# Patient Record
Sex: MALE | Race: WHITE | NOT HISPANIC OR LATINO | Employment: OTHER | URBAN - METROPOLITAN AREA
[De-identification: names, ages, dates, MRNs, and addresses within clinical notes are randomized per-mention and may not be internally consistent; named-entity substitution may affect disease eponyms.]

---

## 2017-02-15 ENCOUNTER — HISTORICAL (OUTPATIENT)
Dept: ADMINISTRATIVE | Facility: HOSPITAL | Age: 77
End: 2017-02-15

## 2017-02-15 LAB
ALBUMIN SERPL-MCNC: 4.3 G/DL (ref 3.1–4.7)
ALP SERPL-CCNC: 79 IU/L (ref 40–104)
ALT (SGPT): 21 IU/L (ref 3–33)
AST SERPL-CCNC: 28 IU/L (ref 10–40)
BILIRUB SERPL-MCNC: 1.2 MG/DL (ref 0.3–1)
BUN SERPL-MCNC: 19 MG/DL (ref 8–20)
CALCIUM SERPL-MCNC: 9.4 MG/DL (ref 7.7–10.4)
CHLORIDE: 101 MMOL/L (ref 98–110)
CO2 SERPL-SCNC: 25.5 MMOL/L (ref 22.8–31.6)
CREATININE RANDOM URINE: 110 MG/DL
CREATININE: 1.23 MG/DL (ref 0.6–1.4)
GLUCOSE: 105 MG/DL (ref 70–99)
MICROALBUM.,U,RANDOM: <2 MCG/ML (ref 0–19.9)
MICROALBUMIN/CREATININE RATIO: NORMAL (ref 0–30)
POTASSIUM SERPL-SCNC: 3.9 MMOL/L (ref 3.5–5)
PROT SERPL-MCNC: 7.8 G/DL (ref 6–8.2)
SODIUM: 138 MMOL/L (ref 134–144)

## 2017-03-20 ENCOUNTER — OFFICE VISIT (OUTPATIENT)
Dept: SURGERY | Facility: CLINIC | Age: 77
End: 2017-03-20
Payer: MEDICARE

## 2017-03-20 VITALS
DIASTOLIC BLOOD PRESSURE: 62 MMHG | BODY MASS INDEX: 33.96 KG/M2 | HEART RATE: 67 BPM | HEIGHT: 70 IN | WEIGHT: 237.19 LBS | SYSTOLIC BLOOD PRESSURE: 135 MMHG

## 2017-03-20 DIAGNOSIS — K40.90 UNILATERAL INGUINAL HERNIA WITHOUT OBSTRUCTION OR GANGRENE, RECURRENCE NOT SPECIFIED: Primary | ICD-10-CM

## 2017-03-20 PROCEDURE — 99203 OFFICE O/P NEW LOW 30 MIN: CPT | Mod: PBBFAC,PO | Performed by: SURGERY

## 2017-03-20 PROCEDURE — 99204 OFFICE O/P NEW MOD 45 MIN: CPT | Mod: S$PBB,,, | Performed by: SURGERY

## 2017-03-20 PROCEDURE — 99999 PR PBB SHADOW E&M-NEW PATIENT-LVL III: CPT | Mod: PBBFAC,,, | Performed by: SURGERY

## 2017-03-20 RX ORDER — OMEPRAZOLE 40 MG/1
40 CAPSULE, DELAYED RELEASE ORAL DAILY
COMMUNITY
End: 2018-05-28

## 2017-03-20 RX ORDER — ASPIRIN 81 MG/1
81 TABLET ORAL DAILY
COMMUNITY
End: 2024-01-18 | Stop reason: SDUPTHER

## 2017-03-20 RX ORDER — ASCORBIC ACID 500 MG
500 TABLET ORAL DAILY
COMMUNITY

## 2017-03-20 RX ORDER — DOXYCYCLINE 40 MG/1
40 CAPSULE ORAL DAILY
Refills: 3 | COMMUNITY
Start: 2017-02-13 | End: 2021-10-15

## 2017-03-20 RX ORDER — AMOXICILLIN 500 MG
2 CAPSULE ORAL DAILY
COMMUNITY
End: 2024-01-18 | Stop reason: SDUPTHER

## 2017-03-20 RX ORDER — LEVOTHYROXINE SODIUM 200 UG/1
200 TABLET ORAL DAILY
COMMUNITY
End: 2018-05-16 | Stop reason: SDUPTHER

## 2017-03-20 RX ORDER — MULTIVITAMIN
1 TABLET ORAL DAILY
COMMUNITY

## 2017-03-20 RX ORDER — PRAVASTATIN SODIUM 20 MG/1
20 TABLET ORAL DAILY
COMMUNITY
End: 2021-01-24

## 2017-03-20 RX ORDER — LOSARTAN POTASSIUM AND HYDROCHLOROTHIAZIDE 12.5; 1 MG/1; MG/1
1 TABLET ORAL DAILY
COMMUNITY
End: 2017-07-31

## 2017-03-20 NOTE — PROGRESS NOTES
Subjective:       Patient ID: Nikolay Barraza is a 76 y.o. male.    Chief Complaint: Consult (hernia right groin, has had it for years but seems to be getting worse)    HPI Comments: Patient presents with Right Inguinal Hernia    Patient presents for evaluation of  right inguinal hernia. Symptoms were first noted 12 years ago. Symptoms did not start at work. Pain is dull, intermittent, worse when standing. Lump is reducible. Pt has no symptoms of  chronic constipation, chronic cough, difficulty urinating. Pt. does not have previous history of groin surgery.      Review of Systems   Constitutional: Negative for activity change, appetite change, chills, fatigue, fever and unexpected weight change.   HENT: Negative for congestion, dental problem, hearing loss, nosebleeds, sinus pressure, sore throat and voice change.    Eyes: Negative for pain and visual disturbance.   Respiratory: Negative for cough, chest tightness and shortness of breath.    Cardiovascular: Negative for chest pain, palpitations and leg swelling.   Gastrointestinal: Negative for abdominal distention, abdominal pain, constipation, diarrhea, nausea and vomiting.   Endocrine: Negative for cold intolerance and heat intolerance.   Genitourinary: Negative for difficulty urinating, flank pain, frequency and hematuria.   Musculoskeletal: Negative for arthralgias, back pain, gait problem, joint swelling, myalgias, neck pain and neck stiffness.   Skin: Negative for rash.   Allergic/Immunologic: Negative for immunocompromised state.   Neurological: Negative for dizziness, tremors, seizures, syncope, weakness, light-headedness, numbness and headaches.   Hematological: Negative for adenopathy. Does not bruise/bleed easily.   Psychiatric/Behavioral: Negative for confusion, decreased concentration, hallucinations, sleep disturbance and suicidal ideas. The patient is not nervous/anxious.        Objective:      Physical Exam   Constitutional: He is oriented to  person, place, and time. He appears well-developed and well-nourished.   HENT:   Head: Normocephalic and atraumatic.   Right Ear: External ear normal.   Left Ear: External ear normal.   Eyes: Conjunctivae are normal. Pupils are equal, round, and reactive to light.   Neck: Normal range of motion.   Cardiovascular: Normal rate and regular rhythm.    Pulmonary/Chest: Effort normal. No respiratory distress. He exhibits no tenderness.   Abdominal: Soft. He exhibits no distension and no mass. There is no hepatosplenomegaly. There is no tenderness. There is no guarding. A hernia is present. Hernia confirmed positive in the right inguinal area (extending into scrotum, reducible). Hernia confirmed negative in the left inguinal area.   Musculoskeletal: He exhibits no edema or tenderness.   Neurological: He is alert and oriented to person, place, and time. He has normal reflexes. No cranial nerve deficit.   Skin: Skin is warm and dry. No rash noted. No erythema. No pallor.   Psychiatric: He has a normal mood and affect. His behavior is normal. Judgment and thought content normal.       Assessment:       1. Unilateral inguinal hernia without obstruction or gangrene, recurrence not specified        Plan:       Discussion had with patient regarding etiology and treatment of inguinal hernias.  Both laparoscopic/robotic and open repair discussed with patient including risks of both.   He would like to wait till after he plants his garden to schedule surgery.  WCB to schedule.

## 2017-04-18 ENCOUNTER — OFFICE VISIT (OUTPATIENT)
Dept: SURGERY | Facility: CLINIC | Age: 77
End: 2017-04-18
Payer: MEDICARE

## 2017-04-18 VITALS
BODY MASS INDEX: 34.18 KG/M2 | DIASTOLIC BLOOD PRESSURE: 61 MMHG | WEIGHT: 238.75 LBS | HEIGHT: 70 IN | SYSTOLIC BLOOD PRESSURE: 128 MMHG | HEART RATE: 66 BPM

## 2017-04-18 DIAGNOSIS — K40.90 UNILATERAL INGUINAL HERNIA WITHOUT OBSTRUCTION OR GANGRENE, RECURRENCE NOT SPECIFIED: Primary | ICD-10-CM

## 2017-04-18 PROCEDURE — 99999 PR PBB SHADOW E&M-EST. PATIENT-LVL II: CPT | Mod: PBBFAC,,, | Performed by: SURGERY

## 2017-04-18 PROCEDURE — 99212 OFFICE O/P EST SF 10 MIN: CPT | Mod: PBBFAC,PO | Performed by: SURGERY

## 2017-04-18 PROCEDURE — 99213 OFFICE O/P EST LOW 20 MIN: CPT | Mod: S$PBB,,, | Performed by: SURGERY

## 2017-04-18 NOTE — PROGRESS NOTES
Subjective:       Patient ID: Nikolay Barraza is a 76 y.o. male.    Chief Complaint: Follow-up (re-evaluate inguinal hernia, swells more now aneudy after being on his feet a lot)    HPI Comments: Patient presents for re-evaluation of Right Inguinal Hernia    Patient presents for evaluation of  right inguinal hernia. Symptoms were first noted 12 years ago. Symptoms did not start at work. Pain is dull, intermittent, worse when standing. Lump is reducible. Pt has no symptoms of  chronic constipation, chronic cough, difficulty urinating. Pt. does not have previous history of groin surgery.    I saw him a month ago and he wanted to wait till his garden was planted.  He noticed it getting larger and wants to discuss repair.           Review of Systems   Constitutional: Negative for activity change, appetite change, chills, fatigue, fever and unexpected weight change.   HENT: Negative for congestion, dental problem, hearing loss, nosebleeds, sinus pressure, sore throat and voice change.    Eyes: Negative for pain and visual disturbance.   Respiratory: Negative for cough, chest tightness and shortness of breath.    Cardiovascular: Negative for chest pain, palpitations and leg swelling.   Gastrointestinal: Negative for abdominal distention, abdominal pain, constipation, diarrhea, nausea and vomiting.   Endocrine: Negative for cold intolerance and heat intolerance.   Genitourinary: Negative for difficulty urinating, flank pain, frequency and hematuria.   Musculoskeletal: Negative for arthralgias, back pain, gait problem, joint swelling, myalgias, neck pain and neck stiffness.   Skin: Negative for rash.   Allergic/Immunologic: Negative for immunocompromised state.   Neurological: Negative for dizziness, tremors, seizures, syncope, weakness, light-headedness, numbness and headaches.   Hematological: Negative for adenopathy. Does not bruise/bleed easily.   Psychiatric/Behavioral: Negative for confusion, decreased concentration,  hallucinations, sleep disturbance and suicidal ideas. The patient is not nervous/anxious.        Objective:      Physical Exam   Constitutional: He is oriented to person, place, and time. He appears well-developed and well-nourished.   HENT:   Head: Normocephalic and atraumatic.   Right Ear: External ear normal.   Left Ear: External ear normal.   Eyes: Conjunctivae are normal. Pupils are equal, round, and reactive to light.   Neck: Normal range of motion.   Cardiovascular: Normal rate and regular rhythm.    Pulmonary/Chest: Effort normal. No respiratory distress. He exhibits no tenderness.   Abdominal: Soft. He exhibits no distension and no mass. There is no hepatosplenomegaly. There is no tenderness. There is no guarding. A hernia is present. Hernia confirmed positive in the right inguinal area (extending into scrotum, reducible). Hernia confirmed negative in the left inguinal area.   Musculoskeletal: He exhibits no edema or tenderness.   Neurological: He is alert and oriented to person, place, and time. He has normal reflexes. No cranial nerve deficit.   Skin: Skin is warm and dry. No rash noted. No erythema. No pallor.   Psychiatric: He has a normal mood and affect. His behavior is normal. Judgment and thought content normal.       Assessment:       1. Unilateral inguinal hernia without obstruction or gangrene, recurrence not specified        Plan:       Discussion had with patient regarding etiology and treatment of inguinal hernias.  Both laparoscopic/robotic and open repair discussed with patient including risks of both.   As this is a large scrotal hernia, recommend open repair.  Since that is the case, he will wait till the fall to schedule.  He will call then.

## 2017-05-17 ENCOUNTER — OFFICE VISIT (OUTPATIENT)
Dept: FAMILY MEDICINE | Facility: CLINIC | Age: 77
End: 2017-05-17
Payer: MEDICARE

## 2017-05-17 VITALS
HEIGHT: 70 IN | BODY MASS INDEX: 34.07 KG/M2 | SYSTOLIC BLOOD PRESSURE: 115 MMHG | HEART RATE: 67 BPM | WEIGHT: 238 LBS | DIASTOLIC BLOOD PRESSURE: 61 MMHG

## 2017-05-17 DIAGNOSIS — H54.7 VISUAL PROBLEMS: Primary | ICD-10-CM

## 2017-05-17 PROBLEM — L71.9 ACNE ROSACEA: Status: ACTIVE | Noted: 2017-05-17

## 2017-05-17 PROBLEM — I10 BENIGN ESSENTIAL HYPERTENSION: Status: ACTIVE | Noted: 2017-05-17

## 2017-05-17 PROBLEM — E03.9 HYPOACTIVE THYROID: Status: ACTIVE | Noted: 2017-05-17

## 2017-05-17 PROBLEM — E78.5 HYPERLIPIDEMIA: Status: ACTIVE | Noted: 2017-05-17

## 2017-05-17 PROBLEM — K21.9 GASTROESOPHAGEAL REFLUX DISEASE WITHOUT ESOPHAGITIS: Status: ACTIVE | Noted: 2017-05-17

## 2017-05-17 PROBLEM — N40.0 BENIGN PROSTATIC HYPERTROPHY WITHOUT URINARY OBSTRUCTION: Status: ACTIVE | Noted: 2017-05-17

## 2017-05-17 PROCEDURE — 99213 OFFICE O/P EST LOW 20 MIN: CPT | Mod: ,,, | Performed by: INTERNAL MEDICINE

## 2017-05-17 NOTE — PATIENT INSTRUCTIONS
Understanding Vision Problems    If your eyes are normal  Your eyes see objects as light. Your cornea focuses light rays onto a layer that lines the back of your eye (the retina). If your eyes are normal, this process produces a focused image on your retina. This makes objects look clear.    If youre nearsighted  Your cornea and your retina are too far apart if youre nearsighted. Sometimes your cornea or your lens has an abnormal shape. This makes light rays from distant objects focus in front of your retina. These objects then look blurry.    If youre farsighted  Your cornea and your retina are too close together if youre farsighted. Sometimes your cornea or your lens has an abnormal shape. This makes light rays from close objects focus behind your retina. These objects then look blurry.    If you have an astigmatism  Sometimes the curve of your cornea is uneven or the lens inside your eye is curved. Then light rays cant focus evenly onto your retina. This is called an astigmatism. It makes both close and distant objects look blurry.  Date Last Reviewed: 6/11/2015 © 2000-2016 [employer]. [address] [place] [address]. Todos los derechos reservados. Esta información no pretende sustituir la atención médica profesional. Sólo zepeda médico puede diagnosticar y tratar un problema de ankit.        Understanding Vision Problems    If your eyes are normal  Your eyes see objects as light. Your cornea focuses light rays onto a layer that lines the back of your eye (the retina). If your eyes are normal, this process produces a focused image on your retina. This makes objects look clear.    If youre nearsighted  Your cornea and your retina are too far apart if youre nearsighted. Sometimes your cornea or your lens has an abnormal shape. This makes light rays from distant objects focus in front of your retina. These objects then look blurry.    If youre farsighted  Your cornea and your retina  are too close together if youre farsighted. Sometimes your cornea or your lens has an abnormal shape. This makes light rays from close objects focus behind your retina. These objects then look blurry.    If you have an astigmatism  Sometimes the curve of your cornea is uneven or the lens inside your eye is curved. Then light rays cant focus evenly onto your retina. This is called an astigmatism. It makes both close and distant objects look blurry.  Date Last Reviewed: 6/11/2015 © 2000-2016 [employer]. [address] [place] [address]. Todos los [facility]. Esta información no pretende sustituir la atención médica profesional. Sólo zepeda médico puede diagnosticar y tratar un problema de ankit.        Understanding Vision Problems    If your eyes are normal  Your eyes see objects as light. Your cornea focuses light rays onto a layer that lines the back of your eye (the retina). If your eyes are normal, this process produces a focused image on your retina. This makes objects look clear.    If youre nearsighted  Your cornea and your retina are too far apart if youre nearsighted. Sometimes your cornea or your lens has an abnormal shape. This makes light rays from distant objects focus in front of your retina. These objects then look blurry.    If youre farsighted  Your cornea and your retina are too close together if youre farsighted. Sometimes your cornea or your lens has an abnormal shape. This makes light rays from close objects focus behind your retina. These objects then look blurry.    If you have an astigmatism  Sometimes the curve of your cornea is uneven or the lens inside your eye is curved. Then light rays cant focus evenly onto your retina. This is called an astigmatism. It makes both close and distant objects look blurry.  Date Last Reviewed: 6/11/2015 © 2000-2016 [employer]. [address] [place] [address]. Todos los [facility]. Esta  información no pretende sustituir la atención médica profesional. Sólo zepeda médico puede diagnosticar y tratar un problema de ankit.        Understanding Vision Problems    If your eyes are normal  Your eyes see objects as light. Your cornea focuses light rays onto a layer that lines the back of your eye (the retina). If your eyes are normal, this process produces a focused image on your retina. This makes objects look clear.    If youre nearsighted  Your cornea and your retina are too far apart if youre nearsighted. Sometimes your cornea or your lens has an abnormal shape. This makes light rays from distant objects focus in front of your retina. These objects then look blurry.    If youre farsighted  Your cornea and your retina are too close together if youre farsighted. Sometimes your cornea or your lens has an abnormal shape. This makes light rays from close objects focus behind your retina. These objects then look blurry.    If you have an astigmatism  Sometimes the curve of your cornea is uneven or the lens inside your eye is curved. Then light rays cant focus evenly onto your retina. This is called an astigmatism. It makes both close and distant objects look blurry.  Date Last Reviewed: 6/11/2015  © 0153-5195 [employer]. [address] [place] [address], [place] [address]. Todos los derechos reservados. Esta información no pretende sustituir la atención médica profesional. Sólo zepeda médico puede diagnosticar y tratar un problema de ankit.

## 2017-05-17 NOTE — MR AVS SNAPSHOT
[facility]  [address]  [place] [address]  Phone: [phone]  Fax: [phone]                  Nikolay Barraza   2017 8:00 AM   Office Visit    Description:  Male : 1940   Provider:  Lalo Cuellar MD   Department:  [facility]           Reason for Visit     Blurred Vision           Diagnoses this Visit        Comments    Acne rosacea                To Do List           Future Appointments        Provider Department Dept Phone    2017 8:15 AM Lalo Cuellar MD [facility] [phone]      Goals (5 Years of Data)     None      Follow-Up and Disposition     Return in about 4 months (around 2017).           Medications           Message regarding Medications     Verify the changes and/or additions to your medication regime listed below are the same as discussed with your clinician today.  If any of these changes or additions are incorrect, please notify your healthcare provider.             Verify that the below list of medications is an accurate representation of the medications you are currently taking.  If none reported, the list may be blank. If incorrect, please contact your healthcare provider. Carry this list with you in case of emergency.           Current Medications     ascorbic acid, vitamin C, (VITAMIN C) 500 MG tablet Take 500 mg by mouth once daily.    aspirin (ECOTRIN) 81 MG EC tablet Take 81 mg by mouth once daily.    CALCIUM CARBONATE/VITAMIN D3 (VITAMIN D-3 ORAL) Take by mouth.    doxycycline (ORACEA) 40 mg capsule Take 40 mg by mouth once daily.    fish oil-omega-3 fatty acids 300-1,000 mg capsule Take 2 g by mouth once daily.    levothyroxine (SYNTHROID) 200 MCG tablet Take 200 mcg by mouth once daily.    losartan-hydrochlorothiazide 100-12.5 mg (HYZAAR) 100-12.5 mg Tab Take 1 tablet by mouth once daily.    multivitamin (ONE DAILY MULTIVITAMIN) per tablet Take 1 tablet by  "mouth once daily.    omeprazole (PRILOSEC) 40 MG capsule Take 40 mg by mouth once daily.    pravastatin (PRAVACHOL) 20 MG tablet Take 20 mg by mouth once daily.           Clinical Reference Information           Your Vitals Were     BP Pulse Height Weight BMI    115/61 67 5' 10" (1.778 m) 108 kg (238 lb) 34.15 kg/m2      Blood Pressure          Most Recent Value    BP  115/61      Allergies as of 5/17/2017     Codeine      Immunizations Administered on Date of Encounter - 5/17/2017     None      [employer] Sign UP     Activating your [employer] account is as easy as 1-2-3!     1) Visit [email or web address], select Sign Up Now, enter this activation code and your date of birth, then select Next.  [email or web address]  Expires: 7/1/2017  8:45 AM      2) Create a username and password to use when you visit [employer] in the future and select a security question in case you lose your password and select Next.    3) Enter your e-mail address and click Sign Up!    Additional Information  If you have questions, please  call [phone] to talk to our [employer] staff. Remember, [employer] is NOT to be used for urgent needs. For medical emergencies, dial 911.         "

## 2017-05-17 NOTE — PROGRESS NOTES
Subjective:       Patient ID: Nikolay Barraza is a 76 y.o. male.    Chief Complaint: Blurred Vision (lasted 30min )    HPI Comments: Pt had episodes of visual blurring going on for 5-10 years. Lasting few minutes in past to 30 minutes as recent as yesterday AM. Saw EYE MD and who though it may be visual migraines.     Eye Problem    Both eyes are affected.This is a recurrent problem. The current episode started more than 1 year ago. The problem occurs rarely. The problem has been unchanged. There was no injury mechanism. The pain is at a severity of 0/10. The patient is experiencing no pain. There is no known exposure to pink eye. He does not wear contacts. Associated symptoms include blurred vision. Pertinent negatives include no eye discharge, eye redness, fever, foreign body sensation, itching, photophobia, recent URI or vomiting. He has tried nothing for the symptoms.     Review of Systems   Constitutional: Negative for activity change, chills and fever.   HENT: Negative for congestion, drooling, ear pain, nosebleeds, rhinorrhea, sore throat and voice change.    Eyes: Positive for blurred vision and visual disturbance. Negative for photophobia, discharge, redness and itching.   Respiratory: Negative for apnea, cough, choking, chest tightness and wheezing.    Cardiovascular: Negative for leg swelling.   Gastrointestinal: Negative for abdominal distention, abdominal pain, anal bleeding and vomiting.   Endocrine: Negative for heat intolerance, polydipsia and polyphagia.   Genitourinary: Negative for enuresis, flank pain and frequency.   Skin: Negative for color change, itching and rash.   Neurological: Negative for facial asymmetry, light-headedness and headaches.   Psychiatric/Behavioral: Negative for agitation and behavioral problems.       Objective:      Physical Exam   Constitutional: He is oriented to person, place, and time. He appears well-developed and well-nourished. No distress.   HENT:   Head:  Normocephalic and atraumatic. Head is without abrasion and without contusion.   Eyes: Conjunctivae and EOM are normal. Pupils are equal, round, and reactive to light. Right eye exhibits no chemosis and no exudate. Left eye exhibits no chemosis and no exudate. Right conjunctiva is not injected. Left conjunctiva is not injected. Right eye exhibits normal extraocular motion. Left eye exhibits normal extraocular motion. Pupils are equal.   Neck: Normal range of motion. Neck supple. No JVD present. No tracheal deviation present. No thyromegaly present.   Cardiovascular: Normal rate and regular rhythm.  Exam reveals no gallop and no friction rub.    No murmur heard.  Pulmonary/Chest: Effort normal and breath sounds normal. No stridor.   Abdominal: Soft. Bowel sounds are normal. He exhibits no distension. There is no tenderness.   Musculoskeletal: Normal range of motion.   Lymphadenopathy:     He has no cervical adenopathy.   Neurological: He is alert and oriented to person, place, and time. He has normal reflexes. He displays normal reflexes. No cranial nerve deficit. He exhibits normal muscle tone. Coordination normal.   Skin: Skin is warm and dry. He is not diaphoretic.   Psychiatric: He has a normal mood and affect. His behavior is normal.   Nursing note and vitals reviewed.      Assessment:       1. Visual problems        Plan:       Patient  Has a rather benign examination. No clinical evidence of  Carotid blockage  Or cardiovascular issues.  Patient will get a [facility] screen which will include bilateral carotid screening also. In the meantime, I will talk with his ophthalmologist Dr. Capps

## 2017-05-23 DIAGNOSIS — J01.00 ACUTE NON-RECURRENT MAXILLARY SINUSITIS: Primary | ICD-10-CM

## 2017-05-23 RX ORDER — AMOXICILLIN 500 MG/1
500 CAPSULE ORAL 3 TIMES DAILY
Qty: 21 CAPSULE | Refills: 0 | Status: SHIPPED | OUTPATIENT
Start: 2017-05-23 | End: 2017-07-14

## 2017-05-23 RX ORDER — PREDNISONE 20 MG/1
20 TABLET ORAL DAILY
Qty: 5 TABLET | Refills: 0 | Status: SHIPPED | OUTPATIENT
Start: 2017-05-23 | End: 2017-06-02

## 2017-06-13 ENCOUNTER — OFFICE VISIT (OUTPATIENT)
Dept: SURGERY | Facility: CLINIC | Age: 77
End: 2017-06-13
Payer: MEDICARE

## 2017-06-13 VITALS
DIASTOLIC BLOOD PRESSURE: 61 MMHG | WEIGHT: 235 LBS | BODY MASS INDEX: 33.64 KG/M2 | HEIGHT: 70 IN | SYSTOLIC BLOOD PRESSURE: 115 MMHG

## 2017-06-13 DIAGNOSIS — K21.9 GASTROESOPHAGEAL REFLUX DISEASE WITHOUT ESOPHAGITIS: ICD-10-CM

## 2017-06-13 DIAGNOSIS — N40.0 BENIGN PROSTATIC HYPERTROPHY WITHOUT URINARY OBSTRUCTION: ICD-10-CM

## 2017-06-13 DIAGNOSIS — K40.90 RIGHT INGUINAL HERNIA: Primary | ICD-10-CM

## 2017-06-13 DIAGNOSIS — I10 BENIGN ESSENTIAL HYPERTENSION: ICD-10-CM

## 2017-06-13 PROCEDURE — 99214 OFFICE O/P EST MOD 30 MIN: CPT | Mod: ,,, | Performed by: SURGERY

## 2017-06-13 PROCEDURE — 1159F MED LIST DOCD IN RCRD: CPT | Mod: ,,, | Performed by: SURGERY

## 2017-06-13 NOTE — LETTER
June 13, 2017      Lalo Cuellar MD  [address]  [address]  [place] [address]           [facility] access hospital Surgery  [address]  [address]  [facility] [address]  Phone: [phone]  Fax: [phone]          Patient: Nikolay Barraza   MR Number: [id number]   YOB: 1940   Date of Visit: 6/13/2017       Dear Dr. Lalo Cuellar:    Thank you for referring Nikolay Barraza to me for evaluation. Attached you will find relevant portions of my assessment and plan of care.    If you have questions, please do not hesitate to call me. I look forward to following Nikolay Barraza along with you.    Sincerely,    Artemio MCKENZIE MD    Enclosure  CC:  No Recipients    If you would like to receive this communication electronically, please contact [email or web address] or [phone] to request more information on [employer] access.    For providers and/or their staff who would like to refer a patient to [facility], please contact us through our one-stop-shop provider referral line, [facility], at [phone].    If you feel you have received this communication in error or would no longer like to receive these types of communications, please e-mail [email or web address]

## 2017-06-13 NOTE — PROGRESS NOTES
Subjective:       Patient ID: Nikolay Barraza is a 76 y.o. male.    Chief Complaint: Hernia (ReEval RT Inguinal Hernia. Last office visit 3/30/17)      HPI:  Patient was seen by me several months ago for a large right inguinal hernia now returns. Patient tells me that his hernias more symptomatic. He would like to have it repaired. No new developments in past medical history the patient reported.    Past Medical History:   Diagnosis Date    GERD (gastroesophageal reflux disease)     Hyperlipidemia     Hypertension     Right inguinal hernia     Thyroid disease      Past Surgical History:   Procedure Laterality Date    ADENOIDECTOMY      TONSILLECTOMY      VASECTOMY       Review of patient's allergies indicates:   Allergen Reactions    Codeine Other (See Comments)     mood changes  Makes him feel violent     Medication List with Changes/Refills   Current Medications    AMOXICILLIN (AMOXIL) 500 MG CAPSULE    Take 1 capsule (500 mg total) by mouth 3 (three) times daily.    ASCORBIC ACID, VITAMIN C, (VITAMIN C) 500 MG TABLET    Take 500 mg by mouth once daily.    ASPIRIN (ECOTRIN) 81 MG EC TABLET    Take 81 mg by mouth once daily.    CALCIUM CARBONATE/VITAMIN D3 (VITAMIN D-3 ORAL)    Take by mouth.    DOXYCYCLINE (ORACEA) 40 MG CAPSULE    Take 40 mg by mouth once daily.    FISH OIL-OMEGA-3 FATTY ACIDS 300-1,000 MG CAPSULE    Take 2 g by mouth once daily.    LEVOTHYROXINE (SYNTHROID) 200 MCG TABLET    Take 200 mcg by mouth once daily.    LOSARTAN-HYDROCHLOROTHIAZIDE 100-12.5 MG (HYZAAR) 100-12.5 MG TAB    Take 1 tablet by mouth once daily.    MULTIVITAMIN (ONE DAILY MULTIVITAMIN) PER TABLET    Take 1 tablet by mouth once daily.    OMEPRAZOLE (PRILOSEC) 40 MG CAPSULE    Take 40 mg by mouth once daily.    PRAVASTATIN (PRAVACHOL) 20 MG TABLET    Take 20 mg by mouth once daily.     Family History   Problem Relation Age of Onset    Heart disease Father     Miscarriages / Stillbirths Father     Skin cancer Mother      Cancer Maternal Grandfather     Stomach cancer Paternal Grandfather      Social History     Social History    Marital status:      Spouse name: N/A    Number of children: N/A    Years of education: N/A     Social History Main Topics    Smoking status: Never Smoker    Smokeless tobacco: None    Alcohol use No    Drug use: No    Sexual activity: Yes     Partners: Female     Other Topics Concern    None     Social History Narrative    None         Review of Systems   Constitutional: Negative for appetite change, chills, fever and unexpected weight change.   HENT: Negative for hearing loss, rhinorrhea, sore throat and voice change.    Eyes: Negative for photophobia and visual disturbance.   Respiratory: Negative for cough, choking and shortness of breath.    Cardiovascular: Negative for chest pain, palpitations and leg swelling.   Gastrointestinal: Negative for abdominal pain, blood in stool, constipation, diarrhea, nausea and vomiting.   Endocrine: Negative for cold intolerance, heat intolerance, polydipsia and polyuria.   Musculoskeletal: Negative for arthralgias, back pain, joint swelling and neck stiffness.   Skin: Negative for color change, pallor and rash.   Neurological: Negative for dizziness, seizures, syncope and headaches.   Hematological: Negative for adenopathy. Does not bruise/bleed easily.   Psychiatric/Behavioral: Negative for agitation, behavioral problems and confusion.       Objective:      Physical Exam   Constitutional: He appears well-developed and well-nourished.  Non-toxic appearance. No distress.   HENT:   Head: Normocephalic and atraumatic. Head is without abrasion and without laceration.   Right Ear: External ear normal.   Left Ear: External ear normal.   Nose: Nose normal.   Mouth/Throat: Oropharynx is clear and moist.   Eyes: EOM are normal. Pupils are equal, round, and reactive to light.   Neck: Trachea normal. No tracheal deviation and normal range of motion present.  No thyroid mass and no thyromegaly present.   Cardiovascular: Normal rate and regular rhythm.    Pulmonary/Chest: Effort normal. No accessory muscle usage. No tachypnea. No respiratory distress.   Abdominal: Soft. Normal appearance and bowel sounds are normal. He exhibits no distension and no mass. There is no hepatosplenomegaly. There is no tenderness. There is no tenderness at McBurney's point and negative Monge's sign. A hernia (Very large right inguinal hernia) is present.   Lymphadenopathy:     He has no cervical adenopathy.     He has no axillary adenopathy.        Right: No inguinal adenopathy present.        Left: No inguinal adenopathy present.   Neurological: He is alert. Coordination and gait normal.   Skin: Skin is warm and intact.   Psychiatric: He has a normal mood and affect. His speech is normal and behavior is normal.       Assessment/Plan:   Right inguinal hernia  -     Ambulatory Referral to External Surgery  -     CBC auto differential; Future; Expected date: 06/13/2017  -     Comprehensive metabolic panel; Future; Expected date: 06/13/2017  -     EKG 12-lead; Future  -     X-Ray Chest PA And Lateral    Gastroesophageal reflux disease without esophagitis    Benign essential hypertension    Benign prostatic hypertrophy without urinary obstruction        Planned procedure: Right inguinal hernia repair with mesh    Ancef 2 gm IV on call to OR    NPO past midnight    Michael cloth scrub per protocol    SCDs Bilateral Lower Extremities    I discussed the proposed procedures the the patient including risks, benefits, indications, alternatives and special concerns.  The patient appears to understand and agrees to go ahead with surgery.  I have made no promises, warranties or verbal agreements beyond what was discussed above.    No Follow-up on file.

## 2017-06-14 LAB
ALBUMIN SERPL-MCNC: 4.2 G/DL (ref 3.1–4.7)
ALP SERPL-CCNC: 66 IU/L (ref 40–104)
ALT (SGPT): 23 IU/L (ref 3–33)
AST SERPL-CCNC: 28 IU/L (ref 10–40)
BASOPHILS NFR BLD: 0 K/UL (ref 0–0.2)
BASOPHILS NFR BLD: 0.6 %
BILIRUB SERPL-MCNC: 1 MG/DL (ref 0.3–1)
BUN SERPL-MCNC: 18 MG/DL (ref 8–20)
CALCIUM SERPL-MCNC: 9.4 MG/DL (ref 7.7–10.4)
CHLORIDE: 101 MMOL/L (ref 98–110)
CO2 SERPL-SCNC: 23.4 MMOL/L (ref 22.8–31.6)
CREATININE: 1.23 MG/DL (ref 0.6–1.4)
EOSINOPHIL NFR BLD: 0.2 K/UL (ref 0–0.7)
EOSINOPHIL NFR BLD: 4.1 %
ERYTHROCYTE [DISTWIDTH] IN BLOOD BY AUTOMATED COUNT: 14.4 % (ref 11.7–14.9)
GLUCOSE: 121 MG/DL (ref 70–99)
GRAN #: 2.7 K/UL (ref 1.4–6.5)
GRAN%: 54.2 %
HCT VFR BLD AUTO: 37.4 % (ref 39–55)
HGB BLD-MCNC: 12.6 G/DL (ref 14–16)
IMMATURE GRANS (ABS): 0 K/UL (ref 0–1)
IMMATURE GRANULOCYTES: 0.2 %
LYMPH #: 1.4 K/UL (ref 1.2–3.4)
LYMPH%: 28.7 %
MCH RBC QN AUTO: 27.6 PG (ref 25–35)
MCHC RBC AUTO-ENTMCNC: 33.7 G/DL (ref 31–36)
MCV RBC AUTO: 82 FL (ref 80–100)
MONO #: 0.6 K/UL (ref 0.1–0.6)
MONO%: 12.2 %
NUCLEATED RBCS: 0 %
PLATELET # BLD AUTO: 236 K/UL (ref 140–440)
PMV BLD AUTO: 9.6 FL (ref 8.8–12.7)
POTASSIUM SERPL-SCNC: 3.9 MMOL/L (ref 3.5–5)
PROT SERPL-MCNC: 7.6 G/DL (ref 6–8.2)
RBC # BLD AUTO: 4.56 M/UL (ref 4.3–5.9)
SODIUM: 135 MMOL/L (ref 134–144)
WBC # BLD AUTO: 4.9 K/UL (ref 5–10)

## 2017-06-28 LAB — GLUCOSE SERPL-MCNC: 110 MG/DL (ref 70–99)

## 2017-06-29 ENCOUNTER — TELEPHONE (OUTPATIENT)
Dept: FAMILY MEDICINE | Facility: CLINIC | Age: 77
End: 2017-06-29

## 2017-06-29 RX ORDER — PHENAZOPYRIDINE HYDROCHLORIDE 200 MG/1
200 TABLET, FILM COATED ORAL 3 TIMES DAILY PRN
Qty: 12 TABLET | Refills: 1 | Status: SHIPPED | OUTPATIENT
Start: 2017-06-29 | End: 2017-07-09

## 2017-06-29 NOTE — TELEPHONE ENCOUNTER
Pt had surg yesterday and had cath    He is now having burning when urinates   Wife gave him Pyridium   And it helped   He wants more called in to pharm

## 2017-07-10 ENCOUNTER — TELEPHONE (OUTPATIENT)
Dept: UROLOGY | Facility: CLINIC | Age: 77
End: 2017-07-10

## 2017-07-10 NOTE — TELEPHONE ENCOUNTER
----- Message from Carla Kang sent at 7/10/2017 10:32 AM CDT -----  Contact: SELF [phone]  Pt was just discharged from the hospital this past Friday and is needing a follow up appt with cody to get catheter out/ this was pt request for cody but he will see someone else/ please call thanks

## 2017-07-12 ENCOUNTER — OFFICE VISIT (OUTPATIENT)
Dept: SURGERY | Facility: CLINIC | Age: 77
End: 2017-07-12
Payer: MEDICARE

## 2017-07-12 VITALS
BODY MASS INDEX: 33.64 KG/M2 | HEIGHT: 70 IN | SYSTOLIC BLOOD PRESSURE: 115 MMHG | WEIGHT: 235 LBS | DIASTOLIC BLOOD PRESSURE: 61 MMHG

## 2017-07-12 DIAGNOSIS — K40.90 RIGHT INGUINAL HERNIA: Primary | ICD-10-CM

## 2017-07-12 PROCEDURE — 99024 POSTOP FOLLOW-UP VISIT: CPT | Mod: ,,, | Performed by: SURGERY

## 2017-07-12 RX ORDER — CIPROFLOXACIN 500 MG/1
500 TABLET ORAL 2 TIMES DAILY
Refills: 0 | COMMUNITY
Start: 2017-06-29 | End: 2017-07-14 | Stop reason: CLARIF

## 2017-07-12 RX ORDER — LOSARTAN POTASSIUM 100 MG/1
100 TABLET ORAL DAILY
Refills: 0 | COMMUNITY
Start: 2017-07-07 | End: 2021-08-04 | Stop reason: SDUPTHER

## 2017-07-12 RX ORDER — HYDROCODONE BITARTRATE AND ACETAMINOPHEN 7.5; 325 MG/1; MG/1
1 TABLET ORAL EVERY 4 HOURS PRN
Refills: 0 | COMMUNITY
Start: 2017-06-28 | End: 2017-07-31

## 2017-07-12 NOTE — PROGRESS NOTES
Subjective:       Patient ID: Nikolay Barraza is a 76 y.o. male.    Chief Complaint: Post-op Evaluation (FU DOS 6/28/17 Right Inguinal Hernia Repair w/mesh)      HPI:  Nikolay Barraza is here for post-op. Patient has no systemic complaints. Post operative   pain is under control.  Tolerating diet, no nausea/vomiting.  Having normal bowel movements.  Postoperative course was complicated by urinary retention as well as electrolyte abnormalities which required readmission. I was not aware of that. Since then patient has been discharged and his problems have been corrected      Objective:      Physical Exam   Constitutional: He is oriented to person, place, and time. He is cooperative. No distress.   Abdominal: Soft. He exhibits no distension. There is no tenderness. There is no rebound and no guarding.   Neurological: He is oriented to person, place, and time.   Skin:   Incisions are clean, dry and intact  There is no evidence of infection, hematoma or seroma        Assessment/Plan:   Right inguinal hernia      postoperative course complicated by I the abnormalities urinary retention. All the problems have been resolved. Patient has urology follow-up.  Return if symptoms worsen or fail to improve.

## 2017-07-14 ENCOUNTER — OFFICE VISIT (OUTPATIENT)
Dept: UROLOGY | Facility: CLINIC | Age: 77
End: 2017-07-14
Payer: MEDICARE

## 2017-07-14 VITALS
TEMPERATURE: 98 F | WEIGHT: 238 LBS | HEART RATE: 69 BPM | SYSTOLIC BLOOD PRESSURE: 119 MMHG | HEIGHT: 70 IN | DIASTOLIC BLOOD PRESSURE: 61 MMHG | BODY MASS INDEX: 34.07 KG/M2

## 2017-07-14 DIAGNOSIS — R33.8 BENIGN PROSTATIC HYPERPLASIA WITH URINARY RETENTION: ICD-10-CM

## 2017-07-14 DIAGNOSIS — R33.9 URINARY RETENTION: Primary | ICD-10-CM

## 2017-07-14 DIAGNOSIS — N40.1 BENIGN PROSTATIC HYPERPLASIA WITH URINARY RETENTION: ICD-10-CM

## 2017-07-14 DIAGNOSIS — Z87.438 HX OF PROSTATITIS: ICD-10-CM

## 2017-07-14 PROCEDURE — 1126F AMNT PAIN NOTED NONE PRSNT: CPT | Mod: ,,, | Performed by: NURSE PRACTITIONER

## 2017-07-14 PROCEDURE — 99214 OFFICE O/P EST MOD 30 MIN: CPT | Mod: S$PBB,,, | Performed by: NURSE PRACTITIONER

## 2017-07-14 PROCEDURE — 99999 PR PBB SHADOW E&M-EST. PATIENT-LVL IV: CPT | Mod: PBBFAC,,, | Performed by: NURSE PRACTITIONER

## 2017-07-14 PROCEDURE — 99214 OFFICE O/P EST MOD 30 MIN: CPT | Mod: PBBFAC,PO | Performed by: NURSE PRACTITIONER

## 2017-07-14 PROCEDURE — 1159F MED LIST DOCD IN RCRD: CPT | Mod: ,,, | Performed by: NURSE PRACTITIONER

## 2017-07-14 RX ORDER — FINASTERIDE 5 MG/1
5 TABLET, FILM COATED ORAL DAILY
Qty: 30 TABLET | Refills: 12 | Status: SHIPPED | OUTPATIENT
Start: 2017-07-14 | End: 2017-07-31 | Stop reason: SDUPTHER

## 2017-07-14 RX ORDER — TAMSULOSIN HYDROCHLORIDE 0.4 MG/1
0.4 CAPSULE ORAL DAILY
Qty: 30 CAPSULE | Refills: 11 | Status: SHIPPED | OUTPATIENT
Start: 2017-07-14 | End: 2017-07-31 | Stop reason: SDUPTHER

## 2017-07-14 NOTE — PROGRESS NOTES
[facility] Note  Staff: DOT Esparza      Chief Complaint: Followup: S/P Urinary retention after hernia surgery.    Subjective:        HPI: Nikolay Barraza is a 76 y.o. male new patient to our Urology clinic presents with recent episode of urinary retention after post right inguinal hernia surgical repair.  He was discharged home after his surgery then few days later returned to [facility] ER with c/o urine dribbling.  Castro was inserted with 2800 mL of urine obtained.  The pt was then seen by Urologist on-call, Dr. Watson on 07/01/2017 for consult of retention (Please see his notes).  Pt was ordered to be discharged on Flomax but pt was never started on it or given a prescription for it at discharge.    Patient does have a history of prostate enlargement with multiple episodes of prostatitis in his past.  He finished a antibiotic course of Cipro 500 mg po BID a few days ago.  He is currently on no medications for his prostate problems.    REVIEW OF SYSTEMS:  Review of Systems   Constitutional: Negative for chills, diaphoresis, fever and weight loss.   HENT: Negative for congestion, hearing loss, nosebleeds and sore throat.    Eyes: Negative for blurred vision and pain.        Wears glasses   Respiratory: Negative for cough and wheezing.    Cardiovascular: Negative for chest pain, palpitations and leg swelling.   Gastrointestinal: Negative for abdominal pain, heartburn, nausea and vomiting.   Genitourinary: Negative for dysuria, flank pain, frequency, hematuria and urgency.        +Castro catheter with leg bag in place and intact upon arrival.   Musculoskeletal: Negative for back pain, joint pain, myalgias and neck pain.   Skin: Negative for itching and rash.   Neurological: Negative for dizziness, tremors, sensory change, seizures, loss of consciousness, weakness and headaches.   Endo/Heme/Allergies: Does not bruise/bleed easily.   Psychiatric/Behavioral: Negative for depression and  suicidal ideas. The patient is not nervous/anxious.      Physical Exam    PMHx:  Past Medical History:   Diagnosis Date    GERD (gastroesophageal reflux disease)     Hyperlipidemia     Hypertension     Right inguinal hernia     Thyroid disease      Kidney stones: No    PSHx:  Past Surgical History:   Procedure Laterality Date    ADENOIDECTOMY      INGUINAL HERNIA REPAIR Right     TONSILLECTOMY      VASECTOMY       Fam Hx:   malignancies: No    kidney stones: No     Allergies:  Codeine    Medications: reviewed   Anticoagulation: Yes - Ecotrin 81 mg one tablet daily    Objective:     Vitals:    07/14/17 0904   BP: 119/61   Pulse: 69   Temp: 97.5 °F (36.4 °C)     General:WDWN in NAD  Eyes: PERRLA, normal conjunctiva  Respiratory: no increased work on breathing, clear to auscultation  Cardiovascular: regular rate and rhythm. No obvious extremity edema.  GI: palpation of masses. No tenderness. No hepatosplenomegaly to palpation.  Musculoskeletal: normal range of motion of bilateral upper extremities. Normal muscle strength and tone.  Skin: no obvious rashes or lesions. No tightening of skin noted.  Neurologic: CN grossly normal. Normal sensation.   Psychiatric: awake, alert and oriented x 3. Mood and affect normal. Cooperative.    LABS REVIEW:  UA today:  Pt has indwelling catheter in place at this time.  Cr:   Lab Results   Component Value Date    CREATININE 1.23 06/14/2017       Assessment:       1. Urinary retention    2. Benign prostatic hyperplasia with urinary retention    3. Hx of prostatitis          Plan:     1.  Pt will RTC on Nurse's schedule on 07/17/17 to have catheter removed in early am.  Pt to do voiding trial at home that day and RTC that evening should he be unable to urinate on his own.    2.  Flomax 0.4 mg 1 po daily started on the patient today by me with risks and side affects explained to the patient and wife.  Finasteride 5 mg 1 po daily prescribed to the patient today with risks and  side affects explained to the pt and wife also.  All questions answered.    F/u--The pt's significant other sees Dr. Galvin, therefore he would like to do the same for his next followup visit in a few weeks.  He will need a repeat pvr check and LARA.    Aleida Duque, JACIP-C

## 2017-07-14 NOTE — PATIENT INSTRUCTIONS
Urinary Retention (Male)  Urinary retention is the medical term for difficulty or inability to pass urine, even though your bladder is full.  Causes  The most common cause of urinary retention in men is the bladder outlet being blocked. This can be due to an enlarged prostate gland or a bladder infection. Certain medicines can also cause this problem. This condition is more likely to occur as men get older.    This condition is treated by insertion of a catheter into the bladder to drain the urine. This provides immediate relief. The catheter may need to remain in place for a few days to prevent a recurrence. The catheter has a balloon on the tip which was inflated after insertion. This prevents the catheter from falling out.  Symptoms  Common symptoms of urinary retention include:  · Pain (not experienced by everyone)  · Frequent urination  · Feeling that the bladder is still full after urinating  · Incontinence (not being able to control the release of urine)  · Swollen abdomen  Treatment  This condition is treated by inserting a tube (catheter) into the bladder to drain the urine. This provides immediate relief. The catheter may need to stay in place for a few days. The catheter has a balloon on the tip, which is inflated after insertion. This prevents the catheter from falling out.  Home care  · If you were given antibiotics, take them until they are used up, or your healthcare provider tells you to stop. It is important to finish the antibiotics even though you feel better. This is to make sure your infection has cleared.  · If a catheter was left in place, it is important to keep bacteria from getting into the collection bag. Do not disconnect the catheter from the collection bag.  · Use a leg band to secure the drainage tube, so it does not pull on the catheter. Drain the collection bag when it becomes full using the drain spout at the bottom of the bag.  · Do not pull on or try to remove your catheter.  This will injure your urethra. The catheter must be removed by a healthcare provider.  Follow-up care  Follow up with your healthcare provider, or as advised.  If a catheter was left in place, it can usually be removed within 3 to 7 days. Some conditions require the catheter to stay in longer. Your healthcare provider will tell you when to return to have the catheter removed.  When to seek medical advice  Call your healthcare provider right away if any of these occur:  · Fever of 100.4ºF (38ºC) or higher, or as directed by your healthcare provider  · Bladder or lower-abdominal pain or fullness  · Abdominal swelling, nausea, vomiting, or back pain  · Blood or urine leakage around the catheter  · Bloody urine coming from the catheter (if a new symptom)  · Weakness, dizziness, or fainting  · Confusion or change in usual level of alertness  · If a catheter was left in place, return if:  ¨ Catheter falls out  ¨ Catheter stops draining for 6 hours  Date Last Reviewed: 7/26/2015  © 4147-4219 [employer]. [address] [place] [address]. All rights reserved. This information is not intended as a substitute for professional medical care. Always follow your healthcare professional's instructions.        Castro Catheter Care    A Castro catheter is a rubber tube that is placed through the urethra (opening where urine comes out) and into the bladder. This helps drain urine from the bladder. There is a small balloon on the end of the tube that is inflated after insertion. This keeps the catheter from sliding out of the bladder.  A Castro catheter is used to treat urinary retention (unable to pass urine). It is also used when there is incontinence (loss of bladder control).  Home care  · Finish taking any prescribed antibiotic even if you are feeling better before then.  · It is important to keep bacteria from getting into the collection bag. Do not disconnect the catheter from the collection bag.  · Use a  leg band to secure the drainage tube, so it does not pull on the catheter. Drain the collection bag when it becomes full using the drain spout at the bottom of the bag.  · Do not try to pull or remove your catheter. This will injure your urethra. It must be removed by your healthcare provider or nurse.  Follow-up care  Follow up with your healthcare provider as advised for repeat urine testing and catheter removal or replacement.  When to seek medical advice  Call your healthcare provider right away if any of these occur:  · Fever of 100.4ºF (38ºC) or higher, or as directed by your healthcare provider  · Bladder pain or fullness  · Abdominal swelling, nausea or vomiting, or back pain  · Blood or urine leakage around the catheter  · Bloody urine coming from the catheter (if a new symptom)  · Catheter falls out  · Catheter stops draining for 6 hours  · Weakness, dizziness, or fainting  Date Last Reviewed: 10/1/2016  © 2257-4592 [employer]. [address] [place] [address]. All rights reserved. This information is not intended as a substitute for professional medical care. Always follow your healthcare professional's instructions.        Finasteride (Proscar) tablets  What is this medicine?  FINASTERIDE (fi VIANEY marsha gary) is used to treat benign prostatic hyperplasia (BPH) in men. This is a condition that causes you to have an enlarged prostate. This medicine helps to control your symptoms, decrease urinary retention, and reduces your risk of needing surgery. When used in combination with certain other medicines, this drug can slow down the progression of your disease.  How should I use this medicine?  Take this medicine by mouth with a glass of water. Follow the directions on the prescription label. You can take this medicine with or without food. Take your doses at regular intervals. Do not take your medicine more often than directed. Do not stop taking except on the advice of your doctor or  health care professional.  Talk to your pediatrician regarding the use of this medicine in children. Special care may be needed.  What side effects may I notice from receiving this medicine?  Side effects that you should report to your doctor or health care professional as soon as possible:  · any signs of an allergic reaction like rash, itching, hives or swelling of the lips or face  · changes in breast like lumps, pain or fluids leaking from the nipple  · pain in the testicles  Side effects that usually do not require medical attention (report to your doctor or health care professional if they continue or are bothersome):  · sexual difficulties like decreased sexual desire or ability to get an erection  · small amount of semen released during sex  What may interact with this medicine?  · saw palmetto or other dietary supplements  What if I miss a dose?  If you miss a dose, take it as soon as you can. If it is almost time for your next dose, take only that dose. Do not take double or extra doses.  Where should I keep my medicine?  Keep out of the reach of children.  Store at room temperature below 30 degrees C (86 degrees F). Protect from light. Keep container tightly closed. Throw away any unused medicine after the expiration date.  What should I tell my health care provider before I take this medicine?  They need to know if you have any of these conditions:  · liver disease  · an unusual or allergic reaction to finasteride, other medicines, foods, dyes, or preservatives  · pregnant or trying to get pregnant  · breast-feeding  What should I watch for while using this medicine?  Do not donate blood while you are taking this medicine. This will prevent giving this medicine to a pregnant female through a blood transfusion. Ask your doctor or health care professional when it is safe to donate blood after you stop taking this medicine.  Women who are pregnant or may get pregnant must not handle broken or crushed  finasteride tablets. The active ingredient could harm the unborn baby. If a pregnant woman comes into contact with broken or crushed tablets she should check with her doctor or health care professional. Exposure to whole tablets is not expected to cause harm as long as they are not swallowed.  Contact your doctor or health care professional if your symptoms do not start to get better. You may need to take this medicine for 6 to 12 months to get the best results.  This medicine can interfere with PSA laboratory tests for prostate cancer. If you are scheduled to have a lab test for prostate cancer, tell your doctor or health care professional that you are taking this medicine.  This medicine may increase your risk of getting some cancers, like breast cancer. Talk with your doctor.  Date Last Reviewed:   NOTE:This sheet is a summary. It may not cover all possible information. If you have questions about this medicine, talk to your doctor, pharmacist, or health care provider. Copyright© 2016 [employer]        Tamsulosin capsules  What is this medicine?  TAMSULOSIN (prince PENELOPE kaylan sin) is used to treat enlargement of the prostate gland in men, a condition called benign prostatic hyperplasia or BPH. It is not for use in women. It works by relaxing muscles in the prostate and bladder neck. This improves urine flow and reduces BPH symptoms.  How should I use this medicine?  Take this medicine by mouth about 30 minutes after the same meal every day. Follow the directions on the prescription label. Swallow the capsules whole with a glass of water. Do not crush, chew, or open capsules. Do not take your medicine more often than directed. Do not stop taking your medicine unless your doctor tells you to.  Talk to your pediatrician regarding the use of this medicine in children. Special care may be needed.  What side effects may I notice from receiving this medicine?  Side effects that you should report to your doctor or health  care professional as soon as possible:  · allergic reactions like skin rash or itching, hives, swelling of the lips, mouth, tongue, or throat  · breathing problems  · change in vision  · feeling faint or lightheaded  · irregular heartbeat  · prolonged or painful erection  · weakness  Side effects that usually do not require medical attention (report to your doctor or health care professional if they continue or are bothersome):  · back pain  · change in sex drive or performance  · constipation, nausea or vomiting  · cough  · drowsy  · runny or stuffy nose  · trouble sleeping  What may interact with this medicine?  · cimetidine  · fluoxetine  · ketoconazole  · medicines for erectile disfunction like sildenafil, tadalafil, vardenafil  · medicines for high blood pressure  · other alpha-blockers like alfuzosin, doxazosin, phentolamine, phenoxybenzamine, prazosin, terazosin  · warfarin  What if I miss a dose?  If you miss a dose, take it as soon as you can. If it is almost time for your next dose, take only that dose. Do not take double or extra doses. If you stop taking your medicine for several days or more, ask your doctor or health care professional what dose you should start back on.  Where should I keep my medicine?  Keep out of the reach of children.  Store at room temperature between 15 and 30 degrees C (59 and 86 degrees F). Throw away any unused medicine after the expiration date.  What should I tell my health care provider before I take this medicine?  They need to know if you have any of the following conditions:  · advanced kidney disease  · advanced liver disease  · low blood pressure  · prostate cancer  · an unusual or allergic reaction to tamsulosin, sulfa drugs, other medicines, foods, dyes, or preservatives  · pregnant or trying to get pregnant  · breast-feeding  What should I watch for while using this medicine?  Visit your doctor or health care professional for regular check ups. You will need lab  work done before you start this medicine and regularly while you are taking it. Check your blood pressure as directed. Ask your health care professional what your blood pressure should be, and when you should contact him or her.  This medicine may make you feel dizzy or lightheaded. This is more likely to happen after the first dose, after an increase in dose, or during hot weather or exercise. Drinking alcohol and taking some medicines can make this worse. Do not drive, use machinery, or do anything that needs mental alertness until you know how this medicine affects you. Do not sit or stand up quickly. If you begin to feel dizzy, sit down until you feel better. These effects can decrease once your body adjusts to the medicine.  Contact your doctor or health care professional right away if you have an erection that lasts longer than 4 hours or if it becomes painful. This may be a sign of a serious problem and must be treated right away to prevent permanent damage.  If you are thinking of having cataract surgery, tell your eye surgeon that you have taken this medicine.  Date Last Reviewed:   NOTE:This sheet is a summary. It may not cover all possible information. If you have questions about this medicine, talk to your doctor, pharmacist, or health care provider. Copyright© 2016 [employer]

## 2017-07-17 ENCOUNTER — OFFICE VISIT (OUTPATIENT)
Dept: FAMILY MEDICINE | Facility: CLINIC | Age: 77
End: 2017-07-17
Payer: MEDICARE

## 2017-07-17 ENCOUNTER — TELEPHONE (OUTPATIENT)
Dept: UROLOGY | Facility: CLINIC | Age: 77
End: 2017-07-17

## 2017-07-17 ENCOUNTER — CLINICAL SUPPORT (OUTPATIENT)
Dept: UROLOGY | Facility: CLINIC | Age: 77
End: 2017-07-17
Payer: MEDICARE

## 2017-07-17 VITALS
WEIGHT: 242 LBS | HEART RATE: 74 BPM | BODY MASS INDEX: 34.65 KG/M2 | HEIGHT: 70 IN | SYSTOLIC BLOOD PRESSURE: 133 MMHG | DIASTOLIC BLOOD PRESSURE: 70 MMHG

## 2017-07-17 DIAGNOSIS — R73.9 ELEVATED BLOOD SUGAR: ICD-10-CM

## 2017-07-17 DIAGNOSIS — R33.9 URINARY RETENTION: Primary | ICD-10-CM

## 2017-07-17 DIAGNOSIS — E87.1 HYPONATREMIA: Primary | ICD-10-CM

## 2017-07-17 DIAGNOSIS — R33.9 URINARY RETENTION: ICD-10-CM

## 2017-07-17 PROBLEM — E03.8 SECONDARY HYPOTHYROIDISM: Status: ACTIVE | Noted: 2017-05-17

## 2017-07-17 PROBLEM — G47.33 OBSTRUCTIVE SLEEP APNEA SYNDROME: Status: ACTIVE | Noted: 2017-07-17

## 2017-07-17 PROBLEM — J98.4 CALCIFICATION OF LUNG: Status: ACTIVE | Noted: 2017-07-17

## 2017-07-17 PROBLEM — E66.9 ADIPOSITY: Status: ACTIVE | Noted: 2017-07-17

## 2017-07-17 PROBLEM — E66.9 OBESITY WITH BODY MASS INDEX 30 OR GREATER: Status: ACTIVE | Noted: 2017-07-17

## 2017-07-17 PROBLEM — E78.2 MULTIPLE-TYPE HYPERLIPIDEMIA: Status: ACTIVE | Noted: 2017-05-17

## 2017-07-17 PROBLEM — Z78.9 NON-SMOKER: Status: ACTIVE | Noted: 2017-07-17

## 2017-07-17 PROBLEM — N40.0 BENIGN PROSTATIC HYPERPLASIA: Status: ACTIVE | Noted: 2017-07-17

## 2017-07-17 PROCEDURE — 1126F AMNT PAIN NOTED NONE PRSNT: CPT | Mod: ,,, | Performed by: INTERNAL MEDICINE

## 2017-07-17 PROCEDURE — 1159F MED LIST DOCD IN RCRD: CPT | Mod: ,,, | Performed by: INTERNAL MEDICINE

## 2017-07-17 PROCEDURE — 99214 OFFICE O/P EST MOD 30 MIN: CPT | Mod: ,,, | Performed by: INTERNAL MEDICINE

## 2017-07-17 PROCEDURE — 99499 UNLISTED E&M SERVICE: CPT | Mod: S$PBB,,, | Performed by: NURSE PRACTITIONER

## 2017-07-17 RX ORDER — FELODIPINE 5 MG/1
5 TABLET, EXTENDED RELEASE ORAL 2 TIMES DAILY
COMMUNITY
End: 2021-04-23

## 2017-07-17 NOTE — PROGRESS NOTES
Patient here to have his catheter removed.    Patient had hernia surgery with urinary retention after.       8ml saline removed from catheter balloon.   Catheter removed with no difficulty.     Patient instructed to drink 6-8 eight oz glasses on non-cafeinated beverages.  If unable to urinate by 2:30 this afternoon, come back to the clinic to have catheter replaced.

## 2017-07-17 NOTE — TELEPHONE ENCOUNTER
----- Message from Deneen Boothe sent at 7/17/2017  4:33 PM CDT -----  Contact: Patient  Patient called to advise that since the catheter has been removed he has been able to use the restroom:    11 - 300 cc   12:30 300 cc  2:30 500 cc or better    He can be contacted at [phone].    Thanks,  Deneen

## 2017-07-17 NOTE — PATIENT INSTRUCTIONS
Hyponatremia  Hyponatremia means low sodium levels in the blood. This condition most often occurs after prolonged vomiting or diarrhea, which causes your body to lose too much water and sodium. It can also result from drinking excess amounts of water or the use of diuretics (water pills).  Mild hyponatremia causes no symptoms. It is only discovered with a blood test. As sodium levels in the blood decreases, symptoms begin to appear. This includes weakness, confusion, muscle cramping and seizures.  Home care  · Reduce your daily water intake until the problem is corrected.  · If you have been taking diuretics, you may be asked to stop taking them for a short time.  · If you are having symptoms of weakness or confusion, do not drive or operate dangerous machinery until symptoms resolve.  Follow-up care  Follow up with your healthcare provider for a repeat blood test within the next week or as advised by our staff.  When to seek medical advice  Call your healthcare provider if any of the following occur:  · Increasing weakness  · Dizziness  · Irregular heartbeat, extra beats or very fast heart rate  · Increasing confusion  · Fainting or loss of consciousness  Date Last Reviewed: 7/26/2015  © 3710-6628 [employer]. [address] [place] [id number]. All rights reserved. This information is not intended as a substitute for professional medical care. Always follow your healthcare professional's instructions.

## 2017-07-17 NOTE — PROGRESS NOTES
Subjective:       Patient ID: Nikolay Barraza is a 76 y.o. male.    Chief Complaint: Hospital Follow Up (hernia surg )    Patient was recently discharged from hospital because of hyponatremia and urinary retention. He had approximately 2 L of urine retained after recent hernia surgery. He was also found to have a sodium of less than 120. He was given hydration and Castro catheter was put. He showed rapid improvement in his sodium levels without any neurological deficits. He was also seen by nephrologist Dr. Smith. Recent sodium is within normal range.    Also noted in the labs was is elevated blood sugars. Thus far he has not been diagnosed as sugar diabetes. Patient's right-sided hernia wound is healing fairly okay. He does suffer from intermittent constipations. No nausea or vomiting.        Past Medical History:   Diagnosis Date    GERD (gastroesophageal reflux disease)     Hyperlipidemia     Hypertension     Right inguinal hernia     Thyroid disease      Social History     Social History    Marital status:      Spouse name: N/A    Number of children: N/A    Years of education: N/A     Occupational History    Not on file.     Social History Main Topics    Smoking status: Never Smoker    Smokeless tobacco: Never Used    Alcohol use No    Drug use: No    Sexual activity: Yes     Partners: Female     Other Topics Concern    Not on file     Social History Narrative    No narrative on file     Past Surgical History:   Procedure Laterality Date    ADENOIDECTOMY      INGUINAL HERNIA REPAIR Right     TONSILLECTOMY      VASECTOMY       Family History   Problem Relation Age of Onset    Heart disease Father     Miscarriages / Stillbirths Father     Skin cancer Mother     Cancer Maternal Grandfather     Stomach cancer Paternal Grandfather        Review of Systems   Constitutional: Negative for activity change, appetite change, fatigue and unexpected weight change.   HENT: Negative for  "congestion, sneezing and trouble swallowing.    Eyes: Negative for pain and visual disturbance.   Respiratory: Negative for cough, chest tightness and shortness of breath.    Cardiovascular: Negative for chest pain, palpitations and leg swelling.   Gastrointestinal: Negative for abdominal distention, abdominal pain, blood in stool, constipation and diarrhea.   Endocrine: Negative for cold intolerance, heat intolerance, polydipsia, polyphagia and polyuria.   Genitourinary: Negative for dysuria, hematuria and scrotal swelling.   Musculoskeletal: Negative for arthralgias, back pain and gait problem.   Skin: Negative for pallor, rash and wound.   Allergic/Immunologic: Negative for environmental allergies, food allergies and immunocompromised state.   Neurological: Negative for dizziness, seizures, speech difficulty, light-headedness and numbness.   Hematological: Negative for adenopathy. Does not bruise/bleed easily.   Psychiatric/Behavioral: Negative for agitation, behavioral problems and confusion. The patient is not nervous/anxious.        Objective:       Vitals:    07/17/17 1354   BP: 133/70   Pulse: 74   Weight: 109.8 kg (242 lb)   Height: 5' 10" (1.778 m)       Physical Exam   Constitutional: He is oriented to person, place, and time. He appears well-developed.   HENT:   Head: Normocephalic and atraumatic.   Nose: Nose normal.   Mouth/Throat: Oropharynx is clear and moist. No oropharyngeal exudate.   Eyes: Conjunctivae and EOM are normal.   Neck: Normal range of motion. Neck supple. No JVD present. No tracheal deviation present. No thyromegaly present.   Cardiovascular: Normal rate, regular rhythm and normal heart sounds.  Exam reveals no gallop and no friction rub.    No murmur heard.  Pulmonary/Chest: Effort normal and breath sounds normal. No respiratory distress. He has no wheezes. He has no rales.   Abdominal: Soft. Bowel sounds are normal. He exhibits no distension. There is no splenomegaly or hepatomegaly. " There is no tenderness. No hernia.       Musculoskeletal: Normal range of motion.   Neurological: He is alert and oriented to person, place, and time. He has normal reflexes.   Skin: Skin is warm and dry.   Psychiatric: He has a normal mood and affect.   Nursing note and vitals reviewed.      Assessment:       1. Hyponatremia    2. Urinary retention    3. Elevated blood sugar         Plan:           Hyponatremia  -     Basic metabolic panel; Future; Expected date: 08/17/2017    Urinary retention    Elevated blood sugar  -     Hemoglobin A1c; Future; Expected date: 08/17/2017    hyponatremia multifactorial -- retention, adh effect and thiazide diuretic  rapid correction with no neurologic sequelae   hypertension  anemia  urinary retention    Current Outpatient Prescriptions on File Prior to Visit   Medication Sig Dispense Refill    ascorbic acid, vitamin C, (VITAMIN C) 500 MG tablet Take 500 mg by mouth once daily.      aspirin (ECOTRIN) 81 MG EC tablet Take 81 mg by mouth once daily.      CALCIUM CARBONATE/VITAMIN D3 (VITAMIN D-3 ORAL) Take by mouth.      doxycycline (ORACEA) 40 mg capsule Take 40 mg by mouth once daily.  3    finasteride (PROSCAR) 5 mg tablet Take 1 tablet (5 mg total) by mouth once daily. 30 tablet 12    fish oil-omega-3 fatty acids 300-1,000 mg capsule Take 2 g by mouth once daily.      levothyroxine (SYNTHROID) 200 MCG tablet Take 200 mcg by mouth once daily.      losartan (COZAAR) 100 MG tablet Take 100 mg by mouth once daily.  0    multivitamin (ONE DAILY MULTIVITAMIN) per tablet Take 1 tablet by mouth once daily.      omeprazole (PRILOSEC) 40 MG capsule Take 40 mg by mouth once daily.      pravastatin (PRAVACHOL) 20 MG tablet Take 20 mg by mouth once daily.      tamsulosin (FLOMAX) 0.4 mg Cp24 Take 1 capsule (0.4 mg total) by mouth once daily. Take at bedtime 30 capsule 11    hydrocodone-acetaminophen 7.5-325mg (NORCO) 7.5-325 mg per tablet Take 1 tablet by mouth every 4 (four)  hours as needed.  0    losartan-hydrochlorothiazide 100-12.5 mg (HYZAAR) 100-12.5 mg Tab Take 1 tablet by mouth once daily.       No current facility-administered medications on file prior to visit.    Patient's sodium has normalized at this point. I've reviewed the hospital records. He is making good recovery from his hernia surgery. Patient's blood sugars are elevated and will check a hemoglobin A1c.

## 2017-07-31 ENCOUNTER — OFFICE VISIT (OUTPATIENT)
Dept: UROLOGY | Facility: CLINIC | Age: 77
End: 2017-07-31
Payer: MEDICARE

## 2017-07-31 VITALS
HEIGHT: 70 IN | DIASTOLIC BLOOD PRESSURE: 66 MMHG | BODY MASS INDEX: 34.07 KG/M2 | TEMPERATURE: 98 F | HEART RATE: 75 BPM | WEIGHT: 238 LBS | SYSTOLIC BLOOD PRESSURE: 116 MMHG

## 2017-07-31 DIAGNOSIS — R33.9 URINARY RETENTION: ICD-10-CM

## 2017-07-31 LAB
BILIRUB SERPL-MCNC: NORMAL MG/DL
BLOOD URINE, POC: NORMAL
COLOR, POC UA: NORMAL
GLUCOSE UR QL STRIP: NORMAL
KETONES UR QL STRIP: NORMAL
LEUKOCYTE ESTERASE URINE, POC: NORMAL
NITRITE, POC UA: NORMAL
PH, POC UA: 5
PROTEIN, POC: NORMAL
SPECIFIC GRAVITY, POC UA: 1.01
UROBILINOGEN, POC UA: NORMAL

## 2017-07-31 PROCEDURE — 1126F AMNT PAIN NOTED NONE PRSNT: CPT | Mod: ,,, | Performed by: UROLOGY

## 2017-07-31 PROCEDURE — 1159F MED LIST DOCD IN RCRD: CPT | Mod: ,,, | Performed by: UROLOGY

## 2017-07-31 PROCEDURE — 99213 OFFICE O/P EST LOW 20 MIN: CPT | Mod: PBBFAC,PO | Performed by: UROLOGY

## 2017-07-31 PROCEDURE — 99214 OFFICE O/P EST MOD 30 MIN: CPT | Mod: S$PBB,,, | Performed by: UROLOGY

## 2017-07-31 PROCEDURE — 51798 US URINE CAPACITY MEASURE: CPT | Mod: PBBFAC,PO | Performed by: UROLOGY

## 2017-07-31 PROCEDURE — 81002 URINALYSIS NONAUTO W/O SCOPE: CPT | Mod: PBBFAC,PO | Performed by: UROLOGY

## 2017-07-31 PROCEDURE — 99999 PR PBB SHADOW E&M-EST. PATIENT-LVL III: CPT | Mod: PBBFAC,,, | Performed by: UROLOGY

## 2017-07-31 RX ORDER — MULTIVIT WITH MINERALS/HERBS
1 TABLET ORAL DAILY
COMMUNITY

## 2017-07-31 RX ORDER — FINASTERIDE 5 MG/1
5 TABLET, FILM COATED ORAL DAILY
Qty: 90 TABLET | Refills: 3 | Status: SHIPPED | OUTPATIENT
Start: 2017-07-31 | End: 2017-10-29

## 2017-07-31 RX ORDER — SODIUM FLUORIDE 1.1 G/100G
GEL ORAL
Refills: 0 | COMMUNITY
Start: 2017-07-20 | End: 2018-02-09

## 2017-07-31 RX ORDER — TAMSULOSIN HYDROCHLORIDE 0.4 MG/1
0.4 CAPSULE ORAL DAILY
Qty: 90 CAPSULE | Refills: 3 | Status: SHIPPED | OUTPATIENT
Start: 2017-07-31 | End: 2018-07-22 | Stop reason: SDUPTHER

## 2017-07-31 NOTE — PROGRESS NOTES
Subjective:       Patient ID: Nikolay Barraza is a 76 y.o. male.    Chief Complaint:   OFFICE VISIT    CHIEF COMPLAINT:  Urinary retention.    Mr. Barraza is a 76-year-old male who has recently underwent a right hernia repair   and underwent an episode of urinary retention with hyponatremia.  The Castro was   inserted and 2800 mL of urine were obtained.  Since then, the patient's Castro   catheter was removed and he is urinating with no significant lower urinary tract   symptoms.  He refers to have nocturia x1 or 2, good urinary flow, no dysuria,   no hematuria and he has the sensation he can empty the bladder satisfactorily.    It is to be noted that before surgery, he was free of any LUTS.  He refers that   before surgery, have nocturia only on occasions.  There is no other significant    history except for recurrent prostatitis.  The last episode was about 15 to   20 years ago.  There is no PSA in the record and there is no PSA at [facility].  The urinalysis today is within normal limits.    The postvoid residual urine was measured and found to be at 0 mL.      EOR/PN  dd: 07/31/2017 14:11:03 (CDT)  td: 07/31/2017 14:21:39 (CDT)  Doc ID   #[id number]  Job ID #[id number]    CC:       HPI  Review of Systems   Constitutional: Negative for activity change and appetite change.   HENT: Negative.    Eyes: Negative for discharge.   Respiratory: Negative for cough and shortness of breath.    Cardiovascular: Negative for chest pain and palpitations.   Gastrointestinal: Negative for abdominal distention, abdominal pain, constipation and vomiting.   Genitourinary: Negative for discharge, dysuria, flank pain, frequency, hematuria, testicular pain and urgency.   Musculoskeletal: Negative for arthralgias.   Skin: Negative for rash.   Neurological: Negative for dizziness.   Psychiatric/Behavioral: The patient is not nervous/anxious.        Objective:      Physical Exam   Constitutional: He appears well-developed and  well-nourished.   HENT:   Head: Normocephalic.   Eyes: Pupils are equal, round, and reactive to light.   Neck: Normal range of motion.   Cardiovascular: Normal rate.    Pulmonary/Chest: Effort normal.   Abdominal: Soft. He exhibits no distension and no mass. There is no tenderness. Hernia confirmed negative in the right inguinal area and confirmed negative in the left inguinal area.   Genitourinary: Rectum normal and penis normal. Rectal exam shows no external hemorrhoid, no mass and no tenderness. Prostate is enlarged. Prostate is not tender. Right testis shows no mass and no tenderness. Left testis shows no mass and no tenderness. No discharge found.   Musculoskeletal: Normal range of motion.   Neurological: He is alert.   Skin: Skin is warm.     Psychiatric: He has a normal mood and affect.       Assessment:       1. Urinary retention        Plan:       Urinary retention  -     POCT URINE DIPSTICK WITHOUT MICROSCOPE  -     POCT Bladder Scan  -     finasteride (PROSCAR) 5 mg tablet; Take 1 tablet (5 mg total) by mouth once daily.  Dispense: 90 tablet; Refill: 3    Other orders  -     tamsulosin (FLOMAX) 0.4 mg Cp24; Take 1 capsule (0.4 mg total) by mouth once daily. Take at bedtime  Dispense: 90 capsule; Refill: 3    PSA ordered at [facility]. Keep on Finasteride + Flomax . RTC 1 yr

## 2017-08-02 ENCOUNTER — TELEPHONE (OUTPATIENT)
Dept: UROLOGY | Facility: CLINIC | Age: 77
End: 2017-08-02

## 2017-08-22 LAB
BUN SERPL-MCNC: 17 MG/DL (ref 8–20)
CALCIUM SERPL-MCNC: 9.3 MG/DL (ref 7.7–10.4)
CHLORIDE: 107 MMOL/L (ref 98–110)
CO2 SERPL-SCNC: 25.5 MMOL/L (ref 22.8–31.6)
CREATININE: 1.25 MG/DL (ref 0.6–1.4)
GLUCOSE: 114 MG/DL (ref 70–99)
HBA1C MFR BLD: 5.8 % (ref 3.1–6.5)
POTASSIUM SERPL-SCNC: 4.3 MMOL/L (ref 3.5–5)
SODIUM: 140 MMOL/L (ref 134–144)

## 2017-08-28 ENCOUNTER — OFFICE VISIT (OUTPATIENT)
Dept: FAMILY MEDICINE | Facility: CLINIC | Age: 77
End: 2017-08-28
Payer: MEDICARE

## 2017-08-28 VITALS
HEIGHT: 70 IN | WEIGHT: 238 LBS | SYSTOLIC BLOOD PRESSURE: 117 MMHG | HEART RATE: 87 BPM | BODY MASS INDEX: 34.07 KG/M2 | DIASTOLIC BLOOD PRESSURE: 71 MMHG

## 2017-08-28 DIAGNOSIS — I10 BENIGN ESSENTIAL HYPERTENSION: ICD-10-CM

## 2017-08-28 DIAGNOSIS — E78.2 MULTIPLE-TYPE HYPERLIPIDEMIA: ICD-10-CM

## 2017-08-28 DIAGNOSIS — R21 RASH: Primary | ICD-10-CM

## 2017-08-28 PROCEDURE — 99213 OFFICE O/P EST LOW 20 MIN: CPT | Mod: ,,, | Performed by: INTERNAL MEDICINE

## 2017-08-28 PROCEDURE — 1159F MED LIST DOCD IN RCRD: CPT | Mod: ,,, | Performed by: INTERNAL MEDICINE

## 2017-08-28 PROCEDURE — 1126F AMNT PAIN NOTED NONE PRSNT: CPT | Mod: ,,, | Performed by: INTERNAL MEDICINE

## 2017-08-28 PROCEDURE — 3078F DIAST BP <80 MM HG: CPT | Mod: ,,, | Performed by: INTERNAL MEDICINE

## 2017-08-28 PROCEDURE — 3074F SYST BP LT 130 MM HG: CPT | Mod: ,,, | Performed by: INTERNAL MEDICINE

## 2017-08-28 RX ORDER — CLOTRIMAZOLE AND BETAMETHASONE DIPROPIONATE 10; .64 MG/G; MG/G
CREAM TOPICAL 2 TIMES DAILY
Qty: 30 G | Refills: 1 | Status: SHIPPED | OUTPATIENT
Start: 2017-08-28 | End: 2018-02-09

## 2017-08-28 NOTE — PATIENT INSTRUCTIONS
Taking Your Blood Pressure  Blood pressure is the force of blood against the artery wall as it moves from the heart through the blood vessels. You can take your own blood pressure reading using a digital monitor. Take readings as often as your healthcare provider instructs. Take each reading at the same time of day.  Step 1. Relax    · Take your blood pressure at the same time every day, such as in the morning or evening, or at the time your healthcare provider recommends.  · Wait at least a half-hour after smoking, eating, drinking caffeinated beverages, or exercising.  · Sit comfortably at a table with both feet on the floor. Do not cross your legs or feet. Place the monitor near you.  · Rest for a few minutes before you begin.  Step 2. Wrap the cuff    · Place your arm on the table, palm up. Your arm should be at the level of your heart. Wrap the cuff around your upper arm, just above your elbow. Its best done on bare skin, not over clothing. Most cuffs will indicate where the brachial artery (the blood vessel in the middle of the arm at the inner side of the elbow) should line up with the cuff. Look in your monitor's instruction booklet for an illustration. You can also bring your cuff to your healthcare provider and have them show you how to correctly place the cuff.  · Make sure your cuff fits. If it doesnt wrap around your upper arm, order a larger cuff.  Step 3. Inflate the cuff    · Pump the cuff until the scale reads 160. If you have a self-inflating cuff, push the button that starts the pump.  · The cuff will tighten, then loosen.  · The numbers will change. When they stop changing, your blood pressure reading will appear.  · Take 2 or 3 readings one minute apart.  Step 4. Write down the results of each reading    · Write down your blood pressure numbers for each reading. Note the date and time. Keep your results in one place, such as a notebook. Even if your monitor has a built-in memory, keep a hard  copy of the readings.  · Remove the cuff from your arm. Turn off the machine.  · Share your blood pressure records with your healthcare providers at each visit.  Date Last Reviewed: 4/27/2016  © 6953-0491 [employer]. [address] [place] [address], [place] [address]. All rights reserved. This information is not intended as a substitute for professional medical care. Always follow your healthcare professional's instructions.

## 2017-08-28 NOTE — PROGRESS NOTES
Subjective:       Patient ID: Nikolay Barraza is a 76 y.o. male.    Chief Complaint: Hypertension (lab review )    Mr. Barraza is a 76-year-old  male who comes for follow-up. He is recovering slowly and steadily from recent hernia surgery and urinary retention. His last sodium level was normal range.    His blood pressures are stable.    He has a rash under his left armpit which is not going away.    Patient is tolerating his medications for blood pressure and cholesterol fairly well. There was some confusion about thyroxine supplementation and is taking only one type of medication not both generic and brand names at this point.      Hypertension   This is a chronic problem. The current episode started more than 1 year ago. The problem has been waxing and waning since onset. The problem is controlled. Pertinent negatives include no anxiety, chest pain, palpitations or shortness of breath. Risk factors for coronary artery disease include male gender, obesity and sedentary lifestyle. Past treatments include angiotensin blockers.   Hyperlipidemia   This is a chronic problem. The current episode started more than 1 year ago. Pertinent negatives include no chest pain or shortness of breath. Current antihyperlipidemic treatment includes statins. Risk factors for coronary artery disease include hypertension, male sex and obesity.   Rash   This is a chronic problem. The affected locations include the left axilla. He was exposed to nothing. Pertinent negatives include no congestion, cough, diarrhea, eye pain, fatigue or shortness of breath. The treatment provided no relief.       Past Medical History:   Diagnosis Date    GERD (gastroesophageal reflux disease)     Hyperlipidemia     Hypertension     Right inguinal hernia     Thyroid disease      Social History     Social History    Marital status:      Spouse name: N/A    Number of children: N/A    Years of education: N/A     Occupational History    Not  on file.     Social History Main Topics    Smoking status: Never Smoker    Smokeless tobacco: Never Used    Alcohol use No    Drug use: No    Sexual activity: Yes     Partners: Female     Other Topics Concern    Not on file     Social History Narrative    No narrative on file     Past Surgical History:   Procedure Laterality Date    ADENOIDECTOMY      INGUINAL HERNIA REPAIR Right     TONSILLECTOMY      VASECTOMY       Family History   Problem Relation Age of Onset    Heart disease Father     Miscarriages / Stillbirths Father     Skin cancer Mother     Cancer Maternal Grandfather     Stomach cancer Paternal Grandfather        Review of Systems   Constitutional: Negative for activity change, appetite change, fatigue and unexpected weight change.   HENT: Negative for congestion, sneezing and trouble swallowing.    Eyes: Negative for pain and visual disturbance.   Respiratory: Negative for cough, chest tightness and shortness of breath.    Cardiovascular: Negative for chest pain, palpitations and leg swelling.        Patient has hypertension and hyperlipidemia.   Gastrointestinal: Negative for abdominal distention, abdominal pain, blood in stool, constipation and diarrhea.   Endocrine: Negative for cold intolerance, heat intolerance, polydipsia and polyphagia.        Patient has hypothyroidism.   Genitourinary: Negative for dysuria, hematuria and scrotal swelling.   Musculoskeletal: Negative for arthralgias, back pain and gait problem.   Skin: Positive for rash. Negative for pallor.        Patient has rash in the left armpit.   Allergic/Immunologic: Negative for environmental allergies, food allergies and immunocompromised state.   Neurological: Negative for dizziness, seizures, speech difficulty, light-headedness and numbness.   Hematological: Negative for adenopathy. Does not bruise/bleed easily.   Psychiatric/Behavioral: Negative for agitation, behavioral problems and confusion. The patient is not  "nervous/anxious.        Objective:       Vitals:    08/28/17 1351   BP: 117/71   Pulse: 87   Weight: 108 kg (238 lb)   Height: 5' 10" (1.778 m)     Physical Exam   Constitutional: He is oriented to person, place, and time. He appears well-developed.   HENT:   Head: Normocephalic and atraumatic.   Nose: Nose normal.   Mouth/Throat: Oropharynx is clear and moist. No oropharyngeal exudate.   Eyes: Conjunctivae and EOM are normal.   Neck: Normal range of motion. Neck supple. No JVD present. No tracheal deviation present. No thyromegaly present.   Cardiovascular: Normal rate, regular rhythm and normal heart sounds.  Exam reveals no gallop and no friction rub.    No murmur heard.  Pulmonary/Chest: Effort normal and breath sounds normal. No respiratory distress. He has no wheezes. He has no rales.   Abdominal: Soft. Bowel sounds are normal. He exhibits no distension. There is no splenomegaly or hepatomegaly. There is no tenderness. No hernia.       Musculoskeletal: Normal range of motion.   Neurological: He is alert and oriented to person, place, and time. He has normal reflexes.   Skin: Skin is warm and dry. Rash noted. Rash is macular.   Patient has a rash which appears to be yeast infection in the left armpit.   Psychiatric: He has a normal mood and affect.   Nursing note and vitals reviewed.      Assessment:       1. Rash    2. Benign essential hypertension    3. Multiple-type hyperlipidemia         Plan:           Rash  -     clotrimazole-betamethasone 1-0.05% (LOTRISONE) cream; Apply topically 2 (two) times daily.  Dispense: 30 g; Refill: 1    Benign essential hypertension    Multiple-type hyperlipidemia    Patient has been advised to watch diet and exercise. Avoid fried and fatty food. Compliance to medications and follow up urged.  Patient has been given instructions on rash in left armpit.  "

## 2017-11-14 ENCOUNTER — CLINICAL SUPPORT (OUTPATIENT)
Dept: FAMILY MEDICINE | Facility: CLINIC | Age: 77
End: 2017-11-14
Payer: MEDICARE

## 2017-11-14 DIAGNOSIS — Z23 INFLUENZA VACCINE ADMINISTERED: Primary | ICD-10-CM

## 2017-11-14 PROCEDURE — 90662 IIV NO PRSV INCREASED AG IM: CPT | Mod: ,,, | Performed by: INTERNAL MEDICINE

## 2017-11-14 PROCEDURE — G0008 ADMIN INFLUENZA VIRUS VAC: HCPCS | Mod: ,,, | Performed by: INTERNAL MEDICINE

## 2017-12-28 ENCOUNTER — OFFICE VISIT (OUTPATIENT)
Dept: FAMILY MEDICINE | Facility: CLINIC | Age: 77
End: 2017-12-28
Payer: MEDICARE

## 2017-12-28 VITALS
HEART RATE: 70 BPM | SYSTOLIC BLOOD PRESSURE: 150 MMHG | DIASTOLIC BLOOD PRESSURE: 80 MMHG | WEIGHT: 245 LBS | BODY MASS INDEX: 35.07 KG/M2 | HEIGHT: 70 IN

## 2017-12-28 DIAGNOSIS — E78.2 MULTIPLE-TYPE HYPERLIPIDEMIA: ICD-10-CM

## 2017-12-28 DIAGNOSIS — R09.81 SINUS CONGESTION: ICD-10-CM

## 2017-12-28 DIAGNOSIS — I10 BENIGN ESSENTIAL HYPERTENSION: Primary | ICD-10-CM

## 2017-12-28 DIAGNOSIS — L85.3 DRY SKIN: ICD-10-CM

## 2017-12-28 PROCEDURE — 99214 OFFICE O/P EST MOD 30 MIN: CPT | Mod: ,,, | Performed by: INTERNAL MEDICINE

## 2017-12-28 RX ORDER — IPRATROPIUM BROMIDE 42 UG/1
2 SPRAY, METERED NASAL 2 TIMES DAILY
Qty: 15 ML | Refills: 5 | Status: SHIPPED | OUTPATIENT
Start: 2017-12-28 | End: 2018-09-07 | Stop reason: SDUPTHER

## 2017-12-28 RX ORDER — FINASTERIDE 5 MG/1
5 TABLET, FILM COATED ORAL DAILY
Refills: 3 | COMMUNITY
Start: 2017-10-31 | End: 2018-07-26 | Stop reason: SDUPTHER

## 2017-12-28 NOTE — PATIENT INSTRUCTIONS
Taking Your Blood Pressure  Blood pressure is the force of blood against the artery wall as it moves from the heart through the blood vessels. You can take your own blood pressure reading using a digital monitor. Take your readings the same each time, using the same arm. Take readings as often as your healthcare provider instructs.  About blood pressure monitors  Blood pressure monitors are designed for certain ages and cases. You can find monitors for older adults, for pregnant women, and for children. Make sure the one you choose is the right one for your age and situation.  The American Heart Association recommends an automatic cuff monitor that fits on your upper arm (bicep). The cuff should fit your arm size. A cuff thats too large or too small will not give an accurate reading. Measure around your upper arm to find your size.  Monitors that attach to your finger or wrist are not as accurate as monitors for your upper arm.  Ask your healthcare provider for help in choosing a monitor. Bring your monitor to your next provider visit if you need help in using it the correct way.  The steps below are general instructions for using an automatic digital monitor.  Step 1. Relax    · Take your blood pressure at the same time every day, such as in the morning or evening, or at the time your healthcare provider recommends.  · Wait at least a half-hour after smoking, eating, or exercising. Don't drink coffee, tea, soda, or other caffeinated beverages before checking your blood pressure.  · Sit comfortably at a table with both feet on the floor. Do not cross your legs or feet. Place the monitor near you.  · Rest for a few minutes before you begin.  Step 2. Wrap the cuff    · Place your arm on the table, palm up. Your arm should be at the level of your heart. Wrap the cuff around your upper arm, just above your elbow. Its best done on bare skin, not over clothing. Most cuffs will indicate where the brachial artery (the  blood vessel in the middle of the arm at the inner side of the elbow) should line up with the cuff. Look in your monitor's instruction booklet for an illustration. You can also bring your cuff to your healthcare provider and have them show you how to correctly place the cuff.  Step 3. Inflate the cuff    · Push the button that starts the pump.  · The cuff will tighten, then loosen.  · The numbers will change. When they stop changing, your blood pressure reading will appear.  · Take 2 or 3 readings one minute apart.  Step 4. Write down the results of each reading    · Write down your blood pressure numbers for each reading. Note the date and time. Keep your results in one place, such as a notebook. Even if your monitor has a built-in memory, keep a hard copy of the readings.  · Remove the cuff from your arm. Turn off the machine.  · Bring your blood pressure records with your healthcare providers at each visit.  · If you start a new blood pressure medicine, note the day you started the new medicine. Also note the day if you change the dose of your medicine. This information goes on your blood pressure recording sheet. This will help your healthcare provider monitor how well the medicine changes are working.  · Ask your healthcare provider what numbers should prompt you to call him or her. Also ask what numbers should prompt you to get help right away.  Date Last Reviewed: 11/1/2016  © 5874-3280 [employer]. [address] [place] [address], [place] [address]. All rights reserved. This information is not intended as a substitute for professional medical care. Always follow your healthcare professional's instructions.

## 2017-12-28 NOTE — PROGRESS NOTES
Subjective:       Patient ID: Nikolay Barraza is a 77 y.o. male.    Chief Complaint: Hypertension; Hyperlipidemia; and Sinus Problem    Mr. Barraza is a 77-year-old  male who comes for follow-up. He has underlying hypertension and dyslipidemia. Last time he saw Dr. Sellers, he was told that his blood pressures were okay. Patient does not check his blood pressures at home. He is getting approximately 10-15 pounds of weight in the holiday season.    He also reports sinus congestion and sneezing intermittently. He also reports a dry skin. He also reports pain in the right shins.    No fevers. There is no known allergen at this point.      Hypertension   This is a chronic problem. The current episode started more than 1 year ago. The problem has been waxing and waning since onset. The problem is uncontrolled. Pertinent negatives include no anxiety, chest pain, malaise/fatigue or palpitations. Risk factors for coronary artery disease include sedentary lifestyle, male gender, obesity and dyslipidemia. Past treatments include calcium channel blockers and alpha 1 blockers. The current treatment provides mild improvement. Compliance problems include exercise and diet.  There is no history of CVA or heart failure. There is no history of a hypertension causing med or pheochromocytoma.   Hyperlipidemia   This is a chronic problem. The current episode started more than 1 year ago. Exacerbating diseases include obesity. He has no history of liver disease. Pertinent negatives include no chest pain. Current antihyperlipidemic treatment includes statins. Risk factors for coronary artery disease include hypertension, male sex, a sedentary lifestyle, dyslipidemia and obesity.   Sinus Problem   This is a recurrent problem. The current episode started more than 1 month ago. The problem has been waxing and waning since onset. There has been no fever. Associated symptoms include sinus pressure and sneezing.       Past Medical  History:   Diagnosis Date    GERD (gastroesophageal reflux disease)     Hyperlipidemia     Hypertension     Right inguinal hernia     Thyroid disease      Social History     Social History    Marital status:      Spouse name: N/A    Number of children: N/A    Years of education: N/A     Occupational History    Not on file.     Social History Main Topics    Smoking status: Never Smoker    Smokeless tobacco: Never Used    Alcohol use No    Drug use: No    Sexual activity: Yes     Partners: Female     Other Topics Concern    Not on file     Social History Narrative    No narrative on file     Past Surgical History:   Procedure Laterality Date    ADENOIDECTOMY      INGUINAL HERNIA REPAIR Right     TONSILLECTOMY      VASECTOMY       Family History   Problem Relation Age of Onset    Heart disease Father     Miscarriages / Stillbirths Father     Skin cancer Mother     Cancer Maternal Grandfather     Stomach cancer Paternal Grandfather        Review of Systems   Constitutional: Negative for activity change, appetite change, malaise/fatigue and unexpected weight change.   HENT: Positive for sinus pressure and sneezing. Negative for trouble swallowing.    Eyes: Negative for visual disturbance.   Respiratory: Negative for chest tightness.    Cardiovascular: Negative for chest pain, palpitations and leg swelling.        Patient has hypertension and hyperlipidemia.   Gastrointestinal: Negative for abdominal distention, abdominal pain, blood in stool and constipation.   Endocrine: Negative for cold intolerance, heat intolerance, polydipsia and polyphagia.        Patient has hypothyroidism.   Genitourinary: Negative for dysuria, hematuria and scrotal swelling.   Musculoskeletal: Negative for arthralgias, back pain and gait problem.   Skin: Negative for pallor.   Allergic/Immunologic: Negative for environmental allergies, food allergies and immunocompromised state.   Neurological: Negative for  "dizziness, seizures, speech difficulty, light-headedness and numbness.   Hematological: Negative for adenopathy. Does not bruise/bleed easily.   Psychiatric/Behavioral: Negative for agitation, behavioral problems and confusion. The patient is not nervous/anxious.        Objective:       Vitals:    12/28/17 1301 12/28/17 1316   BP: (!) 179/85 (!) 150/80   Pulse: 70    Weight: 111.1 kg (245 lb)    Height: 5' 10" (1.778 m)      Physical Exam   Constitutional: He is oriented to person, place, and time. He appears well-developed.   HENT:   Head: Normocephalic and atraumatic.   Nose: Nose normal.   Mouth/Throat: Oropharynx is clear and moist. No oropharyngeal exudate.   Eyes: Conjunctivae and EOM are normal.   Neck: Normal range of motion. Neck supple. No JVD present. No tracheal deviation present. No thyromegaly present.   Cardiovascular: Normal rate, regular rhythm and normal heart sounds.  Exam reveals no gallop and no friction rub.    No murmur heard.  Pulmonary/Chest: Effort normal and breath sounds normal. No respiratory distress. He has no wheezes. He has no rales.   Abdominal: Soft. Bowel sounds are normal. He exhibits no distension. There is no splenomegaly or hepatomegaly. There is no tenderness. No hernia.       Musculoskeletal: Normal range of motion.   Neurological: He is alert and oriented to person, place, and time. He has normal reflexes.   Skin: Skin is warm and dry. No rash noted.   C/O Dry skin   Psychiatric: He has a normal mood and affect.   Nursing note and vitals reviewed.      Assessment:       1. Benign essential hypertension    2. Multiple-type hyperlipidemia    3. Sinus congestion    4. Dry skin         Plan:           Benign essential hypertension    Multiple-type hyperlipidemia    Sinus congestion  -     ipratropium (ATROVENT) 42 mcg (0.06 %) nasal spray; 2 sprays by Nasal route 2 (two) times daily.  Dispense: 15 mL; Refill: 5    Dry skin  -     ceramides 1,3,6-11 (CERAVE) Lotn; Apply 335 mLs " topically 2 (two) times daily as needed.    Patient's blood pressures are slightly elevated today. I've reviewed his labs. His blood sugars 107. Creatinine is 1.20. Liver function tests are okay. LDL cholesterol is 113.  Patient has gained about 10-15 pounds of weight since the last visit.    I would advise the patient to watch his diet now and start some exercise. By the time he comes next month, he should have lost about 10-15 pounds of weight. Patient also complains of pain on the right shin with a history of injury. There seems to be a small hematoma which is getting better. I've advised the patient to keep the legs elevated as much as possible and we will see how he does back in 6 weeks.  Check out complaints are dry skin and sinus congestion with she and pains. These will be addressed in details at follow-up.

## 2018-02-06 ENCOUNTER — TELEPHONE (OUTPATIENT)
Dept: FAMILY MEDICINE | Facility: CLINIC | Age: 78
End: 2018-02-06

## 2018-02-06 DIAGNOSIS — Z20.828 EXPOSURE TO INFLUENZA: Primary | ICD-10-CM

## 2018-02-06 RX ORDER — OSELTAMIVIR PHOSPHATE 75 MG/1
75 CAPSULE ORAL DAILY
Qty: 10 CAPSULE | Refills: 0 | Status: SHIPPED | OUTPATIENT
Start: 2018-02-06 | End: 2018-02-09

## 2018-02-09 ENCOUNTER — OFFICE VISIT (OUTPATIENT)
Dept: FAMILY MEDICINE | Facility: CLINIC | Age: 78
End: 2018-02-09
Payer: MEDICARE

## 2018-02-09 VITALS
WEIGHT: 243 LBS | DIASTOLIC BLOOD PRESSURE: 70 MMHG | SYSTOLIC BLOOD PRESSURE: 125 MMHG | HEART RATE: 73 BPM | HEIGHT: 70 IN | BODY MASS INDEX: 34.79 KG/M2

## 2018-02-09 DIAGNOSIS — E78.2 MULTIPLE-TYPE HYPERLIPIDEMIA: ICD-10-CM

## 2018-02-09 DIAGNOSIS — I10 BENIGN ESSENTIAL HYPERTENSION: Primary | ICD-10-CM

## 2018-02-09 DIAGNOSIS — Z12.5 ENCOUNTER FOR PROSTATE CANCER SCREENING: ICD-10-CM

## 2018-02-09 DIAGNOSIS — K21.9 GASTROESOPHAGEAL REFLUX DISEASE WITHOUT ESOPHAGITIS: ICD-10-CM

## 2018-02-09 DIAGNOSIS — N40.0 BENIGN PROSTATIC HYPERPLASIA WITHOUT URINARY OBSTRUCTION: ICD-10-CM

## 2018-02-09 DIAGNOSIS — E03.8 SECONDARY HYPOTHYROIDISM: ICD-10-CM

## 2018-02-09 PROCEDURE — 1159F MED LIST DOCD IN RCRD: CPT | Mod: ,,, | Performed by: INTERNAL MEDICINE

## 2018-02-09 PROCEDURE — 99213 OFFICE O/P EST LOW 20 MIN: CPT | Mod: ,,, | Performed by: INTERNAL MEDICINE

## 2018-02-09 PROCEDURE — 1126F AMNT PAIN NOTED NONE PRSNT: CPT | Mod: ,,, | Performed by: INTERNAL MEDICINE

## 2018-02-09 PROCEDURE — 3008F BODY MASS INDEX DOCD: CPT | Mod: ,,, | Performed by: INTERNAL MEDICINE

## 2018-02-09 PROCEDURE — 1170F FXNL STATUS ASSESSED: CPT | Mod: ,,, | Performed by: INTERNAL MEDICINE

## 2018-02-09 NOTE — PATIENT INSTRUCTIONS
BPH (Enlarged Prostate)  The prostate is a gland at the base of the bladder. As some men get older, the prostate may get bigger in size. This problem is called benign prostatic hyperplasia (BPH). BPH puts pressure on the urethra. This is the tube that carries urine from the bladder to the penis. It may interfere with the flow of urine. It may also keep the bladder from emptying fully.    Symptoms of BPH include trouble starting urination and feeling as though the bladder isnt emptying all the way. It also includes a weak urine stream, dribbling and leaking of urine, and frequent and urgent urination (especially at night). BPH can increase the risk of urinary infections. It can also block off urine flow completely. If this occurs, a thin tube (catheter) may be passed into the bladder to help drain urine.  If symptoms are mild, no treatment may be needed right now. If symptoms are more severe, treatment is likely needed. The goal of treatment is to improve urine flow and reduce symptoms. Treatments can include medicine and procedures. Your healthcare provider will discuss treatment options with you as needed.  Home care  The following guidelines will help you care for yourself at home:  · Urinate as soon as you feel the urge. Don't try to hold your urine.  · Don't limit your fluid intake during the day. Drink 6 to 8 glasses of water or liquids a day. This prevents bacteria from building up in the bladder.  · Avoid drinking fluids after dinner to help reduce urination during the night.  · Avoid medicines that can worsen your symptoms. These include certain cold and allergy medicines and antidepressants. Diuretics used for high blood pressure can also worsen symptoms. Talk to your doctor about the medicines you take. Other choices may work better for you.  Prostate cancer screening  BPH does not increase the risk of prostate cancer. But because prostate cancer is a common cancer in men, screening is sometimes  recommended. This may help detect the cancer in its early stages when treatment is most effective. Factors that can increase the risk of prostate cancer include being -American or having a father or brother who had prostate cancer. A high-fat diet may also increase the risk of prostate cancer. Talk to your healthcare provider to see whether you should be screened for prostate cancer.  Follow-up care  Follow up with your healthcare provider, or as advised  To learn more, go to:  · National Kidney & Urologic Diseases Information Clearinghouse  kidney.niddk.nih.gov, 397.563.8218  When to seek medical advice  Call your healthcare provider right away if any of these occur:  · Fever of 100.4°F (38.0°C) or higher, or as advised  · Unable to pass urine for 8 hours  · Increasing pressure or pain in your bladder (lower abdomen)  · Blood in the urine  · Increasing low back pain, not related to injury  · Symptoms of urinary infection (increased urge to urinate, burning when passing urine, foul-smelling urine)  Date Last Reviewed: 7/1/2016 © 2000-2017 [employer]. [address] [place] [address], [place] [address]. All rights reserved. This information is not intended as a substitute for professional medical care. Always follow your healthcare professional's instructions.

## 2018-05-16 RX ORDER — LEVOTHYROXINE SODIUM 200 UG/1
200 TABLET ORAL DAILY
Qty: 90 TABLET | Refills: 3 | Status: SHIPPED | OUTPATIENT
Start: 2018-05-16 | End: 2019-05-01 | Stop reason: SDUPTHER

## 2018-05-22 DIAGNOSIS — E03.8 SECONDARY HYPOTHYROIDISM: ICD-10-CM

## 2018-05-22 DIAGNOSIS — Z12.5 SCREENING FOR PROSTATE CANCER: ICD-10-CM

## 2018-05-22 DIAGNOSIS — E78.2 MULTIPLE-TYPE HYPERLIPIDEMIA: ICD-10-CM

## 2018-05-22 DIAGNOSIS — I10 BENIGN ESSENTIAL HYPERTENSION: Primary | ICD-10-CM

## 2018-05-22 LAB
ALBUMIN SERPL-MCNC: 4 G/DL (ref 3.1–4.7)
ALP SERPL-CCNC: 75 IU/L (ref 40–104)
ALT (SGPT): 20 IU/L (ref 3–33)
AST SERPL-CCNC: 23 IU/L (ref 10–40)
BILIRUB SERPL-MCNC: 0.7 MG/DL (ref 0.3–1)
BUN SERPL-MCNC: 20 MG/DL (ref 8–20)
CALCIUM SERPL-MCNC: 9.2 MG/DL (ref 7.7–10.4)
CHLORIDE: 107 MMOL/L (ref 98–110)
CO2 SERPL-SCNC: 26.4 MMOL/L (ref 22.8–31.6)
COMPLEXED PSA SERPL-MCNC: 0.1 NG/ML (ref 0–3)
CREATININE RANDOM URINE: 70 MG/DL
CREATININE: 1.26 MG/DL (ref 0.6–1.4)
GLUCOSE: 109 MG/DL (ref 70–99)
MICROALBUM.,U,RANDOM: <2 MCG/ML (ref 0–19.9)
MICROALBUMIN/CREATININE RATIO: NORMAL (ref 0–30)
POTASSIUM SERPL-SCNC: 4.6 MMOL/L (ref 3.5–5)
PROT SERPL-MCNC: 7 G/DL (ref 6–8.2)
SODIUM: 141 MMOL/L (ref 134–144)
TSH SERPL DL<=0.005 MIU/L-ACNC: 2.36 ULU/ML (ref 0.3–5.6)

## 2018-05-28 ENCOUNTER — OFFICE VISIT (OUTPATIENT)
Dept: FAMILY MEDICINE | Facility: CLINIC | Age: 78
End: 2018-05-28
Payer: MEDICARE

## 2018-05-28 VITALS
WEIGHT: 235 LBS | HEART RATE: 74 BPM | HEIGHT: 70 IN | SYSTOLIC BLOOD PRESSURE: 135 MMHG | DIASTOLIC BLOOD PRESSURE: 69 MMHG | BODY MASS INDEX: 33.64 KG/M2

## 2018-05-28 DIAGNOSIS — R73.9 ELEVATED BLOOD SUGAR: ICD-10-CM

## 2018-05-28 DIAGNOSIS — G47.33 OBSTRUCTIVE SLEEP APNEA: ICD-10-CM

## 2018-05-28 DIAGNOSIS — E78.2 MULTIPLE-TYPE HYPERLIPIDEMIA: ICD-10-CM

## 2018-05-28 DIAGNOSIS — N40.0 BENIGN PROSTATIC HYPERPLASIA WITHOUT URINARY OBSTRUCTION: ICD-10-CM

## 2018-05-28 DIAGNOSIS — I10 BENIGN ESSENTIAL HYPERTENSION: Primary | ICD-10-CM

## 2018-05-28 DIAGNOSIS — R53.83 OTHER FATIGUE: ICD-10-CM

## 2018-05-28 PROCEDURE — 3078F DIAST BP <80 MM HG: CPT | Mod: ,,, | Performed by: INTERNAL MEDICINE

## 2018-05-28 PROCEDURE — 99214 OFFICE O/P EST MOD 30 MIN: CPT | Mod: ,,, | Performed by: INTERNAL MEDICINE

## 2018-05-28 PROCEDURE — 3075F SYST BP GE 130 - 139MM HG: CPT | Mod: ,,, | Performed by: INTERNAL MEDICINE

## 2018-05-28 NOTE — PATIENT INSTRUCTIONS
Step-by-Step  Checking Your Blood Pressure    Date Last Reviewed: 4/27/2016  © 4711-6167 [employer]. [address] [place] [address], [place] [address]. All rights reserved. This information is not intended as a substitute for professional medical care. Always follow your healthcare professional's instructions.

## 2018-05-28 NOTE — PROGRESS NOTES
Subjective:       Patient ID: Nikolay Barraza is a 77 y.o. male.    Chief Complaint: Hypertension (lab review ); Hyperlipidemia; and Thyroid Problem      Mr. Nikolay Barraza is a pleasant 77-year-old  male who comes for follow-up. His underlying medical issues include hypertension, hyperlipidemia, hypothyroidism, benign prostatic hypertrophy and sleep apnea. He is using a mask for sleep but tends to feel fatigued mostly in the morning. He states that his eyes feel tired in the morning.    His blood pressures are stable. He is compliant with his medications for the thyroid, blood pressure and cholesterol. His proximal states symptoms are also stable at this point. No leakage or incontinence. He is not sexually active at this point.    On social front, he seems to be somewhat concerned about his wife's mental issues. This seems to be aggravating him over several years. (Wife does not acknowledge them to be a problem.)      Hypertension   This is a chronic problem. The current episode started more than 1 year ago. Pertinent negatives include no chest pain or palpitations. Risk factors for coronary artery disease include sedentary lifestyle, obesity, dyslipidemia and male gender. Past treatments include calcium channel blockers and angiotensin blockers. The current treatment provides moderate improvement. There is no history of heart failure. Identifiable causes of hypertension include a thyroid problem. There is no history of a hypertension causing med, pheochromocytoma or renovascular disease.   Hyperlipidemia   This is a chronic problem. The current episode started more than 1 year ago. The problem is controlled. Exacerbating diseases include obesity. Pertinent negatives include no chest pain or myalgias. Current antihyperlipidemic treatment includes statins. The current treatment provides moderate improvement of lipids. Risk factors for coronary artery disease include a sedentary lifestyle, hypertension and  dyslipidemia.   Thyroid Problem   Presents for follow-up visit. Symptoms include fatigue. Patient reports no anxiety, cold intolerance, constipation, heat intolerance, palpitations or tremors. The symptoms have been stable. His past medical history is significant for hyperlipidemia. There is no history of heart failure.   Benign Prostatic Hypertrophy   This is a chronic problem. The current episode started more than 1 year ago. The problem has been gradually improving since onset. Pertinent negatives include no dysuria or hematuria. He is not sexually active. Past treatments include finasteride and tamsulosin. The treatment provided moderate relief.   Fatigue   This is a chronic problem. The problem occurs constantly. The problem has been unchanged. Associated symptoms include fatigue. Pertinent negatives include no abdominal pain, arthralgias, chest pain, myalgias or numbness. Nothing aggravates the symptoms. He has tried nothing for the symptoms. The treatment provided no relief.       Past Medical History:   Diagnosis Date    Allergy     Codeine    GERD (gastroesophageal reflux disease)     Hyperlipidemia     Hypertension     Right inguinal hernia     Thyroid disease      Social History     Social History    Marital status:      Spouse name: N/A    Number of children: 3    Years of education: N/A     Occupational History    [employer]-             Social History Main Topics    Smoking status: Never Smoker    Smokeless tobacco: Never Used    Alcohol use No    Drug use: No    Sexual activity: Yes     Partners: Female     Other Topics Concern    Not on file     Social History Narrative    No narrative on file     Past Surgical History:   Procedure Laterality Date    ADENOIDECTOMY      INGUINAL HERNIA REPAIR Right     TONSILLECTOMY      VASECTOMY       Family History   Problem Relation Age of Onset    Heart disease Father     Miscarriages /  "Stillbirths Father     Skin cancer Mother     Cancer Maternal Grandfather     Stomach cancer Paternal Grandfather        Review of Systems   Constitutional: Positive for fatigue. Negative for activity change, appetite change and unexpected weight change.   HENT: Negative for sinus pressure, sneezing and trouble swallowing.    Eyes: Negative for visual disturbance.   Respiratory: Negative for chest tightness.    Cardiovascular: Negative for chest pain, palpitations and leg swelling.        Patient has hypertension and hyperlipidemia.   Gastrointestinal: Negative for abdominal distention, abdominal pain, blood in stool and constipation.        Stable GERD   Endocrine: Negative for cold intolerance, heat intolerance, polydipsia and polyphagia.        Patient has hypothyroidism. Stable on levothyroxine. Current labs are good. Blood sugars are slightly elevated.   Genitourinary: Negative for dysuria, hematuria and scrotal swelling.        Benign prostate hypertrophy stable on tamsulosin and finasteride.   Musculoskeletal: Negative for arthralgias, back pain, gait problem and myalgias.   Skin: Negative for pallor.   Allergic/Immunologic: Negative for environmental allergies, food allergies and immunocompromised state.   Neurological: Negative for dizziness, tremors, seizures, speech difficulty, light-headedness and numbness.        Some memory issues.   Hematological: Negative for adenopathy. Does not bruise/bleed easily.   Psychiatric/Behavioral: Negative for agitation, behavioral problems and confusion. The patient is not nervous/anxious.        Objective:       Vitals:    05/28/18 0801   BP: 135/69   Pulse: 74   Weight: 106.6 kg (235 lb)   Height: 5' 10" (1.778 m)     Physical Exam   Constitutional: He appears well-developed.   HENT:   Head: Normocephalic and atraumatic.   Nose: Mucosal edema (mild nasal congestion.no significant obstruction.) present. Right sinus exhibits no maxillary sinus tenderness and no " frontal sinus tenderness. Left sinus exhibits no maxillary sinus tenderness and no frontal sinus tenderness.   Mouth/Throat: Oropharynx is clear and moist. No oropharyngeal exudate.   Oral airways are Malampati score 4.   Eyes: Conjunctivae and EOM are normal.   Neck: Normal range of motion. Neck supple. No JVD present. No tracheal deviation present. No thyromegaly present.   Cardiovascular: Normal rate, regular rhythm and normal heart sounds.  Exam reveals no gallop and no friction rub.    No murmur heard.  Pulmonary/Chest: Effort normal and breath sounds normal. No respiratory distress. He has no wheezes. He has no rales.   Abdominal: Soft. Bowel sounds are normal. He exhibits no distension. There is no splenomegaly or hepatomegaly. There is no tenderness. No hernia.   Neurological: He is alert. He has normal reflexes.   Skin: Skin is warm and dry. No rash noted.   C/O Dry skin   Psychiatric: He has a normal mood and affect.   Nursing note and vitals reviewed.      Assessment:       1. Benign essential hypertension    2. Multiple-type hyperlipidemia    3. Benign prostatic hyperplasia without urinary obstruction    4. Elevated blood sugar    5. Other fatigue    6. Obstructive sleep apnea       Cholesterol                   148                         mg/dL       Triglycerides                 108                         mg/dL       HDL Cholesterol                35                        23-75  mg/dL       LDL Cholesterol                92                        0-100  mg/dL       VLDL Cholesterol               22                        12-27  mg/dL       Cholesterol Ratio            4.00                                            CHOLESTEROL RATIO INTERPRETATION                           MEN-- WOMEN         TSH   Order: [id number] - Reflex for Order [id number]   Status:  Final result   Visible to patient:  No (Not Released)   Next appt:  None    Ref Range & Units 6d ago   TSH 0.30 - 5.60 ulU/ml 2.36             Glucose 70 - 99 mg/dL 109     BUN, Bld 8 - 20 mg/dL 20    Creatinine 0.60 - 1.40 mg/dL 1.26    Calcium 7.7 - 10.4 mg/dL 9.2    Sodium 134 - 144 mmol/L 141    Potassium 3.5 - 5.0 mmol/L 4.6    Chloride 98 - 110 mmol/L 107    CO2 22.8 - 31.6 mmol/L 26.4    Albumin 3.1 - 4.7 g/dL 4.0    Total Bilirubin 0.3 - 1.0 mg/dL 0.7    Alkaline Phosphatase 40 - 104 IU/L 75    Total Protein 6.0 - 8.2 g/dL 7.0    ALT (SGPT) 3 - 33 IU/L 20    AST 10 - 40 IU/L 23    [facility]     PSA, Screening   Order: [id number] - Reflex for Order [id number]   Status:  Final result   Visible to patient:  No (Not Released)   Next appt:  None    Ref Range & Units 6d ago   PSA, SCREEN 0.0 - 3.0 ng/mL 0.1            Microalbumin/creatinine urine ratio   Order: [id number]   Status:  Final result   Visible to patient:  No (Not Released)   Next appt:  None    Ref Range & Units 6d ago   Microalbum.,U,Random 0.0 - 19.9 mcg/ml <2.0            Estimated GFR                  55                               ml/min/1.73m2      Specimen Collected: 05/22/18 08:23   Last Resulted: 05/22/18 10:26                Plan:           Benign essential hypertension    Multiple-type hyperlipidemia    Benign prostatic hyperplasia without urinary obstruction    Elevated blood sugar    Other fatigue  -     Ambulatory consult to Sleep Disorders    Obstructive sleep apnea    Patient's recent labs have been reviewed and no easy discernible cause of his fatigue can be determined.    His blood pressures are stable. His cholesterol level is good. Prostate symptoms are stable. He has slightly elevated blood sugar and advice to avoid concentrated sweets and excess carbohydrates has been given.    He does have sleep apnea which may be contributing to his persistent fatigue and I believe he may benefit from a follow-up with Dr. Jann Briseno. Letter to Dr. Jann Briseno has been given.    His recent thyroid tests are within range.      I'll see him for follow-up in  approximately 4 months or earlier as needed. Medications have been reviewed.      The patient is asked to make an attempt to improve diet and exercise patterns to aid in medical management of this problem.    Patient has been advised to watch diet and exercise. Avoid fried and fatty food. Compliance to medications and follow up urged.

## 2018-07-06 PROBLEM — S32.009A CLOSED FRACTURE DISLOCATION OF LUMBAR SPINE: Status: ACTIVE | Noted: 2018-07-06

## 2018-07-25 ENCOUNTER — OFFICE VISIT (OUTPATIENT)
Dept: FAMILY MEDICINE | Facility: CLINIC | Age: 78
End: 2018-07-25
Payer: MEDICARE

## 2018-07-25 VITALS
WEIGHT: 222 LBS | SYSTOLIC BLOOD PRESSURE: 124 MMHG | HEIGHT: 70 IN | DIASTOLIC BLOOD PRESSURE: 67 MMHG | HEART RATE: 81 BPM | BODY MASS INDEX: 31.78 KG/M2

## 2018-07-25 DIAGNOSIS — R21 RASH: ICD-10-CM

## 2018-07-25 DIAGNOSIS — R10.31 RIGHT GROIN PAIN: Primary | ICD-10-CM

## 2018-07-25 DIAGNOSIS — K59.09 OTHER CONSTIPATION: ICD-10-CM

## 2018-07-25 PROCEDURE — 3074F SYST BP LT 130 MM HG: CPT | Mod: ,,, | Performed by: INTERNAL MEDICINE

## 2018-07-25 PROCEDURE — 3078F DIAST BP <80 MM HG: CPT | Mod: ,,, | Performed by: INTERNAL MEDICINE

## 2018-07-25 PROCEDURE — 99213 OFFICE O/P EST LOW 20 MIN: CPT | Mod: ,,, | Performed by: INTERNAL MEDICINE

## 2018-07-25 RX ORDER — LACTULOSE 10 G/15ML
10 SOLUTION ORAL 2 TIMES DAILY
Qty: 200 ML | Refills: 1 | Status: SHIPPED | OUTPATIENT
Start: 2018-07-25 | End: 2020-01-08

## 2018-07-25 RX ORDER — TRIAMCINOLONE ACETONIDE 1 MG/G
CREAM TOPICAL 2 TIMES DAILY
Qty: 30 G | Refills: 1 | Status: SHIPPED | OUTPATIENT
Start: 2018-07-25 | End: 2020-07-09

## 2018-07-25 NOTE — PROGRESS NOTES
Subjective:       Patient ID: Nikolay Barraza is a 77 y.o. male.    Chief Complaint: Groin Pain    Mr Nikolay Barraza had L 2 vertebra which was compressed fractured. He had vertebro/kyphoplasty by Dr Erik Collazo with significant relief.    Thereafter he has started experiencing constipation and he goes to the bathroom perhaps once or twice in a week. He was on opiate medications when he was in significant pain because of the compression fracture but currently he is off the opiate medication for several weeks. His constipation has improved somewhat as he continues to take over-the-counter stool softeners. No nausea or vomiting. No abdominal pain.    He is also started feeling some pressure over the right groin region. He does not qualify it as a pain. Several years ago he had a hernia mesh placement in the side.He  Is concerned about hernia coming back. His back pain is approximately 90% better at this point.      Groin Pain   The patient's pertinent negatives include no pelvic pain, scrotal swelling or testicular pain. Associated symptoms include constipation. Pertinent negatives include no abdominal pain, chest pain, diarrhea, dysuria, flank pain, frequency or vomiting. (Vertebroplasty/kypholasty)   Constipation   This is a new problem. The current episode started more than 1 month ago. The problem has been waxing and waning since onset. His stool frequency is 2 to 3 times per week. Associated symptoms include back pain. Pertinent negatives include no abdominal pain, diarrhea, hematochezia, hemorrhoids, melena or vomiting. Risk factors include obesity. (Vertebroplasty/kypholasty)       Past Medical History:   Diagnosis Date    Allergy     Codeine    Closed fracture dislocation of lumbar spine 7/6/2018    #2 vertebra. Patient went to the emergency Department.  Followup with Erik:    GERD (gastroesophageal reflux disease)     Hyperlipidemia     Hypertension     Right inguinal hernia     Thyroid disease       Social History     Social History    Marital status:      Spouse name: N/A    Number of children: 3    Years of education: N/A     Occupational History    [employer]-             Social History Main Topics    Smoking status: Never Smoker    Smokeless tobacco: Never Used    Alcohol use No    Drug use: No    Sexual activity: Yes     Partners: Female     Other Topics Concern    Not on file     Social History Narrative    No narrative on file     Past Surgical History:   Procedure Laterality Date    ADENOIDECTOMY      INGUINAL HERNIA REPAIR Right     TONSILLECTOMY      VASECTOMY       Family History   Problem Relation Age of Onset    Heart disease Father     Miscarriages / Stillbirths Father     Skin cancer Mother     Cancer Maternal Grandfather     Stomach cancer Paternal Grandfather        Review of Systems   Constitutional: Positive for fatigue. Negative for activity change, appetite change and unexpected weight change.   HENT: Negative for sinus pressure, sneezing and trouble swallowing.    Eyes: Negative for visual disturbance.   Respiratory: Negative for chest tightness.    Cardiovascular: Negative for chest pain, palpitations and leg swelling.        Patient has hypertension and hyperlipidemia.   Gastrointestinal: Positive for constipation. Negative for abdominal distention, abdominal pain, blood in stool, diarrhea, hematochezia, hemorrhoids, melena and vomiting.        Stable GERD   Endocrine: Negative for cold intolerance, heat intolerance, polydipsia and polyphagia.        Patient has hypothyroidism. Stable on levothyroxine. Current labs are good. Blood sugars are slightly elevated.   Genitourinary: Negative for dysuria, flank pain, frequency, hematuria, pelvic pain, scrotal swelling and testicular pain.        Benign prostate hypertrophy stable on tamsulosin and finasteride.    Rt groin pain   Musculoskeletal: Positive for back pain. Negative  "for arthralgias and gait problem.        History of compression fracture in the lumbar vertebra closed. Status post kyphoplasty/vertebroplasty by Dr. Erik Orr. He has found approximately 90% improvement in his symptoms.   Skin: Negative for pallor.   Neurological: Negative for dizziness, tremors, speech difficulty and numbness.        Some memory issues.   Hematological: Negative for adenopathy. Does not bruise/bleed easily.       Objective:       Vitals:    07/25/18 1423   BP: 124/67   Pulse: 81   Weight: 100.7 kg (222 lb)   Height: 5' 10" (1.778 m)     Physical Exam   Constitutional: He appears well-developed.   HENT:   Head: Normocephalic and atraumatic.   Nose: Mucosal edema (mild nasal congestion.no significant obstruction.) present. Right sinus exhibits no maxillary sinus tenderness and no frontal sinus tenderness. Left sinus exhibits no maxillary sinus tenderness and no frontal sinus tenderness.   Mouth/Throat: Oropharynx is clear and moist. No oropharyngeal exudate.   Oral airways are Malampati score 4.   Eyes: Conjunctivae and EOM are normal.   Neck: Normal range of motion. Neck supple. No JVD present. No tracheal deviation present. No thyromegaly present.   Cardiovascular: Normal rate, regular rhythm and normal heart sounds.  Exam reveals no gallop and no friction rub.    No murmur heard.  Pulmonary/Chest: Effort normal and breath sounds normal. No respiratory distress. He has no wheezes. He has no rales.   Abdominal: Soft. Bowel sounds are normal. He exhibits no distension, no abdominal bruit and no mass. There is no splenomegaly or hepatomegaly. There is no tenderness. There is no rigidity, no rebound, no guarding, no tenderness at McBurney's point and negative Monge's sign. No hernia.       ? Slight bulge on finger in right inguinal canal.   Neurological: He is alert. He has normal reflexes.   Skin: Skin is warm and dry. No rash noted.        C/O Dry skin   Psychiatric: He has a normal mood and " affect.   Nursing note and vitals reviewed.      Assessment:       1. Right groin pain    2. Other constipation         Plan:           Right groin pain    Other constipation    At this point, postoperatively I would like him to have a good bowel movement and I'll prescribe lactulose 15 mL twice a day daily till he has as a soft bowel movement every day.    I'm wondering if this would improve his sense of fullness in the right groin region.    I'm not convinced that I felt any evidence of significant hernia but perhaps a small bulge report with finger inside the inguinal canal asked him to cough.    I'll see him back in about 6 weeks to 8 weeks time to see how he is doing.    Fall precautions and appropriate seasonal immunizations discussed.

## 2018-07-25 NOTE — PATIENT INSTRUCTIONS
Constipation (Adult)  Constipation means that you have bowel movements that are less frequent than usual. Stools often become very hard and difficult to pass.  Constipation is very common. At some point in life it affects almost everyone. Since everyone's bowel habits are different, what is constipation to one person may not be to another. Your healthcare provider may do tests to diagnose constipation. It depends on what he or she finds when evaluating you.    Symptoms of constipation include:  · Abdominal pain  · Bloating  · Vomiting  · Painful bowel movements  · Itching, swelling, bleeding, or pain around the anus  Causes  Constipation can have many causes. These include:  · Diet low in fiber  · Too much dairy  · Not drinking enough liquids  · Lack of exercise or physical activity. This is especially true for older adults.  · Changes in lifestyle or daily routine, including pregnancy, aging, work, and travel  · Frequent use or misuse of laxatives  · Ignoring the urge to have a bowel movement or delaying it until later  · Medicines, such as certain prescription pain medicines, iron supplements, antacids, certain antidepressants, and calcium supplements  · Diseases like irritable bowel syndrome, bowel obstructions, stroke, diabetes, thyroid disease, Parkinson disease, hemorrhoids, and colon cancer  Complications  Potential complications of constipation can include:  · Hemorrhoids  · Rectal bleeding from hemorrhoids or anal fissures (skin tears)  · Hernias  · Dependency on laxatives  · Chronic constipation  · Fecal impaction  · Bowel obstruction or perforation  Home care  All treatment should be done after talking with your healthcare provider. This is especially true if you have another medical problems, are taking prescription medicines, or are an older adult. Treatment most often involves lifestyle changes. You may also need medicines. Your healthcare provider will tell you which will work best for you. Follow  the advice below to help avoid this problem in the future.  Lifestyle changes  These lifestyle changes can help prevent constipation:  · Diet. Eat a high-fiber diet, with fresh fruit and vegetables, and reduce dairy intake, meats, and processed foods  · Fluids. It's important to get enough fluids each day. Drink plenty of water when you eat more fiber. If you are on diet that limits the amount of fluid you can have, talk about this with your healthcare provider.  · Regular exercise. Check with your healthcare provider first.  Medications  Take any medicines as directed. Some laxatives are safe to use only every now and then. Others can be taken on a regular basis. Talk with your doctor or pharmacist if you have questions.  Prescription pain medicines can cause constipation. If you are taking this kind of medicine, ask your healthcare provider if you should also take a stool softener.  Medicines you may take to treat constipation include:  · Fiber supplements  · Stool softeners  · Laxatives  · Enemas  · Rectal suppositories  Follow-up care  Follow up with your healthcare provider if symptoms don't get better in the next few days. You may need to have more tests or see a specialist.  Call 911  Call 911 if any of these occur:  · Trouble breathing  · Stiff, rigid abdomen that is severely painful to touch  · Confusion  · Fainting or loss of consciousness  · Rapid heart rate  · Chest pain  When to seek medical advice  Call your healthcare provider right away if any of these occur:  · Fever over 100.4°F (38°C)  · Failure to resume normal bowel movements  · Pain in your abdomen or back gets worse  · Nausea or vomiting  · Swelling in your abdomen  · Blood in the stool  · Black, tarry stool  · Involuntary weight loss  · Weakness  Date Last Reviewed: 12/30/2015  © 4922-0050 [employer]. [address] [place] [address], [place] [address]. All rights reserved. This information is not intended as a substitute for  professional medical care. Always follow your healthcare professional's instructions.

## 2018-07-26 DIAGNOSIS — N40.0 BPH WITHOUT URINARY OBSTRUCTION: Primary | ICD-10-CM

## 2018-07-26 NOTE — TELEPHONE ENCOUNTER
----- Message from Zeyad Ro sent at 7/26/2018  9:03 AM CDT -----  Contact: pt  Pt is requesting a 1 year prescription with 90 day refills on his finasteride (PROSCAR) 5 mg tablet  Call Back#[phone]  Thanks    [facility] #[id number] - [place], [place] - [address] AT [facility]  [address]  [place] [address]  Phone: [phone] Fax: [phone]

## 2018-07-30 RX ORDER — FINASTERIDE 5 MG/1
5 TABLET, FILM COATED ORAL DAILY
Qty: 90 TABLET | Refills: 3 | Status: SHIPPED | OUTPATIENT
Start: 2018-07-30 | End: 2019-10-16 | Stop reason: SDUPTHER

## 2018-07-30 RX ORDER — TAMSULOSIN HYDROCHLORIDE 0.4 MG/1
0.4 CAPSULE ORAL DAILY
Qty: 90 CAPSULE | Refills: 3 | Status: SHIPPED | OUTPATIENT
Start: 2018-07-30 | End: 2019-07-16 | Stop reason: SDUPTHER

## 2018-07-30 NOTE — TELEPHONE ENCOUNTER
Notified pt that refill was sent to pharmacy. Pt verbalized understanding and voices no other concerns at this time.

## 2018-08-03 ENCOUNTER — OFFICE VISIT (OUTPATIENT)
Dept: UROLOGY | Facility: CLINIC | Age: 78
End: 2018-08-03
Payer: MEDICARE

## 2018-08-03 VITALS
HEIGHT: 70 IN | SYSTOLIC BLOOD PRESSURE: 123 MMHG | DIASTOLIC BLOOD PRESSURE: 75 MMHG | HEART RATE: 73 BPM | BODY MASS INDEX: 31.5 KG/M2 | WEIGHT: 220 LBS

## 2018-08-03 DIAGNOSIS — N40.0 BPH WITHOUT URINARY OBSTRUCTION: Primary | ICD-10-CM

## 2018-08-03 LAB
BILIRUB SERPL-MCNC: NEGATIVE MG/DL
BLOOD URINE, POC: NEGATIVE
COLOR, POC UA: NORMAL
GLUCOSE UR QL STRIP: NEGATIVE
KETONES UR QL STRIP: NEGATIVE
LEUKOCYTE ESTERASE URINE, POC: NEGATIVE
NITRITE, POC UA: NEGATIVE
PH, POC UA: 5
PROTEIN, POC: NEGATIVE
SPECIFIC GRAVITY, POC UA: 1010
UROBILINOGEN, POC UA: NEGATIVE

## 2018-08-03 PROCEDURE — 99214 OFFICE O/P EST MOD 30 MIN: CPT | Mod: 25,S$GLB,, | Performed by: UROLOGY

## 2018-08-03 PROCEDURE — 81002 URINALYSIS NONAUTO W/O SCOPE: CPT | Mod: S$GLB,,, | Performed by: UROLOGY

## 2018-08-03 PROCEDURE — 3078F DIAST BP <80 MM HG: CPT | Mod: CPTII,S$GLB,, | Performed by: UROLOGY

## 2018-08-03 PROCEDURE — 3074F SYST BP LT 130 MM HG: CPT | Mod: CPTII,S$GLB,, | Performed by: UROLOGY

## 2018-08-03 PROCEDURE — 99999 PR PBB SHADOW E&M-EST. PATIENT-LVL III: CPT | Mod: PBBFAC,,, | Performed by: UROLOGY

## 2018-08-06 ENCOUNTER — OFFICE VISIT (OUTPATIENT)
Dept: SURGERY | Facility: CLINIC | Age: 78
End: 2018-08-06
Payer: MEDICARE

## 2018-08-06 VITALS
DIASTOLIC BLOOD PRESSURE: 75 MMHG | SYSTOLIC BLOOD PRESSURE: 123 MMHG | HEIGHT: 70 IN | WEIGHT: 220 LBS | BODY MASS INDEX: 31.5 KG/M2

## 2018-08-06 DIAGNOSIS — K40.90 LEFT INGUINAL HERNIA: ICD-10-CM

## 2018-08-06 DIAGNOSIS — R10.31 RIGHT GROIN PAIN: Primary | ICD-10-CM

## 2018-08-06 PROCEDURE — 3074F SYST BP LT 130 MM HG: CPT | Mod: ,,, | Performed by: SURGERY

## 2018-08-06 PROCEDURE — 3078F DIAST BP <80 MM HG: CPT | Mod: ,,, | Performed by: SURGERY

## 2018-08-06 PROCEDURE — 99213 OFFICE O/P EST LOW 20 MIN: CPT | Mod: ,,, | Performed by: SURGERY

## 2018-08-06 NOTE — PROGRESS NOTES
About a month ago patient fell down the steps. It was severe enough to fracture ones elbow vertebrae. Ever since then patient had right inguinal pain. Patient is worried about a recurrence of his hernia. No systemic symptoms. Patient does not feel a bulge. He doeshave to hold himself when he strains or has a bowel movement.    Patient examined. Exam is somewhat difficult due to a lot of soft tissue in the inguinal areas bilaterally. There is a small left inguinal hernia. The right side feels fine. There is some mild tenderness but no evidence of recurrence.    Impression/plan: #1 right groin pain. Most likely secondary to muscle strain. No evidence of recurrence at this time. #2 small asymptomatic left inguinal hernia.    All the above discussed with patient and patient was reassured.

## 2018-08-06 NOTE — PROGRESS NOTES
DATE OF PROCEDURE:  08/03/2018.    CHIEF COMPLAINT:  Renal cyst.    HISTORY OF PRESENT ILLNESS:  Mr. Barraza is a 77-year-old male who was last  seen by me on 07/31/2017.  This patient has a history of urinary retention  and BPH and the patient was placed on a combination of finasteride and  tamsulosin.  From that point of view, the patient is doing well.  He is not  having any significant problems.  He is urinating with a good flow, good  control.  No dysuria and no gross hematuria.    Recently, the patient suffered a fall from a ladder and he went to the  Emergency Room at [facility] and among the studies, the  patient had a CT scan of the abdomen and pelvis and lumbar spine.  The  patient did show that the patient has renal cyst both sides and has been  referred to our office for further evaluation and treatment.    The CT scan findings are consistent with simple renal cysts that do not  have septations and probably do not need any followup or when the most an ultrasound in a  couple of years from now.  The patient is free of any  symptoms at the  present time and the urinalysis is completely clear.    It is to be too that he underwent a PSA test on 05/23/2018 and this was  found to be at 0.1.    After a lengthy discussion with  and Mrs. Barraza, we agree that we are  going to follow him up on a yearly basis, but the renal disease has no need  to further follow up at the present time and he is going to keep taking his  Flomax and finasteride that resolving his LUTS.  All the questions were  answered at their satisfaction and they left the office in satisfactory  condition.  I spent approximately 30 minutes with  and Mrs. Barraza and  all the time was spent on counseling.          /vandana [id number] evelyne(s)          EOR/HN dd: 08/06/2018 11:49:24 (CDT)   td: 08/06/2018 15:06:08 (CDT)  Doc ID #[id number]   Job ID #[id number]    CC:

## 2018-09-07 ENCOUNTER — OFFICE VISIT (OUTPATIENT)
Dept: FAMILY MEDICINE | Facility: CLINIC | Age: 78
End: 2018-09-07
Payer: MEDICARE

## 2018-09-07 VITALS
HEART RATE: 69 BPM | WEIGHT: 222 LBS | SYSTOLIC BLOOD PRESSURE: 113 MMHG | BODY MASS INDEX: 31.78 KG/M2 | HEIGHT: 70 IN | DIASTOLIC BLOOD PRESSURE: 67 MMHG

## 2018-09-07 DIAGNOSIS — I10 BENIGN ESSENTIAL HYPERTENSION: ICD-10-CM

## 2018-09-07 DIAGNOSIS — J30.1 SEASONAL ALLERGIC RHINITIS DUE TO POLLEN: ICD-10-CM

## 2018-09-07 DIAGNOSIS — E03.8 SECONDARY HYPOTHYROIDISM: ICD-10-CM

## 2018-09-07 DIAGNOSIS — R09.81 SINUS CONGESTION: ICD-10-CM

## 2018-09-07 DIAGNOSIS — R10.31 RIGHT GROIN PAIN: Primary | ICD-10-CM

## 2018-09-07 DIAGNOSIS — Z23 INFLUENZA VACCINE ADMINISTERED: ICD-10-CM

## 2018-09-07 PROCEDURE — 1101F PT FALLS ASSESS-DOCD LE1/YR: CPT | Mod: ,,, | Performed by: INTERNAL MEDICINE

## 2018-09-07 PROCEDURE — 3078F DIAST BP <80 MM HG: CPT | Mod: ,,, | Performed by: INTERNAL MEDICINE

## 2018-09-07 PROCEDURE — 90662 IIV NO PRSV INCREASED AG IM: CPT | Mod: ,,, | Performed by: INTERNAL MEDICINE

## 2018-09-07 PROCEDURE — 99213 OFFICE O/P EST LOW 20 MIN: CPT | Mod: 25,,, | Performed by: INTERNAL MEDICINE

## 2018-09-07 PROCEDURE — G0008 ADMIN INFLUENZA VIRUS VAC: HCPCS | Mod: ,,, | Performed by: INTERNAL MEDICINE

## 2018-09-07 PROCEDURE — 3074F SYST BP LT 130 MM HG: CPT | Mod: ,,, | Performed by: INTERNAL MEDICINE

## 2018-09-07 RX ORDER — IPRATROPIUM BROMIDE 42 UG/1
2 SPRAY, METERED NASAL 2 TIMES DAILY
Qty: 15 ML | Refills: 5 | Status: SHIPPED | OUTPATIENT
Start: 2018-09-07 | End: 2021-03-04

## 2018-09-07 NOTE — PATIENT INSTRUCTIONS

## 2018-09-07 NOTE — PROGRESS NOTES
Subjective:       Patient ID: Nikolay Barraza is a 77 y.o. male.    Chief Complaint: Groin Injury (pressure ); Hypertension; and Thyroid Problem    Mr. Nikolay Barraza is a 77-year-old gentleman who comes for follow-up. He had sustained a groin strain and injury few months ago. He is doing better now. He was worried that he has a hernia. He saw Dr. Thomas who did not find any evidence of hernia except for a small one on the left side which is not significant. His assessment was that he had a groin strain and to follow conservatively.    In the meantime he had also seen Dr. Galvin and his prostate symptoms seem to be doing okay. His follow-up with Dr. Galvin will be in one-year time.    His blood pressures are doing good.    He does have sinus congestion on and off for which he needs a refill on his nasal spray.    He is also taking doxycycline and he gets skin burns on his nose.      Hypertension   This is a chronic problem. The current episode started more than 1 year ago. Associated symptoms include malaise/fatigue. Pertinent negatives include no palpitations. Risk factors for coronary artery disease include sedentary lifestyle, obesity and male gender. Past treatments include calcium channel blockers. The current treatment provides moderate improvement. Identifiable causes of hypertension include a thyroid problem. There is no history of pheochromocytoma.   Thyroid Problem   Presents for follow-up visit. Symptoms include fatigue. Patient reports no cold intolerance, constipation, depressed mood, diaphoresis, heat intolerance, hoarse voice, palpitations, tremors or visual change. The symptoms have been stable.   Sinus Problem   This is a chronic problem. The current episode started more than 1 year ago. The problem has been waxing and waning since onset. There has been no fever. He is experiencing no pain. Pertinent negatives include no diaphoresis, hoarse voice, sinus pressure or sneezing. Past treatments include  spray decongestants. The treatment provided moderate relief.   Groin Pain   The patient's primary symptoms include pelvic pain. This is a recurrent problem. The current episode started more than 1 month ago. The problem has been gradually improving. The patient is experiencing no pain. Pertinent negatives include no constipation, dysuria, flank pain or frequency.       Past Medical History:   Diagnosis Date    Allergy     Codeine    Closed fracture dislocation of lumbar spine 7/6/2018    #2 vertebra. Patient went to the emergency Department.  Followup with Erik:    GERD (gastroesophageal reflux disease)     Hyperlipidemia     Hypertension     Right inguinal hernia     Thyroid disease      Social History     Socioeconomic History    Marital status:      Spouse name: Not on file    Number of children: 3    Years of education: Not on file    Highest education level: Not on file   Social Needs    Financial resource strain: Not on file    Food insecurity - worry: Not on file    Food insecurity - inability: Not on file    Transportation needs - medical: Not on file    Transportation needs - non-medical: Not on file   Occupational History    Occupation: [employer]-      Comment:     Tobacco Use    Smoking status: Never Smoker    Smokeless tobacco: Never Used   Substance and Sexual Activity    Alcohol use: No    Drug use: No    Sexual activity: Yes     Partners: Female   Other Topics Concern    Not on file   Social History Narrative    Not on file     Past Surgical History:   Procedure Laterality Date    ADENOIDECTOMY      INGUINAL HERNIA REPAIR Right 07/12/2018    Dr[facility]    TONSILLECTOMY      VASECTOMY       Family History   Problem Relation Age of Onset    Heart disease Father     Miscarriages / Stillbirths Father     Skin cancer Mother     Cancer Maternal Grandfather     Stomach cancer Paternal Grandfather        Review of Systems  "  Constitutional: Positive for fatigue and malaise/fatigue. Negative for activity change, appetite change, diaphoresis and unexpected weight change.   HENT: Negative for hoarse voice, sinus pressure and sneezing.    Eyes: Negative for visual disturbance.   Respiratory: Negative for chest tightness.    Cardiovascular: Negative for palpitations and leg swelling.        Patient has hypertension and hyperlipidemia.   Gastrointestinal: Negative for abdominal distention, blood in stool and constipation.        Stable GERD   Endocrine: Negative for cold intolerance, heat intolerance, polydipsia and polyphagia.        Patient has hypothyroidism. Stable on levothyroxine. Current labs are good. Blood sugars are slightly elevated.   Genitourinary: Positive for pelvic pain. Negative for dysuria, flank pain, frequency and hematuria.        Benign prostate hypertrophy stable on tamsulosin and finasteride.    Rt groin pain- better now   Musculoskeletal: Positive for back pain. Negative for arthralgias and gait problem.        History of compression fracture in the lumbar vertebra closed. Status post kyphoplasty/vertebroplasty by Dr. Erik Orr. He has found approximately 90% improvement in his symptoms.   Skin: Negative for pallor.   Neurological: Negative for dizziness, tremors, speech difficulty and numbness.        Some memory issues.   Hematological: Negative for adenopathy. Does not bruise/bleed easily.         Objective:      Blood pressure 113/67, pulse 69, height 5' 10" (1.778 m), weight 100.7 kg (222 lb). Body mass index is 31.85 kg/m².  Physical Exam   Constitutional: He appears well-developed.   HENT:   Head: Normocephalic and atraumatic.   Nose: Mucosal edema (mild nasal congestion.no significant obstruction.) present. Right sinus exhibits no maxillary sinus tenderness and no frontal sinus tenderness. Left sinus exhibits no maxillary sinus tenderness and no frontal sinus tenderness.   Mouth/Throat: Oropharynx is " clear and moist. No oropharyngeal exudate.   Oral airways are Malampati score 4.   Eyes: Conjunctivae and EOM are normal.   Neck: Normal range of motion. Neck supple. No JVD present. No tracheal deviation present. No thyromegaly present.   Cardiovascular: Normal rate, regular rhythm and normal heart sounds. Exam reveals no gallop and no friction rub.   No murmur heard.  Pulmonary/Chest: Effort normal and breath sounds normal. No respiratory distress. He has no wheezes. He has no rales.   Abdominal: Soft. Bowel sounds are normal. He exhibits no distension, no abdominal bruit and no mass. There is no tenderness.   ? Slight bulge on finger in right inguinal canal.   Neurological: He is alert. He has normal reflexes.   Skin: Skin is warm and dry. No rash noted.   C/O Dry skin   Psychiatric: He has a normal mood and affect.   Nursing note and vitals reviewed.        Assessment:       1. Right groin pain    2. Benign essential hypertension    3. Secondary hypothyroidism    4. Seasonal allergic rhinitis due to pollen    5. Sinus congestion    6. Influenza vaccine administered           Office Visit on 08/03/2018   Component Date Value Ref Range Status    Color, UA 08/03/2018 yellow/ clear   Final    Spec Grav UA 08/03/2018 1,010   Final    pH, UA 08/03/2018 5   Final    WBC, UA 08/03/2018 negative   Final    Nitrite, UA 08/03/2018 negative   Final    Protein 08/03/2018 negative   Final    Glucose, UA 08/03/2018 negative   Final    Ketones, UA 08/03/2018 negative   Final    Urobilinogen, UA 08/03/2018 negative   Final    Bilirubin 08/03/2018 negative   Final    Blood, UA 08/03/2018 negative   Final         Plan:           Right groin pain    Benign essential hypertension  -     Basic metabolic panel; Future; Expected date: 09/07/2018    Secondary hypothyroidism    Seasonal allergic rhinitis due to pollen  -     ipratropium (ATROVENT) 42 mcg (0.06 %) nasal spray; 2 sprays by Nasal route 2 (two) times daily.   Dispense: 15 mL; Refill: 5    Sinus congestion    Influenza vaccine administered  -     Influenza - High Dose (65+) (PF) (IM)    Patient has been advised to watch diet and exercise. Avoid fried and fatty food. Compliance to medications and follow up urged.    The patient is asked to make an attempt to improve diet and exercise patterns to aid in medical management of this problem.    Allergen avoidance.    Season appropriate immunizations. Fall precautions. Next    Follow-up in 6 months.        Current Outpatient Medications:     ascorbic acid, vitamin C, (VITAMIN C) 500 MG tablet, Take 500 mg by mouth once daily., Disp: , Rfl:     aspirin (ECOTRIN) 81 MG EC tablet, Take 81 mg by mouth once daily., Disp: , Rfl:     b complex vitamins tablet, Take 1 tablet by mouth once daily., Disp: , Rfl:     CALCIUM CARBONATE/VITAMIN D3 (VITAMIN D-3 ORAL), Take by mouth., Disp: , Rfl:     docusate sodium (COLACE) 50 MG capsule, Take by mouth 2 (two) times daily as needed for Constipation., Disp: , Rfl:     doxycycline (ORACEA) 40 mg capsule, Take 40 mg by mouth once daily., Disp: , Rfl: 3    felodipine (PLENDIL) 5 MG 24 hr tablet, Take 5 mg by mouth 2 (two) times daily. , Disp: , Rfl:     finasteride (PROSCAR) 5 mg tablet, Take 1 tablet (5 mg total) by mouth once daily., Disp: 90 tablet, Rfl: 3    fish oil-omega-3 fatty acids 300-1,000 mg capsule, Take 2 g by mouth once daily., Disp: , Rfl:     ipratropium (ATROVENT) 42 mcg (0.06 %) nasal spray, 2 sprays by Nasal route 2 (two) times daily., Disp: 15 mL, Rfl: 5    lactulose 10 gram/15 ml (CHRONULAC) 10 gram/15 mL (15 mL) solution, Take 15 mLs (10 g total) by mouth 2 (two) times daily., Disp: 200 mL, Rfl: 1    levothyroxine (SYNTHROID) 200 MCG tablet, Take 1 tablet (200 mcg total) by mouth once daily., Disp: 90 tablet, Rfl: 3    losartan (COZAAR) 100 MG tablet, Take 100 mg by mouth once daily., Disp: , Rfl: 0    multivitamin (ONE DAILY MULTIVITAMIN) per tablet, Take 1  tablet by mouth once daily., Disp: , Rfl:     pravastatin (PRAVACHOL) 20 MG tablet, Take 20 mg by mouth once daily., Disp: , Rfl:     tamsulosin (FLOMAX) 0.4 mg Cap, TAKE 1 CAPSULE (0.4 MG TOTAL) BY MOUTH ONCE DAILY. TAKE AT BEDTIME, Disp: 90 capsule, Rfl: 3    triamcinolone acetonide 0.1% (KENALOG) 0.1 % cream, Apply topically 2 (two) times daily. for 10 days, Disp: 30 g, Rfl: 1

## 2018-10-09 ENCOUNTER — OFFICE VISIT (OUTPATIENT)
Dept: FAMILY MEDICINE | Facility: CLINIC | Age: 78
End: 2018-10-09
Payer: MEDICARE

## 2018-10-09 VITALS
HEIGHT: 70 IN | DIASTOLIC BLOOD PRESSURE: 73 MMHG | WEIGHT: 225 LBS | HEART RATE: 76 BPM | BODY MASS INDEX: 32.21 KG/M2 | SYSTOLIC BLOOD PRESSURE: 127 MMHG

## 2018-10-09 DIAGNOSIS — R10.31 RIGHT GROIN PAIN: ICD-10-CM

## 2018-10-09 DIAGNOSIS — M71.372 OTHER BURSAL CYST, LEFT ANKLE AND FOOT: Primary | ICD-10-CM

## 2018-10-09 DIAGNOSIS — S76.211D STRAIN OF ADDUCTOR MAGNUS MUSCLE OF RIGHT LOWER EXTREMITY, SUBSEQUENT ENCOUNTER: ICD-10-CM

## 2018-10-09 PROBLEM — S76.211A STRAIN OF ADDUCTOR MAGNUS MUSCLE OF RIGHT LOWER EXTREMITY: Status: ACTIVE | Noted: 2018-10-09

## 2018-10-09 PROCEDURE — 3078F DIAST BP <80 MM HG: CPT | Mod: ,,, | Performed by: INTERNAL MEDICINE

## 2018-10-09 PROCEDURE — 99213 OFFICE O/P EST LOW 20 MIN: CPT | Mod: ,,, | Performed by: INTERNAL MEDICINE

## 2018-10-09 PROCEDURE — 1101F PT FALLS ASSESS-DOCD LE1/YR: CPT | Mod: ,,, | Performed by: INTERNAL MEDICINE

## 2018-10-09 PROCEDURE — 3074F SYST BP LT 130 MM HG: CPT | Mod: ,,, | Performed by: INTERNAL MEDICINE

## 2018-10-09 RX ORDER — FLUTICASONE PROPIONATE 50 MCG
SPRAY, SUSPENSION (ML) NASAL
Refills: 12 | COMMUNITY
Start: 2018-09-12 | End: 2021-03-04

## 2018-10-09 NOTE — PATIENT INSTRUCTIONS
Groin Strain (Adult)     A groin strain is a stretching or partial tearing of the muscle in the lower abdomen or upper thigh. This may happen because of too much coughing, heavy lifting, or active sports. The pain may last for several days to weeks, depending on how bad the stretch or tear is. It will generally get better with rest, ice, and anti-inflammatory medicines.  A groin strain can lead to a groin hernia. This is also called an inguinal hernia. A hernia is a complete tear of the abdominal muscle. This allows fat or the intestines to bulge out and create a visible bump just above the thigh crease. This is a more serious problem and may need surgery to repair it. When you lie down, the bump should get smaller or disappear completely. If it doesnt, and you are not able to flatten it with your hand, you need medical attention right away.  Home care  · Avoid heavy lifting, straining, or any activities that cause groin pain.  · You may use over-the-counter pain medicine to control pain, unless another pain medicine was prescribed. If you have chronic liver or kidney disease or ever had a stomach ulcer or GI bleeding, talk with your healthcare provider before using these medicines.  Follow-up care  Follow up with your healthcare provider, or as advised. Make an appointment with your healthcare provider if you develop a bump in the area of the groin strain.  When to seek medical advice  Call your healthcare provider right away if any of these occur:  · Increasing pain in the area of the groin strain  · Tender bump just above the groin crease that does not flatten when you lie down or press on it  · Overall abdominal swelling or pain  · Fever of 100.4°F (38°C) or above lasting for 24 to 48 hours  · Repeated vomiting  · Pain that moves to the lower right abdomen, just below the waistline, or spreads to the back  Date Last Reviewed: 11/19/2015  © 3934-3193 [employer]. [address] [place] [address], [place] [address]. All rights reserved. This information is not intended as a substitute for professional medical care. Always follow your healthcare professional's instructions.

## 2018-10-09 NOTE — PROGRESS NOTES
Subjective:       Patient ID: Nikolay Barraza is a 77 y.o. male.    Chief Complaint: Groin Pain and Mass (ankle has bump )    Patient continues to have groin pain. The pain is localized on the right supra pubic/pubic region. Whenever he stretches his right thigh he experiences discomfort. He believes is a hernia there. Apparently had seen a surgeon who did not find anything significant there. He recalls stretching his leg and having a groin injury. He does not have any problems passing urine. Once in a while he gets constipated. There is no weight loss. He does have prostate symptoms which are stable.    He also complains of pain in the left ankle laterally over the lateral epicondyles. There is a small swelling which moves under the skin and it seems to bother him constantly. He does not recall any injury or penetrating injury.      Groin Pain   The patient's primary symptoms include pelvic pain. The patient's pertinent negatives include no genital itching, penile pain, scrotal swelling or testicular pain. This is a new problem. The current episode started more than 1 month ago. The problem occurs intermittently. The problem has been waxing and waning. The pain is medium. Pertinent negatives include no chest pain, chills, constipation, coughing, dysuria, fever, flank pain or frequency. The treatment provided mild relief. He is not sexually active.   Mass   This is a new problem. The current episode started more than 1 month ago. The problem occurs constantly. The problem has been unchanged. Associated symptoms include fatigue. Pertinent negatives include no arthralgias, chest pain, chills, coughing, diaphoresis or fever.     Please note that the small lesion described as mass about is in the left ankle lateral malleolus.  Past Medical History:   Diagnosis Date    Allergy     Codeine    Closed fracture dislocation of lumbar spine 7/6/2018    #2 vertebra. Patient went to the emergency Department.  Followup with  Erik:    GERD (gastroesophageal reflux disease)     Hyperlipidemia     Hypertension     Right inguinal hernia     Thyroid disease      Social History     Socioeconomic History    Marital status:      Spouse name: Not on file    Number of children: 3    Years of education: Not on file    Highest education level: Not on file   Social Needs    Financial resource strain: Not on file    Food insecurity - worry: Not on file    Food insecurity - inability: Not on file    Transportation needs - medical: Not on file    Transportation needs - non-medical: Not on file   Occupational History    Occupation: [employer]-      Comment:     Tobacco Use    Smoking status: Never Smoker    Smokeless tobacco: Never Used   Substance and Sexual Activity    Alcohol use: No    Drug use: No    Sexual activity: Yes     Partners: Female   Other Topics Concern    Not on file   Social History Narrative    Not on file     Past Surgical History:   Procedure Laterality Date    ADENOIDECTOMY      INGUINAL HERNIA REPAIR Right 07/12/2018    Dr. [facility]    TONSILLECTOMY      VASECTOMY       Family History   Problem Relation Age of Onset    Heart disease Father     Miscarriages / Stillbirths Father     Skin cancer Mother     Cancer Maternal Grandfather     Stomach cancer Paternal Grandfather        Review of Systems   Constitutional: Positive for fatigue. Negative for activity change, appetite change, chills, diaphoresis, fever and unexpected weight change.   HENT: Negative for sinus pressure and sneezing.    Eyes: Negative for visual disturbance.   Respiratory: Negative for cough and chest tightness.    Cardiovascular: Negative for chest pain, palpitations and leg swelling.        Patient has hypertension and hyperlipidemia.   Gastrointestinal: Negative for abdominal distention, blood in stool and constipation.        Stable GERD   Endocrine: Negative for cold  "intolerance, heat intolerance, polydipsia and polyphagia.        Patient has hypothyroidism. Stable on levothyroxine. Current labs are good. Blood sugars are slightly elevated.   Genitourinary: Positive for pelvic pain. Negative for dysuria, flank pain, frequency, hematuria, penile pain, scrotal swelling and testicular pain.        Benign prostate hypertrophy stable on tamsulosin and finasteride.    Persistent right groin pain.   Musculoskeletal: Positive for back pain. Negative for arthralgias and gait problem.   Skin: Negative for pallor.   Neurological:        Some memory issues.   Hematological: Negative for adenopathy. Does not bruise/bleed easily.         Objective:      Blood pressure 127/73, pulse 76, height 5' 10" (1.778 m), weight 102.1 kg (225 lb). Body mass index is 32.28 kg/m².  Physical Exam   Constitutional: He appears well-developed.   HENT:   Head: Atraumatic.   Mouth/Throat: Oropharynx is clear and moist. No oropharyngeal exudate.   Eyes: Conjunctivae and EOM are normal.   Neck: Normal range of motion. Neck supple.   Cardiovascular: Normal rate, regular rhythm and normal heart sounds.   Pulmonary/Chest: Effort normal and breath sounds normal. No respiratory distress. He has no wheezes. He has no rales.   Abdominal: Soft. Bowel sounds are normal. He exhibits no distension and no abdominal bruit. There is no tenderness.   ? Slight bulge on finger in right inguinal canal.   Genitourinary:         Musculoskeletal:        Legs:       Feet:    Patient has a tiny subcentimeter cystic firm and mobile swelling under the skin at the left lateral malleolus of left ankle joint. This seems to be occasionally uncomfortable and painful.    I do not definitely feel any hernia or hydrocele. The pain seems to be more on the pubic symphysis region. Forceful abduction and stretching of the thigh seems to exacerbate this discomfort.   Neurological: He is alert. He has normal reflexes.   Skin: Skin is warm and dry. No " rash noted.   C/O Dry skin   Psychiatric: He has a normal mood and affect.   Nursing note and vitals reviewed.        Assessment:       1. Other bursal cyst, left ankle and foot    2. Right groin pain    3. Strain of adductor umm muscle of right lower extremity, subsequent encounter           Office Visit on 08/03/2018   Component Date Value Ref Range Status    Color, UA 08/03/2018 yellow/ clear   Final    Spec Grav UA 08/03/2018 1,010   Final    pH, UA 08/03/2018 5   Final    WBC, UA 08/03/2018 negative   Final    Nitrite, UA 08/03/2018 negative   Final    Protein 08/03/2018 negative   Final    Glucose, UA 08/03/2018 negative   Final    Ketones, UA 08/03/2018 negative   Final    Urobilinogen, UA 08/03/2018 negative   Final    Bilirubin 08/03/2018 negative   Final    Blood, UA 08/03/2018 negative   Final         Plan:           Other bursal cyst, left ankle and foot    Right groin pain  -     MRI Hip Without Contrast Right; Future; Expected date: 10/09/2018    Strain of adductor umm muscle of right lower extremity, subsequent encounter    I feel patient may have right-sided adductor strain leading to persistent pain in the groin and pubic region.    I will order MRI IF ALLOWED AND IF IT PROVES THE DIAGNOSIS THEN WE'LL GET PHYSICAL THERAPY.    Patient seems to have a small cyst in the left ankle region for which he is going to see Dr. Leighann Prince and see what she sees.    Alternatively I will make referral to orthopedics and see if removal of cyst or painful area is feasible and beneficial.    I'll see him back in 3 months for regular follow-up. Earlier if needed.        Current Outpatient Medications:     ascorbic acid, vitamin C, (VITAMIN C) 500 MG tablet, Take 500 mg by mouth once daily., Disp: , Rfl:     aspirin (ECOTRIN) 81 MG EC tablet, Take 81 mg by mouth once daily., Disp: , Rfl:     b complex vitamins tablet, Take 1 tablet by mouth once daily., Disp: , Rfl:     CALCIUM  CARBONATE/VITAMIN D3 (VITAMIN D-3 ORAL), Take by mouth., Disp: , Rfl:     docusate sodium (COLACE) 50 MG capsule, Take by mouth 2 (two) times daily as needed for Constipation., Disp: , Rfl:     doxycycline (ORACEA) 40 mg capsule, Take 40 mg by mouth once daily., Disp: , Rfl: 3    felodipine (PLENDIL) 5 MG 24 hr tablet, Take 5 mg by mouth 2 (two) times daily. , Disp: , Rfl:     finasteride (PROSCAR) 5 mg tablet, Take 1 tablet (5 mg total) by mouth once daily., Disp: 90 tablet, Rfl: 3    fish oil-omega-3 fatty acids 300-1,000 mg capsule, Take 2 g by mouth once daily., Disp: , Rfl:     ipratropium (ATROVENT) 42 mcg (0.06 %) nasal spray, 2 sprays by Nasal route 2 (two) times daily., Disp: 15 mL, Rfl: 5    lactulose 10 gram/15 ml (CHRONULAC) 10 gram/15 mL (15 mL) solution, Take 15 mLs (10 g total) by mouth 2 (two) times daily., Disp: 200 mL, Rfl: 1    levothyroxine (SYNTHROID) 200 MCG tablet, Take 1 tablet (200 mcg total) by mouth once daily., Disp: 90 tablet, Rfl: 3    losartan (COZAAR) 100 MG tablet, Take 100 mg by mouth once daily., Disp: , Rfl: 0    multivitamin (ONE DAILY MULTIVITAMIN) per tablet, Take 1 tablet by mouth once daily., Disp: , Rfl:     pravastatin (PRAVACHOL) 20 MG tablet, Take 20 mg by mouth once daily., Disp: , Rfl:     tamsulosin (FLOMAX) 0.4 mg Cap, TAKE 1 CAPSULE (0.4 MG TOTAL) BY MOUTH ONCE DAILY. TAKE AT BEDTIME, Disp: 90 capsule, Rfl: 3    fluticasone (FLONASE) 50 mcg/actuation nasal spray, SPRAY 1 SPRAY NASALLY AS DIRECTED TWICE A DAY, Disp: , Rfl: 12    triamcinolone acetonide 0.1% (KENALOG) 0.1 % cream, Apply topically 2 (two) times daily. for 10 days, Disp: 30 g, Rfl: 1

## 2019-01-07 ENCOUNTER — OFFICE VISIT (OUTPATIENT)
Dept: FAMILY MEDICINE | Facility: CLINIC | Age: 79
End: 2019-01-07
Payer: MEDICARE

## 2019-01-07 VITALS
WEIGHT: 232 LBS | HEART RATE: 96 BPM | BODY MASS INDEX: 33.21 KG/M2 | HEIGHT: 70 IN | SYSTOLIC BLOOD PRESSURE: 115 MMHG | DIASTOLIC BLOOD PRESSURE: 69 MMHG

## 2019-01-07 DIAGNOSIS — E78.2 MULTIPLE-TYPE HYPERLIPIDEMIA: ICD-10-CM

## 2019-01-07 DIAGNOSIS — Z12.5 SCREENING FOR PROSTATE CANCER: ICD-10-CM

## 2019-01-07 DIAGNOSIS — I10 BENIGN ESSENTIAL HYPERTENSION: Primary | ICD-10-CM

## 2019-01-07 DIAGNOSIS — E03.8 SECONDARY HYPOTHYROIDISM: ICD-10-CM

## 2019-01-07 PROCEDURE — 99214 PR OFFICE/OUTPT VISIT, EST, LEVL IV, 30-39 MIN: ICD-10-PCS | Mod: ,,, | Performed by: INTERNAL MEDICINE

## 2019-01-07 PROCEDURE — 99214 OFFICE O/P EST MOD 30 MIN: CPT | Mod: ,,, | Performed by: INTERNAL MEDICINE

## 2019-01-07 RX ORDER — BETAMETHASONE DIPROPIONATE 0.5 MG/G
1 CREAM TOPICAL DAILY PRN
Refills: 1 | COMMUNITY
Start: 2018-12-12 | End: 2022-04-12

## 2019-01-07 NOTE — PATIENT INSTRUCTIONS
Taking Your Blood Pressure  Blood pressure is the force of blood against the artery wall as it moves from the heart through the blood vessels. You can take your own blood pressure reading using a digital monitor. Take your readings the same each time, using the same arm. Take readings as often as your healthcare provider instructs.  About blood pressure monitors  Blood pressure monitors are designed for certain ages and cases. You can find monitors for older adults, for pregnant women, and for children. Make sure the one you choose is the right one for your age and situation.  The American Heart Association recommends an automatic cuff monitor that fits on your upper arm (bicep). The cuff should fit your arm size. A cuff thats too large or too small will not give an accurate reading. Measure around your upper arm to find your size.  Monitors that attach to your finger or wrist are not as accurate as monitors for your upper arm.  Ask your healthcare provider for help in choosing a monitor. Bring your monitor to your next provider visit if you need help in using it the correct way.  The steps below are general instructions for using an automatic digital monitor.  Step 1. Relax    · Take your blood pressure at the same time every day, such as in the morning or evening, or at the time your healthcare provider recommends.  · Wait at least a half-hour after smoking, eating, or exercising. Don't drink coffee, tea, soda, or other caffeinated beverages before checking your blood pressure.  · Sit comfortably at a table with both feet on the floor. Do not cross your legs or feet. Place the monitor near you.  · Rest for a few minutes before you begin.  Step 2. Wrap the cuff    · Place your arm on the table, palm up. Your arm should be at the level of your heart. Wrap the cuff around your upper arm, just above your elbow. Its best done on bare skin, not over clothing. Most cuffs will indicate where the brachial artery (the  blood vessel in the middle of the arm at the inner side of the elbow) should line up with the cuff. Look in your monitor's instruction booklet for an illustration. You can also bring your cuff to your healthcare provider and have them show you how to correctly place the cuff.  Step 3. Inflate the cuff    · Push the button that starts the pump.  · The cuff will tighten, then loosen.  · The numbers will change. When they stop changing, your blood pressure reading will appear.  · Take 2 or 3 readings one minute apart.  Step 4. Write down the results of each reading    · Write down your blood pressure numbers for each reading. Note the date and time. Keep your results in one place, such as a notebook. Even if your monitor has a built-in memory, keep a hard copy of the readings.  · Remove the cuff from your arm. Turn off the machine.  · Bring your blood pressure records with your healthcare providers at each visit.  · If you start a new blood pressure medicine, note the day you started the new medicine. Also note the day if you change the dose of your medicine. This information goes on your blood pressure recording sheet. This will help your healthcare provider monitor how well the medicine changes are working.  · Ask your healthcare provider what numbers should prompt you to call him or her. Also ask what numbers should prompt you to get help right away.  Date Last Reviewed: 11/1/2016  © 2233-5857 [employer]. [address] [place] [address], [place] [address]. All rights reserved. This information is not intended as a substitute for professional medical care. Always follow your healthcare professional's instructions.

## 2019-01-07 NOTE — PROGRESS NOTES
Subjective:       Patient ID: Nikolay Barraza is a 78 y.o. male.    Chief Complaint: Hypertension (lab review ); Hyperlipidemia; and Thyroid Problem    Mr. Scot Barraza is a pleasant 78-year-old  gentleman who comes for follow-up. He has underlying hypertension, dyslipidemia, benign prostatic hypertrophy and hypothyroidism. His blood pressures are generally under control at home.    Today his pulse rate is slightly elevated at 90 which is not the usual case.    He also had seen his dermatologist was prescribed him dexamethasone cream for a rash on the left ankle medially. Probably this rash is due to status dermatitis and varicose veins.    He also has a either a cancerous or precancerous lesion in the left hand for which she is following up with a dermatologist.    Prostate symptoms are stable at this point. Vision is okay with his glasses. 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 Memories fairly stable. Sleep is okay at night.    I've taken the opportunity to review his labs also.      Hypertension   This is a chronic problem. The current episode started more than 1 year ago. The problem is controlled. Pertinent negatives include no chest pain or palpitations. Risk factors for coronary artery disease include sedentary lifestyle, obesity and male gender. The current treatment provides moderate improvement. Identifiable causes of hypertension include a thyroid problem. There is no history of pheochromocytoma or renovascular disease.   Hyperlipidemia   This is a chronic problem. The current episode started more than 1 year ago. Exacerbating diseases include obesity. Pertinent negatives include no chest pain. Current antihyperlipidemic treatment includes statins. The current treatment provides moderate improvement of lipids. Compliance problems include adherence to diet.  Risk factors for coronary artery disease include a sedentary lifestyle, hypertension, male sex, obesity and dyslipidemia.    Thyroid Problem   Presents for follow-up visit. Symptoms include fatigue. Patient reports no anxiety, cold intolerance, constipation, depressed mood, diaphoresis, diarrhea, dry skin, hair loss, heat intolerance, hoarse voice, leg swelling, nail problem, palpitations, visual change, weight gain or weight loss. The symptoms have been stable. His past medical history is significant for hyperlipidemia.       Past Medical History:   Diagnosis Date    Allergy     Codeine    Closed fracture dislocation of lumbar spine 7/6/2018    #2 vertebra. Patient went to the emergency Department.  Followup with Erik:    GERD (gastroesophageal reflux disease)     Hyperlipidemia     Hypertension     Right inguinal hernia     Thyroid disease      Social History     Socioeconomic History    Marital status:      Spouse name: Not on file    Number of children: 3    Years of education: Not on file    Highest education level: Not on file   Social Needs    Financial resource strain: Not on file    Food insecurity - worry: Not on file    Food insecurity - inability: Not on file    Transportation needs - medical: Not on file    Transportation needs - non-medical: Not on file   Occupational History    Occupation: [employer]-      Comment:     Tobacco Use    Smoking status: Never Smoker    Smokeless tobacco: Never Used   Substance and Sexual Activity    Alcohol use: No    Drug use: No    Sexual activity: Yes     Partners: Female   Other Topics Concern    Not on file   Social History Narrative    Not on file     Past Surgical History:   Procedure Laterality Date    ADENOIDECTOMY      INGUINAL HERNIA REPAIR Right 07/12/2018    [facility]    TONSILLECTOMY      VASECTOMY       Family History   Problem Relation Age of Onset    Heart disease Father     Miscarriages / Stillbirths Father     Skin cancer Mother     Cancer Maternal Grandfather     Stomach cancer Paternal  "Grandfather        Review of Systems   Constitutional: Positive for fatigue. Negative for activity change, appetite change, chills, diaphoresis, fever, unexpected weight change (gained 7 lbs), weight gain and weight loss.   HENT: Negative for hoarse voice, sinus pressure and sneezing.    Eyes: Negative for visual disturbance.   Respiratory: Negative for cough and chest tightness.    Cardiovascular: Negative for chest pain, palpitations and leg swelling.        Patient has hypertension and hyperlipidemia.   Gastrointestinal: Negative for abdominal distention, blood in stool, constipation and diarrhea.        Stable GERD   Endocrine: Negative for cold intolerance, heat intolerance, polydipsia and polyphagia.        Patient has hypothyroidism. Stable on levothyroxine. Current labs are good. Blood sugars are slightly elevated.   Genitourinary: Negative for dysuria and frequency.        Benign prostate hypertrophy stable on tamsulosin and finasteride.    Persistent right groin pain.   Musculoskeletal: Positive for back pain. Negative for arthralgias and gait problem.   Skin: Positive for rash. Negative for pallor. Wound: left ankle.   Neurological:        Some memory issues.   Hematological: Negative for adenopathy. Does not bruise/bleed easily.   Psychiatric/Behavioral: The patient is not nervous/anxious.          Objective:      Blood pressure 115/69, pulse 96, height 5' 10" (1.778 m), weight 105.2 kg (232 lb). Body mass index is 33.29 kg/m².  Physical Exam   Constitutional: He appears well-developed.   HENT:   Head: Atraumatic.   Mouth/Throat: Oropharynx is clear and moist. No oropharyngeal exudate.   Eyes: Conjunctivae and EOM are normal.   Neck: Normal range of motion. Neck supple.   Cardiovascular: Normal rate, regular rhythm and normal heart sounds.   Pulmonary/Chest: Effort normal and breath sounds normal. No respiratory distress. He has no wheezes. He has no rales.   Abdominal: Soft. Bowel sounds are normal. He " exhibits no distension and no abdominal bruit. There is no tenderness.   Musculoskeletal:        Feet:    Neurological: He is alert. He has normal reflexes.   Skin: Skin is warm and dry. No rash noted.   C/O Dry skin   Psychiatric: He has a normal mood and affect.   Nursing note and vitals reviewed.        Assessment:       1. Benign essential hypertension    2. Secondary hypothyroidism    3. Multiple-type hyperlipidemia    4. Screening for prostate cancer           Patient's labs have been reviewed as ordered by Dr. Sellers. Blood glucoses 101. BUN 20. Creatinine 1.20. Estimated GFR 58. Serum calcium is 9.5. Sodium-142. Potassium 4.2. LDL cholesterol is 100. Triglycerides are 166. PTH intact is 28.9. (Why this test is done is unknown dose (liver function tests are normal. CBC shows a hemoglobin of 14.0 and hematocrit of 44. Please see the media section.      Plan:           Benign essential hypertension  -     Microalbumin/creatinine urine ratio; Future; Expected date: 01/07/2019    Secondary hypothyroidism  -     TSH; Future; Expected date: 01/07/2019    Multiple-type hyperlipidemia    Screening for prostate cancer  -     PSA, Screening; Future; Expected date: 01/07/2019      Patient's blood pressures are doing fairly okay. His cholesterol level is fairly under control. His pulse rate tends to be little high. Last year his PSA was in good range. Thyroid was also in good range last year but this year it has not been done as yet. This will be done next visit.    Patient has been advised to give In between steroid cream for the left ankle.    He has been advised to continue to walk and do some exercise. Weight management is also important.    Otherwise all said and done, if he is doing okay I'll be happy to see him in about 6 months time.      Current Outpatient Medications:     ascorbic acid, vitamin C, (VITAMIN C) 500 MG tablet, Take 500 mg by mouth once daily., Disp: , Rfl:     aspirin (ECOTRIN) 81 MG EC  tablet, Take 81 mg by mouth once daily., Disp: , Rfl:     augmented betamethasone dipropionate (DIPROLENE-AF) 0.05 % cream, 1 Dose daily as needed., Disp: , Rfl: 1    b complex vitamins tablet, Take 1 tablet by mouth once daily., Disp: , Rfl:     CALCIUM CARBONATE/VITAMIN D3 (VITAMIN D-3 ORAL), Take by mouth., Disp: , Rfl:     docusate sodium (COLACE) 50 MG capsule, Take by mouth 2 (two) times daily as needed for Constipation., Disp: , Rfl:     doxycycline (ORACEA) 40 mg capsule, Take 40 mg by mouth once daily., Disp: , Rfl: 3    felodipine (PLENDIL) 5 MG 24 hr tablet, Take 5 mg by mouth 2 (two) times daily. , Disp: , Rfl:     finasteride (PROSCAR) 5 mg tablet, Take 1 tablet (5 mg total) by mouth once daily., Disp: 90 tablet, Rfl: 3    fish oil-omega-3 fatty acids 300-1,000 mg capsule, Take 2 g by mouth once daily., Disp: , Rfl:     fluticasone (FLONASE) 50 mcg/actuation nasal spray, SPRAY 1 SPRAY NASALLY AS DIRECTED TWICE A DAY, Disp: , Rfl: 12    ipratropium (ATROVENT) 42 mcg (0.06 %) nasal spray, 2 sprays by Nasal route 2 (two) times daily., Disp: 15 mL, Rfl: 5    lactulose 10 gram/15 ml (CHRONULAC) 10 gram/15 mL (15 mL) solution, Take 15 mLs (10 g total) by mouth 2 (two) times daily., Disp: 200 mL, Rfl: 1    levothyroxine (SYNTHROID) 200 MCG tablet, Take 1 tablet (200 mcg total) by mouth once daily., Disp: 90 tablet, Rfl: 3    losartan (COZAAR) 100 MG tablet, Take 100 mg by mouth once daily., Disp: , Rfl: 0    multivitamin (ONE DAILY MULTIVITAMIN) per tablet, Take 1 tablet by mouth once daily., Disp: , Rfl:     pravastatin (PRAVACHOL) 20 MG tablet, Take 20 mg by mouth once daily., Disp: , Rfl:     tamsulosin (FLOMAX) 0.4 mg Cap, TAKE 1 CAPSULE (0.4 MG TOTAL) BY MOUTH ONCE DAILY. TAKE AT BEDTIME, Disp: 90 capsule, Rfl: 3    triamcinolone acetonide 0.1% (KENALOG) 0.1 % cream, Apply topically 2 (two) times daily. for 10 days, Disp: 30 g, Rfl: 1

## 2019-05-01 RX ORDER — LEVOTHYROXINE SODIUM 200 UG/1
200 TABLET ORAL DAILY
Qty: 90 TABLET | Refills: 3 | Status: SHIPPED | OUTPATIENT
Start: 2019-05-01 | End: 2020-04-14

## 2019-06-28 LAB
COMPLEXED PSA SERPL-MCNC: 0.11 NG/ML (ref 0–3)
CREATININE RANDOM URINE: 159 MG/DL
MICROALBUM.,U,RANDOM: 7.8 MCG/ML (ref 0–19.9)
MICROALBUMIN/CREATININE RATIO: 5 (ref 0–30)
TSH SERPL DL<=0.005 MIU/L-ACNC: 5.25 ULU/ML (ref 0.3–5.6)

## 2019-06-28 NOTE — PROGRESS NOTES
Notify patient that his PSA test and thyroid test are within normal range. He should keep his regular follow-up.

## 2019-07-01 ENCOUNTER — TELEPHONE (OUTPATIENT)
Dept: FAMILY MEDICINE | Facility: CLINIC | Age: 79
End: 2019-07-01

## 2019-07-01 NOTE — TELEPHONE ENCOUNTER
----- Message from Lalo Cuellar MD sent at 6/28/2019  4:43 PM CDT -----  The results are within acceptable range.  Please keep regular follow up.

## 2019-07-08 ENCOUNTER — OFFICE VISIT (OUTPATIENT)
Dept: FAMILY MEDICINE | Facility: CLINIC | Age: 79
End: 2019-07-08
Payer: MEDICARE

## 2019-07-08 VITALS
DIASTOLIC BLOOD PRESSURE: 76 MMHG | WEIGHT: 228 LBS | BODY MASS INDEX: 32.64 KG/M2 | HEIGHT: 70 IN | SYSTOLIC BLOOD PRESSURE: 122 MMHG | HEART RATE: 67 BPM

## 2019-07-08 DIAGNOSIS — E78.2 MULTIPLE-TYPE HYPERLIPIDEMIA: ICD-10-CM

## 2019-07-08 DIAGNOSIS — N40.0 BENIGN PROSTATIC HYPERPLASIA WITHOUT URINARY OBSTRUCTION: ICD-10-CM

## 2019-07-08 DIAGNOSIS — Z23 NEED FOR SHINGLES VACCINE: ICD-10-CM

## 2019-07-08 DIAGNOSIS — E03.8 SECONDARY HYPOTHYROIDISM: ICD-10-CM

## 2019-07-08 DIAGNOSIS — I10 BENIGN ESSENTIAL HYPERTENSION: Primary | ICD-10-CM

## 2019-07-08 PROBLEM — S32.009A CLOSED FRACTURE DISLOCATION OF LUMBAR SPINE: Status: RESOLVED | Noted: 2018-07-06 | Resolved: 2019-07-08

## 2019-07-08 PROBLEM — M71.372 OTHER BURSAL CYST, LEFT ANKLE AND FOOT: Status: RESOLVED | Noted: 2018-10-09 | Resolved: 2019-07-08

## 2019-07-08 PROBLEM — R10.31 RIGHT GROIN PAIN: Status: RESOLVED | Noted: 2018-07-25 | Resolved: 2019-07-08

## 2019-07-08 PROBLEM — H54.7 VISUAL PROBLEMS: Status: RESOLVED | Noted: 2017-05-17 | Resolved: 2019-07-08

## 2019-07-08 PROBLEM — S76.211A STRAIN OF ADDUCTOR MAGNUS MUSCLE OF RIGHT LOWER EXTREMITY: Status: RESOLVED | Noted: 2018-10-09 | Resolved: 2019-07-08

## 2019-07-08 PROCEDURE — 99213 PR OFFICE/OUTPT VISIT, EST, LEVL III, 20-29 MIN: ICD-10-PCS | Mod: ,,, | Performed by: INTERNAL MEDICINE

## 2019-07-08 PROCEDURE — 99213 OFFICE O/P EST LOW 20 MIN: CPT | Mod: ,,, | Performed by: INTERNAL MEDICINE

## 2019-07-08 NOTE — PATIENT INSTRUCTIONS
Taking Your Blood Pressure  Blood pressure is the force of blood against the artery wall as it moves from the heart through the blood vessels. You can take your own blood pressure reading using a digital monitor. Take your readings the same each time, using the same arm. Take readings as often as your healthcare provider instructs.  About blood pressure monitors  Blood pressure monitors are designed for certain ages and cases. You can find monitors for older adults, for pregnant women, and for children. Make sure the one you choose is the right one for your age and situation.  The American Heart Association recommends an automatic cuff monitor that fits on your upper arm (bicep). The cuff should fit your arm size. A cuff thats too large or too small will not give an accurate reading. Measure around your upper arm to find your size.  Monitors that attach to your finger or wrist are not as accurate as monitors for your upper arm.  Ask your healthcare provider for help in choosing a monitor. Bring your monitor to your next provider visit if you need help in using it the correct way.  The steps below are general instructions for using an automatic digital monitor.  Step 1. Relax    · Take your blood pressure at the same time every day, such as in the morning or evening, or at the time your healthcare provider recommends.  · Wait at least a half-hour after smoking, eating, or exercising. Don't drink coffee, tea, soda, or other caffeinated beverages before checking your blood pressure.  · Sit comfortably at a table with both feet on the floor. Do not cross your legs or feet. Place the monitor near you.  · Rest for a few minutes before you begin.  Step 2. Wrap the cuff    · Place your arm on the table, palm up. Your arm should be at the level of your heart. Wrap the cuff around your upper arm, just above your elbow. Its best done on bare skin, not over clothing. Most cuffs will indicate where the brachial artery (the  blood vessel in the middle of the arm at the inner side of the elbow) should line up with the cuff. Look in your monitor's instruction booklet for an illustration. You can also bring your cuff to your healthcare provider and have them show you how to correctly place the cuff.  Step 3. Inflate the cuff    · Push the button that starts the pump.  · The cuff will tighten, then loosen.  · The numbers will change. When they stop changing, your blood pressure reading will appear.  · Take 2 or 3 readings one minute apart.  Step 4. Write down the results of each reading    · Write down your blood pressure numbers for each reading. Note the date and time. Keep your results in one place, such as a notebook. Even if your monitor has a built-in memory, keep a hard copy of the readings.  · Remove the cuff from your arm. Turn off the machine.  · Bring your blood pressure records with your healthcare providers at each visit.  · If you start a new blood pressure medicine, note the day you started the new medicine. Also note the day if you change the dose of your medicine. This information goes on your blood pressure recording sheet. This will help your healthcare provider monitor how well the medicine changes are working.  · Ask your healthcare provider what numbers should prompt you to call him or her. Also ask what numbers should prompt you to get help right away.  Date Last Reviewed: 11/1/2016 © 2000-2017 [employer]. [address] [place] [address]. All rights reserved. This information is not intended as a substitute for professional medical care. Always follow your healthcare professional's instructions.        BPH (Enlarged Prostate)  The prostate is a gland at the base of the bladder. As some men get older, the prostate may get bigger in size. This problem is called benign prostatic hyperplasia (BPH). BPH puts pressure on the urethra. This is the tube that carries urine from the bladder to the  penis. It may interfere with the flow of urine. It may also keep the bladder from emptying fully.    Symptoms of BPH include trouble starting urination and feeling as though the bladder isnt emptying all the way. It also includes a weak urine stream, dribbling and leaking of urine, and frequent and urgent urination (especially at night). BPH can increase the risk of urinary infections. It can also block off urine flow completely. If this occurs, a thin tube (catheter) may be passed into the bladder to help drain urine.  If symptoms are mild, no treatment may be needed right now. If symptoms are more severe, treatment is likely needed. The goal of treatment is to improve urine flow and reduce symptoms. Treatments can include medicine and procedures. Your healthcare provider will discuss treatment options with you as needed.  Home care  The following guidelines will help you care for yourself at home:  · Urinate as soon as you feel the urge. Don't try to hold your urine.  · Don't limit your fluid intake during the day. Drink 6 to 8 glasses of water or liquids a day. This prevents bacteria from building up in the bladder.  · Avoid drinking fluids after dinner to help reduce urination during the night.  · Avoid medicines that can worsen your symptoms. These include certain cold and allergy medicines and antidepressants. Diuretics used for high blood pressure can also worsen symptoms. Talk to your doctor about the medicines you take. Other choices may work better for you.  Prostate cancer screening  BPH does not increase the risk of prostate cancer. But because prostate cancer is a common cancer in men, screening is sometimes recommended. This may help detect the cancer in its early stages when treatment is most effective. Factors that can increase the risk of prostate cancer include being -American or having a father or brother who had prostate cancer. A high-fat diet may also increase the risk of prostate  cancer. Talk to your healthcare provider to see whether you should be screened for prostate cancer.  Follow-up care  Follow up with your healthcare provider, or as advised  To learn more, go to:  · National Kidney & Urologic Diseases Information Clearinghouse  kidney.niddk.nih.gov, 971.573.7095  When to seek medical advice  Call your healthcare provider right away if any of these occur:  · Fever of 100.4°F (38.0°C) or higher, or as advised  · Unable to pass urine for 8 hours  · Increasing pressure or pain in your bladder (lower abdomen)  · Blood in the urine  · Increasing low back pain, not related to injury  · Symptoms of urinary infection (increased urge to urinate, burning when passing urine, foul-smelling urine)  Date Last Reviewed: 7/1/2016  © 5440-2453 [employer]. [address] [place] [address], [place] [address]. All rights reserved. This information is not intended as a substitute for professional medical care. Always follow your healthcare professional's instructions.

## 2019-07-08 NOTE — LETTER
July 8, 2019        Tio Cespedes MD  [address]  [address]  [place] [address]             [facility]  [address]  [place] [address]  Phone: [phone]  Fax: [phone]   Patient: Nikolay Barraza   MR Number: [id number]   YOB: 1940   Date of Visit: 7/8/2019     Dear Dr. Cespedes,     Patient is medically clear for proposed knee surgeries. I have not done any labs at this point.     Dr. Sellers is supposed to see him for cardiac clearance. If you order any labs please forward be a copy for the same.    Sincerely,      Lalo Cuellar MD            CC  No Recipients    Enclosure

## 2019-07-08 NOTE — PROGRESS NOTES
Subjective:       Patient ID: Nikolay Barraza is a 78 y.o. male.    Chief Complaint: Hyperlipidemia; Hypertension; Thyroid Problem; Prostate Problem; and Hemorrhoids    Mr. Scot Barraza is a pleasant 78-year-old  gentleman who comes for follow-up. He has underlying hypertension, dyslipidemia, benign prostatic hypertrophy and hypothyroidism. His blood pressures are generally under control at home.    He suffers from occasional hemorrhoidal bleeding. This is when he gets constipated.    Prostate symptoms are stable at this point. Vision is okay with his glasses. 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 Memory is fairly stable. Sleep is okay at night.      Patient also proposes to get bilateral total knee replacements by Dr. Cespedes because of chronic degenerative changes. Medical opinion has been sought as to the need for clearance.      Hyperlipidemia   This is a chronic problem. The current episode started more than 1 year ago. Exacerbating diseases include obesity. Pertinent negatives include no chest pain. Current antihyperlipidemic treatment includes statins. The current treatment provides moderate improvement of lipids. Compliance problems include adherence to diet.  Risk factors for coronary artery disease include a sedentary lifestyle, hypertension, male sex, obesity and dyslipidemia.   Hypertension   This is a chronic problem. The current episode started more than 1 year ago. The problem is controlled. Pertinent negatives include no chest pain or palpitations. Risk factors for coronary artery disease include sedentary lifestyle, obesity and male gender. The current treatment provides moderate improvement. Identifiable causes of hypertension include a thyroid problem. There is no history of pheochromocytoma or renovascular disease.   Thyroid Problem   Presents for follow-up visit. Symptoms include constipation. Patient reports no anxiety, cold intolerance, depressed mood, diaphoresis,  diarrhea, dry skin, fatigue, hair loss, heat intolerance, hoarse voice, leg swelling, nail problem, palpitations, visual change, weight gain or weight loss. The symptoms have been stable. His past medical history is significant for hyperlipidemia.       Past Medical History:   Diagnosis Date    Allergy     Codeine    Closed fracture dislocation of lumbar spine 7/6/2018    #2 vertebra. Patient went to the emergency Department.  Followup with Erik:    GERD (gastroesophageal reflux disease)     Hyperlipidemia     Hypertension     Right inguinal hernia     Thyroid disease      Social History     Socioeconomic History    Marital status:      Spouse name: Alisa galvin    Number of children: 3    Years of education: Not on file    Highest education level: Not on file   Occupational History    Occupation: [employer]-      Comment:     Social Needs    Financial resource strain: Not on file    Food insecurity:     Worry: Not on file     Inability: Not on file    Transportation needs:     Medical: Not on file     Non-medical: Not on file   Tobacco Use    Smoking status: Never Smoker    Smokeless tobacco: Never Used   Substance and Sexual Activity    Alcohol use: No    Drug use: No    Sexual activity: Yes     Partners: Female   Lifestyle    Physical activity:     Days per week: Not on file     Minutes per session: Not on file    Stress: Not at all   Relationships    Social connections:     Talks on phone: Not on file     Gets together: Not on file     Attends [employer] service: Not on file     Active member of club or organization: Not on file     Attends meetings of clubs or organizations: Not on file     Relationship status: Not on file   Other Topics Concern    Not on file   Social History Narrative    Not on file     Past Surgical History:   Procedure Laterality Date    ADENOIDECTOMY      INGUINAL HERNIA REPAIR Right 07/12/2018    Dr[facility]     "TONSILLECTOMY      VASECTOMY       Family History   Problem Relation Age of Onset    Heart disease Father     Miscarriages / Stillbirths Father     Skin cancer Mother     Cancer Maternal Grandfather     Stomach cancer Paternal Grandfather        Review of Systems   Constitutional: Negative for activity change, appetite change, chills, diaphoresis, fatigue, fever, unexpected weight change (gained 7 lbs), weight gain and weight loss.   HENT: Negative for hoarse voice, sinus pressure and sneezing.    Eyes: Negative for visual disturbance.   Respiratory: Negative for cough and chest tightness.    Cardiovascular: Negative for chest pain, palpitations and leg swelling.        Patient has hypertension and hyperlipidemia.   Gastrointestinal: Positive for blood in stool and constipation. Negative for abdominal distention and diarrhea.        Stable GERD  Occasional hemorrhoidal bleeding. Secondary to constipation.   Endocrine: Negative for cold intolerance, heat intolerance, polydipsia and polyphagia.        Patient has hypothyroidism. Stable on levothyroxine. Current labs are good. Blood sugars are slightly elevated.   Genitourinary: Negative for dysuria and frequency.        Benign prostate hypertrophy stable on tamsulosin and finasteride.    Persistent right groin pain.   Musculoskeletal: Negative for arthralgias, back pain and gait problem.   Skin: Negative for pallor and rash. Wound: left ankle.   Neurological:        Some memory issues.   Hematological: Negative for adenopathy. Does not bruise/bleed easily.   Psychiatric/Behavioral: The patient is not nervous/anxious.          Objective:      Blood pressure 122/76, pulse 67, height 5' 10" (1.778 m), weight 103.4 kg (228 lb). Body mass index is 32.71 kg/m².  Physical Exam   Constitutional: He appears well-developed.   HENT:   Head: Atraumatic.   Mouth/Throat: Oropharynx is clear and moist. No oropharyngeal exudate.   Eyes: Conjunctivae and EOM are normal.   Neck: " Normal range of motion. Neck supple.   Cardiovascular: Normal rate, regular rhythm and normal heart sounds.   Pulmonary/Chest: Effort normal and breath sounds normal. No respiratory distress. He has no wheezes. He has no rales.   Abdominal: Soft. Bowel sounds are normal. He exhibits no distension and no abdominal bruit. There is no tenderness.   Musculoskeletal: He exhibits no edema, tenderness or deformity.   Neurological: He is alert. He has normal reflexes.   Skin: Skin is warm and dry. No rash noted.   C/O Dry skin   Psychiatric: He has a normal mood and affect.   Nursing note and vitals reviewed.        Assessment:       1. Benign essential hypertension    2. Multiple-type hyperlipidemia    3. Secondary hypothyroidism    4. Benign prostatic hyperplasia without urinary obstruction    5. Need for shingles vaccine           Patient's labs have been reviewed. PSA 0.11. Urine microalbumin is 7.8 which is within normal range. TSH is 5.25 which is within normal range.    Plan:           Benign essential hypertension    Multiple-type hyperlipidemia    Secondary hypothyroidism    Benign prostatic hyperplasia without urinary obstruction    Need for shingles vaccine  -     varicella-zoster gE-AS01B, PF, (SHINGRIX, PF,) 50 mcg/0.5 mL injection; Inject 0.5 mLs into the muscle once. First vaccine now and repeat Booster dose between 2-6 months. for 1 dose  Dispense: 0.5 mL; Refill: 1      Patient's blood pressures are doing fairly okay. His cholesterol level is fairly under control. His pulse rate tends to be little high. He has been advised to continue to walk and do some exercise. Weight management is also important.    Otherwise all said and done, if he is doing okay I'll be happy to see him in about 6 months time.    Patient is medically clear for proposed knee surgeries. I'm wondering if he had a course of physical therapy or not.  Once he is close to surgery, he can come back again for final clearance.    Dr. Sellers  should give the cardiac clearance.        Current Outpatient Medications:     ascorbic acid, vitamin C, (VITAMIN C) 500 MG tablet, Take 500 mg by mouth once daily., Disp: , Rfl:     aspirin (ECOTRIN) 81 MG EC tablet, Take 81 mg by mouth once daily., Disp: , Rfl:     augmented betamethasone dipropionate (DIPROLENE-AF) 0.05 % cream, 1 Dose daily as needed., Disp: , Rfl: 1    b complex vitamins tablet, Take 1 tablet by mouth once daily., Disp: , Rfl:     CALCIUM CARBONATE/VITAMIN D3 (VITAMIN D-3 ORAL), Take by mouth., Disp: , Rfl:     docusate sodium (COLACE) 50 MG capsule, Take by mouth 2 (two) times daily as needed for Constipation., Disp: , Rfl:     doxycycline (ORACEA) 40 mg capsule, Take 40 mg by mouth once daily., Disp: , Rfl: 3    felodipine (PLENDIL) 5 MG 24 hr tablet, Take 5 mg by mouth 2 (two) times daily. , Disp: , Rfl:     finasteride (PROSCAR) 5 mg tablet, Take 1 tablet (5 mg total) by mouth once daily., Disp: 90 tablet, Rfl: 3    fish oil-omega-3 fatty acids 300-1,000 mg capsule, Take 2 g by mouth once daily., Disp: , Rfl:     fluticasone (FLONASE) 50 mcg/actuation nasal spray, SPRAY 1 SPRAY NASALLY AS DIRECTED TWICE A DAY, Disp: , Rfl: 12    ipratropium (ATROVENT) 42 mcg (0.06 %) nasal spray, 2 sprays by Nasal route 2 (two) times daily., Disp: 15 mL, Rfl: 5    lactulose 10 gram/15 ml (CHRONULAC) 10 gram/15 mL (15 mL) solution, Take 15 mLs (10 g total) by mouth 2 (two) times daily., Disp: 200 mL, Rfl: 1    levothyroxine (SYNTHROID) 200 MCG tablet, Take 1 tablet (200 mcg total) by mouth once daily., Disp: 90 tablet, Rfl: 3    losartan (COZAAR) 100 MG tablet, Take 100 mg by mouth once daily., Disp: , Rfl: 0    multivitamin (ONE DAILY MULTIVITAMIN) per tablet, Take 1 tablet by mouth once daily., Disp: , Rfl:     pravastatin (PRAVACHOL) 20 MG tablet, Take 20 mg by mouth once daily., Disp: , Rfl:     tamsulosin (FLOMAX) 0.4 mg Cap, TAKE 1 CAPSULE (0.4 MG TOTAL) BY MOUTH ONCE DAILY. TAKE AT  BEDTIME, Disp: 90 capsule, Rfl: 3    triamcinolone acetonide 0.1% (KENALOG) 0.1 % cream, Apply topically 2 (two) times daily. for 10 days, Disp: 30 g, Rfl: 1    varicella-zoster gE-AS01B, PF, (SHINGRIX, PF,) 50 mcg/0.5 mL injection, Inject 0.5 mLs into the muscle once. First vaccine now and repeat Booster dose between 2-6 months. for 1 dose, Disp: 0.5 mL, Rfl: 1

## 2019-07-16 RX ORDER — TAMSULOSIN HYDROCHLORIDE 0.4 MG/1
0.4 CAPSULE ORAL DAILY
Qty: 90 CAPSULE | Refills: 0 | Status: SHIPPED | OUTPATIENT
Start: 2019-07-16 | End: 2019-10-16 | Stop reason: SDUPTHER

## 2019-10-16 DIAGNOSIS — N40.0 BPH WITHOUT URINARY OBSTRUCTION: ICD-10-CM

## 2019-10-16 RX ORDER — TAMSULOSIN HYDROCHLORIDE 0.4 MG/1
0.4 CAPSULE ORAL DAILY
Qty: 90 CAPSULE | Refills: 0 | Status: SHIPPED | OUTPATIENT
Start: 2019-10-16 | End: 2020-01-09

## 2019-10-16 RX ORDER — FINASTERIDE 5 MG/1
TABLET, FILM COATED ORAL
Qty: 90 TABLET | Refills: 0 | Status: SHIPPED | OUTPATIENT
Start: 2019-10-16 | End: 2020-02-13 | Stop reason: SDUPTHER

## 2019-11-07 ENCOUNTER — LAB VISIT (OUTPATIENT)
Dept: LAB | Facility: HOSPITAL | Age: 79
End: 2019-11-07
Attending: INTERNAL MEDICINE
Payer: MEDICARE

## 2019-11-07 DIAGNOSIS — R68.89 MECHANICAL AND MOTOR PROBLEMS WITH INTERNAL ORGANS: ICD-10-CM

## 2019-11-07 DIAGNOSIS — R53.83 FATIGUE: Primary | ICD-10-CM

## 2019-11-07 LAB — TSH SERPL DL<=0.005 MIU/L-ACNC: 4.81 UIU/ML (ref 0.34–5.6)

## 2019-11-07 PROCEDURE — 36415 COLL VENOUS BLD VENIPUNCTURE: CPT

## 2019-11-07 PROCEDURE — 84443 ASSAY THYROID STIM HORMONE: CPT

## 2020-01-08 ENCOUNTER — OFFICE VISIT (OUTPATIENT)
Dept: FAMILY MEDICINE | Facility: CLINIC | Age: 80
End: 2020-01-08
Payer: MEDICARE

## 2020-01-08 VITALS
BODY MASS INDEX: 33.07 KG/M2 | WEIGHT: 231 LBS | OXYGEN SATURATION: 97 % | TEMPERATURE: 98 F | HEART RATE: 73 BPM | SYSTOLIC BLOOD PRESSURE: 127 MMHG | DIASTOLIC BLOOD PRESSURE: 69 MMHG | HEIGHT: 70 IN

## 2020-01-08 DIAGNOSIS — N40.0 BENIGN PROSTATIC HYPERPLASIA WITHOUT URINARY OBSTRUCTION: ICD-10-CM

## 2020-01-08 DIAGNOSIS — I10 BENIGN ESSENTIAL HYPERTENSION: Primary | ICD-10-CM

## 2020-01-08 DIAGNOSIS — E78.2 MULTIPLE-TYPE HYPERLIPIDEMIA: ICD-10-CM

## 2020-01-08 DIAGNOSIS — E03.8 SECONDARY HYPOTHYROIDISM: ICD-10-CM

## 2020-01-08 PROCEDURE — 99214 OFFICE O/P EST MOD 30 MIN: CPT | Performed by: INTERNAL MEDICINE

## 2020-01-08 PROCEDURE — 1159F MED LIST DOCD IN RCRD: CPT | Mod: ,,, | Performed by: INTERNAL MEDICINE

## 2020-01-08 PROCEDURE — 1170F PR FUNCTIONAL STATUS ASSESSED: ICD-10-PCS | Mod: ,,, | Performed by: INTERNAL MEDICINE

## 2020-01-08 PROCEDURE — 1126F AMNT PAIN NOTED NONE PRSNT: CPT | Mod: ,,, | Performed by: INTERNAL MEDICINE

## 2020-01-08 PROCEDURE — 99214 PR OFFICE/OUTPT VISIT, EST, LEVL IV, 30-39 MIN: ICD-10-PCS | Mod: S$PBB,,, | Performed by: INTERNAL MEDICINE

## 2020-01-08 PROCEDURE — 1159F PR MEDICATION LIST DOCUMENTED IN MEDICAL RECORD: ICD-10-PCS | Mod: ,,, | Performed by: INTERNAL MEDICINE

## 2020-01-08 PROCEDURE — 1126F PR PAIN SEVERITY QUANTIFIED, NO PAIN PRESENT: ICD-10-PCS | Mod: ,,, | Performed by: INTERNAL MEDICINE

## 2020-01-08 PROCEDURE — 99214 OFFICE O/P EST MOD 30 MIN: CPT | Mod: S$PBB,,, | Performed by: INTERNAL MEDICINE

## 2020-01-08 PROCEDURE — 1170F FXNL STATUS ASSESSED: CPT | Mod: ,,, | Performed by: INTERNAL MEDICINE

## 2020-01-08 NOTE — PATIENT INSTRUCTIONS
"  Facts About Dietary Fat     Olive oil is a good source of unsaturated fat.     Eating less saturated and trans fat is one of the best things you can do for your heart. Start by finding out which fats are better to use. Then always try to use as little "bad" fat as you can.  Why eat less fat?  · Cutting down on the fat you eat can lower your blood cholesterol levels. This may help prevent clogged arteries from buildup of plaque.  · A low-fat diet can help you lose excess weight. Doing so can lower your blood pressure and reduce your chances of getting diabetes.  · A low-fat diet reduces your risk for stroke and for some cancers.  Unsaturated fat is most healthy  · When you must add fat, use unsaturated fat.  · Unsaturated fats come from plants. They include olive, canola, peanut, corn, avocado, safflower, and sunflower oils.  · Liquid (squeezable) margarine is also mostly unsaturated fat.  · In moderate amounts, unsaturated fat can even be good for your heart.  Saturated fat is less healthy  · Avoid eating saturated fat because it raises your blood cholesterol levels.  · Most saturated fat comes from animals. Foods such as butter, lard, cheese, cream, whole milk, and fatty cuts of meat are high in saturated fat.  · Some oils, such as palm and coconut oils, are also saturated fats.  Trans fat is least healthy  · Also avoid trans fat whenever possible. Even if it's not listed on the food label, look for it in the ingredients in the form of hydrogenated or partially hydrogenated oils.  · This is found in snack foods, shortening, french fries, and stick margarines.  Add flavor without fat  · Sprinkle herbs on fish, chicken, and meat, and in soups.  · Try herbs, lemon juice, or flavored vinegar on vegetables.  · Add chopped onions, garlic, and peppers to flavor beans and rice.   Date Last Reviewed: 5/11/2015  © 8539-0861 [employer]. [address] [place] [address], [place] [address]. All rights reserved. This " information is not intended as a substitute for professional medical care. Always follow your healthcare professional's instructions.

## 2020-01-08 NOTE — PROGRESS NOTES
Subjective:       Patient ID: Nikolay Barraza is a 79 y.o. male.    Chief Complaint: Follow-up; Hyperlipidemia; Hypertension; Thyroid Problem; and Prostate Problem    Mr. Scot Barraza is a pleasant 79-year-old  gentleman who comes for follow-up. He has underlying hypertension, dyslipidemia, benign prostatic hypertrophy and hypothyroidism. His blood pressures are generally under control at home.    Prostate symptoms are stable at this point.  He is on a combination of finasteride and tamsulosin did in fact at this point he does not recall any symptoms at all.    Patient is status post left-sided total knee replacement done by Dr. lewis.  His knees are doing better and has completely recovered.  In the past he was thinking of getting a right knee surgery but at this point he is going to postpone the right needy till he goes to [place] to visit his grandson any comes back.  He does not want to miss that particular trip especially if he has any complication after a potential right knee surgery.        Hyperlipidemia   This is a chronic problem. The current episode started more than 1 year ago. Exacerbating diseases include obesity. Pertinent negatives include no shortness of breath. Current antihyperlipidemic treatment includes statins. The current treatment provides moderate improvement of lipids. Compliance problems include adherence to diet.  Risk factors for coronary artery disease include a sedentary lifestyle, hypertension, male sex, obesity and dyslipidemia.   Hypertension   This is a chronic problem. The current episode started more than 1 year ago. The problem is controlled. Pertinent negatives include no headaches, palpitations or shortness of breath. Risk factors for coronary artery disease include sedentary lifestyle, obesity and male gender. The current treatment provides moderate improvement. Identifiable causes of hypertension include a thyroid problem. There is no history of  pheochromocytoma or renovascular disease.   Thyroid Problem   Presents for follow-up visit. Patient reports no anxiety, cold intolerance, constipation, depressed mood, diarrhea, dry skin, fatigue, hair loss, heat intolerance, hoarse voice, leg swelling, nail problem, palpitations, weight gain or weight loss. The symptoms have been stable. His past medical history is significant for hyperlipidemia.   Benign Prostatic Hypertrophy   This is a chronic problem. The current episode started more than 1 year ago. The problem has been gradually improving since onset. Irritative symptoms do not include frequency, nocturia or urgency. Obstructive symptoms do not include a slower stream or a weak stream. Pertinent negatives include no dysuria. He is not sexually active. Past treatments include tamsulosin and finasteride. The treatment provided significant relief. He has been using treatment for 2 or more years.       Past Medical History:   Diagnosis Date    Allergy     Codeine    Closed fracture dislocation of lumbar spine 7/6/2018    #2 vertebra. Patient went to the emergency Department.  Followup with Erik:    GERD (gastroesophageal reflux disease)     Hyperlipidemia     Hypertension     Right inguinal hernia     Thyroid disease      Social History     Socioeconomic History    Marital status:      Spouse name: Alisa galvin    Number of children: 3    Years of education: Not on file    Highest education level: Not on file   Occupational History    Occupation: [employer]-      Comment:     Social Needs    Financial resource strain: Not on file    Food insecurity:     Worry: Not on file     Inability: Not on file    Transportation needs:     Medical: Not on file     Non-medical: Not on file   Tobacco Use    Smoking status: Never Smoker    Smokeless tobacco: Never Used   Substance and Sexual Activity    Alcohol use: No    Drug use: No    Sexual activity: Yes      Partners: Female   Lifestyle    Physical activity:     Days per week: Not on file     Minutes per session: Not on file    Stress: Not at all   Relationships    Social connections:     Talks on phone: Not on file     Gets together: Not on file     Attends [employer] service: Not on file     Active member of club or organization: Not on file     Attends meetings of clubs or organizations: Not on file     Relationship status: Not on file   Other Topics Concern    Not on file   Social History Narrative    Not on file     Past Surgical History:   Procedure Laterality Date    ADENOIDECTOMY      INGUINAL HERNIA REPAIR Right [facility]/12/2018    [facility]    TONSILLECTOMY      VASECTOMY       Family History   Problem Relation Age of Onset    Heart disease Father     Miscarriages / Stillbirths Father     Skin cancer Mother     Cancer Maternal Grandfather     Stomach cancer Paternal Grandfather        Review of Systems   Constitutional: Negative for activity change, appetite change, fatigue, fever, unexpected weight change (gained 7 lbs), weight gain and weight loss.   HENT: Negative for congestion, hoarse voice, nosebleeds, rhinorrhea, sinus pressure and sneezing.    Eyes: Negative for pain, discharge, itching and visual disturbance.   Respiratory: Negative for cough, chest tightness, shortness of breath and stridor.    Cardiovascular: Negative for palpitations and leg swelling.        Patient has hypertension and hyperlipidemia.   Gastrointestinal: Negative for abdominal distention, blood in stool, constipation and diarrhea.        Stable GERD  Occasional hemorrhoidal bleeding. Secondary to constipation.   Endocrine: Negative for cold intolerance, heat intolerance, polydipsia and polyphagia.        Patient has hypothyroidism. Stable on levothyroxine. Current labs are good. Blood sugars are slightly elevated.   Genitourinary: Negative for dysuria, frequency, nocturia and urgency.        Benign prostate  "hypertrophy stable on tamsulosin and finasteride.    Persistent right groin pain.   Musculoskeletal: Negative for back pain and gait problem.        S/P Left knee TKR   Skin: Negative for pallor. Wound: left ankle.   Neurological: Negative for dizziness, seizures, speech difficulty and headaches.        Some memory issues.   Hematological: Negative for adenopathy. Does not bruise/bleed easily.   Psychiatric/Behavioral: Negative for agitation, behavioral problems and confusion. The patient is not nervous/anxious.          Objective:      Blood pressure 127/69, pulse 73, temperature 98.2 °F (36.8 °C), height 5' 10" (1.778 m), weight 104.8 kg (231 lb), SpO2 97 %. Body mass index is 33.15 kg/m².  Physical Exam   Constitutional: He appears well-developed. No distress.   BMI is 33.   HENT:   Head: Atraumatic.   Mouth/Throat: Oropharynx is clear and moist. No oropharyngeal exudate.   Eyes: Conjunctivae and EOM are normal. Right eye exhibits no discharge. Left eye exhibits no discharge. No scleral icterus.   Neck: Normal range of motion. Neck supple. No JVD present. No tracheal deviation present.   Cardiovascular: Normal rate, regular rhythm and normal heart sounds.   Pulmonary/Chest: Effort normal and breath sounds normal. No respiratory distress. He has no wheezes. He has no rales.   Abdominal: Soft. Bowel sounds are normal. He exhibits no distension and no abdominal bruit. There is no tenderness.   Musculoskeletal: He exhibits no edema, tenderness or deformity.   Lymphadenopathy:     He has no cervical adenopathy.   Neurological: He is alert. He has normal reflexes.   Skin: Skin is warm and dry. No rash noted.   C/O Dry skin   Psychiatric: He has a normal mood and affect.   Nursing note and vitals reviewed.        Assessment:       1. Benign essential hypertension    2. Multiple-type hyperlipidemia    3. Secondary hypothyroidism    4. Benign prostatic hyperplasia without urinary obstruction           Lab Visit on " 11/07/2019   Component Date Value Ref Range Status    TSH 11/07/2019 4.810  0.340 - 5.600 uIU/mL Final     Component      Latest Ref Rng & Units 6/28/2019   Microalbum.,U,Random      0.0 - 19.9 mcg/ml 7.8   Creatinine, Random Ur      mg/dl 159.00   MICROALB/CREAT RATIO      0 - 30 5   PSA, SCREEN      0.00 - 3.00 ng/mL 0.11   TSH      0.30 - 5.60 ulU/ml 5.25         Plan:           Benign essential hypertension  -     Comprehensive metabolic panel; Future; Expected date: 01/08/2020  -     Microalbumin/creatinine urine ratio; Future; Expected date: 01/08/2020    Multiple-type hyperlipidemia  -     Lipid panel; Future; Expected date: 01/08/2020    Secondary hypothyroidism    Benign prostatic hyperplasia without urinary obstruction     Patient's multiple medical issues of hypertension, hypothyroidism, hyperlipidemia have been noted.    His blood pressures seem to be stable.  He is compliant to his medications.  Recent TSH is within normal range.  Previous labs including PSA level have also been reviewed and they are in acceptable range.    Interestingly he does not recall his prostate symptoms and feels completely okay and he is on a combination of finasteride and tamsulosin.  Subsequently may consider discontinuing tamsulosin but he does have a follow-up with Urology also.  (Dr. Lopez)  Patient has been advised to watch diet and exercise. Avoid fried and fatty food. Compliance to medications and follow up urged.    I will check his lipid panel and general chemistry for future follow-up.  Copies will be forwarded to his cardiologist also.    Advised Mr. Barraza about age and season appropriate immunizations/ cancer screenings.  Also seasonal influenza vaccine, update on tetanus diphtheria vaccination every 10 years.  Follow-up in 6 months.  Spent roland 25 minutes with patient which involved review of pts medical conditions, labs, medications and with 50% of time face-to-face discussion about medical problems, management  and any applicable changes.        Current Outpatient Medications:     ascorbic acid, vitamin C, (VITAMIN C) 500 MG tablet, Take 500 mg by mouth once daily., Disp: , Rfl:     aspirin (ECOTRIN) 81 MG EC tablet, Take 81 mg by mouth once daily., Disp: , Rfl:     b complex vitamins tablet, Take 1 tablet by mouth once daily., Disp: , Rfl:     CALCIUM CARBONATE/VITAMIN D3 (VITAMIN D-3 ORAL), Take by mouth., Disp: , Rfl:     docusate sodium (COLACE) 50 MG capsule, Take by mouth 2 (two) times daily as needed for Constipation., Disp: , Rfl:     doxycycline (ORACEA) 40 mg capsule, Take 40 mg by mouth once daily., Disp: , Rfl: 3    felodipine (PLENDIL) 5 MG 24 hr tablet, Take 5 mg by mouth 2 (two) times daily. , Disp: , Rfl:     finasteride (PROSCAR) 5 mg tablet, TAKE 1 TABLET BY MOUTH EVERY DAY, Disp: 90 tablet, Rfl: 0    fish oil-omega-3 fatty acids 300-1,000 mg capsule, Take 2 g by mouth once daily., Disp: , Rfl:     fluticasone (FLONASE) 50 mcg/actuation nasal spray, SPRAY 1 SPRAY NASALLY AS DIRECTED TWICE A DAY, Disp: , Rfl: 12    FLUZONE HIGH-DOSE 2019-20, PF, 180 mcg/0.5 mL Syrg, TO BE ADMINISTERED BY PHARMACIST FOR IMMUNIZATION, Disp: , Rfl:     ipratropium (ATROVENT) 42 mcg (0.06 %) nasal spray, 2 sprays by Nasal route 2 (two) times daily., Disp: 15 mL, Rfl: 5    levothyroxine (SYNTHROID) 200 MCG tablet, Take 1 tablet (200 mcg total) by mouth once daily., Disp: 90 tablet, Rfl: 3    losartan (COZAAR) 100 MG tablet, Take 100 mg by mouth once daily., Disp: , Rfl: 0    multivitamin (ONE DAILY MULTIVITAMIN) per tablet, Take 1 tablet by mouth once daily., Disp: , Rfl:     pravastatin (PRAVACHOL) 20 MG tablet, Take 20 mg by mouth once daily., Disp: , Rfl:     tamsulosin (FLOMAX) 0.4 mg Cap, TAKE 1 CAPSULE (0.4 MG TOTAL) BY MOUTH ONCE DAILY. TAKE AT BEDTIME, Disp: 90 capsule, Rfl: 0    augmented betamethasone dipropionate (DIPROLENE-AF) 0.05 % cream, 1 Dose daily as needed., Disp: , Rfl: 1    triamcinolone  acetonide 0.1% (KENALOG) 0.1 % cream, Apply topically 2 (two) times daily. for 10 days, Disp: 30 g, Rfl: 1

## 2020-01-09 RX ORDER — TAMSULOSIN HYDROCHLORIDE 0.4 MG/1
0.4 CAPSULE ORAL DAILY
Qty: 90 CAPSULE | Refills: 0 | Status: SHIPPED | OUTPATIENT
Start: 2020-01-09 | End: 2020-02-27

## 2020-01-14 ENCOUNTER — LAB VISIT (OUTPATIENT)
Dept: LAB | Facility: HOSPITAL | Age: 80
End: 2020-01-14
Attending: INTERNAL MEDICINE
Payer: MEDICARE

## 2020-01-14 DIAGNOSIS — R33.9 RETENTION OF URINE, UNSPECIFIED: ICD-10-CM

## 2020-01-14 DIAGNOSIS — E87.1 HYPOSMOLALITY SYNDROME: ICD-10-CM

## 2020-01-14 DIAGNOSIS — E66.3 SEVERELY OVERWEIGHT: ICD-10-CM

## 2020-01-14 DIAGNOSIS — N18.30 CHRONIC KIDNEY DISEASE, STAGE III (MODERATE): ICD-10-CM

## 2020-01-14 DIAGNOSIS — I10 BENIGN ESSENTIAL HYPERTENSION: ICD-10-CM

## 2020-01-14 DIAGNOSIS — I10 ESSENTIAL HYPERTENSION, MALIGNANT: Primary | ICD-10-CM

## 2020-01-14 DIAGNOSIS — N18.2 CHRONIC KIDNEY DISEASE, STAGE II (MILD): ICD-10-CM

## 2020-01-14 DIAGNOSIS — N25.81 SECONDARY HYPERPARATHYROIDISM OF RENAL ORIGIN: ICD-10-CM

## 2020-01-14 DIAGNOSIS — E78.2 MULTIPLE-TYPE HYPERLIPIDEMIA: ICD-10-CM

## 2020-01-14 LAB
ALBUMIN SERPL BCP-MCNC: 3.9 G/DL (ref 3.5–5.2)
ALBUMIN SERPL BCP-MCNC: 3.9 G/DL (ref 3.5–5.2)
ALBUMIN/CREAT UR: 3.8 UG/MG (ref 0–30)
ALP SERPL-CCNC: 68 U/L (ref 55–135)
ALT SERPL W/O P-5'-P-CCNC: 18 U/L (ref 10–44)
ANION GAP SERPL CALC-SCNC: 9 MMOL/L (ref 8–16)
ANION GAP SERPL CALC-SCNC: 9 MMOL/L (ref 8–16)
AST SERPL-CCNC: 22 U/L (ref 10–40)
BILIRUB SERPL-MCNC: 0.7 MG/DL (ref 0.1–1)
BILIRUB UR QL STRIP: NEGATIVE
BUN SERPL-MCNC: 18 MG/DL (ref 8–23)
BUN SERPL-MCNC: 18 MG/DL (ref 8–23)
CALCIUM SERPL-MCNC: 9.1 MG/DL (ref 8.7–10.5)
CALCIUM SERPL-MCNC: 9.1 MG/DL (ref 8.7–10.5)
CHLORIDE SERPL-SCNC: 107 MMOL/L (ref 95–110)
CHLORIDE SERPL-SCNC: 107 MMOL/L (ref 95–110)
CHOLEST SERPL-MCNC: 169 MG/DL (ref 120–199)
CHOLEST/HDLC SERPL: 3.8 {RATIO} (ref 2–5)
CLARITY UR: CLEAR
CO2 SERPL-SCNC: 25 MMOL/L (ref 23–29)
CO2 SERPL-SCNC: 25 MMOL/L (ref 23–29)
COLOR UR: YELLOW
CREAT SERPL-MCNC: 1.2 MG/DL (ref 0.5–1.4)
CREAT SERPL-MCNC: 1.2 MG/DL (ref 0.5–1.4)
CREAT UR-MCNC: 73 MG/DL (ref 23–375)
CREAT UR-MCNC: 73 MG/DL (ref 23–375)
EST. GFR  (AFRICAN AMERICAN): >60 ML/MIN/1.73 M^2
EST. GFR  (AFRICAN AMERICAN): >60 ML/MIN/1.73 M^2
EST. GFR  (NON AFRICAN AMERICAN): 56.8 ML/MIN/1.73 M^2
EST. GFR  (NON AFRICAN AMERICAN): 56.8 ML/MIN/1.73 M^2
GLUCOSE SERPL-MCNC: 105 MG/DL (ref 70–110)
GLUCOSE SERPL-MCNC: 105 MG/DL (ref 70–110)
GLUCOSE UR QL STRIP: NEGATIVE
HDLC SERPL-MCNC: 44 MG/DL (ref 40–75)
HDLC SERPL: 26 % (ref 20–50)
HGB UR QL STRIP: NEGATIVE
KETONES UR QL STRIP: NEGATIVE
LDLC SERPL CALC-MCNC: 106.2 MG/DL (ref 63–159)
LEUKOCYTE ESTERASE UR QL STRIP: NEGATIVE
MICROALBUMIN UR DL<=1MG/L-MCNC: 2.8 UG/ML
NITRITE UR QL STRIP: NEGATIVE
NONHDLC SERPL-MCNC: 125 MG/DL
PH UR STRIP: 6 [PH] (ref 5–8)
PHOSPHATE SERPL-MCNC: 3.4 MG/DL (ref 2.7–4.5)
POTASSIUM SERPL-SCNC: 4.4 MMOL/L (ref 3.5–5.1)
POTASSIUM SERPL-SCNC: 4.4 MMOL/L (ref 3.5–5.1)
PROT SERPL-MCNC: 7.2 G/DL (ref 6–8.4)
PROT UR QL STRIP: NEGATIVE
PROT UR-MCNC: <6 MG/DL (ref 0–15)
PROT/CREAT UR: NORMAL MG/G{CREAT} (ref 0–0.2)
SODIUM SERPL-SCNC: 141 MMOL/L (ref 136–145)
SODIUM SERPL-SCNC: 141 MMOL/L (ref 136–145)
SP GR UR STRIP: 1.01 (ref 1–1.03)
TRIGL SERPL-MCNC: 94 MG/DL (ref 30–150)
URN SPEC COLLECT METH UR: NORMAL
UROBILINOGEN UR STRIP-ACNC: NEGATIVE EU/DL

## 2020-01-14 PROCEDURE — 81003 URINALYSIS AUTO W/O SCOPE: CPT

## 2020-01-14 PROCEDURE — 82043 UR ALBUMIN QUANTITATIVE: CPT

## 2020-01-14 PROCEDURE — 36415 COLL VENOUS BLD VENIPUNCTURE: CPT

## 2020-01-14 PROCEDURE — 80061 LIPID PANEL: CPT

## 2020-01-14 PROCEDURE — 84156 ASSAY OF PROTEIN URINE: CPT

## 2020-01-14 PROCEDURE — 80069 RENAL FUNCTION PANEL: CPT

## 2020-01-14 PROCEDURE — 80053 COMPREHEN METABOLIC PANEL: CPT

## 2020-01-15 ENCOUNTER — TELEPHONE (OUTPATIENT)
Dept: FAMILY MEDICINE | Facility: CLINIC | Age: 80
End: 2020-01-15

## 2020-01-15 NOTE — PROGRESS NOTES
Notify patient report of his cholesterol level and general chemistry are in normal range.  Urine test is also okay.  Keep regular appointment in June July.

## 2020-01-15 NOTE — TELEPHONE ENCOUNTER
----- Message from Lalo Cuellar MD sent at 1/14/2020  8:31 PM CST -----  Notify patient report of his cholesterol level and general chemistry are in normal range.  Urine test is also okay.  Keep regular appointment in June July.

## 2020-02-13 DIAGNOSIS — N40.0 BPH WITHOUT URINARY OBSTRUCTION: ICD-10-CM

## 2020-02-13 NOTE — TELEPHONE ENCOUNTER
+Refill request(s). Please advise. Thank you.     Pt has scheduled appt 2/27/2020      ----- Message from Mackenzie Judith sent at 2/13/2020 11:59 AM CST -----  Contact: pt  Type: Needs Medical Advice    Who Called:      Best Call Back Number:   Additional Information: Requesting a call back from Nurse, regarding Medication and pt wants a call back  On number provided ,please call

## 2020-02-14 RX ORDER — FINASTERIDE 5 MG/1
5 TABLET, FILM COATED ORAL DAILY
Qty: 90 TABLET | Refills: 3 | Status: SHIPPED | OUTPATIENT
Start: 2020-02-14 | End: 2020-02-27 | Stop reason: SDUPTHER

## 2020-02-27 ENCOUNTER — OFFICE VISIT (OUTPATIENT)
Dept: UROLOGY | Facility: CLINIC | Age: 80
End: 2020-02-27
Payer: MEDICARE

## 2020-02-27 VITALS
BODY MASS INDEX: 32.93 KG/M2 | SYSTOLIC BLOOD PRESSURE: 135 MMHG | WEIGHT: 230 LBS | HEIGHT: 70 IN | DIASTOLIC BLOOD PRESSURE: 69 MMHG | HEART RATE: 63 BPM | TEMPERATURE: 98 F | RESPIRATION RATE: 14 BRPM

## 2020-02-27 DIAGNOSIS — N13.8 BPH WITH OBSTRUCTION/LOWER URINARY TRACT SYMPTOMS: Primary | ICD-10-CM

## 2020-02-27 DIAGNOSIS — N40.1 BPH WITH OBSTRUCTION/LOWER URINARY TRACT SYMPTOMS: Primary | ICD-10-CM

## 2020-02-27 LAB
BILIRUB SERPL-MCNC: NORMAL MG/DL
BLOOD URINE, POC: NORMAL
COLOR, POC UA: NORMAL
GLUCOSE UR QL STRIP: NORMAL
KETONES UR QL STRIP: NORMAL
LEUKOCYTE ESTERASE URINE, POC: NORMAL
NITRITE, POC UA: NORMAL
PH, POC UA: 7
PROTEIN, POC: NORMAL
SPECIFIC GRAVITY, POC UA: 1.02
UROBILINOGEN, POC UA: NORMAL

## 2020-02-27 PROCEDURE — 99213 OFFICE O/P EST LOW 20 MIN: CPT | Mod: PBBFAC,PN | Performed by: UROLOGY

## 2020-02-27 PROCEDURE — 81002 URINALYSIS NONAUTO W/O SCOPE: CPT | Mod: PBBFAC,PN | Performed by: UROLOGY

## 2020-02-27 PROCEDURE — 99999 PR PBB SHADOW E&M-EST. PATIENT-LVL III: ICD-10-PCS | Mod: PBBFAC,,, | Performed by: UROLOGY

## 2020-02-27 PROCEDURE — 99214 PR OFFICE/OUTPT VISIT, EST, LEVL IV, 30-39 MIN: ICD-10-PCS | Mod: S$PBB,,, | Performed by: UROLOGY

## 2020-02-27 PROCEDURE — 99999 PR PBB SHADOW E&M-EST. PATIENT-LVL III: CPT | Mod: PBBFAC,,, | Performed by: UROLOGY

## 2020-02-27 PROCEDURE — 99214 OFFICE O/P EST MOD 30 MIN: CPT | Mod: S$PBB,,, | Performed by: UROLOGY

## 2020-02-27 RX ORDER — FINASTERIDE 5 MG/1
5 TABLET, FILM COATED ORAL DAILY
Qty: 90 TABLET | Refills: 3 | Status: SHIPPED | OUTPATIENT
Start: 2020-02-27 | End: 2021-02-08 | Stop reason: SDUPTHER

## 2020-02-27 RX ORDER — TAMSULOSIN HYDROCHLORIDE 0.4 MG/1
0.4 CAPSULE ORAL DAILY
Qty: 90 CAPSULE | Refills: 3 | Status: SHIPPED | OUTPATIENT
Start: 2020-02-27 | End: 2021-02-08 | Stop reason: SDUPTHER

## 2020-02-27 NOTE — PROGRESS NOTES
Subjective:       Patient ID: Nikolay Barraza is a 79 y.o. male.    Chief Complaint:   BPH with lower urinary tract symptoms under treatment with Flomax and finasteride.    HPI   Mr. Barraza is an 79-year-old male under treatment for symptomatic BPH with a combination of Flomax and finasteride.  The patient referred that he is doing fairly well while taking the medication.  Patient have nocturia x1.  No dysuria or hematuria.  The flow is adequate.  He feels that he can empty the bladder satisfactory.  The family history is negative for prostate cancer.  The patient referred that is tolerating well both medication with no significant side effects.    The past medical and surgical history the current medications and allergies are well documented in the EHR and all these were reviewed by me during this visit.  Is to be noted that since his last visit in August 2018 he underwent left knee replacement that have no complications from that procedure and significant relief from his symptoms.    The last PSA was performed at the order of Dr. Lalo Cuellar in a on June 2019  0.11.  I explained to the patient that the we should not do another PSA at this point probably he needs to have 1 when he sees his PCP later on this year.    The urinalysis today is completely clear.    Review of Systems   Constitutional: Negative for activity change and appetite change.   HENT: Negative.    Eyes: Negative for discharge.   Respiratory: Negative for cough and shortness of breath.    Cardiovascular: Negative for chest pain and palpitations.   Gastrointestinal: Negative for abdominal distention, abdominal pain, constipation and vomiting.   Genitourinary: Negative for discharge, dysuria, flank pain, frequency, hematuria, testicular pain and urgency.   Musculoskeletal: Positive for arthralgias.   Skin: Negative for rash.   Neurological: Negative for dizziness.   Psychiatric/Behavioral: The patient is not nervous/anxious.          Objective:       Physical Exam   Constitutional: He appears well-developed and well-nourished.   HENT:   Head: Normocephalic.   Eyes: Pupils are equal, round, and reactive to light.   Neck: Normal range of motion.   Cardiovascular: Normal rate.    Pulmonary/Chest: Effort normal.   Abdominal: Soft. He exhibits no distension and no mass. There is no tenderness.   Genitourinary: Rectum normal, prostate normal and penis normal. Rectal exam shows no external hemorrhoid, no mass and no tenderness. Prostate is not enlarged and not tender. Right testis shows no mass and no tenderness. Left testis shows no mass and no tenderness. No discharge found.       Musculoskeletal: Normal range of motion.   Neurological: He is alert.   Skin: Skin is warm.     Psychiatric: He has a normal mood and affect.       Assessment:       1. BPH with obstruction/lower urinary tract symptoms        Plan:       BPH with obstruction/lower urinary tract symptoms  -     finasteride (PROSCAR) 5 mg tablet; Take 1 tablet (5 mg total) by mouth once daily.  Dispense: 90 tablet; Refill: 3    Other orders  -     tamsulosin (FLOMAX) 0.4 mg Cap; Take 1 capsule (0.4 mg total) by mouth once daily. Take at bedtime  Dispense: 90 capsule; Refill: 3     Patient in a stable condition with good satisfactory control of his symptoms of BPH with the present regimen.  He is going to keep taking both medications he is going to follow up with me in 1 year.

## 2020-04-14 RX ORDER — LEVOTHYROXINE SODIUM 200 UG/1
TABLET ORAL
Qty: 90 TABLET | Refills: 3 | Status: SHIPPED | OUTPATIENT
Start: 2020-04-14 | End: 2021-03-27

## 2020-07-02 DIAGNOSIS — J32.4 CHRONIC PANSINUSITIS: Primary | ICD-10-CM

## 2020-07-06 ENCOUNTER — LAB VISIT (OUTPATIENT)
Dept: LAB | Facility: HOSPITAL | Age: 80
End: 2020-07-06
Attending: INTERNAL MEDICINE
Payer: MEDICARE

## 2020-07-06 ENCOUNTER — TELEPHONE (OUTPATIENT)
Dept: FAMILY MEDICINE | Facility: CLINIC | Age: 80
End: 2020-07-06

## 2020-07-06 DIAGNOSIS — I10 BENIGN ESSENTIAL HYPERTENSION: ICD-10-CM

## 2020-07-06 LAB
ALBUMIN/CREAT UR: 2.1 UG/MG (ref 0–30)
CREAT UR-MCNC: 96 MG/DL (ref 23–375)
MICROALBUMIN UR DL<=1MG/L-MCNC: 2 UG/ML

## 2020-07-06 PROCEDURE — 82043 UR ALBUMIN QUANTITATIVE: CPT

## 2020-07-06 NOTE — TELEPHONE ENCOUNTER
----- Message from Lalo Cuellar MD sent at 7/6/2020 12:59 PM CDT -----  The results are within acceptable range.  Please keep regular follow up.

## 2020-07-07 ENCOUNTER — HOSPITAL ENCOUNTER (OUTPATIENT)
Dept: RADIOLOGY | Facility: HOSPITAL | Age: 80
Discharge: HOME | End: 2020-07-07
Attending: OTOLARYNGOLOGY
Payer: MEDICARE

## 2020-07-07 DIAGNOSIS — J32.4 CHRONIC PANSINUSITIS: ICD-10-CM

## 2020-07-07 PROCEDURE — 70486 CT MAXILLOFACIAL W/O DYE: CPT | Mod: TC,PO

## 2020-07-09 ENCOUNTER — OFFICE VISIT (OUTPATIENT)
Dept: FAMILY MEDICINE | Facility: CLINIC | Age: 80
End: 2020-07-09
Payer: MEDICARE

## 2020-07-09 VITALS
BODY MASS INDEX: 31.64 KG/M2 | SYSTOLIC BLOOD PRESSURE: 133 MMHG | HEIGHT: 70 IN | WEIGHT: 221 LBS | DIASTOLIC BLOOD PRESSURE: 61 MMHG | HEART RATE: 59 BPM

## 2020-07-09 DIAGNOSIS — E03.8 SECONDARY HYPOTHYROIDISM: ICD-10-CM

## 2020-07-09 DIAGNOSIS — E78.2 MULTIPLE-TYPE HYPERLIPIDEMIA: ICD-10-CM

## 2020-07-09 DIAGNOSIS — R09.81 SINUS CONGESTION: ICD-10-CM

## 2020-07-09 DIAGNOSIS — J30.1 SEASONAL ALLERGIC RHINITIS DUE TO POLLEN: ICD-10-CM

## 2020-07-09 DIAGNOSIS — I10 BENIGN ESSENTIAL HYPERTENSION: Primary | ICD-10-CM

## 2020-07-09 DIAGNOSIS — N40.0 BENIGN PROSTATIC HYPERPLASIA WITHOUT URINARY OBSTRUCTION: ICD-10-CM

## 2020-07-09 PROCEDURE — 99214 OFFICE O/P EST MOD 30 MIN: CPT | Mod: S$PBB,,, | Performed by: INTERNAL MEDICINE

## 2020-07-09 PROCEDURE — 99214 PR OFFICE/OUTPT VISIT, EST, LEVL IV, 30-39 MIN: ICD-10-PCS | Mod: S$PBB,,, | Performed by: INTERNAL MEDICINE

## 2020-07-09 PROCEDURE — 99213 OFFICE O/P EST LOW 20 MIN: CPT | Performed by: INTERNAL MEDICINE

## 2020-07-09 NOTE — PROGRESS NOTES
Subjective:       Patient ID: Nikolay Barraza is a 79 y.o. male.    Chief Complaint: Hypertension, Hyperlipidemia, Sinus Problem, Thyroid Problem, and Prostate Problem    Mr. Scot Barraza is a pleasant 79-year-old  gentleman who comes for follow-up. He has underlying hypertension, dyslipidemia, benign prostatic hypertrophy and hypothyroidism. His blood pressures are generally under control at home.    Prostate symptoms are stable at this point.  He is on a combination of finasteride and tamsulosin did in fact at this point he does not recall any symptoms at all.    Sinus problems continue to bother him.  He had seen Dr. Alvarez in past who did not recommend him surgery at this point.    He was planning to go to [place] to visit his grandson but because of the currently prevailing COVID-19 pandemic, this plan got scuttled.      Hypertension  This is a chronic problem. The current episode started more than 1 year ago. The problem is controlled. Pertinent negatives include no headaches, palpitations or shortness of breath. Risk factors for coronary artery disease include sedentary lifestyle, obesity and male gender. The current treatment provides moderate improvement. Identifiable causes of hypertension include a thyroid problem. There is no history of pheochromocytoma or renovascular disease.   Hyperlipidemia  This is a chronic problem. The current episode started more than 1 year ago. Exacerbating diseases include obesity. Pertinent negatives include no shortness of breath. Current antihyperlipidemic treatment includes statins. The current treatment provides moderate improvement of lipids. Compliance problems include adherence to diet.  Risk factors for coronary artery disease include a sedentary lifestyle, hypertension, male sex, obesity and dyslipidemia.   Sinus Problem  This is a chronic problem. The current episode started more than 1 year ago. The problem has been waxing and waning since onset. There  has been no fever. Pertinent negatives include no congestion, coughing, headaches, hoarse voice, shortness of breath, sinus pressure or sneezing.   Thyroid Problem  Presents for follow-up visit. Patient reports no anxiety, cold intolerance, constipation, depressed mood, diarrhea, dry skin, fatigue, hair loss, heat intolerance, hoarse voice, leg swelling, nail problem, palpitations, weight gain or weight loss. The symptoms have been stable. His past medical history is significant for hyperlipidemia.   Benign Prostatic Hypertrophy  This is a chronic problem. The current episode started more than 1 year ago. The problem has been gradually improving since onset. Irritative symptoms do not include frequency, nocturia or urgency. Obstructive symptoms do not include a slower stream or a weak stream. Pertinent negatives include no dysuria. He is not sexually active. Past treatments include tamsulosin and finasteride. The treatment provided significant relief. He has been using treatment for 2 or more years.       Past Medical History:   Diagnosis Date    Allergy     Codeine    Closed fracture dislocation of lumbar spine 7/6/2018    #2 vertebra. Patient went to the emergency Department.  Followup with Erik:    GERD (gastroesophageal reflux disease)     Hyperlipidemia     Hypertension     Right inguinal hernia     Thyroid disease      Social History     Socioeconomic History    Marital status:      Spouse name: Alisa galivn    Number of children: 3    Years of education: Not on file    Highest education level: Not on file   Occupational History    Occupation: [employer]-      Comment:     Social Needs    Financial resource strain: Not on file    Food insecurity     Worry: Not on file     Inability: Not on file    Transportation needs     Medical: Not on file     Non-medical: Not on file   Tobacco Use    Smoking status: Never Smoker    Smokeless tobacco: Never Used    Substance and Sexual Activity    Alcohol use: No    Drug use: No    Sexual activity: Not Currently     Partners: Female   Lifestyle    Physical activity     Days per week: Not on file     Minutes per session: Not on file    Stress: Not at all   Relationships    Social connections     Talks on phone: Not on file     Gets together: Not on file     Attends Scientologist service: Not on file     Active member of club or organization: Not on file     Attends meetings of clubs or organizations: Not on file     Relationship status: Not on file   Other Topics Concern    Not on file   Social History Narrative    Not on file     Past Surgical History:   Procedure Laterality Date    ADENOIDECTOMY      INGUINAL HERNIA REPAIR Right 07/12/2018    Dr[facility]    KNEE SURGERY Left 07/31/2019    TONSILLECTOMY      VASECTOMY       Family History   Problem Relation Age of Onset    Heart disease Father     Miscarriages / Stillbirths Father     Skin cancer Mother     Cancer Maternal Grandfather     Stomach cancer Paternal Grandfather        Review of Systems   Constitutional: Negative for activity change, appetite change, fatigue, fever, unexpected weight change (Lost 9 lbs), weight gain and weight loss.   HENT: Negative for congestion, hoarse voice, nosebleeds, rhinorrhea, sinus pressure and sneezing.         Frequent sinus problems   Eyes: Negative for pain, discharge, itching and visual disturbance.   Respiratory: Negative for cough, chest tightness, shortness of breath and stridor.    Cardiovascular: Negative for palpitations and leg swelling.        Patient has hypertension and hyperlipidemia.   Gastrointestinal: Negative for abdominal distention, blood in stool, constipation and diarrhea.        Stable GERD  Occasional hemorrhoidal bleeding. Secondary to constipation.   Endocrine: Negative for cold intolerance, heat intolerance, polydipsia and polyphagia.        Patient has hypothyroidism. Stable on  "levothyroxine. Current labs are good. Blood sugars are slightly elevated.   Genitourinary: Negative for dysuria, frequency, nocturia and urgency.        Benign prostate hypertrophy stable on tamsulosin and finasteride.       Musculoskeletal: Negative for back pain and gait problem.        S/P Left knee TKR   Skin: Negative for pallor. Wound: left ankle.   Neurological: Negative for dizziness, seizures, speech difficulty and headaches.        Some memory issues.   Hematological: Negative for adenopathy. Does not bruise/bleed easily.   Psychiatric/Behavioral: Negative for agitation, behavioral problems and confusion. The patient is not nervous/anxious.          Objective:      Blood pressure 133/61, pulse (!) 59, height 5' 10" (1.778 m), weight 100.2 kg (221 lb). Body mass index is 31.71 kg/m².  Physical Exam  Vitals signs and nursing note reviewed.   Constitutional:       General: He is not in acute distress.     Appearance: He is well-developed. He is not ill-appearing, toxic-appearing or diaphoretic.      Comments: BMI is 31.   HENT:      Head: Atraumatic.      Ears:      Comments: Ears reveal slight amount Of wax.  Nose shows congestion.     Nose: Congestion present.      Right Sinus: No frontal sinus tenderness.      Left Sinus: No maxillary sinus tenderness or frontal sinus tenderness.      Comments: Nasal congestion noted.  Possibility of a nasal septum deviation towards the left side.  Air flow is okay.     Mouth/Throat:      Pharynx: No oropharyngeal exudate.   Eyes:      General: No scleral icterus.        Right eye: No discharge.         Left eye: No discharge.      Conjunctiva/sclera: Conjunctivae normal.   Neck:      Musculoskeletal: Neck supple.      Vascular: No JVD.      Trachea: No tracheal deviation.   Cardiovascular:      Rate and Rhythm: Normal rate and regular rhythm.      Heart sounds: Normal heart sounds.   Pulmonary:      Effort: Pulmonary effort is normal. No respiratory distress.      Breath " sounds: Normal breath sounds. No wheezing or rales.   Abdominal:      General: Bowel sounds are normal. There is no distension or abdominal bruit.      Palpations: Abdomen is soft.      Tenderness: There is no abdominal tenderness.   Musculoskeletal:         General: No tenderness or deformity.      Right lower leg: No edema.      Left lower leg: No edema.   Lymphadenopathy:      Cervical: No cervical adenopathy.   Skin:     General: Skin is warm and dry.      Findings: No erythema or rash.      Comments: C/O Dry skin   Neurological:      Mental Status: He is alert. Mental status is at baseline.      Deep Tendon Reflexes: Reflexes are normal and symmetric.   Psychiatric:         Mood and Affect: Mood normal.         Behavior: Behavior normal.           Assessment:       1. Benign essential hypertension    2. Multiple-type hyperlipidemia    3. Secondary hypothyroidism    4. Benign prostatic hyperplasia without urinary obstruction    5. Sinus congestion    6. Seasonal allergic rhinitis due to pollen           Lab Visit on 07/06/2020   Component Date Value Ref Range Status    Microalbum.,U,Random 07/06/2020 2.0  ug/mL Final    Creatinine, Random Ur 07/06/2020 96.0  23.0 - 375.0 mg/dL Final    Microalb Creat Ratio 07/06/2020 2.1  0.0 - 30.0 ug/mg Final     Component      Latest Ref Rng & Units 6/28/2019   Microalbum.,U,Random      0.0 - 19.9 mcg/ml 7.8   Creatinine, Random Ur      mg/dl 159.00   MICROALB/CREAT RATIO      0 - 30 5   PSA, SCREEN      0.00 - 3.00 ng/mL 0.11   TSH      0.30 - 5.60 ulU/ml 5.25         Plan:           Benign essential hypertension  -     Basic metabolic panel; Future; Expected date: 07/09/2020    Multiple-type hyperlipidemia    Secondary hypothyroidism    Benign prostatic hyperplasia without urinary obstruction    Sinus congestion    Seasonal allergic rhinitis due to pollen     Patient's multiple medical issues include hypertension, dyslipidemia, hypothyroidism have been noted.  He is  taking his medications as prescribed.  His blood pressures seem to be doing okay.  He has lost approximately 9 lb of weight after watching his diet.  He is trying to observe COVID-19 precautions but gets tired of a mask easily and goes without mass to the grocery store.    He states his memory is fair.  He was never good at names.  His wife does the shopping list.  He accompanies his wife.    Sinus symptoms continue to bother him but he has not been considered to be a candidate for sinuplasty.  I have recommended him to do some salt water gargles and steam inhalation.     His blood pressures seem to be stable.  He is compliant to his medications.  Recent TSH is within normal range.  Previous labs including PSA level have also been reviewed and they are in acceptable range.    Prostate symptoms are stable.  Currently is not sexually active.    I will check his lipid panel and general chemistry for future follow-up.  Copies will be forwarded to his cardiologist also.    Advised Mr. Barraza about age and season appropriate immunizations/ cancer screenings.  Also seasonal influenza vaccine, update on tetanus diphtheria vaccination every 10 years.    Incidentally patient is also following up with Dr. Smith.  His creatinine is not bad.  It is 1.2.  Follow-up in 6 months.  Spent roland 25 minutes with patient which involved review of pts medical conditions, labs, medications and with 50% of time face-to-face discussion about medical problems, management and any applicable changes.        Current Outpatient Medications:     ascorbic acid, vitamin C, (VITAMIN C) 500 MG tablet, Take 500 mg by mouth once daily., Disp: , Rfl:     aspirin (ECOTRIN) 81 MG EC tablet, Take 81 mg by mouth once daily., Disp: , Rfl:     augmented betamethasone dipropionate (DIPROLENE-AF) 0.05 % cream, 1 Dose daily as needed., Disp: , Rfl: 1    b complex vitamins tablet, Take 1 tablet by mouth once daily., Disp: , Rfl:     CALCIUM CARBONATE/VITAMIN D3  (VITAMIN D-3 ORAL), Take by mouth., Disp: , Rfl:     docusate sodium (COLACE) 50 MG capsule, Take by mouth 2 (two) times daily as needed for Constipation., Disp: , Rfl:     doxycycline (ORACEA) 40 mg capsule, Take 40 mg by mouth once daily., Disp: , Rfl: 3    felodipine (PLENDIL) 5 MG 24 hr tablet, Take 5 mg by mouth 2 (two) times daily. , Disp: , Rfl:     finasteride (PROSCAR) 5 mg tablet, Take 1 tablet (5 mg total) by mouth once daily., Disp: 90 tablet, Rfl: 3    fish oil-omega-3 fatty acids 300-1,000 mg capsule, Take 2 g by mouth once daily., Disp: , Rfl:     fluticasone (FLONASE) 50 mcg/actuation nasal spray, SPRAY 1 SPRAY NASALLY AS DIRECTED TWICE A DAY, Disp: , Rfl: 12    ipratropium (ATROVENT) 42 mcg (0.06 %) nasal spray, 2 sprays by Nasal route 2 (two) times daily., Disp: 15 mL, Rfl: 5    losartan (COZAAR) 100 MG tablet, Take 100 mg by mouth once daily., Disp: , Rfl: 0    multivitamin (ONE DAILY MULTIVITAMIN) per tablet, Take 1 tablet by mouth once daily., Disp: , Rfl:     pravastatin (PRAVACHOL) 20 MG tablet, Take 20 mg by mouth once daily., Disp: , Rfl:     SYNTHROID 200 mcg tablet, TAKE 1 TABLET ONCE DAILY, Disp: 90 tablet, Rfl: 3    tamsulosin (FLOMAX) 0.4 mg Cap, Take 1 capsule (0.4 mg total) by mouth once daily. Take at bedtime, Disp: 90 capsule, Rfl: 3

## 2020-07-09 NOTE — PATIENT INSTRUCTIONS
Allergic Rhinitis  Allergic rhinitis is an allergic reaction that affects the nose, and often the eyes. Its often known as nasal allergies. Nasal allergies are often due to things in the environment that are breathed in. Depending what you are sensitive to, nasal allergies may occur only during certain seasons. Or they may occur year round. Common indoor allergens include house dust mites, mold, cockroaches, and pet dander. Outdoor allergens include pollen from trees, grasses, and weeds.   Symptoms include a drippy, stuffy, and itchy nose. They also include sneezing and red and itchy eyes. You may feel tired more often. Severe allergies may also affect your breathing and trigger a condition called asthma.   Tests can be done to see what allergens are affecting you. You may be referred to an allergy specialist for testing and further evaluation.  Home care  Your healthcare provider may prescribe medicines to help relieve allergy symptoms. These may include oral medicines, nasal sprays, or eye drops.  Ask your provider for advice on how to avoid substances that you are allergic to. Below are a few tips for each type of allergen.  Pet dander:  · Do not have pets with fur and feathers.  · If you can't avoid having a pet, keep it out of your bedroom and off upholstered furniture.  Pollen:  · When pollen counts are high, keep windows of your car and home closed. If possible, use an air conditioner instead.  · Wear a filter mask when mowing or doing yard work.  House dust mites:  · Wash bedding every week in warm water and detergent and dry on a hot setting.  · Cover the mattress, box spring, and pillows with allergy covers.   · If possible, sleep in a room with no carpet, curtains, or upholstered furniture.  Cockroaches:  · Store food in sealed containers.  · Remove garbage from the home promptly.  · Fix water leaks  Mold:  · Keep humidity low by using a dehumidifier or air conditioner. Keep the dehumidifier and air  conditioner clean and free of mold.  · Clean moldy areas with bleach and water.  In general:  · Vacuum once or twice a week. If possible, use a vacuum with a high-efficiency particulate air (HEPA) filter.  · Do not smoke. Avoid cigarette smoke. Cigarette smoke is an irritant that can make symptoms worse.  Follow-up care  Follow up as advised by the healthcare provider or our staff. If you were referred to an allergy specialist, make this appointment promptly.  When to seek medical advice  Call your healthcare provider right away if the following occur:  · Coughing or wheezing  · Fever greater than 100.4°F (38°C)  · Hives (raised red bumps)  · Continuing symptoms, new symptoms, or worsening symptoms  Call 911 right away if you have:  · Trouble breathing  · Severe swelling of the face or severe itching of the eyes or mouth  Date Last Reviewed: 3/1/2017  © 1780-3600 [employer]. [address] [place] [address]. All rights reserved. This information is not intended as a substitute for professional medical care. Always follow your healthcare professional's instructions.        BPH (Enlarged Prostate)  The prostate is a gland at the base of the bladder. As some men get older, the prostate may get bigger in size. This problem is called benign prostatic hyperplasia (BPH). BPH puts pressure on the urethra. This is the tube that carries urine from the bladder to the penis. It may interfere with the flow of urine. It may also keep the bladder from emptying fully.    Symptoms of BPH include trouble starting urination and feeling as though the bladder isnt emptying all the way. It also includes a weak urine stream, dribbling and leaking of urine, and frequent and urgent urination (especially at night). BPH can increase the risk of urinary infections. It can also block off urine flow completely. If this occurs, a thin tube (catheter) may be passed into the bladder to help drain urine.  If symptoms are  mild, no treatment may be needed right now. If symptoms are more severe, treatment is likely needed. The goal of treatment is to improve urine flow and reduce symptoms. Treatments can include medicine and procedures. Your healthcare provider will discuss treatment options with you as needed.  Home care  The following guidelines will help you care for yourself at home:  · Urinate as soon as you feel the urge. Don't try to hold your urine.  · Don't limit your fluid intake during the day. Drink 6 to 8 glasses of water or liquids a day. This prevents bacteria from building up in the bladder.  · Avoid drinking fluids after dinner to help reduce urination during the night.  · Avoid medicines that can worsen your symptoms. These include certain cold and allergy medicines and antidepressants. Diuretics used for high blood pressure can also worsen symptoms. Talk to your doctor about the medicines you take. Other choices may work better for you.  Prostate cancer screening  BPH does not increase the risk of prostate cancer. But because prostate cancer is a common cancer in men, screening is sometimes recommended. This may help detect the cancer in its early stages when treatment is most effective. Factors that can increase the risk of prostate cancer include being -American or having a father or brother who had prostate cancer. A high-fat diet may also increase the risk of prostate cancer. Talk to your healthcare provider to see whether you should be screened for prostate cancer.  Follow-up care  Follow up with your healthcare provider, or as advised  To learn more, go to:  · National Kidney & Urologic Diseases Information Clearinghouse  kidney.niddk.nih.gov, 910.635.3094  When to seek medical advice  Call your healthcare provider right away if any of these occur:  · Fever of 100.4°F (38.0°C) or higher, or as advised  · Unable to pass urine for 8 hours  · Increasing pressure or pain in your bladder (lower  abdomen)  · Blood in the urine  · Increasing low back pain, not related to injury  · Symptoms of urinary infection (increased urge to urinate, burning when passing urine, foul-smelling urine)  Date Last Reviewed: 7/1/2016  © 8300-5490 [employer]. [address] [place] [address]. All rights reserved. This information is not intended as a substitute for professional medical care. Always follow your healthcare professional's instructions.

## 2020-07-14 ENCOUNTER — LAB VISIT (OUTPATIENT)
Dept: LAB | Facility: HOSPITAL | Age: 80
End: 2020-07-14
Attending: INTERNAL MEDICINE
Payer: MEDICARE

## 2020-07-14 DIAGNOSIS — I10 ESSENTIAL HYPERTENSION, MALIGNANT: ICD-10-CM

## 2020-07-14 DIAGNOSIS — N18.30 CHRONIC KIDNEY DISEASE, STAGE III (MODERATE): Primary | ICD-10-CM

## 2020-07-14 DIAGNOSIS — I10 BENIGN ESSENTIAL HYPERTENSION: ICD-10-CM

## 2020-07-14 DIAGNOSIS — N25.81 SECONDARY HYPERPARATHYROIDISM OF RENAL ORIGIN: ICD-10-CM

## 2020-07-14 LAB
25(OH)D3+25(OH)D2 SERPL-MCNC: 70 NG/ML (ref 30–96)
ALBUMIN SERPL BCP-MCNC: 4 G/DL (ref 3.5–5.2)
ALBUMIN/CREAT UR: NORMAL UG/MG (ref 0–30)
ANION GAP SERPL CALC-SCNC: 11 MMOL/L (ref 8–16)
ANION GAP SERPL CALC-SCNC: 12 MMOL/L (ref 8–16)
BACTERIA #/AREA URNS HPF: NEGATIVE /HPF
BUN SERPL-MCNC: 18 MG/DL (ref 8–23)
BUN SERPL-MCNC: 18 MG/DL (ref 8–23)
CALCIUM SERPL-MCNC: 9.3 MG/DL (ref 8.7–10.5)
CALCIUM SERPL-MCNC: 9.4 MG/DL (ref 8.7–10.5)
CHLORIDE SERPL-SCNC: 104 MMOL/L (ref 95–110)
CHLORIDE SERPL-SCNC: 107 MMOL/L (ref 95–110)
CO2 SERPL-SCNC: 24 MMOL/L (ref 23–29)
CO2 SERPL-SCNC: 25 MMOL/L (ref 23–29)
CREAT SERPL-MCNC: 1.2 MG/DL (ref 0.5–1.4)
CREAT SERPL-MCNC: 1.2 MG/DL (ref 0.5–1.4)
CREAT UR-MCNC: 81 MG/DL (ref 23–375)
CREAT UR-MCNC: 81 MG/DL (ref 23–375)
EST. GFR  (AFRICAN AMERICAN): >60 ML/MIN/1.73 M^2
EST. GFR  (AFRICAN AMERICAN): >60 ML/MIN/1.73 M^2
EST. GFR  (NON AFRICAN AMERICAN): 56.8 ML/MIN/1.73 M^2
EST. GFR  (NON AFRICAN AMERICAN): 56.8 ML/MIN/1.73 M^2
GLUCOSE SERPL-MCNC: 96 MG/DL (ref 70–110)
GLUCOSE SERPL-MCNC: 98 MG/DL (ref 70–110)
HYALINE CASTS #/AREA URNS LPF: 0 /LPF
MAGNESIUM SERPL-MCNC: 1.9 MG/DL (ref 1.6–2.6)
MICROALBUMIN UR DL<=1MG/L-MCNC: <2 UG/ML
MICROSCOPIC COMMENT: ABNORMAL
PHOSPHATE SERPL-MCNC: 3.4 MG/DL (ref 2.7–4.5)
POTASSIUM SERPL-SCNC: 4.3 MMOL/L (ref 3.5–5.1)
POTASSIUM SERPL-SCNC: 4.3 MMOL/L (ref 3.5–5.1)
PROT UR-MCNC: <6 MG/DL (ref 6–15)
PROT/CREAT UR: NORMAL MG/G{CREAT} (ref 0–0.2)
PTH-INTACT SERPL-MCNC: 29.4 PG/ML (ref 9–77)
RBC #/AREA URNS HPF: 0 /HPF (ref 0–4)
SODIUM SERPL-SCNC: 140 MMOL/L (ref 136–145)
SODIUM SERPL-SCNC: 143 MMOL/L (ref 136–145)
SQUAMOUS #/AREA URNS HPF: 0 /HPF
URATE SERPL-MCNC: 5.7 MG/DL (ref 3.4–7)
WBC #/AREA URNS HPF: 0 /HPF (ref 0–5)

## 2020-07-14 PROCEDURE — 82306 VITAMIN D 25 HYDROXY: CPT

## 2020-07-14 PROCEDURE — 83970 ASSAY OF PARATHORMONE: CPT

## 2020-07-14 PROCEDURE — 83735 ASSAY OF MAGNESIUM: CPT

## 2020-07-14 PROCEDURE — 84156 ASSAY OF PROTEIN URINE: CPT

## 2020-07-14 PROCEDURE — 80048 BASIC METABOLIC PNL TOTAL CA: CPT | Mod: XB

## 2020-07-14 PROCEDURE — 80069 RENAL FUNCTION PANEL: CPT

## 2020-07-14 PROCEDURE — 84550 ASSAY OF BLOOD/URIC ACID: CPT

## 2020-07-14 PROCEDURE — 82043 UR ALBUMIN QUANTITATIVE: CPT

## 2020-07-14 PROCEDURE — 36415 COLL VENOUS BLD VENIPUNCTURE: CPT

## 2020-07-14 PROCEDURE — 81001 URINALYSIS AUTO W/SCOPE: CPT

## 2020-07-18 NOTE — PROGRESS NOTES
Notify patient that Dr. Smith and I ordered the same test for him.  This was done on 07/14/2020.  His kidney function on creatinine is 1.2 which is more or less the same as compared to last time.  No further worsening.

## 2020-07-20 ENCOUNTER — TELEPHONE (OUTPATIENT)
Dept: FAMILY MEDICINE | Facility: CLINIC | Age: 80
End: 2020-07-20

## 2020-07-20 NOTE — TELEPHONE ENCOUNTER
----- Message from Lalo Cuellar MD sent at 7/18/2020 10:53 AM CDT -----  Notify patient that Dr. Smith and I ordered the same test for him.  This was done on 07/14/2020.  His kidney function on creatinine is 1.2 which is more or less the same as compared to last time.  No further worsening.

## 2020-12-17 ENCOUNTER — LAB VISIT (OUTPATIENT)
Dept: LAB | Facility: HOSPITAL | Age: 80
End: 2020-12-17
Attending: INTERNAL MEDICINE
Payer: MEDICARE

## 2020-12-17 DIAGNOSIS — R53.83 FATIGUE: ICD-10-CM

## 2020-12-17 DIAGNOSIS — E78.5 HYPERLIPEMIA: Primary | ICD-10-CM

## 2020-12-17 DIAGNOSIS — I11.9 MALIGNANT HYPERTENSIVE HEART DISEASE WITHOUT HEART FAILURE: ICD-10-CM

## 2020-12-17 LAB
ALBUMIN SERPL BCP-MCNC: 4.1 G/DL (ref 3.5–5.2)
ALP SERPL-CCNC: 75 U/L (ref 55–135)
ALT SERPL W/O P-5'-P-CCNC: 20 U/L (ref 10–44)
ANION GAP SERPL CALC-SCNC: 10 MMOL/L (ref 8–16)
AST SERPL-CCNC: 24 U/L (ref 10–40)
BILIRUB SERPL-MCNC: 0.9 MG/DL (ref 0.1–1)
BUN SERPL-MCNC: 19 MG/DL (ref 8–23)
CALCIUM SERPL-MCNC: 10.9 MG/DL (ref 8.7–10.5)
CHLORIDE SERPL-SCNC: 104 MMOL/L (ref 95–110)
CHOLEST SERPL-MCNC: 169 MG/DL (ref 120–199)
CHOLEST/HDLC SERPL: 4.1 {RATIO} (ref 2–5)
CO2 SERPL-SCNC: 27 MMOL/L (ref 23–29)
CREAT SERPL-MCNC: 1.2 MG/DL (ref 0.5–1.4)
EST. GFR  (AFRICAN AMERICAN): >60 ML/MIN/1.73 M^2
EST. GFR  (NON AFRICAN AMERICAN): 56.4 ML/MIN/1.73 M^2
GLUCOSE SERPL-MCNC: 104 MG/DL (ref 70–110)
HDLC SERPL-MCNC: 41 MG/DL (ref 40–75)
HDLC SERPL: 24.3 % (ref 20–50)
LDLC SERPL CALC-MCNC: 108.4 MG/DL (ref 63–159)
NONHDLC SERPL-MCNC: 128 MG/DL
POTASSIUM SERPL-SCNC: 4.4 MMOL/L (ref 3.5–5.1)
PROT SERPL-MCNC: 7.3 G/DL (ref 6–8.4)
SODIUM SERPL-SCNC: 141 MMOL/L (ref 136–145)
TRIGL SERPL-MCNC: 98 MG/DL (ref 30–150)

## 2020-12-17 PROCEDURE — 80053 COMPREHEN METABOLIC PANEL: CPT

## 2020-12-17 PROCEDURE — 36415 COLL VENOUS BLD VENIPUNCTURE: CPT

## 2020-12-17 PROCEDURE — 80061 LIPID PANEL: CPT

## 2020-12-22 ENCOUNTER — OFFICE VISIT (OUTPATIENT)
Dept: CARDIOLOGY | Facility: CLINIC | Age: 80
End: 2020-12-22
Payer: MEDICARE

## 2020-12-22 ENCOUNTER — LAB VISIT (OUTPATIENT)
Dept: LAB | Facility: HOSPITAL | Age: 80
End: 2020-12-22
Attending: INTERNAL MEDICINE
Payer: MEDICARE

## 2020-12-22 DIAGNOSIS — N40.0 BENIGN PROSTATIC HYPERPLASIA WITHOUT URINARY OBSTRUCTION: ICD-10-CM

## 2020-12-22 DIAGNOSIS — E03.8 SECONDARY HYPOTHYROIDISM: ICD-10-CM

## 2020-12-22 DIAGNOSIS — E78.2 MULTIPLE-TYPE HYPERLIPIDEMIA: ICD-10-CM

## 2020-12-22 DIAGNOSIS — I10 BENIGN ESSENTIAL HYPERTENSION: Primary | ICD-10-CM

## 2020-12-22 DIAGNOSIS — E83.52 HYPERCALCEMIA: ICD-10-CM

## 2020-12-22 LAB
CALCIUM SERPL-MCNC: 9.3 MG/DL (ref 8.7–10.5)
PTH-INTACT SERPL-MCNC: 38.7 PG/ML (ref 9–77)

## 2020-12-22 PROCEDURE — 99213 PR OFFICE/OUTPT VISIT, EST, LEVL III, 20-29 MIN: ICD-10-PCS | Mod: S$GLB,,, | Performed by: INTERNAL MEDICINE

## 2020-12-22 PROCEDURE — 83970 ASSAY OF PARATHORMONE: CPT

## 2020-12-22 PROCEDURE — 82310 ASSAY OF CALCIUM: CPT

## 2020-12-22 PROCEDURE — 36415 COLL VENOUS BLD VENIPUNCTURE: CPT

## 2020-12-22 PROCEDURE — 99213 OFFICE O/P EST LOW 20 MIN: CPT | Mod: S$GLB,,, | Performed by: INTERNAL MEDICINE

## 2020-12-22 NOTE — PROGRESS NOTES
Subjective:    Patient ID:  Nikolay Barraza is a 80 y.o. male who presents for   Follow-up (labs in chart, no test, meds utd and reviewed, )    HPI no specific cardiac complaints.  Other than age-related some decrease in balance he is doing fine he has no chest pain or shortness of breath.    Review of patient's allergies indicates:   Allergen Reactions    Codeine Other (See Comments)     mood changes  Makes him feel violent       Past Medical History:   Diagnosis Date    Allergy     Codeine    Closed fracture dislocation of lumbar spine 7/6/2018    #2 vertebra. Patient went to the emergency Department.  Followup with Erik:    GERD (gastroesophageal reflux disease)     Hyperlipidemia     Hypertension     Right inguinal hernia     Thyroid disease      Past Surgical History:   Procedure Laterality Date    ADENOIDECTOMY      INGUINAL HERNIA REPAIR Right 07/12/2018    [facility]    KNEE SURGERY Left 07/31/2019    TONSILLECTOMY      VASECTOMY       Social History     Tobacco Use    Smoking status: Never Smoker    Smokeless tobacco: Never Used   Substance Use Topics    Alcohol use: No    Drug use: No        Review of Systems     ROS        Objective:        There were no vitals filed for this visit.    Lab Results   Component Value Date    WBC 4.9 (L) 06/14/2017    HGB 12.6 (L) 06/14/2017    HCT 37.4 (L) 06/14/2017     06/14/2017    CHOL 169 12/17/2020    TRIG 98 12/17/2020    HDL 41 12/17/2020    ALT 20 12/17/2020    AST 24 12/17/2020     12/17/2020    K 4.4 12/17/2020     12/17/2020    CREATININE 1.2 12/17/2020    BUN 19 12/17/2020    CO2 27 12/17/2020    TSH 4.810 11/07/2019    PSA 0.11 06/28/2019    HGBA1C 5.8 08/22/2017        ECHOCARDIOGRAM RESULTS  No results found for this or any previous visit.    CURRENT/PREVIOUS VISIT EKG  Today's ECG shows sinus rhythm rate 63 nonspecific T-wave flattening borderline ECG    PHYSICAL EXAM    Physical Exam   There is no thyroid or  other palpable neck swelling  Pulses regular  Heart sounds are normal no murmur or gallop  Lungs clear to auscultation  Lower extremities no edema  No carotid bruit  Labs reviewed    Medication List with Changes/Refills   Current Medications    ASCORBIC ACID, VITAMIN C, (VITAMIN C) 500 MG TABLET    Take 500 mg by mouth once daily.    ASPIRIN (ECOTRIN) 81 MG EC TABLET    Take 81 mg by mouth once daily.    AUGMENTED BETAMETHASONE DIPROPIONATE (DIPROLENE-AF) 0.05 % CREAM    1 Dose daily as needed.    B COMPLEX VITAMINS TABLET    Take 1 tablet by mouth once daily.    CALCIUM CARBONATE/VITAMIN D3 (VITAMIN D-3 ORAL)    Take by mouth.    DOCUSATE SODIUM (COLACE) 50 MG CAPSULE    Take by mouth 2 (two) times daily as needed for Constipation.    DOXYCYCLINE (ORACEA) 40 MG CAPSULE    Take 40 mg by mouth once daily.    FELODIPINE (PLENDIL) 5 MG 24 HR TABLET    Take 5 mg by mouth 2 (two) times daily.     FINASTERIDE (PROSCAR) 5 MG TABLET    Take 1 tablet (5 mg total) by mouth once daily.    FISH OIL-OMEGA-3 FATTY ACIDS 300-1,000 MG CAPSULE    Take 2 g by mouth once daily.    FLUTICASONE (FLONASE) 50 MCG/ACTUATION NASAL SPRAY    SPRAY 1 SPRAY NASALLY AS DIRECTED TWICE A DAY    IPRATROPIUM (ATROVENT) 42 MCG (0.06 %) NASAL SPRAY    2 sprays by Nasal route 2 (two) times daily.    LOSARTAN (COZAAR) 100 MG TABLET    Take 100 mg by mouth once daily.    MULTIVITAMIN (ONE DAILY MULTIVITAMIN) PER TABLET    Take 1 tablet by mouth once daily.    PRAVASTATIN (PRAVACHOL) 20 MG TABLET    Take 20 mg by mouth once daily.    SYNTHROID 200 MCG TABLET    TAKE 1 TABLET ONCE DAILY    TAMSULOSIN (FLOMAX) 0.4 MG CAP    Take 1 capsule (0.4 mg total) by mouth once daily. Take at bedtime           Assessment:       1. Benign essential hypertension    2. Multiple-type hyperlipidemia    3. Secondary hypothyroidism    4. Benign prostatic hyperplasia without urinary obstruction    5. Hypercalcemia         Plan:  Incidental finding of hypercalcemia at 10.9.   He does not have any bone pain.  No parathyroid swelling in the neck.  No recent fractures his renal function is normal will check a PTH and repeat calcium level.  His blood pressure is well controlled lipid profile is satisfactory follow-up will be in with 6 months but will get back to him with his repeat calcium levels       Problem List Items Addressed This Visit        Cardiac/Vascular    Benign essential hypertension - Primary    Overview     Blood pressures are still elevated today. It is felt that with further weight loss, perhaps his blood pressure may come down naturally and he may not need any new medications.His lipid panel shows borderline LDL and triglycerides. HDL is 30.         Relevant Orders    IN OFFICE EKG 12-LEAD (to Muse)    Multiple-type hyperlipidemia       Renal/    Benign prostatic hyperplasia without urinary obstruction       Endocrine    Secondary hypothyroidism    Overview     Clinically patient is euthyroid and his TSH is within normal limits.           Other Visit Diagnoses     Hypercalcemia        Relevant Orders    PTH, intact    Calcium           Follow up in about 6 months (around 6/22/2021) for Routine follow up.

## 2020-12-26 ENCOUNTER — HOSPITAL ENCOUNTER (OUTPATIENT)
Facility: HOSPITAL | Age: 80
Discharge: HOME | End: 2020-12-27
Attending: EMERGENCY MEDICINE | Admitting: INTERNAL MEDICINE
Payer: MEDICARE

## 2020-12-26 ENCOUNTER — OFFICE VISIT (OUTPATIENT)
Dept: URGENT CARE | Facility: CLINIC | Age: 80
End: 2020-12-26
Payer: MEDICARE

## 2020-12-26 VITALS
OXYGEN SATURATION: 97 % | BODY MASS INDEX: 32.64 KG/M2 | RESPIRATION RATE: 17 BRPM | DIASTOLIC BLOOD PRESSURE: 82 MMHG | HEIGHT: 70 IN | WEIGHT: 228 LBS | SYSTOLIC BLOOD PRESSURE: 152 MMHG | HEART RATE: 60 BPM | TEMPERATURE: 98 F

## 2020-12-26 DIAGNOSIS — R07.9 CHEST PAIN: ICD-10-CM

## 2020-12-26 DIAGNOSIS — R55 NEAR SYNCOPE: ICD-10-CM

## 2020-12-26 DIAGNOSIS — H81.10 BENIGN PAROXYSMAL POSITIONAL VERTIGO, UNSPECIFIED LATERALITY: ICD-10-CM

## 2020-12-26 DIAGNOSIS — R55 SYNCOPE: ICD-10-CM

## 2020-12-26 DIAGNOSIS — R42 DIZZINESS: Primary | ICD-10-CM

## 2020-12-26 LAB
ALBUMIN SERPL BCP-MCNC: 4.2 G/DL (ref 3.5–5.2)
ALP SERPL-CCNC: 98 U/L (ref 55–135)
ALT SERPL W/O P-5'-P-CCNC: 24 U/L (ref 10–44)
ANION GAP SERPL CALC-SCNC: 12 MMOL/L (ref 8–16)
APTT BLDCRRT: 27.3 SEC (ref 21–32)
AST SERPL-CCNC: 34 U/L (ref 10–40)
BASOPHILS # BLD AUTO: 0.05 K/UL (ref 0–0.2)
BASOPHILS NFR BLD: 0.8 % (ref 0–1.9)
BILIRUB SERPL-MCNC: 0.7 MG/DL (ref 0.1–1)
BILIRUB UR QL STRIP: NEGATIVE
BUN SERPL-MCNC: 16 MG/DL (ref 8–23)
CALCIUM SERPL-MCNC: 9.7 MG/DL (ref 8.7–10.5)
CHLORIDE SERPL-SCNC: 105 MMOL/L (ref 95–110)
CK MB SERPL-MCNC: 0.9 NG/ML (ref 0.1–6.5)
CK MB SERPL-RTO: 2.4 % (ref 0–5)
CK SERPL-CCNC: 37 U/L (ref 20–200)
CLARITY UR: CLEAR
CO2 SERPL-SCNC: 23 MMOL/L (ref 23–29)
COLOR UR: YELLOW
CREAT SERPL-MCNC: 1.2 MG/DL (ref 0.5–1.4)
DIFFERENTIAL METHOD: NORMAL
EOSINOPHIL # BLD AUTO: 0.1 K/UL (ref 0–0.5)
EOSINOPHIL NFR BLD: 1.2 % (ref 0–8)
ERYTHROCYTE [DISTWIDTH] IN BLOOD BY AUTOMATED COUNT: 13.3 % (ref 11.5–14.5)
EST. GFR  (AFRICAN AMERICAN): >60 ML/MIN/1.73 M^2
EST. GFR  (NON AFRICAN AMERICAN): 56 ML/MIN/1.73 M^2
GLUCOSE SERPL-MCNC: 96 MG/DL (ref 70–110)
GLUCOSE UR QL STRIP: NEGATIVE
HCT VFR BLD AUTO: 43.4 % (ref 40–54)
HGB BLD-MCNC: 14.5 G/DL (ref 14–18)
HGB UR QL STRIP: NEGATIVE
IMM GRANULOCYTES # BLD AUTO: 0.01 K/UL (ref 0–0.04)
IMM GRANULOCYTES NFR BLD AUTO: 0.2 % (ref 0–0.5)
INR PPP: 1 (ref 0.8–1.2)
KETONES UR QL STRIP: NEGATIVE
LEUKOCYTE ESTERASE UR QL STRIP: NEGATIVE
LYMPHOCYTES # BLD AUTO: 1.4 K/UL (ref 1–4.8)
LYMPHOCYTES NFR BLD: 24.3 % (ref 18–48)
MCH RBC QN AUTO: 29.5 PG (ref 27–31)
MCHC RBC AUTO-ENTMCNC: 33.4 G/DL (ref 32–36)
MCV RBC AUTO: 88 FL (ref 82–98)
MONOCYTES # BLD AUTO: 0.6 K/UL (ref 0.3–1)
MONOCYTES NFR BLD: 9.4 % (ref 4–15)
NEUTROPHILS # BLD AUTO: 3.8 K/UL (ref 1.8–7.7)
NEUTROPHILS NFR BLD: 64.1 % (ref 38–73)
NITRITE UR QL STRIP: NEGATIVE
NRBC BLD-RTO: 0 /100 WBC
PH UR STRIP: 6 [PH] (ref 5–8)
PLATELET # BLD AUTO: 212 K/UL (ref 150–350)
PMV BLD AUTO: 9.8 FL (ref 9.2–12.9)
POTASSIUM SERPL-SCNC: 5.2 MMOL/L (ref 3.5–5.1)
PROT SERPL-MCNC: 7.9 G/DL (ref 6–8.4)
PROT UR QL STRIP: NEGATIVE
PROTHROMBIN TIME: 10.5 SEC (ref 9–12.5)
RBC # BLD AUTO: 4.92 M/UL (ref 4.6–6.2)
SARS-COV-2 RDRP RESP QL NAA+PROBE: NEGATIVE
SODIUM SERPL-SCNC: 140 MMOL/L (ref 136–145)
SP GR UR STRIP: 1.01 (ref 1–1.03)
TROPONIN I SERPL DL<=0.01 NG/ML-MCNC: <0.006 NG/ML (ref 0–0.03)
URN SPEC COLLECT METH UR: NORMAL
UROBILINOGEN UR STRIP-ACNC: NEGATIVE EU/DL
WBC # BLD AUTO: 5.93 K/UL (ref 3.9–12.7)

## 2020-12-26 PROCEDURE — 93005 ELECTROCARDIOGRAM TRACING: CPT

## 2020-12-26 PROCEDURE — 82550 ASSAY OF CK (CPK): CPT

## 2020-12-26 PROCEDURE — G0378 HOSPITAL OBSERVATION PER HR: HCPCS

## 2020-12-26 PROCEDURE — 99285 EMERGENCY DEPT VISIT HI MDM: CPT | Mod: 25

## 2020-12-26 PROCEDURE — 99211 OFF/OP EST MAY X REQ PHY/QHP: CPT | Mod: S$GLB,,, | Performed by: NURSE PRACTITIONER

## 2020-12-26 PROCEDURE — 85025 COMPLETE CBC W/AUTO DIFF WBC: CPT

## 2020-12-26 PROCEDURE — 85730 THROMBOPLASTIN TIME PARTIAL: CPT

## 2020-12-26 PROCEDURE — 25000003 PHARM REV CODE 250: Performed by: EMERGENCY MEDICINE

## 2020-12-26 PROCEDURE — 82553 CREATINE MB FRACTION: CPT

## 2020-12-26 PROCEDURE — 93010 ELECTROCARDIOGRAM REPORT: CPT | Mod: ,,, | Performed by: SPECIALIST

## 2020-12-26 PROCEDURE — 99211 PR OFFICE/OUTPT VISIT, EST, LEVL I: ICD-10-PCS | Mod: S$GLB,,, | Performed by: NURSE PRACTITIONER

## 2020-12-26 PROCEDURE — 93010 EKG 12-LEAD: ICD-10-PCS | Mod: ,,, | Performed by: SPECIALIST

## 2020-12-26 PROCEDURE — U0002 COVID-19 LAB TEST NON-CDC: HCPCS

## 2020-12-26 PROCEDURE — 36415 COLL VENOUS BLD VENIPUNCTURE: CPT

## 2020-12-26 PROCEDURE — 84484 ASSAY OF TROPONIN QUANT: CPT

## 2020-12-26 PROCEDURE — 81003 URINALYSIS AUTO W/O SCOPE: CPT

## 2020-12-26 PROCEDURE — 99900035 HC TECH TIME PER 15 MIN (STAT)

## 2020-12-26 PROCEDURE — 80053 COMPREHEN METABOLIC PANEL: CPT

## 2020-12-26 PROCEDURE — 85610 PROTHROMBIN TIME: CPT

## 2020-12-26 RX ORDER — SODIUM CHLORIDE 0.9 % (FLUSH) 0.9 %
10 SYRINGE (ML) INJECTION
Status: DISCONTINUED | OUTPATIENT
Start: 2020-12-26 | End: 2020-12-27 | Stop reason: HOSPADM

## 2020-12-26 RX ORDER — ACETAMINOPHEN 325 MG/1
650 TABLET ORAL EVERY 6 HOURS PRN
Status: DISCONTINUED | OUTPATIENT
Start: 2020-12-26 | End: 2020-12-27 | Stop reason: HOSPADM

## 2020-12-26 RX ORDER — LEVOTHYROXINE SODIUM 100 UG/1
200 TABLET ORAL
Status: DISCONTINUED | OUTPATIENT
Start: 2020-12-27 | End: 2020-12-27 | Stop reason: HOSPADM

## 2020-12-26 RX ORDER — POTASSIUM CHLORIDE 20 MEQ/1
40 TABLET, EXTENDED RELEASE ORAL
Status: DISCONTINUED | OUTPATIENT
Start: 2020-12-26 | End: 2020-12-26

## 2020-12-26 RX ORDER — DOCUSATE SODIUM 50 MG/5ML
50 LIQUID ORAL 2 TIMES DAILY PRN
Status: DISCONTINUED | OUTPATIENT
Start: 2020-12-26 | End: 2020-12-27 | Stop reason: HOSPADM

## 2020-12-26 RX ORDER — FLUTICASONE PROPIONATE 50 MCG
1 SPRAY, SUSPENSION (ML) NASAL DAILY
Status: DISCONTINUED | OUTPATIENT
Start: 2020-12-27 | End: 2020-12-27 | Stop reason: HOSPADM

## 2020-12-26 RX ORDER — SODIUM,POTASSIUM PHOSPHATES 280-250MG
2 POWDER IN PACKET (EA) ORAL
Status: DISCONTINUED | OUTPATIENT
Start: 2020-12-26 | End: 2020-12-27 | Stop reason: HOSPADM

## 2020-12-26 RX ORDER — DOXYCYCLINE 25 MG/5ML
20 POWDER, FOR SUSPENSION ORAL 2 TIMES DAILY
Status: DISCONTINUED | OUTPATIENT
Start: 2020-12-27 | End: 2020-12-27 | Stop reason: HOSPADM

## 2020-12-26 RX ORDER — MECLIZINE HYDROCHLORIDE 25 MG/1
25 TABLET ORAL
Status: COMPLETED | OUTPATIENT
Start: 2020-12-26 | End: 2020-12-26

## 2020-12-26 RX ORDER — LOSARTAN POTASSIUM 25 MG/1
100 TABLET ORAL DAILY
Status: DISCONTINUED | OUTPATIENT
Start: 2020-12-27 | End: 2020-12-26

## 2020-12-26 RX ORDER — IPRATROPIUM BROMIDE AND ALBUTEROL SULFATE 2.5; .5 MG/3ML; MG/3ML
3 SOLUTION RESPIRATORY (INHALATION) EVERY 6 HOURS PRN
Status: DISCONTINUED | OUTPATIENT
Start: 2020-12-26 | End: 2020-12-27 | Stop reason: HOSPADM

## 2020-12-26 RX ORDER — GLUCAGON 1 MG
1 KIT INJECTION
Status: DISCONTINUED | OUTPATIENT
Start: 2020-12-26 | End: 2020-12-27 | Stop reason: HOSPADM

## 2020-12-26 RX ORDER — IBUPROFEN 200 MG
24 TABLET ORAL
Status: DISCONTINUED | OUTPATIENT
Start: 2020-12-26 | End: 2020-12-27 | Stop reason: HOSPADM

## 2020-12-26 RX ORDER — PRAVASTATIN SODIUM 10 MG/1
20 TABLET ORAL DAILY
Status: DISCONTINUED | OUTPATIENT
Start: 2020-12-27 | End: 2020-12-27 | Stop reason: HOSPADM

## 2020-12-26 RX ORDER — MECLIZINE HCL 12.5 MG 12.5 MG/1
12.5 TABLET ORAL 3 TIMES DAILY PRN
Status: DISCONTINUED | OUTPATIENT
Start: 2020-12-26 | End: 2020-12-27 | Stop reason: HOSPADM

## 2020-12-26 RX ORDER — ONDANSETRON 2 MG/ML
4 INJECTION INTRAMUSCULAR; INTRAVENOUS EVERY 8 HOURS PRN
Status: DISCONTINUED | OUTPATIENT
Start: 2020-12-26 | End: 2020-12-27 | Stop reason: HOSPADM

## 2020-12-26 RX ORDER — ASPIRIN 81 MG/1
81 TABLET ORAL DAILY
Status: DISCONTINUED | OUTPATIENT
Start: 2020-12-27 | End: 2020-12-27 | Stop reason: HOSPADM

## 2020-12-26 RX ORDER — TAMSULOSIN HYDROCHLORIDE 0.4 MG/1
0.4 CAPSULE ORAL DAILY
Status: DISCONTINUED | OUTPATIENT
Start: 2020-12-27 | End: 2020-12-27 | Stop reason: HOSPADM

## 2020-12-26 RX ORDER — LANOLIN ALCOHOL/MO/W.PET/CERES
800 CREAM (GRAM) TOPICAL
Status: DISCONTINUED | OUTPATIENT
Start: 2020-12-26 | End: 2020-12-27 | Stop reason: HOSPADM

## 2020-12-26 RX ORDER — FELODIPINE 5 MG/1
5 TABLET, EXTENDED RELEASE ORAL DAILY
Status: DISCONTINUED | OUTPATIENT
Start: 2020-12-27 | End: 2020-12-27 | Stop reason: HOSPADM

## 2020-12-26 RX ORDER — IBUPROFEN 200 MG
16 TABLET ORAL
Status: DISCONTINUED | OUTPATIENT
Start: 2020-12-26 | End: 2020-12-27 | Stop reason: HOSPADM

## 2020-12-26 RX ORDER — GLUCOSAM/CHONDRO/HERB 149/HYAL 750-100 MG
1 TABLET ORAL DAILY
Status: DISCONTINUED | OUTPATIENT
Start: 2020-12-27 | End: 2020-12-27 | Stop reason: HOSPADM

## 2020-12-26 RX ORDER — ASCORBIC ACID 500 MG
500 TABLET ORAL DAILY
Status: DISCONTINUED | OUTPATIENT
Start: 2020-12-27 | End: 2020-12-27 | Stop reason: HOSPADM

## 2020-12-26 RX ADMIN — MECLIZINE HYDROCHLORIDE 25 MG: 25 TABLET ORAL at 01:12

## 2020-12-26 NOTE — PROGRESS NOTES
"Subjective:       Patient ID: Nikolay Barraza is a 80 y.o. male.    Vitals:  height is 5' 10" (1.778 m) and weight is 103.4 kg (228 lb). His temperature is 97.8 °F (36.6 °C). His blood pressure is 152/82 (abnormal) and his pulse is 60. His respiration is 17 and oxygen saturation is 97%.     Chief Complaint: Dizziness    Pt here with complaints of dizziness, pt fell yesterday morning didn't hit anything caught his self and slid down. States that he woke up with dizziness yesterday, most noted with position changes. Denies associated nausea. Denies SOB or CP. Denies associated or unusual weakness, denies headache. Denies memory changes or speech issues. States that he moved slowly and changed positions very slowly. States that he may have had something like this years ago.   Orthostatic vitals:   Lying 138/72  Sitting 152/82 (increased nystagmus at this point and dizziness)  Standing  160/92 (unsteady balance for several seconds)      Dizziness:   Chronicity:  Recurrent  Onset:  Yesterday  Progression since onset:  Gradually worsening  Frequency:  Constantly  Severity:  Severe  Duration:  1 minute  Dizziness characteristics:  Spacial disorientation, off-balance, walking on uneven surface, lightheaded/impending faint and spinning inside head only  Frequency of Spells:  Hourly  Aggravated by:  Getting up, lying down and bending  Treatments tried:  Nothing  Improvements on treatment:  No relief      Constitution: Negative.   HENT: Negative.    Neck: negative.   Cardiovascular: Negative.    Eyes: Negative.    Respiratory: Negative.    Gastrointestinal: Negative.    Endocrine: negative.   Genitourinary: Negative.    Musculoskeletal: Negative.    Skin: Negative.    Allergic/Immunologic: Negative.    Neurological: Positive for dizziness.   Hematologic/Lymphatic: Negative.    Psychiatric/Behavioral: Negative.        Objective:      Physical Exam   Constitutional: He is oriented to person, place, and time. He appears " well-developed. He is cooperative.  Non-toxic appearance. He does not appear ill. No distress.   HENT:   Head: Normocephalic and atraumatic.   Ears:   Right Ear: Hearing, tympanic membrane, external ear and ear canal normal.   Left Ear: Hearing, tympanic membrane, external ear and ear canal normal.   Nose: Nose normal. No mucosal edema, rhinorrhea or nasal deformity. No epistaxis. Right sinus exhibits no maxillary sinus tenderness and no frontal sinus tenderness. Left sinus exhibits no maxillary sinus tenderness and no frontal sinus tenderness.   Mouth/Throat: Uvula is midline, oropharynx is clear and moist and mucous membranes are normal. No trismus in the jaw. Normal dentition. No uvula swelling. No posterior oropharyngeal erythema.   Eyes: Conjunctivae and lids are normal. Right eye exhibits no discharge. Left eye exhibits no discharge. No scleral icterus.   Neck: Trachea normal, normal range of motion, full passive range of motion without pain and phonation normal. Neck supple.   Cardiovascular: Normal rate, regular rhythm, normal heart sounds and normal pulses.   Pulmonary/Chest: Effort normal and breath sounds normal. No respiratory distress.   Abdominal: Soft. Normal appearance and bowel sounds are normal. He exhibits no distension, no pulsatile midline mass and no mass. There is no abdominal tenderness.   Musculoskeletal: Normal range of motion.         General: No deformity.   Neurological: He is alert and oriented to person, place, and time. He exhibits normal muscle tone. Coordination normal.   Skin: Skin is warm, dry, intact, not diaphoretic and not pale. Psychiatric: His speech is normal and behavior is normal. Judgment and thought content normal.   Nursing note and vitals reviewed.        Assessment:       1. Dizziness    2. Benign paroxysmal positional vertigo, unspecified laterality        Plan:         Dizziness    Benign paroxysmal positional vertigo, unspecified laterality    TO ER for further  eval due to age and sudden onset of symptoms.

## 2020-12-27 VITALS
OXYGEN SATURATION: 97 % | RESPIRATION RATE: 18 BRPM | SYSTOLIC BLOOD PRESSURE: 134 MMHG | HEIGHT: 70 IN | WEIGHT: 223.13 LBS | DIASTOLIC BLOOD PRESSURE: 63 MMHG | HEART RATE: 56 BPM | TEMPERATURE: 99 F | BODY MASS INDEX: 31.94 KG/M2

## 2020-12-27 LAB
ANION GAP SERPL CALC-SCNC: 10 MMOL/L (ref 8–16)
AORTIC ROOT ANNULUS: 3.42 CM
AORTIC VALVE CUSP SEPERATION: 2.02 CM
AV INDEX (PROSTH): 0.84
AV MEAN GRADIENT: 3 MMHG
AV PEAK GRADIENT: 5 MMHG
AV VALVE AREA: 2.73 CM2
AV VELOCITY RATIO: 0.84
BASOPHILS # BLD AUTO: 0.03 K/UL (ref 0–0.2)
BASOPHILS NFR BLD: 0.6 % (ref 0–1.9)
BSA FOR ECHO PROCEDURE: 2.25 M2
BUN SERPL-MCNC: 16 MG/DL (ref 8–23)
CALCIUM SERPL-MCNC: 8.9 MG/DL (ref 8.7–10.5)
CHLORIDE SERPL-SCNC: 106 MMOL/L (ref 95–110)
CHOLEST SERPL-MCNC: 157 MG/DL (ref 120–199)
CHOLEST/HDLC SERPL: 4.2 {RATIO} (ref 2–5)
CK MB SERPL-MCNC: 0.6 NG/ML (ref 0.1–6.5)
CK MB SERPL-RTO: 1.9 % (ref 0–5)
CK SERPL-CCNC: 32 U/L (ref 20–200)
CO2 SERPL-SCNC: 25 MMOL/L (ref 23–29)
CREAT SERPL-MCNC: 1.2 MG/DL (ref 0.5–1.4)
CV ECHO LV RWT: 0.46 CM
DIFFERENTIAL METHOD: ABNORMAL
DOP CALC AO PEAK VEL: 1.15 M/S
DOP CALC AO VTI: 26.87 CM
DOP CALC LVOT AREA: 3.2 CM2
DOP CALC LVOT DIAMETER: 2.03 CM
DOP CALC LVOT PEAK VEL: 0.97 M/S
DOP CALC LVOT STROKE VOLUME: 73.4 CM3
DOP CALCLVOT PEAK VEL VTI: 22.69 CM
E WAVE DECELERATION TIME: 269.62 MSEC
E/A RATIO: 0.77
E/E' RATIO: 9 M/S
ECHO LV POSTERIOR WALL: 1.26 CM (ref 0.6–1.1)
EOSINOPHIL # BLD AUTO: 0.2 K/UL (ref 0–0.5)
EOSINOPHIL NFR BLD: 3.2 % (ref 0–8)
ERYTHROCYTE [DISTWIDTH] IN BLOOD BY AUTOMATED COUNT: 13.3 % (ref 11.5–14.5)
EST. GFR  (AFRICAN AMERICAN): >60 ML/MIN/1.73 M^2
EST. GFR  (NON AFRICAN AMERICAN): 56 ML/MIN/1.73 M^2
FRACTIONAL SHORTENING: 31 % (ref 28–44)
GLUCOSE SERPL-MCNC: 91 MG/DL (ref 70–110)
HCT VFR BLD AUTO: 39.8 % (ref 40–54)
HDLC SERPL-MCNC: 37 MG/DL (ref 40–75)
HDLC SERPL: 23.6 % (ref 20–50)
HGB BLD-MCNC: 13.2 G/DL (ref 14–18)
IMM GRANULOCYTES # BLD AUTO: 0.01 K/UL (ref 0–0.04)
IMM GRANULOCYTES NFR BLD AUTO: 0.2 % (ref 0–0.5)
INTERVENTRICULAR SEPTUM: 1.42 CM (ref 0.6–1.1)
LDLC SERPL CALC-MCNC: 93.6 MG/DL (ref 63–159)
LEFT ATRIUM SIZE: 4.42 CM
LEFT INTERNAL DIMENSION IN SYSTOLE: 3.79 CM (ref 2.1–4)
LEFT VENTRICLE DIASTOLIC VOLUME INDEX: 68.01 ML/M2
LEFT VENTRICLE DIASTOLIC VOLUME: 148.7 ML
LEFT VENTRICLE MASS INDEX: 146 G/M2
LEFT VENTRICLE SYSTOLIC VOLUME INDEX: 28.2 ML/M2
LEFT VENTRICLE SYSTOLIC VOLUME: 61.59 ML
LEFT VENTRICULAR INTERNAL DIMENSION IN DIASTOLE: 5.52 CM (ref 3.5–6)
LEFT VENTRICULAR MASS: 319.4 G
LV LATERAL E/E' RATIO: 9 M/S
LV SEPTAL E/E' RATIO: 9 M/S
LYMPHOCYTES # BLD AUTO: 1.7 K/UL (ref 1–4.8)
LYMPHOCYTES NFR BLD: 33.7 % (ref 18–48)
MCH RBC QN AUTO: 29.7 PG (ref 27–31)
MCHC RBC AUTO-ENTMCNC: 33.2 G/DL (ref 32–36)
MCV RBC AUTO: 90 FL (ref 82–98)
MONOCYTES # BLD AUTO: 0.5 K/UL (ref 0.3–1)
MONOCYTES NFR BLD: 8.9 % (ref 4–15)
MV PEAK A VEL: 0.82 M/S
MV PEAK E VEL: 0.63 M/S
NEUTROPHILS # BLD AUTO: 2.7 K/UL (ref 1.8–7.7)
NEUTROPHILS NFR BLD: 53.4 % (ref 38–73)
NONHDLC SERPL-MCNC: 120 MG/DL
NRBC BLD-RTO: 0 /100 WBC
PISA MRMAX VEL: 0.04 M/S
PISA TR MAX VEL: 2.51 M/S
PLATELET # BLD AUTO: 202 K/UL (ref 150–350)
PMV BLD AUTO: 9.8 FL (ref 9.2–12.9)
POTASSIUM SERPL-SCNC: 4.3 MMOL/L (ref 3.5–5.1)
PV PEAK VELOCITY: 1.01 CM/S
RA PRESSURE: 3 MMHG
RBC # BLD AUTO: 4.44 M/UL (ref 4.6–6.2)
RIGHT VENTRICULAR END-DIASTOLIC DIMENSION: 3.53 CM
SODIUM SERPL-SCNC: 141 MMOL/L (ref 136–145)
TDI LATERAL: 0.07 M/S
TDI SEPTAL: 0.07 M/S
TDI: 0.07 M/S
TR MAX PG: 25 MMHG
TRICUSPID ANNULAR PLANE SYSTOLIC EXCURSION: 2.05 CM
TRIGL SERPL-MCNC: 132 MG/DL (ref 30–150)
TROPONIN I SERPL DL<=0.01 NG/ML-MCNC: 0.01 NG/ML (ref 0–0.03)
TROPONIN I SERPL DL<=0.01 NG/ML-MCNC: <0.006 NG/ML (ref 0–0.03)
TROPONIN I SERPL DL<=0.01 NG/ML-MCNC: <0.006 NG/ML (ref 0–0.03)
TSH SERPL DL<=0.005 MIU/L-ACNC: 1.48 UIU/ML (ref 0.4–4)
TV REST PULMONARY ARTERY PRESSURE: 28 MMHG
WBC # BLD AUTO: 5.07 K/UL (ref 3.9–12.7)

## 2020-12-27 PROCEDURE — 80061 LIPID PANEL: CPT

## 2020-12-27 PROCEDURE — 84484 ASSAY OF TROPONIN QUANT: CPT | Mod: 91

## 2020-12-27 PROCEDURE — 80048 BASIC METABOLIC PNL TOTAL CA: CPT

## 2020-12-27 PROCEDURE — 82550 ASSAY OF CK (CPK): CPT

## 2020-12-27 PROCEDURE — G0378 HOSPITAL OBSERVATION PER HR: HCPCS

## 2020-12-27 PROCEDURE — 85025 COMPLETE CBC W/AUTO DIFF WBC: CPT

## 2020-12-27 PROCEDURE — 82553 CREATINE MB FRACTION: CPT

## 2020-12-27 PROCEDURE — 97112 NEUROMUSCULAR REEDUCATION: CPT

## 2020-12-27 PROCEDURE — 99900035 HC TECH TIME PER 15 MIN (STAT)

## 2020-12-27 PROCEDURE — 25000242 PHARM REV CODE 250 ALT 637 W/ HCPCS: Performed by: INTERNAL MEDICINE

## 2020-12-27 PROCEDURE — 36415 COLL VENOUS BLD VENIPUNCTURE: CPT

## 2020-12-27 PROCEDURE — 97161 PT EVAL LOW COMPLEX 20 MIN: CPT

## 2020-12-27 PROCEDURE — 84484 ASSAY OF TROPONIN QUANT: CPT

## 2020-12-27 PROCEDURE — 25000003 PHARM REV CODE 250: Performed by: INTERNAL MEDICINE

## 2020-12-27 PROCEDURE — 84443 ASSAY THYROID STIM HORMONE: CPT

## 2020-12-27 RX ORDER — MECLIZINE HCL 12.5 MG 12.5 MG/1
12.5 TABLET ORAL 3 TIMES DAILY PRN
Qty: 20 TABLET | Refills: 0 | Status: SHIPPED | OUTPATIENT
Start: 2020-12-27 | End: 2021-03-04

## 2020-12-27 RX ADMIN — THERA TABS 1 TABLET: TAB at 08:12

## 2020-12-27 RX ADMIN — Medication 500 MG: at 08:12

## 2020-12-27 RX ADMIN — FELODIPINE 5 MG: 5 TABLET, EXTENDED RELEASE ORAL at 08:12

## 2020-12-27 RX ADMIN — PRAVASTATIN SODIUM 20 MG: 10 TABLET ORAL at 08:12

## 2020-12-27 RX ADMIN — ASPIRIN 81 MG: 81 TABLET, COATED ORAL at 08:12

## 2020-12-27 RX ADMIN — FLUTICASONE PROPIONATE 50 MCG: 50 SPRAY, METERED NASAL at 08:12

## 2020-12-27 RX ADMIN — LEVOTHYROXINE SODIUM 200 MCG: 0.1 TABLET ORAL at 06:12

## 2020-12-27 RX ADMIN — TAMSULOSIN HYDROCHLORIDE 0.4 MG: 0.4 CAPSULE ORAL at 08:12

## 2020-12-27 RX ADMIN — DOXYCYCLINE 20 MG: 25 FOR SUSPENSION ORAL at 08:12

## 2020-12-27 RX ADMIN — OMEGA-3 FATTY ACIDS CAP 1000 MG 1 CAPSULE: 1000 CAP at 08:12

## 2020-12-28 ENCOUNTER — TELEPHONE (OUTPATIENT)
Dept: CARDIOLOGY | Facility: CLINIC | Age: 80
End: 2020-12-28

## 2020-12-28 NOTE — TELEPHONE ENCOUNTER
Spoke to pt he was in hospital over weekend he hasnt talk to dr au was told to f/u due to test at hospital will talk to Dr AU and call pt back

## 2020-12-28 NOTE — TELEPHONE ENCOUNTER
----- Message from Kandi Koenig sent at 12/28/2020  8:39 AM CST -----  Regarding: APPT   patient was seen in the hospital and was told he need to be seen by   today or tomorrow ASAP  give patient a call [phone]

## 2020-12-28 NOTE — TELEPHONE ENCOUNTER
----- Message from Kandi Koenig sent at 12/28/2020  8:39 AM CST -----  Regarding: APPT   patient was seen in the hospital and was told he need to be seen by   today or tomorrow ASAP  give patient a call [phone]

## 2020-12-29 ENCOUNTER — OFFICE VISIT (OUTPATIENT)
Dept: FAMILY MEDICINE | Facility: CLINIC | Age: 80
End: 2020-12-29
Payer: MEDICARE

## 2020-12-29 VITALS
SYSTOLIC BLOOD PRESSURE: 136 MMHG | OXYGEN SATURATION: 98 % | WEIGHT: 225.5 LBS | BODY MASS INDEX: 32.28 KG/M2 | TEMPERATURE: 97 F | HEIGHT: 70 IN | DIASTOLIC BLOOD PRESSURE: 74 MMHG | RESPIRATION RATE: 20 BRPM | HEART RATE: 72 BPM

## 2020-12-29 DIAGNOSIS — H83.2X9 VESTIBULAR DISEQUILIBRIUM, UNSPECIFIED LATERALITY: ICD-10-CM

## 2020-12-29 DIAGNOSIS — E78.2 MULTIPLE-TYPE HYPERLIPIDEMIA: ICD-10-CM

## 2020-12-29 DIAGNOSIS — R42 DIZZINESS ON STANDING: Primary | ICD-10-CM

## 2020-12-29 DIAGNOSIS — E03.8 SECONDARY HYPOTHYROIDISM: ICD-10-CM

## 2020-12-29 DIAGNOSIS — I10 BENIGN ESSENTIAL HYPERTENSION: ICD-10-CM

## 2020-12-29 PROCEDURE — 99215 OFFICE O/P EST HI 40 MIN: CPT | Performed by: INTERNAL MEDICINE

## 2020-12-29 PROCEDURE — 99214 OFFICE O/P EST MOD 30 MIN: CPT | Mod: S$PBB,,, | Performed by: INTERNAL MEDICINE

## 2020-12-29 PROCEDURE — 99214 PR OFFICE/OUTPT VISIT, EST, LEVL IV, 30-39 MIN: ICD-10-PCS | Mod: S$PBB,,, | Performed by: INTERNAL MEDICINE

## 2020-12-29 NOTE — PATIENT INSTRUCTIONS
Understanding Dizziness, Balance Problems, and Fainting     The eyes, inner ear, joints, and muscles send signals to the brain to achieve balance.     Balance is a group effort of the eyes, inner ear, joints, and muscles. They each send signals to the brain about body position and head movement. Then the brain uses this information to achieve balance. When the brain receives conflicting signals, or when there is a problem with blood flow, dizziness or fainting can happen.  Vertigo  Vertigo is the feeling of spinning. It may happen if the brain receives conflicting balance signals. Vertigo is often caused by a problem in the inner ear. Problems include changes in inner ear structures, infection, swelling, or excess fluid. Sometimes vertigo is due to a brain problem, such as migraine.   Dysequilibrium  Dysequilibrium is the feeling of imbalance without a sense of spinning. It may happen if the signal path between the body and brain is disrupted. There are many causes of dysequilibrium, including diabetes, anemia, head injury, and aging.  Syncope  Syncope is losing consciousness or fainting. The brain needs oxygen-rich blood to function. The heart pumps that blood to the brain. If there is a problem with the heart, blood flow (such as low blood pressure), or blood vessels, faintness may happen.  Date Last Reviewed: 10/6/2015  © 4212-6362 [employer] [address] [place] [address], [place] [address]. All rights reserved. This information is not intended as a substitute for professional medical care. Always follow your healthcare professional's instructions.

## 2020-12-29 NOTE — PROGRESS NOTES
Subjective:       Patient ID: Nikolay Barraza is a 80 y.o. male.    Chief Complaint: Ear Fullness (ER f/u Inner ear ( crystals in left ear) ) and Dizziness      Mr. Barraza is a 80-year-old gentleman who comes for follow-up.  Recently was hospitalized for what appeared to be an episode of dizziness and lightheadedness.  He had extensive workup in the hospital.  He also had a echocardiogram and carotid Doppler study.  Carotid Dopplers did not show any hemodynamically significant stenosis.  MRI was age-appropriate for cortical atrophy and some microvascular changes.  No lateral skull lesions.  He also had a ENT evaluation is he was told that his crystals are out of balance.    Patient continues to have symptoms of lightheadedness when he gets up in the morning.  He is taking tamsulosin at this point.  I have advised him to cut down on this medication or stop it for 7-10 days and see how he does.    Multiple blood pressure medications also have been noted.    Hospital course of action has been copied and pasted here ad verbatim for easy referral.  No editing has been made by me.  HPI:   Patient is an 80-year-old  male with past medical history significant for hypertension, history of benign prostatic hypertrophy, hyperlipidemia and obstructive sleep apnea who is being admitted to Hospital Medicine under observation status from [facility] Emergency Room with complaint of ongoing recurrent dizziness for 1 day.  Patient denies any prior history of stroke, stroke-like symptom or vertigo symptoms.  No recent history of head injury or seizure activity reported.  Patient denies any new hearing deficit, tinnitus, headache, vision changes or any focal neuro deficits.  Patient states he woke up this morning with sense of dizziness and upon getting up from bed he backed into the wall and slid to the wall.  Patient states his symptoms are reoccurring every time he tries to sit up or stand.  No recent  changes in medications reported.  Patient denied any recent changes in appetite, diarrhea or dehydration symptoms.  No recent fevers or chills reported.  Patient was evaluated at a local urgent care facility and was directed to come to the emergency room.  No abdominal pain or urinary complaints reported.  Patient received dose of meclizine in the emergency room with persisting symptoms.     Hospital Course:   Patient was admitted to medicine telemetry service where patient underwent evaluation and further workup by Neurology.  No evidence of acute CVA noted on MRI and CT scan of the brain.  Carotid Doppler ultrasound negative for any significant stenosis.  Patient's symptoms improved with use of meclizine.  Fall precautions and use of walker discussed with the patient.  Neurologist cleared the patient for discharge.  No evidence of intracardiac thrombus noted.  Patient instructed in case if any worsening of symptoms, he should consider follow-up with the ENT specialist.  Echocardiogram reported negative bubble study without intracardiac shunting however cardiologist recommended outpatient transesophageal echocardiography.  I discussed with the patient.  Patient states he follows with Dr. Sellers and will see him this coming Tuesday to discuss transesophageal echocardiography need.  Discharge plan of care reviewed with the patient who is in agreement and voiced understanding.       Ear Fullness   There is pain in both ears. This is a new problem. The current episode started 1 to 4 weeks ago. The problem occurs every few minutes. The problem has been waxing and waning. There has been no fever. Pertinent negatives include no coughing, diarrhea, headaches or rhinorrhea.   Dizziness:   Chronicity:  New  Onset:  1 to 4 weeks ago  Progression since onset:  Waxing and waning  Frequency:  Every few minutes  Severity:  Initially severe, but improved  Dizziness characteristics:  Spacial disorientation, sensation of  movement, off-balance and lightheaded/impending faint   Associated symptoms: aural fullness and light-headedness.no ear congestion, no ear pain, no fever, no headaches, no tinnitus, no visual disturbances, no syncope, no palpitations, no facial weakness and no slurred speech.  Aggravated by:  Getting up and position changes      Past Medical History:   Diagnosis Date    Allergy     Codeine    Closed fracture dislocation of lumbar spine 7/6/2018    #2 vertebra. Patient went to the emergency Department.  Followup with Erik:    GERD (gastroesophageal reflux disease)     Hyperlipidemia     Hypertension     Right inguinal hernia     Thyroid disease      Social History     Socioeconomic History    Marital status:      Spouse name: Alisa galvin    Number of children: 3    Years of education: Not on file    Highest education level: Not on file   Occupational History    Occupation: [employer]-      Comment:     Social Needs    Financial resource strain: Not on file    Food insecurity     Worry: Not on file     Inability: Not on file    Transportation needs     Medical: Not on file     Non-medical: Not on file   Tobacco Use    Smoking status: Never Smoker    Smokeless tobacco: Never Used   Substance and Sexual Activity    Alcohol use: No    Drug use: No    Sexual activity: Not Currently     Partners: Female   Lifestyle    Physical activity     Days per week: Not on file     Minutes per session: Not on file    Stress: Not at all   Relationships    Social connections     Talks on phone: Not on file     Gets together: Not on file     Attends Jain service: Not on file     Active member of club or organization: Not on file     Attends meetings of clubs or organizations: Not on file     Relationship status: Not on file   Other Topics Concern    Not on file   Social History Narrative    Not on file     Past Surgical History:   Procedure Laterality Date     ADENOIDECTOMY      INGUINAL HERNIA REPAIR Right 07/12/2018    Dr. Thomas - [facility]    KNEE SURGERY Left 07/31/2019    TONSILLECTOMY      VASECTOMY       Family History   Problem Relation Age of Onset    Heart disease Father     Miscarriages / Stillbirths Father     Skin cancer Mother     Cancer Maternal Grandfather     Stomach cancer Paternal Grandfather        Review of Systems   Constitutional: Negative for activity change, appetite change, fatigue, fever and unexpected weight change (Lost 9 lbs).   HENT: Negative for congestion, ear pain, nosebleeds, rhinorrhea, sinus pressure, sneezing and tinnitus.         Frequent sinus problems   Eyes: Negative for pain, discharge, itching and visual disturbance.   Respiratory: Negative for cough, chest tightness, shortness of breath and stridor.    Cardiovascular: Negative for palpitations, leg swelling and syncope.        Patient has hypertension and hyperlipidemia.   Gastrointestinal: Negative for abdominal distention, blood in stool, constipation and diarrhea.        Stable GERD  Occasional hemorrhoidal bleeding. Secondary to constipation.   Endocrine: Negative for cold intolerance, heat intolerance, polydipsia and polyphagia.        Patient has hypothyroidism. Stable on levothyroxine. Current labs are good. Blood sugars are slightly elevated.   Genitourinary: Negative for dysuria, frequency and urgency.        Benign prostate hypertrophy stable on tamsulosin and finasteride.       Musculoskeletal: Negative for back pain and gait problem.        S/P Left knee TKR   Skin: Negative for pallor. Wound: left ankle.   Neurological: Positive for dizziness and light-headedness. Negative for seizures, speech difficulty and headaches.        Some memory issues.   Hematological: Negative for adenopathy. Does not bruise/bleed easily.   Psychiatric/Behavioral: Negative for agitation, behavioral problems and confusion. The patient is not nervous/anxious.          Objective:     "  Blood pressure 136/74, pulse 72, temperature 97.2 °F (36.2 °C), temperature source Temporal, resp. rate 20, height 5' 10" (1.778 m), weight 102.3 kg (225 lb 8 oz), SpO2 98 %. Body mass index is 32.36 kg/m².     Blood pressure while lying down was 126/79 with a pulse of 60.    Blood pressure while standing up immediately was 125/63 with a pulse of 65.    No significant orthostatic drop in blood pressure.  Physical Exam  Vitals signs and nursing note reviewed.   Constitutional:       General: He is not in acute distress.     Appearance: He is well-developed. He is not ill-appearing, toxic-appearing or diaphoretic.      Comments: BMI is 31.   HENT:      Head: Atraumatic.      Ears:      Comments: Ears reveal slight amount Of wax.  Nose shows congestion.     Nose: Congestion present.      Right Sinus: No frontal sinus tenderness.      Left Sinus: No maxillary sinus tenderness or frontal sinus tenderness.      Comments: Nasal congestion noted.  Possibility of a nasal septum deviation towards the left side.  Air flow is okay.     Mouth/Throat:      Pharynx: No oropharyngeal exudate.   Eyes:      General: No scleral icterus.        Right eye: No discharge.         Left eye: No discharge.      Conjunctiva/sclera: Conjunctivae normal.   Neck:      Musculoskeletal: Neck supple.      Vascular: No JVD.      Trachea: No tracheal deviation.   Cardiovascular:      Rate and Rhythm: Normal rate and regular rhythm.      Heart sounds: Normal heart sounds.   Pulmonary:      Effort: Pulmonary effort is normal. No respiratory distress.      Breath sounds: Normal breath sounds. No wheezing or rales.   Abdominal:      General: Bowel sounds are normal. There is no distension or abdominal bruit.      Palpations: Abdomen is soft.      Tenderness: There is no abdominal tenderness.   Musculoskeletal:         General: No tenderness or deformity.      Right lower leg: No edema.      Left lower leg: No edema.   Lymphadenopathy:      Cervical: No " cervical adenopathy.   Skin:     General: Skin is warm and dry.      Findings: No erythema or rash.      Comments: C/O Dry skin   Neurological:      Mental Status: He is alert. Mental status is at baseline.      Deep Tendon Reflexes: Reflexes are normal and symmetric.   Psychiatric:         Mood and Affect: Mood normal.         Behavior: Behavior normal.           Assessment:       1. Dizziness on standing    2. Vestibular disequilibrium, unspecified laterality    3. Benign essential hypertension    4. Multiple-type hyperlipidemia    5. Secondary hypothyroidism           Admission on 12/26/2020, Discharged on 12/27/2020   Component Date Value Ref Range Status    WBC 12/26/2020 5.93  3.90 - 12.70 K/uL Final    RBC 12/26/2020 4.92  4.60 - 6.20 M/uL Final    Hemoglobin 12/26/2020 14.5  14.0 - 18.0 g/dL Final    Hematocrit 12/26/2020 43.4  40.0 - 54.0 % Final    MCV 12/26/2020 88  82 - 98 fL Final    MCH 12/26/2020 29.5  27.0 - 31.0 pg Final    MCHC 12/26/2020 33.4  32.0 - 36.0 g/dL Final    RDW 12/26/2020 13.3  11.5 - 14.5 % Final    Platelets 12/26/2020 212  150 - 350 K/uL Final    MPV 12/26/2020 9.8  9.2 - 12.9 fL Final    Immature Granulocytes 12/26/2020 0.2  0.0 - 0.5 % Final    Gran # (ANC) 12/26/2020 3.8  1.8 - 7.7 K/uL Final    Immature Grans (Abs) 12/26/2020 0.01  0.00 - 0.04 K/uL Final    Lymph # 12/26/2020 1.4  1.0 - 4.8 K/uL Final    Mono # 12/26/2020 0.6  0.3 - 1.0 K/uL Final    Eos # 12/26/2020 0.1  0.0 - 0.5 K/uL Final    Baso # 12/26/2020 0.05  0.00 - 0.20 K/uL Final    nRBC 12/26/2020 0  0 /100 WBC Final    Gran % 12/26/2020 64.1  38.0 - 73.0 % Final    Lymph % 12/26/2020 24.3  18.0 - 48.0 % Final    Mono % 12/26/2020 9.4  4.0 - 15.0 % Final    Eosinophil % 12/26/2020 1.2  0.0 - 8.0 % Final    Basophil % 12/26/2020 0.8  0.0 - 1.9 % Final    Differential Method 12/26/2020 Automated   Final    Sodium 12/26/2020 140  136 - 145 mmol/L Final    Potassium 12/26/2020 5.2* 3.5 - 5.1  mmol/L Final    Chloride 12/26/2020 105  95 - 110 mmol/L Final    CO2 12/26/2020 23  23 - 29 mmol/L Final    Glucose 12/26/2020 96  70 - 110 mg/dL Final    BUN 12/26/2020 16  8 - 23 mg/dL Final    Creatinine 12/26/2020 1.2  0.5 - 1.4 mg/dL Final    Calcium 12/26/2020 9.7  8.7 - 10.5 mg/dL Final    Total Protein 12/26/2020 7.9  6.0 - 8.4 g/dL Final    Albumin 12/26/2020 4.2  3.5 - 5.2 g/dL Final    Total Bilirubin 12/26/2020 0.7  0.1 - 1.0 mg/dL Final    Alkaline Phosphatase 12/26/2020 98  55 - 135 U/L Final    AST 12/26/2020 34  10 - 40 U/L Final    ALT 12/26/2020 24  10 - 44 U/L Final    Anion Gap 12/26/2020 12  8 - 16 mmol/L Final    eGFR if African American 12/26/2020 >60  >60 mL/min/1.73 m^2 Final    eGFR if non African American 12/26/2020 56* >60 mL/min/1.73 m^2 Final    Specimen UA 12/26/2020 Urine, Clean Catch   Final    Color, UA 12/26/2020 Yellow  Yellow, Straw, Raina Final    Appearance, UA 12/26/2020 Clear  Clear Final    pH, UA 12/26/2020 6.0  5.0 - 8.0 Final    Specific [place], UA 12/26/2020 1.015  1.005 - 1.030 Final    Protein, UA 12/26/2020 Negative  Negative Final    Glucose, UA 12/26/2020 Negative  Negative Final    Ketones, UA 12/26/2020 Negative  Negative Final    Bilirubin (UA) 12/26/2020 Negative  Negative Final    Occult Blood UA 12/26/2020 Negative  Negative Final    Nitrite, UA 12/26/2020 Negative  Negative Final    Urobilinogen, UA 12/26/2020 Negative  <2.0 EU/dL Final    Leukocytes, UA 12/26/2020 Negative  Negative Final    SARS-CoV-2 RNA, Amplification, Qual 12/26/2020 Negative  Negative Final    CPK 12/26/2020 37  20 - 200 U/L Final    CPK MB 12/26/2020 0.9  0.1 - 6.5 ng/mL Final    MB % 12/26/2020 2.4  0.0 - 5.0 % Final    Troponin I 12/26/2020 <0.006  0.000 - 0.026 ng/mL Final    Prothrombin Time 12/26/2020 10.5  9.0 - 12.5 sec Final    INR 12/26/2020 1.0  0.8 - 1.2 Final    aPTT 12/26/2020 27.3  21.0 - 32.0 sec Final    AV mean gradient 12/27/2020  3  mmHg Final    Ao peak kunal 12/27/2020 1.15  m/s Final    Ao VTI 12/27/2020 26.87  cm Final    IVS 12/27/2020 1.42* 0.6 - 1.1 cm Final    LA size 12/27/2020 4.42  cm Final    LVIDd 12/27/2020 5.52  3.5 - 6.0 cm Final    LVIDs 12/27/2020 3.79  2.1 - 4.0 cm Final    LVOT diameter 12/27/2020 2.03  cm Final    LVOT peak VTI 12/27/2020 22.69  cm Final    Posterior Wall 12/27/2020 1.26* 0.6 - 1.1 cm Final    MV Peak A Kunal 12/27/2020 0.82  m/s Final    E wave decelartion time 12/27/2020 269.62  msec Final    MV Peak E Kunal 12/27/2020 0.63  m/s Final    RVDD 12/27/2020 3.53  cm Final    TAPSE 12/27/2020 2.05  cm Final    TR Max Kunal 12/27/2020 2.51  m/s Final    Ao root annulus 12/27/2020 3.42  cm Final    AORTIC VALVE CUSP SEPERATION 12/27/2020 2.02  cm Final    PV PEAK VELOCITY 12/27/2020 1.01  cm/s Final    LV Diastolic Volume 12/27/2020 148.70  mL Final    LV Systolic Volume 12/27/2020 61.59  mL Final    LVOT peak kunal 12/27/2020 0.97  m/s Final    TDI LATERAL 12/27/2020 0.07  m/s Final    TDI SEPTAL 12/27/2020 0.07  m/s Final    Mr max kunal 12/27/2020 0.04  m/s Final    LV LATERAL E/E' RATIO 12/27/2020 9.00  m/s Final    LV SEPTAL E/E' RATIO 12/27/2020 9.00  m/s Final    FS 12/27/2020 31  % Final    LV mass 12/27/2020 319.40  g Final    Left Ventricle Relative Wall Thick* 12/27/2020 0.46  cm Final    AV valve area 12/27/2020 2.73  cm2 Final    AV Velocity Ratio 12/27/2020 0.84   Final    AV index (prosthetic) 12/27/2020 0.84   Final    E/A ratio 12/27/2020 0.77   Final    Mean e' 12/27/2020 0.07  m/s Final    LVOT area 12/27/2020 3.2  cm2 Final    LVOT stroke volume 12/27/2020 73.40  cm3 Final    AV peak gradient 12/27/2020 5  mmHg Final    E/E' ratio 12/27/2020 9.00  m/s Final    LV Systolic Volume Index 12/27/2020 28.2  mL/m2 Final    LV Diastolic Volume Index 12/27/2020 68.01  mL/m2 Final    LV Mass Index 12/27/2020 146  g/m2 Final    Triscuspid Valve Regurgitation Pea*  12/27/2020 25  mmHg Final    BSA 12/27/2020 2.25  m2 Final    Right Atrial Pressure (from IVC) 12/27/2020 3  mmHg Final    TV rest pulmonary artery pressure 12/27/2020 28  mmHg Final    CPK 12/27/2020 32  20 - 200 U/L Final    CPK MB 12/27/2020 0.6  0.1 - 6.5 ng/mL Final    MB % 12/27/2020 1.9  0.0 - 5.0 % Final    Troponin I 12/27/2020 <0.006  0.000 - 0.026 ng/mL Final    Cholesterol 12/27/2020 157  120 - 199 mg/dL Final    Triglycerides 12/27/2020 132  30 - 150 mg/dL Final    HDL 12/27/2020 37* 40 - 75 mg/dL Final    LDL Cholesterol 12/27/2020 93.6  63.0 - 159.0 mg/dL Final    HDL/Cholesterol Ratio 12/27/2020 23.6  20.0 - 50.0 % Final    Total Cholesterol/HDL Ratio 12/27/2020 4.2  2.0 - 5.0 Final    Non-HDL Cholesterol 12/27/2020 120  mg/dL Final    Troponin I 12/27/2020 0.009  0.000 - 0.026 ng/mL Final    TSH 12/27/2020 1.483  0.400 - 4.000 uIU/mL Final    Sodium 12/27/2020 141  136 - 145 mmol/L Final    Potassium 12/27/2020 4.3  3.5 - 5.1 mmol/L Final    Chloride 12/27/2020 106  95 - 110 mmol/L Final    CO2 12/27/2020 25  23 - 29 mmol/L Final    Glucose 12/27/2020 91  70 - 110 mg/dL Final    BUN 12/27/2020 16  8 - 23 mg/dL Final    Creatinine 12/27/2020 1.2  0.5 - 1.4 mg/dL Final    Calcium 12/27/2020 8.9  8.7 - 10.5 mg/dL Final    Anion Gap 12/27/2020 10  8 - 16 mmol/L Final    eGFR if African American 12/27/2020 >60  >60 mL/min/1.73 m^2 Final    eGFR if non  12/27/2020 56* >60 mL/min/1.73 m^2 Final    WBC 12/27/2020 5.07  3.90 - 12.70 K/uL Final    RBC 12/27/2020 4.44* 4.60 - 6.20 M/uL Final    Hemoglobin 12/27/2020 13.2* 14.0 - 18.0 g/dL Final    Hematocrit 12/27/2020 39.8* 40.0 - 54.0 % Final    MCV 12/27/2020 90  82 - 98 fL Final    MCH 12/27/2020 29.7  27.0 - 31.0 pg Final    MCHC 12/27/2020 33.2  32.0 - 36.0 g/dL Final    RDW 12/27/2020 13.3  11.5 - 14.5 % Final    Platelets 12/27/2020 202  150 - 350 K/uL Final    MPV 12/27/2020 9.8  9.2 - 12.9 fL Final     Immature Granulocytes 12/27/2020 0.2  0.0 - 0.5 % Final    Gran # (ANC) 12/27/2020 2.7  1.8 - 7.7 K/uL Final    Immature Grans (Abs) 12/27/2020 0.01  0.00 - 0.04 K/uL Final    Lymph # 12/27/2020 1.7  1.0 - 4.8 K/uL Final    Mono # 12/27/2020 0.5  0.3 - 1.0 K/uL Final    Eos # 12/27/2020 0.2  0.0 - 0.5 K/uL Final    Baso # 12/27/2020 0.03  0.00 - 0.20 K/uL Final    nRBC 12/27/2020 0  0 /100 WBC Final    Gran % 12/27/2020 53.4  38.0 - 73.0 % Final    Lymph % 12/27/2020 33.7  18.0 - 48.0 % Final    Mono % 12/27/2020 8.9  4.0 - 15.0 % Final    Eosinophil % 12/27/2020 3.2  0.0 - 8.0 % Final    Basophil % 12/27/2020 0.6  0.0 - 1.9 % Final    Differential Method 12/27/2020 Automated   Final    Troponin I 12/27/2020 <0.006  0.000 - 0.026 ng/mL Final   Lab Visit on 12/22/2020   Component Date Value Ref Range Status    PTH, Intact 12/22/2020 38.7  9.0 - 77.0 pg/mL Final    Calcium 12/22/2020 9.3  8.7 - 10.5 mg/dL Final   Lab Visit on 12/17/2020   Component Date Value Ref Range Status    Sodium 12/17/2020 141  136 - 145 mmol/L Final    Potassium 12/17/2020 4.4  3.5 - 5.1 mmol/L Final    Chloride 12/17/2020 104  95 - 110 mmol/L Final    CO2 12/17/2020 27  23 - 29 mmol/L Final    Glucose 12/17/2020 104  70 - 110 mg/dL Final    BUN 12/17/2020 19  8 - 23 mg/dL Final    Creatinine 12/17/2020 1.2  0.5 - 1.4 mg/dL Final    Calcium 12/17/2020 10.9* 8.7 - 10.5 mg/dL Final    Total Protein 12/17/2020 7.3  6.0 - 8.4 g/dL Final    Albumin 12/17/2020 4.1  3.5 - 5.2 g/dL Final    Total Bilirubin 12/17/2020 0.9  0.1 - 1.0 mg/dL Final    Alkaline Phosphatase 12/17/2020 75  55 - 135 U/L Final    AST 12/17/2020 24  10 - 40 U/L Final    ALT 12/17/2020 20  10 - 44 U/L Final    Anion Gap 12/17/2020 10  8 - 16 mmol/L Final    eGFR if African American 12/17/2020 >60.0  >60 mL/min/1.73 m^2 Final    eGFR if non  12/17/2020 56.4* >60 mL/min/1.73 m^2 Final    Cholesterol 12/17/2020 169  120 - 199  mg/dL Final    Triglycerides 12/17/2020 98  30 - 150 mg/dL Final    HDL 12/17/2020 41  40 - 75 mg/dL Final    LDL Cholesterol 12/17/2020 108.4  63.0 - 159.0 mg/dL Final    HDL/Cholesterol Ratio 12/17/2020 24.3  20.0 - 50.0 % Final    Total Cholesterol/HDL Ratio 12/17/2020 4.1  2.0 - 5.0 Final    Non-HDL Cholesterol 12/17/2020 128  mg/dL Final     ECHO:   · The left ventricle is normal in size with mild concentric hypertrophy and normal systolic function. The estimated ejection fraction is 60%  · Normal left ventricular diastolic function.  · Normal right ventricular size with normal right ventricular systolic function.  · Mild left atrial enlargement.  · Normal central venous pressure (3 mmHg).  · The estimated PA systolic pressure is 28 mmHg.  · PFO noted by color flow Doppler. Bubble study is negative for any intracardiac shunt however it is not of good quality. Consider MYRIAM    Plan:           Dizziness on standing    Vestibular disequilibrium, unspecified laterality    Benign essential hypertension    Multiple-type hyperlipidemia    Secondary hypothyroidism      patient's dizziness or lightheadedness could be multifactorial.    His blood pressures are comparatively on the low side.  He is treated at this point with combination of felodipine, losartan 100 mg.  He also takes tamsulosin.    He is on finasteride and I believe he is on finasteride for quite some time and may not need tamsulosin.  Tamsulosin should be stopped for 7-10 days and see if it makes any difference in his dizziness.  Getting up from the bed and feeling dizzy for a few seconds may be a sign of benign positional vertigo.  Maybe he may benefit from canalith repositioning and may consider referral to physical therapy it ENT is not helpful.  He does have an appointment with ENT specialist either today or tomorrow.    His symptoms mostly occur when he tries to get up from the bed.  Probably it could be vestibular also.  I am not sure if he  might have because of dysautonomia.    Advised Mr. Barraza about age and season appropriate immunizations/ cancer screenings.  Also seasonal influenza vaccine, update on tetanus diphtheria vaccination every 10 years.        Patient has been advised to watch diet and exercise. Avoid fried and fatty food. Compliance to medications and follow up urged.    Please utilize precautions for current COVID-19 pandemic.  Try to avoid crowds or close contact with multiple people.  Minimize outside interaction.  Wash hands with soap for  frequently upon contact.Use face mask or cover.    Assuming improvement in symptoms gradually over a period of time and no other changes, I will see him back in 3 months time.    Spent roland 25 minutes with patient which involved review of pts medical conditions, labs, medications and with 50% of time face-to-face discussion about medical problems, management and any applicable changes.      Current Outpatient Medications:     ascorbic acid, vitamin C, (VITAMIN C) 500 MG tablet, Take 500 mg by mouth once daily., Disp: , Rfl:     aspirin (ECOTRIN) 81 MG EC tablet, Take 81 mg by mouth once daily., Disp: , Rfl:     augmented betamethasone dipropionate (DIPROLENE-AF) 0.05 % cream, 1 Dose daily as needed., Disp: , Rfl: 1    b complex vitamins tablet, Take 1 tablet by mouth once daily., Disp: , Rfl:     CALCIUM CARBONATE/VITAMIN D3 (VITAMIN D-3 ORAL), Take by mouth., Disp: , Rfl:     docusate sodium (COLACE) 50 MG capsule, Take by mouth 2 (two) times daily as needed for Constipation., Disp: , Rfl:     doxycycline (ORACEA) 40 mg capsule, Take 40 mg by mouth once daily., Disp: , Rfl: 3    felodipine (PLENDIL) 5 MG 24 hr tablet, Take 5 mg by mouth 2 (two) times daily. , Disp: , Rfl:     finasteride (PROSCAR) 5 mg tablet, Take 1 tablet (5 mg total) by mouth once daily., Disp: 90 tablet, Rfl: 3    fish oil-omega-3 fatty acids 300-1,000 mg capsule, Take 2 g by mouth once daily., Disp: , Rfl:      fluticasone (FLONASE) 50 mcg/actuation nasal spray, SPRAY 1 SPRAY NASALLY AS DIRECTED TWICE A DAY, Disp: , Rfl: 12    ipratropium (ATROVENT) 42 mcg (0.06 %) nasal spray, 2 sprays by Nasal route 2 (two) times daily., Disp: 15 mL, Rfl: 5    losartan (COZAAR) 100 MG tablet, Take 100 mg by mouth once daily., Disp: , Rfl: 0    meclizine (ANTIVERT) 12.5 mg tablet, Take 1 tablet (12.5 mg total) by mouth 3 (three) times daily as needed for Dizziness. Watch for sedation, observe fall precautions, use walker for ambulation, Disp: 20 tablet, Rfl: 0    multivitamin (ONE DAILY MULTIVITAMIN) per tablet, Take 1 tablet by mouth once daily., Disp: , Rfl:     pravastatin (PRAVACHOL) 20 MG tablet, Take 20 mg by mouth once daily., Disp: , Rfl:     SYNTHROID 200 mcg tablet, TAKE 1 TABLET ONCE DAILY, Disp: 90 tablet, Rfl: 3    tamsulosin (FLOMAX) 0.4 mg Cap, Take 1 capsule (0.4 mg total) by mouth once daily. Take at bedtime, Disp: 90 capsule, Rfl: 3

## 2021-01-20 ENCOUNTER — LAB VISIT (OUTPATIENT)
Dept: LAB | Facility: HOSPITAL | Age: 81
End: 2021-01-20
Attending: INTERNAL MEDICINE
Payer: MEDICARE

## 2021-01-20 DIAGNOSIS — N18.30 CHRONIC KIDNEY DISEASE, STAGE III (MODERATE): Primary | ICD-10-CM

## 2021-01-20 LAB
ALBUMIN SERPL BCP-MCNC: 4 G/DL (ref 3.5–5.2)
ANION GAP SERPL CALC-SCNC: 8 MMOL/L (ref 8–16)
BILIRUB UR QL STRIP: NEGATIVE
BUN SERPL-MCNC: 24 MG/DL (ref 8–23)
CALCIUM SERPL-MCNC: 9.3 MG/DL (ref 8.7–10.5)
CHLORIDE SERPL-SCNC: 106 MMOL/L (ref 95–110)
CLARITY UR: CLEAR
CO2 SERPL-SCNC: 27 MMOL/L (ref 23–29)
COLOR UR: YELLOW
CREAT SERPL-MCNC: 1.6 MG/DL (ref 0.5–1.4)
CREAT UR-MCNC: 117 MG/DL (ref 23–375)
EST. GFR  (AFRICAN AMERICAN): 46 ML/MIN/1.73 M^2
EST. GFR  (NON AFRICAN AMERICAN): 39.8 ML/MIN/1.73 M^2
GLUCOSE SERPL-MCNC: 88 MG/DL (ref 70–110)
GLUCOSE UR QL STRIP: NEGATIVE
HGB UR QL STRIP: NEGATIVE
KETONES UR QL STRIP: NEGATIVE
LEUKOCYTE ESTERASE UR QL STRIP: NEGATIVE
NITRITE UR QL STRIP: NEGATIVE
PH UR STRIP: 7 [PH] (ref 5–8)
PHOSPHATE SERPL-MCNC: 3.6 MG/DL (ref 2.7–4.5)
POTASSIUM SERPL-SCNC: 4.4 MMOL/L (ref 3.5–5.1)
PROT UR QL STRIP: NEGATIVE
PROT UR-MCNC: 7 MG/DL (ref 6–15)
PROT/CREAT UR: 0.06 MG/G{CREAT} (ref 0–0.2)
SODIUM SERPL-SCNC: 141 MMOL/L (ref 136–145)
SP GR UR STRIP: 1.02 (ref 1–1.03)
URN SPEC COLLECT METH UR: NORMAL
UROBILINOGEN UR STRIP-ACNC: NEGATIVE EU/DL

## 2021-01-20 PROCEDURE — 80069 RENAL FUNCTION PANEL: CPT

## 2021-01-20 PROCEDURE — 81003 URINALYSIS AUTO W/O SCOPE: CPT

## 2021-01-20 PROCEDURE — 82570 ASSAY OF URINE CREATININE: CPT

## 2021-01-20 PROCEDURE — 36415 COLL VENOUS BLD VENIPUNCTURE: CPT

## 2021-01-22 ENCOUNTER — TELEPHONE (OUTPATIENT)
Dept: CARDIOLOGY | Facility: CLINIC | Age: 81
End: 2021-01-22

## 2021-02-02 ENCOUNTER — LAB VISIT (OUTPATIENT)
Dept: LAB | Facility: HOSPITAL | Age: 81
End: 2021-02-02
Attending: INTERNAL MEDICINE
Payer: MEDICARE

## 2021-02-02 DIAGNOSIS — N17.9 ACUTE KIDNEY FAILURE, UNSPECIFIED: Primary | ICD-10-CM

## 2021-02-02 LAB
ALBUMIN SERPL BCP-MCNC: 4 G/DL (ref 3.5–5.2)
ANION GAP SERPL CALC-SCNC: 13 MMOL/L (ref 8–16)
BUN SERPL-MCNC: 17 MG/DL (ref 8–23)
CALCIUM SERPL-MCNC: 9.6 MG/DL (ref 8.7–10.5)
CHLORIDE SERPL-SCNC: 105 MMOL/L (ref 95–110)
CO2 SERPL-SCNC: 24 MMOL/L (ref 23–29)
CREAT SERPL-MCNC: 1.3 MG/DL (ref 0.5–1.4)
EST. GFR  (AFRICAN AMERICAN): 59.2 ML/MIN/1.73 M^2
EST. GFR  (NON AFRICAN AMERICAN): 51.2 ML/MIN/1.73 M^2
GLUCOSE SERPL-MCNC: 116 MG/DL (ref 70–110)
PHOSPHATE SERPL-MCNC: 2.5 MG/DL (ref 2.7–4.5)
POTASSIUM SERPL-SCNC: 3.8 MMOL/L (ref 3.5–5.1)
SODIUM SERPL-SCNC: 142 MMOL/L (ref 136–145)

## 2021-02-02 PROCEDURE — 80069 RENAL FUNCTION PANEL: CPT

## 2021-02-02 PROCEDURE — 36415 COLL VENOUS BLD VENIPUNCTURE: CPT

## 2021-02-08 ENCOUNTER — OFFICE VISIT (OUTPATIENT)
Dept: UROLOGY | Facility: CLINIC | Age: 81
End: 2021-02-08
Payer: MEDICARE

## 2021-02-08 VITALS
HEART RATE: 64 BPM | DIASTOLIC BLOOD PRESSURE: 64 MMHG | WEIGHT: 229.63 LBS | RESPIRATION RATE: 15 BRPM | TEMPERATURE: 98 F | BODY MASS INDEX: 32.87 KG/M2 | SYSTOLIC BLOOD PRESSURE: 138 MMHG | OXYGEN SATURATION: 98 % | HEIGHT: 70 IN

## 2021-02-08 DIAGNOSIS — N18.31 STAGE 3A CHRONIC KIDNEY DISEASE: ICD-10-CM

## 2021-02-08 DIAGNOSIS — R39.9 LOWER URINARY TRACT SYMPTOMS (LUTS): Primary | ICD-10-CM

## 2021-02-08 LAB
BILIRUB SERPL-MCNC: NORMAL MG/DL
BLOOD URINE, POC: NORMAL
CLARITY, POC UA: CLEAR
COLOR, POC UA: YELLOW
GLUCOSE UR QL STRIP: NORMAL
KETONES UR QL STRIP: NORMAL
LEUKOCYTE ESTERASE URINE, POC: NORMAL
NITRITE, POC UA: NORMAL
PH, POC UA: 5.5
PROTEIN, POC: NORMAL
SPECIFIC GRAVITY, POC UA: 1.01
UROBILINOGEN, POC UA: 0.2

## 2021-02-08 PROCEDURE — 99999 PR PBB SHADOW E&M-EST. PATIENT-LVL V: CPT | Mod: PBBFAC,,, | Performed by: UROLOGY

## 2021-02-08 PROCEDURE — 99999 PR PBB SHADOW E&M-EST. PATIENT-LVL V: ICD-10-PCS | Mod: PBBFAC,,, | Performed by: UROLOGY

## 2021-02-08 PROCEDURE — 81002 URINALYSIS NONAUTO W/O SCOPE: CPT | Mod: PBBFAC | Performed by: UROLOGY

## 2021-02-08 PROCEDURE — 99214 PR OFFICE/OUTPT VISIT, EST, LEVL IV, 30-39 MIN: ICD-10-PCS | Mod: S$PBB,,, | Performed by: UROLOGY

## 2021-02-08 PROCEDURE — 99214 OFFICE O/P EST MOD 30 MIN: CPT | Mod: S$PBB,,, | Performed by: UROLOGY

## 2021-02-08 PROCEDURE — 99215 OFFICE O/P EST HI 40 MIN: CPT | Mod: PBBFAC | Performed by: UROLOGY

## 2021-02-08 RX ORDER — TAMSULOSIN HYDROCHLORIDE 0.4 MG/1
0.4 CAPSULE ORAL DAILY
Qty: 90 CAPSULE | Refills: 3 | Status: SHIPPED | OUTPATIENT
Start: 2021-02-08 | End: 2022-02-09 | Stop reason: SDUPTHER

## 2021-02-08 RX ORDER — FINASTERIDE 5 MG/1
5 TABLET, FILM COATED ORAL DAILY
Qty: 90 TABLET | Refills: 3 | Status: SHIPPED | OUTPATIENT
Start: 2021-02-08 | End: 2022-02-09 | Stop reason: SDUPTHER

## 2021-02-10 ENCOUNTER — HOSPITAL ENCOUNTER (OUTPATIENT)
Dept: RADIOLOGY | Facility: HOSPITAL | Age: 81
Discharge: HOME | End: 2021-02-10
Attending: UROLOGY
Payer: MEDICARE

## 2021-02-10 DIAGNOSIS — N18.31 STAGE 3A CHRONIC KIDNEY DISEASE: ICD-10-CM

## 2021-02-10 PROCEDURE — 76770 US EXAM ABDO BACK WALL COMP: CPT | Mod: 26,,, | Performed by: RADIOLOGY

## 2021-02-10 PROCEDURE — 76770 US EXAM ABDO BACK WALL COMP: CPT | Mod: TC

## 2021-02-10 PROCEDURE — 76770 US RETROPERITONEAL COMPLETE: ICD-10-PCS | Mod: 26,,, | Performed by: RADIOLOGY

## 2021-02-17 DIAGNOSIS — N28.1 CYST OF KIDNEY, ACQUIRED: Primary | ICD-10-CM

## 2021-02-26 ENCOUNTER — HOSPITAL ENCOUNTER (OUTPATIENT)
Dept: RADIOLOGY | Facility: HOSPITAL | Age: 81
Discharge: HOME | End: 2021-02-26
Attending: UROLOGY
Payer: MEDICARE

## 2021-02-26 DIAGNOSIS — N28.1 CYST OF KIDNEY, ACQUIRED: ICD-10-CM

## 2021-02-26 PROCEDURE — 25500020 PHARM REV CODE 255

## 2021-02-26 PROCEDURE — 74170 CT ABD WO CNTRST FLWD CNTRST: CPT | Mod: 26,,, | Performed by: RADIOLOGY

## 2021-02-26 PROCEDURE — 74170 CT ABDOMEN W WO CONTRAST: ICD-10-PCS | Mod: 26,,, | Performed by: RADIOLOGY

## 2021-02-26 PROCEDURE — 74170 CT ABD WO CNTRST FLWD CNTRST: CPT | Mod: TC

## 2021-02-26 RX ADMIN — IOHEXOL 100 ML: 350 INJECTION, SOLUTION INTRAVENOUS at 08:02

## 2021-03-02 ENCOUNTER — TELEPHONE (OUTPATIENT)
Dept: CARDIOLOGY | Facility: CLINIC | Age: 81
End: 2021-03-02

## 2021-03-03 ENCOUNTER — TELEPHONE (OUTPATIENT)
Dept: UROLOGY | Facility: CLINIC | Age: 81
End: 2021-03-03

## 2021-03-04 ENCOUNTER — OFFICE VISIT (OUTPATIENT)
Dept: FAMILY MEDICINE | Facility: CLINIC | Age: 81
End: 2021-03-04
Payer: MEDICARE

## 2021-03-04 VITALS
HEIGHT: 70 IN | DIASTOLIC BLOOD PRESSURE: 62 MMHG | HEART RATE: 70 BPM | BODY MASS INDEX: 32.35 KG/M2 | TEMPERATURE: 98 F | SYSTOLIC BLOOD PRESSURE: 134 MMHG | WEIGHT: 226 LBS

## 2021-03-04 DIAGNOSIS — I10 BENIGN ESSENTIAL HYPERTENSION: Primary | Chronic | ICD-10-CM

## 2021-03-04 DIAGNOSIS — I71.40 ABDOMINAL AORTIC ANEURYSM (AAA) WITHOUT RUPTURE: ICD-10-CM

## 2021-03-04 DIAGNOSIS — R91.1 PULMONARY NODULE LESS THAN 1 CM IN DIAMETER WITH LOW RISK FOR MALIGNANT NEOPLASM: ICD-10-CM

## 2021-03-04 DIAGNOSIS — E03.8 SECONDARY HYPOTHYROIDISM: ICD-10-CM

## 2021-03-04 DIAGNOSIS — E78.2 MULTIPLE-TYPE HYPERLIPIDEMIA: Chronic | ICD-10-CM

## 2021-03-04 DIAGNOSIS — I67.82 SUBCORTICAL MICROVASCULAR ISCHEMIC OCCLUSIVE DISEASE: ICD-10-CM

## 2021-03-04 DIAGNOSIS — N40.0 BENIGN PROSTATIC HYPERPLASIA WITHOUT URINARY OBSTRUCTION: ICD-10-CM

## 2021-03-04 DIAGNOSIS — Z91.89 PULMONARY NODULE LESS THAN 1 CM IN DIAMETER WITH LOW RISK FOR MALIGNANT NEOPLASM: ICD-10-CM

## 2021-03-04 PROCEDURE — 99214 PR OFFICE/OUTPT VISIT, EST, LEVL IV, 30-39 MIN: ICD-10-PCS | Mod: S$PBB,,, | Performed by: INTERNAL MEDICINE

## 2021-03-04 PROCEDURE — 99214 OFFICE O/P EST MOD 30 MIN: CPT | Performed by: INTERNAL MEDICINE

## 2021-03-04 PROCEDURE — 99214 OFFICE O/P EST MOD 30 MIN: CPT | Mod: S$PBB,,, | Performed by: INTERNAL MEDICINE

## 2021-04-23 ENCOUNTER — TELEPHONE (OUTPATIENT)
Dept: CARDIOLOGY | Facility: CLINIC | Age: 81
End: 2021-04-23

## 2021-04-23 ENCOUNTER — TELEPHONE (OUTPATIENT)
Dept: UROLOGY | Facility: CLINIC | Age: 81
End: 2021-04-23

## 2021-04-23 RX ORDER — FELODIPINE 5 MG/1
TABLET, EXTENDED RELEASE ORAL
Qty: 180 TABLET | Refills: 3 | Status: SHIPPED | OUTPATIENT
Start: 2021-04-23 | End: 2022-03-02

## 2021-05-12 PROBLEM — H35.033 HYPERTENSIVE RETINOPATHY OF BOTH EYES: Status: ACTIVE | Noted: 2021-05-11

## 2021-07-20 ENCOUNTER — LAB VISIT (OUTPATIENT)
Dept: LAB | Facility: HOSPITAL | Age: 81
End: 2021-07-20
Attending: INTERNAL MEDICINE
Payer: MEDICARE

## 2021-07-20 DIAGNOSIS — N18.30 CHRONIC KIDNEY DISEASE, STAGE III (MODERATE): ICD-10-CM

## 2021-07-20 DIAGNOSIS — I10 ESSENTIAL HYPERTENSION, MALIGNANT: ICD-10-CM

## 2021-07-20 DIAGNOSIS — N25.81 SECONDARY HYPERPARATHYROIDISM OF RENAL ORIGIN: Primary | ICD-10-CM

## 2021-07-20 LAB
25(OH)D3+25(OH)D2 SERPL-MCNC: 63 NG/ML (ref 30–96)
ALBUMIN SERPL BCP-MCNC: 3.9 G/DL (ref 3.5–5.2)
ANION GAP SERPL CALC-SCNC: 12 MMOL/L (ref 8–16)
BACTERIA #/AREA URNS HPF: NEGATIVE /HPF
BILIRUB UR QL STRIP: NEGATIVE
BUN SERPL-MCNC: 19 MG/DL (ref 8–23)
CALCIUM SERPL-MCNC: 9.4 MG/DL (ref 8.7–10.5)
CHLORIDE SERPL-SCNC: 106 MMOL/L (ref 95–110)
CLARITY UR: CLEAR
CO2 SERPL-SCNC: 22 MMOL/L (ref 23–29)
COLOR UR: YELLOW
CREAT SERPL-MCNC: 1.1 MG/DL (ref 0.5–1.4)
CREAT UR-MCNC: 66 MG/DL (ref 23–375)
EST. GFR  (AFRICAN AMERICAN): >60 ML/MIN/1.73 M^2
EST. GFR  (NON AFRICAN AMERICAN): >60 ML/MIN/1.73 M^2
GLUCOSE SERPL-MCNC: 107 MG/DL (ref 70–110)
GLUCOSE UR QL STRIP: NEGATIVE
HGB UR QL STRIP: NEGATIVE
HYALINE CASTS #/AREA URNS LPF: 0 /LPF
KETONES UR QL STRIP: NEGATIVE
LEUKOCYTE ESTERASE UR QL STRIP: NEGATIVE
MAGNESIUM SERPL-MCNC: 1.9 MG/DL (ref 1.6–2.6)
MICROSCOPIC COMMENT: ABNORMAL
NITRITE UR QL STRIP: NEGATIVE
PH UR STRIP: 6 [PH] (ref 5–8)
PHOSPHATE SERPL-MCNC: 3.3 MG/DL (ref 2.7–4.5)
POTASSIUM SERPL-SCNC: 4.3 MMOL/L (ref 3.5–5.1)
PROT UR QL STRIP: NEGATIVE
PROT UR-MCNC: <6 MG/DL (ref 6–15)
PROT/CREAT UR: NORMAL MG/G{CREAT} (ref 0–0.2)
PTH-INTACT SERPL-MCNC: 23.9 PG/ML (ref 9–77)
RBC #/AREA URNS HPF: 0 /HPF (ref 0–4)
SODIUM SERPL-SCNC: 140 MMOL/L (ref 136–145)
SP GR UR STRIP: 1.01 (ref 1–1.03)
SQUAMOUS #/AREA URNS HPF: 0 /HPF
URATE SERPL-MCNC: 5.5 MG/DL (ref 3.4–7)
URN SPEC COLLECT METH UR: NORMAL
UROBILINOGEN UR STRIP-ACNC: NEGATIVE EU/DL
WBC #/AREA URNS HPF: 0 /HPF (ref 0–5)

## 2021-07-20 PROCEDURE — 84550 ASSAY OF BLOOD/URIC ACID: CPT | Performed by: INTERNAL MEDICINE

## 2021-07-20 PROCEDURE — 36415 COLL VENOUS BLD VENIPUNCTURE: CPT | Performed by: INTERNAL MEDICINE

## 2021-07-20 PROCEDURE — 82306 VITAMIN D 25 HYDROXY: CPT | Performed by: INTERNAL MEDICINE

## 2021-07-20 PROCEDURE — 83735 ASSAY OF MAGNESIUM: CPT | Performed by: INTERNAL MEDICINE

## 2021-07-20 PROCEDURE — 80069 RENAL FUNCTION PANEL: CPT | Performed by: INTERNAL MEDICINE

## 2021-07-20 PROCEDURE — 83970 ASSAY OF PARATHORMONE: CPT | Performed by: INTERNAL MEDICINE

## 2021-07-20 PROCEDURE — 84156 ASSAY OF PROTEIN URINE: CPT | Performed by: INTERNAL MEDICINE

## 2021-07-20 PROCEDURE — 81001 URINALYSIS AUTO W/SCOPE: CPT | Performed by: INTERNAL MEDICINE

## 2021-08-04 RX ORDER — LOSARTAN POTASSIUM 100 MG/1
100 TABLET ORAL DAILY
Qty: 90 TABLET | Refills: 3 | Status: SHIPPED | OUTPATIENT
Start: 2021-08-04 | End: 2022-03-02

## 2021-09-09 ENCOUNTER — TELEPHONE (OUTPATIENT)
Dept: FAMILY MEDICINE | Facility: CLINIC | Age: 81
End: 2021-09-09

## 2021-09-09 ENCOUNTER — OFFICE VISIT (OUTPATIENT)
Dept: FAMILY MEDICINE | Facility: CLINIC | Age: 81
End: 2021-09-09
Payer: MEDICARE

## 2021-09-09 ENCOUNTER — LAB VISIT (OUTPATIENT)
Dept: LAB | Facility: HOSPITAL | Age: 81
End: 2021-09-09
Attending: INTERNAL MEDICINE
Payer: MEDICARE

## 2021-09-09 VITALS
HEIGHT: 70 IN | WEIGHT: 225 LBS | SYSTOLIC BLOOD PRESSURE: 124 MMHG | BODY MASS INDEX: 32.21 KG/M2 | HEART RATE: 65 BPM | DIASTOLIC BLOOD PRESSURE: 65 MMHG

## 2021-09-09 DIAGNOSIS — Z91.89 PULMONARY NODULE LESS THAN 1 CM IN DIAMETER WITH LOW RISK FOR MALIGNANT NEOPLASM: ICD-10-CM

## 2021-09-09 DIAGNOSIS — E03.8 SECONDARY HYPOTHYROIDISM: ICD-10-CM

## 2021-09-09 DIAGNOSIS — I71.40 ABDOMINAL AORTIC ANEURYSM (AAA) WITHOUT RUPTURE: ICD-10-CM

## 2021-09-09 DIAGNOSIS — I67.82 SUBCORTICAL MICROVASCULAR ISCHEMIC OCCLUSIVE DISEASE: ICD-10-CM

## 2021-09-09 DIAGNOSIS — Z23 NEEDS FLU SHOT: ICD-10-CM

## 2021-09-09 DIAGNOSIS — R91.1 PULMONARY NODULE LESS THAN 1 CM IN DIAMETER WITH LOW RISK FOR MALIGNANT NEOPLASM: ICD-10-CM

## 2021-09-09 DIAGNOSIS — R91.1 SOLITARY PULMONARY NODULE: ICD-10-CM

## 2021-09-09 DIAGNOSIS — E78.2 MULTIPLE-TYPE HYPERLIPIDEMIA: ICD-10-CM

## 2021-09-09 DIAGNOSIS — I10 BENIGN ESSENTIAL HYPERTENSION: Primary | ICD-10-CM

## 2021-09-09 DIAGNOSIS — N40.0 BENIGN PROSTATIC HYPERPLASIA WITHOUT URINARY OBSTRUCTION: ICD-10-CM

## 2021-09-09 LAB — TSH SERPL DL<=0.005 MIU/L-ACNC: 1.6 UIU/ML (ref 0.34–5.6)

## 2021-09-09 PROCEDURE — 99214 PR OFFICE/OUTPT VISIT, EST, LEVL IV, 30-39 MIN: ICD-10-PCS | Mod: S$PBB,,, | Performed by: INTERNAL MEDICINE

## 2021-09-09 PROCEDURE — 90662 IIV NO PRSV INCREASED AG IM: CPT | Mod: PBBFAC | Performed by: INTERNAL MEDICINE

## 2021-09-09 PROCEDURE — G0008 ADMIN INFLUENZA VIRUS VAC: HCPCS | Mod: PBBFAC | Performed by: INTERNAL MEDICINE

## 2021-09-09 PROCEDURE — 84443 ASSAY THYROID STIM HORMONE: CPT | Performed by: INTERNAL MEDICINE

## 2021-09-09 PROCEDURE — 36415 COLL VENOUS BLD VENIPUNCTURE: CPT | Performed by: INTERNAL MEDICINE

## 2021-09-09 PROCEDURE — 99214 OFFICE O/P EST MOD 30 MIN: CPT | Mod: 25 | Performed by: INTERNAL MEDICINE

## 2021-09-09 PROCEDURE — 99214 OFFICE O/P EST MOD 30 MIN: CPT | Mod: S$PBB,,, | Performed by: INTERNAL MEDICINE

## 2021-12-06 ENCOUNTER — TELEPHONE (OUTPATIENT)
Dept: CARDIOLOGY | Facility: CLINIC | Age: 81
End: 2021-12-06
Payer: MEDICARE

## 2021-12-07 ENCOUNTER — TELEPHONE (OUTPATIENT)
Dept: CARDIOLOGY | Facility: CLINIC | Age: 81
End: 2021-12-07
Payer: MEDICARE

## 2021-12-21 RX ORDER — PRAVASTATIN SODIUM 20 MG/1
20 TABLET ORAL DAILY
Qty: 90 TABLET | Refills: 2 | Status: SHIPPED | OUTPATIENT
Start: 2021-12-21 | End: 2022-09-13

## 2022-01-18 ENCOUNTER — LAB VISIT (OUTPATIENT)
Dept: LAB | Facility: HOSPITAL | Age: 82
End: 2022-01-18
Attending: INTERNAL MEDICINE
Payer: MEDICARE

## 2022-01-18 DIAGNOSIS — N18.30 CHRONIC KIDNEY DISEASE, STAGE III (MODERATE): Primary | ICD-10-CM

## 2022-01-18 LAB
ALBUMIN SERPL BCP-MCNC: 4 G/DL (ref 3.5–5.2)
ANION GAP SERPL CALC-SCNC: 9 MMOL/L (ref 8–16)
BACTERIA #/AREA URNS HPF: NEGATIVE /HPF
BILIRUB UR QL STRIP: NEGATIVE
BUN SERPL-MCNC: 23 MG/DL (ref 8–23)
CALCIUM SERPL-MCNC: 9.5 MG/DL (ref 8.7–10.5)
CHLORIDE SERPL-SCNC: 106 MMOL/L (ref 95–110)
CLARITY UR: CLEAR
CO2 SERPL-SCNC: 25 MMOL/L (ref 23–29)
COLOR UR: YELLOW
CREAT SERPL-MCNC: 1.2 MG/DL (ref 0.5–1.4)
CREAT UR-MCNC: 63 MG/DL (ref 23–375)
EST. GFR  (AFRICAN AMERICAN): >60 ML/MIN/1.73 M^2
EST. GFR  (NON AFRICAN AMERICAN): 56 ML/MIN/1.73 M^2
GLUCOSE SERPL-MCNC: 106 MG/DL (ref 70–110)
GLUCOSE UR QL STRIP: NEGATIVE
HGB UR QL STRIP: NEGATIVE
HYALINE CASTS #/AREA URNS LPF: 0 /LPF
KETONES UR QL STRIP: NEGATIVE
LEUKOCYTE ESTERASE UR QL STRIP: NEGATIVE
MICROSCOPIC COMMENT: ABNORMAL
NITRITE UR QL STRIP: NEGATIVE
PH UR STRIP: 6 [PH] (ref 5–8)
PHOSPHATE SERPL-MCNC: 2.8 MG/DL (ref 2.7–4.5)
POTASSIUM SERPL-SCNC: 4.2 MMOL/L (ref 3.5–5.1)
PROT UR QL STRIP: NEGATIVE
PROT UR-MCNC: <6 MG/DL (ref 6–15)
PROT/CREAT UR: NORMAL MG/G{CREAT} (ref 0–0.2)
RBC #/AREA URNS HPF: 0 /HPF (ref 0–4)
SODIUM SERPL-SCNC: 140 MMOL/L (ref 136–145)
SP GR UR STRIP: 1.01 (ref 1–1.03)
SQUAMOUS #/AREA URNS HPF: 0 /HPF
URN SPEC COLLECT METH UR: NORMAL
UROBILINOGEN UR STRIP-ACNC: NEGATIVE EU/DL
WBC #/AREA URNS HPF: 0 /HPF (ref 0–5)

## 2022-01-18 PROCEDURE — 82570 ASSAY OF URINE CREATININE: CPT | Performed by: INTERNAL MEDICINE

## 2022-01-18 PROCEDURE — 80069 RENAL FUNCTION PANEL: CPT | Performed by: INTERNAL MEDICINE

## 2022-01-18 PROCEDURE — 36415 COLL VENOUS BLD VENIPUNCTURE: CPT | Performed by: INTERNAL MEDICINE

## 2022-01-18 PROCEDURE — 81001 URINALYSIS AUTO W/SCOPE: CPT | Performed by: INTERNAL MEDICINE

## 2022-01-26 ENCOUNTER — OFFICE VISIT (OUTPATIENT)
Dept: CARDIOLOGY | Facility: CLINIC | Age: 82
End: 2022-01-26
Payer: MEDICARE

## 2022-01-26 VITALS
HEIGHT: 70 IN | HEART RATE: 79 BPM | DIASTOLIC BLOOD PRESSURE: 68 MMHG | OXYGEN SATURATION: 96 % | SYSTOLIC BLOOD PRESSURE: 118 MMHG | BODY MASS INDEX: 32.21 KG/M2 | WEIGHT: 225 LBS

## 2022-01-26 DIAGNOSIS — I10 BENIGN ESSENTIAL HYPERTENSION: Primary | ICD-10-CM

## 2022-01-26 DIAGNOSIS — Z01.810 PREOPERATIVE CARDIOVASCULAR EXAMINATION: ICD-10-CM

## 2022-01-26 PROCEDURE — 99214 OFFICE O/P EST MOD 30 MIN: CPT | Mod: S$GLB,,, | Performed by: INTERNAL MEDICINE

## 2022-01-26 PROCEDURE — 93005 EKG 12-LEAD: ICD-10-PCS | Mod: S$GLB,,, | Performed by: INTERNAL MEDICINE

## 2022-01-26 PROCEDURE — 93010 EKG 12-LEAD: ICD-10-PCS | Mod: S$GLB,,, | Performed by: INTERNAL MEDICINE

## 2022-01-26 PROCEDURE — 93005 ELECTROCARDIOGRAM TRACING: CPT | Mod: S$GLB,,, | Performed by: INTERNAL MEDICINE

## 2022-01-26 PROCEDURE — 99214 PR OFFICE/OUTPT VISIT, EST, LEVL IV, 30-39 MIN: ICD-10-PCS | Mod: S$GLB,,, | Performed by: INTERNAL MEDICINE

## 2022-01-26 PROCEDURE — 93010 ELECTROCARDIOGRAM REPORT: CPT | Mod: S$GLB,,, | Performed by: INTERNAL MEDICINE

## 2022-01-26 NOTE — LETTER
January 26, 2022        Tio Cespedes MD  [address]  [place]  [facility] [address]             [facility] [place]  [address]  [place] [address]  Phone: [phone]  Fax: [phone]   Patient: Nikolay Barraza   MR Number: [id number]   YOB: 1940   Date of Visit: 1/26/2022     Dear Dr. Cespedes,     Thank you for referring him. Patient is asymptomatic with moderate exertion from cardiac standpoint. Can proceed with surgery. Low to intermediate risk of perioperative cardiovascular complications. Please let me know if there is anything else I can do.    Sincerely,      Tomas Salomon MD            CC  No Recipients    Enclosure

## 2022-01-26 NOTE — PROGRESS NOTES
[facility] [place]    Subjective:     Patient ID:  Nikolay Barraza is a 81 y.o. male patient here for evaluation Establish Care (New patient , here for surgery clearance . Needs total RIGHT knee replacement , Dr Cespedes . )      HPI: 80 yo male here for preop evaluation for knee surgery. Reports being active at home and going to gym without any symptoms of chest pain, dyspnea, dizziness, palpitations. No previous cardiac issues.    Review of Systems   All other systems reviewed and are negative.       Past Medical History:   Diagnosis Date    Allergy     Codeine    Closed fracture dislocation of lumbar spine 7/6/2018    #2 vertebra. Patient went to the emergency Department.  Followup with Erik:    GERD (gastroesophageal reflux disease)     Hyperlipidemia     Hypertension     Hypertensive retinopathy of both eyes 5/11/2021    As per ophthalmology notes.  Eye care 20/20    Right inguinal hernia     Thyroid disease        Past Surgical History:   Procedure Laterality Date    ADENOIDECTOMY      INGUINAL HERNIA REPAIR Right 07/12/2018    Dr. Thomas - [facility]    KNEE SURGERY Left 07/31/2019    TONSILLECTOMY      VASECTOMY         Family History   Problem Relation Age of Onset    Heart disease Father     Miscarriages / Stillbirths Father     Skin cancer Mother     Cancer Maternal Grandfather     Stomach cancer Paternal Grandfather        Social History     Socioeconomic History    Marital status:      Spouse name: Alisa barraza    Number of children: 3   Occupational History    Occupation: [employer]-      Comment:     Tobacco Use    Smoking status: Never Smoker    Smokeless tobacco: Never Used   Substance and Sexual Activity    Alcohol use: No    Drug use: No    Sexual activity: Not Currently     Partners: Female     Social Determinants of Health     Financial Resource Strain: Low Risk     Difficulty of Paying Living Expenses: Not very  hard   Food Insecurity: No Food Insecurity    Worried About Running Out of Food in the Last Year: Never true    Ran Out of Food in the Last Year: Never true   Transportation Needs: No Transportation Needs    Lack of Transportation (Medical): No    Lack of Transportation (Non-Medical): No   Physical Activity: Inactive    Days of Exercise per Week: 0 days    Minutes of Exercise per Session: 0 min   Stress: Stress Concern Present    Feeling of Stress : To some extent   Social Connections: Socially Integrated    Frequency of Communication with Friends and Family: Three times a week    Frequency of Social Gatherings with Friends and Family: Three times a week    Attends Orthodox Services: More than 4 times per year    Active Member of Clubs or Organizations: Yes    Attends Club or Organization Meetings: 1 to 4 times per year    Marital Status:    Housing Stability: Low Risk     Unable to Pay for Housing in the Last Year: No    Number of Places Lived in the Last Year: 1    Unstable Housing in the Last Year: No       Current Outpatient Medications   Medication Sig Dispense Refill    ascorbic acid, vitamin C, (VITAMIN C) 500 MG tablet Take 500 mg by mouth once daily.      aspirin (ECOTRIN) 81 MG EC tablet Take 81 mg by mouth once daily.      b complex vitamins tablet Take 1 tablet by mouth once daily.      CALCIUM CARBONATE/VITAMIN D3 (VITAMIN D-3 ORAL) Take by mouth.      docusate sodium (COLACE) 50 MG capsule Take by mouth 2 (two) times daily as needed for Constipation.      doxycycline (ORACEA) 40 mg capsule TAKE 1 CAPSULE BY MOUTH EVERY DAY 90 capsule 3    felodipine (PLENDIL) 5 MG 24 hr tablet TAKE 1 TABLET BY MOUTH TWICE A  tablet 3    finasteride (PROSCAR) 5 mg tablet Take 1 tablet (5 mg total) by mouth once daily. 90 tablet 3    fish oil-omega-3 fatty acids 300-1,000 mg capsule Take 2 g by mouth once daily.      losartan (COZAAR) 100 MG tablet Take 1 tablet (100 mg total) by  mouth once daily. 90 tablet 3    multivitamin (THERAGRAN) per tablet Take 1 tablet by mouth once daily.      pravastatin (PRAVACHOL) 20 MG tablet Take 1 tablet (20 mg total) by mouth once daily. 90 tablet 2    SYNTHROID 200 mcg tablet TAKE 1 TABLET ONCE DAILY 90 tablet 3    tamsulosin (FLOMAX) 0.4 mg Cap Take 1 capsule (0.4 mg total) by mouth once daily. Take at bedtime 90 capsule 3    augmented betamethasone dipropionate (DIPROLENE-AF) 0.05 % cream 1 Dose daily as needed.  1     No current facility-administered medications for this visit.       Review of patient's allergies indicates:   Allergen Reactions    Codeine Other (See Comments)     mood changes  Makes him feel violent         Objective:        Vitals:    01/26/22 1310   BP: 118/68   Pulse: 79       Physical Exam  Vitals reviewed.   HENT:      Mouth/Throat:      Mouth: Mucous membranes are moist.   Eyes:      Extraocular Movements: Extraocular movements intact.      Pupils: Pupils are equal, round, and reactive to light.   Cardiovascular:      Rate and Rhythm: Normal rate.      Pulses: Normal pulses.      Heart sounds: Normal heart sounds. No murmur heard.  No gallop.    Pulmonary:      Effort: Pulmonary effort is normal.      Breath sounds: Normal breath sounds.   Abdominal:      General: Bowel sounds are normal.      Palpations: Abdomen is soft.   Musculoskeletal:         General: Normal range of motion.      Cervical back: Normal range of motion.   Skin:     General: Skin is warm and dry.   Neurological:      General: No focal deficit present.      Mental Status: He is alert and oriented to person, place, and time.   Psychiatric:         Mood and Affect: Mood normal.         LIPIDS - LAST 2   Lab Results   Component Value Date    CHOL 157 12/27/2020    CHOL 169 12/17/2020    HDL 37 (L) 12/27/2020    HDL 41 12/17/2020    LDLCALC 93.6 12/27/2020    LDLCALC 108.4 12/17/2020    TRIG 132 12/27/2020    TRIG 98 12/17/2020    CHOLHDL 23.6 12/27/2020     CHOLHDL 24.3 12/17/2020       CBC - LAST 2  Lab Results   Component Value Date    WBC 5.07 12/27/2020    WBC 5.93 12/26/2020    RBC 4.44 (L) 12/27/2020    RBC 4.92 12/26/2020    HGB 13.2 (L) 12/27/2020    HGB 14.5 12/26/2020    HCT 39.8 (L) 12/27/2020    HCT 43.4 12/26/2020    MCV 90 12/27/2020    MCV 88 12/26/2020    MCH 29.7 12/27/2020    MCH 29.5 12/26/2020    MCHC 33.2 12/27/2020    MCHC 33.4 12/26/2020    RDW 13.3 12/27/2020    RDW 13.3 12/26/2020     12/27/2020     12/26/2020    MPV 9.8 12/27/2020    MPV 9.8 12/26/2020    GRAN 2.7 12/27/2020    GRAN 53.4 12/27/2020    LYMPH 1.7 12/27/2020    LYMPH 33.7 12/27/2020    MONO 0.5 12/27/2020    MONO 8.9 12/27/2020    BASO 0.03 12/27/2020    BASO 0.05 12/26/2020    NRBC 0 12/27/2020    NRBC 0 12/26/2020       CHEMISTRY & LIVER FUNCTION - LAST 2  Lab Results   Component Value Date     01/18/2022     07/20/2021    K 4.2 01/18/2022    K 4.3 07/20/2021     01/18/2022     07/20/2021    CO2 25 01/18/2022    CO2 22 (L) 07/20/2021    ANIONGAP 9 01/18/2022    ANIONGAP 12 07/20/2021    BUN 23 01/18/2022    BUN 19 07/20/2021    CREATININE 1.2 01/18/2022    CREATININE 1.1 07/20/2021     01/18/2022     07/20/2021    CALCIUM 9.5 01/18/2022    CALCIUM 9.4 07/20/2021    MG 1.9 07/20/2021    MG 1.9 07/14/2020    ALBUMIN 4.0 01/18/2022    ALBUMIN 3.9 07/20/2021    PROT 7.9 12/26/2020    PROT 7.3 12/17/2020    ALKPHOS 98 12/26/2020    ALKPHOS 75 12/17/2020    ALT 24 12/26/2020    ALT 20 12/17/2020    AST 34 12/26/2020    AST 24 12/17/2020    BILITOT 0.7 12/26/2020    BILITOT 0.9 12/17/2020        CARDIAC PROFILE - LAST 2  Lab Results   Component Value Date    CPK 32 12/27/2020    CPK 37 12/26/2020    CPKMB 0.6 12/27/2020    CPKMB 0.9 12/26/2020    TROPONINI <0.006 12/27/2020    TROPONINI <0.006 12/27/2020        COAGULATION - LAST 2  Lab Results   Component Value Date    INR 1.0 12/26/2020    APTT 27.3 12/26/2020       ENDOCRINE & PSA  - LAST 2  Lab Results   Component Value Date    HGBA1C 5.8 08/22/2017    TSH 1.600 09/09/2021    TSH 1.483 12/27/2020    PSA 0.11 06/28/2019    PSA 0.1 05/22/2018        ECHOCARDIOGRAM RESULTS  Results for orders placed during the hospital encounter of 12/26/20    Echo Color Flow Doppler? Yes    Interpretation Summary  · The left ventricle is normal in size with mild concentric hypertrophy and normal systolic function. The estimated ejection fraction is 60%  · Normal left ventricular diastolic function.  · Normal right ventricular size with normal right ventricular systolic function.  · Mild left atrial enlargement.  · Normal central venous pressure (3 mmHg).  · The estimated PA systolic pressure is 28 mmHg.  · PFO noted by color flow Doppler. Bubble study is negative for any intracardiac shunt however it is not of good quality. Consider MYRIAM      CURRENT/PREVIOUS VISIT EKG  Results for orders placed or performed during the hospital encounter of 12/26/20   EKG 12-lead    Collection Time: 12/26/20  2:34 PM    Narrative    Test Reason : R55,    Vent. Rate : 061 BPM     Atrial Rate : 061 BPM     P-R Int : 198 ms          QRS Dur : 088 ms      QT Int : 438 ms       P-R-T Axes : 057 -16 079 degrees     QTc Int : 440 ms    Normal sinus rhythm  Low voltage QRS  Borderline Abnormal ECG  No previous ECGs available  Confirmed by Ethan GOINS, Tez HOLLAND ([id number]) on 12/28/2020 9:36:58 AM    Referred By: [email or web address]   SELF           Confirmed By:Tez Long MD     No valid procedures specified.   No results found for this or any previous visit.    No valid procedures specified.      EKG - NSR  Assessment:       1. Benign essential hypertension    2. Preoperative cardiovascular examination           Plan:       Benign essential hypertension  -     IN OFFICE EKG 12-LEAD (to Muse)    Preoperative cardiovascular examination    HTN is well controlled. C/w current medications.   Asymptomatic with moderate exertion from cardiac standpoint.  Can proceed with surgery. Low to intermediate risk of perioperative cardiovascular complications.     Follow up in about 1 year (around 1/26/2023) for HTN.        CC: Dr. Tio Cespedes, Orthopedics.     Tomas Salomon MD  [facility] [place]

## 2022-01-27 ENCOUNTER — TELEPHONE (OUTPATIENT)
Dept: CARDIOLOGY | Facility: CLINIC | Age: 82
End: 2022-01-27
Payer: MEDICARE

## 2022-01-27 NOTE — TELEPHONE ENCOUNTER
----- Message from Bigg Patrick MA sent at 1/27/2022  4:39 PM CST -----  Contact: STAN KEN [[id number]]  Type: Needs Medical Advice    Who Called: STAN KEN [[id number]]  Best Call Back Number:[phone]  Inquiry/Question: would you kindly call STAN KEN [[id number]] regarding a medication concern Thank you~

## 2022-01-27 NOTE — TELEPHONE ENCOUNTER
Re faxed note for surgery clearance . Dr Cespedes at [phone] . This is per the patient's request.

## 2022-02-09 ENCOUNTER — OFFICE VISIT (OUTPATIENT)
Dept: UROLOGY | Facility: CLINIC | Age: 82
End: 2022-02-09
Payer: MEDICARE

## 2022-02-09 VITALS
SYSTOLIC BLOOD PRESSURE: 133 MMHG | DIASTOLIC BLOOD PRESSURE: 60 MMHG | HEIGHT: 70 IN | WEIGHT: 225.06 LBS | BODY MASS INDEX: 32.22 KG/M2 | HEART RATE: 70 BPM

## 2022-02-09 DIAGNOSIS — R39.9 LOWER URINARY TRACT SYMPTOMS (LUTS): Primary | ICD-10-CM

## 2022-02-09 DIAGNOSIS — N28.1 CYST OF KIDNEY, ACQUIRED: ICD-10-CM

## 2022-02-09 PROCEDURE — 99214 PR OFFICE/OUTPT VISIT, EST, LEVL IV, 30-39 MIN: ICD-10-PCS | Mod: S$PBB,,, | Performed by: UROLOGY

## 2022-02-09 PROCEDURE — 99999 PR PBB SHADOW E&M-EST. PATIENT-LVL III: CPT | Mod: PBBFAC,,, | Performed by: UROLOGY

## 2022-02-09 PROCEDURE — 99999 PR PBB SHADOW E&M-EST. PATIENT-LVL III: ICD-10-PCS | Mod: PBBFAC,,, | Performed by: UROLOGY

## 2022-02-09 PROCEDURE — 99214 OFFICE O/P EST MOD 30 MIN: CPT | Mod: S$PBB,,, | Performed by: UROLOGY

## 2022-02-09 PROCEDURE — 99213 OFFICE O/P EST LOW 20 MIN: CPT | Mod: PBBFAC,PN | Performed by: UROLOGY

## 2022-02-09 RX ORDER — TAMSULOSIN HYDROCHLORIDE 0.4 MG/1
0.4 CAPSULE ORAL DAILY
Qty: 90 CAPSULE | Refills: 3 | Status: SHIPPED | OUTPATIENT
Start: 2022-02-09 | End: 2022-04-07

## 2022-02-09 RX ORDER — FINASTERIDE 5 MG/1
5 TABLET, FILM COATED ORAL DAILY
Qty: 90 TABLET | Refills: 3 | Status: SHIPPED | OUTPATIENT
Start: 2022-02-09 | End: 2023-02-23

## 2022-02-09 NOTE — PROGRESS NOTES
[facility] Established Patient/H&P:    Nikolay Barraza is a 81 y.o. male who presents for follow up for lower urinary tract symptoms and renal cysts.     Patient with a history of CKD and enlarged prostate complicated by lower urinary tract symptoms and urinary retention that have been managed with Dr. Galvin in the past.      On review of records, he has been on Flomax and Finasteride and feels as if he is voiding well. Per patient, his nephrologist Dr. Smith found that he had an elevated Creatinine of 1.6. It was rechecked on 2/2/21 and found to be 1.3. Referred to rule out urinary retention as a cause of his GUZMAN given his episode of acute urinary retention in 2017 after right inguinal hernia repair.       Interval History    2/9/22: He remains on Flomax 0.4 mg and Finasteride 5 mg PO daily for his well-controlled LUTS. IPSS 1 today. Renal ultrasound on 2/10/21 with right upper pole cystic lesions that grew in size. CT abdomen with contrast showed unremarkable cysts. Incidental lung nodule - referred to PCP.      Denies any significant lower urinary tract symptoms, gross hematuria, recent urinary tract infection,  trauma or history of  malignancy.        PSA  0.11                 6/28/19  0.1                   5/22/18     Creatinine  1.3                   2/2/21     IPSS    QoL  1 0 2/9/22  1          1          2/8/21     PVR  9 mL                2/8/21    Past Medical History:   Diagnosis Date    Allergy     Codeine    Closed fracture dislocation of lumbar spine 7/6/2018    #2 vertebra. Patient went to the emergency Department.  Followup with Erik:    GERD (gastroesophageal reflux disease)     Hyperlipidemia     Hypertension     Hypertensive retinopathy of both eyes 5/11/2021    As per ophthalmology notes.  Eye care 20/20    Right inguinal hernia     Thyroid disease        Past Surgical History:   Procedure Laterality Date    ADENOIDECTOMY      INGUINAL HERNIA REPAIR Right  "07/12/2018    Dr[facility] - [facility]    KNEE SURGERY Left 07/31/2019    TONSILLECTOMY      VASECTOMY         Review of patient's allergies indicates:   Allergen Reactions    Codeine Other (See Comments)     mood changes  Makes him feel violent       Medications Reviewed: see MAR    FOCUSED PHYSICAL EXAM:    Vitals:    02/09/22 0951   BP: 133/60   Pulse: 70     Body mass index is 32.3 kg/m². Weight: 102.1 kg (225 lb 1.4 oz) Height: 5' 10" (177.8 cm)       General: Alert, cooperative, no distress, appears stated age  Abdomen: Soft, non-tender, no CVA tenderness, non-distended      LABS:    No results found for this or any previous visit (from the past 336 hour(s)).      Assessment/Diagnosis:    1. Lower urinary tract symptoms (LUTS)  finasteride (PROSCAR) 5 mg tablet    tamsulosin (FLOMAX) 0.4 mg Cap   2. Cyst of kidney, acquired         Plans:    - Patient's lower urinary tract symptoms well controlled. IPSS 1 today. Continue Flomax 0.4 mg and Finasteride 5 mg PO daily - refills ordered.  - Cysts unremarkable on CT scan in 2/2021. No further surveillance required. Follow up with PCP for pulmonary nodule.   - RTC in 1 year with symptom score.     "

## 2022-03-02 ENCOUNTER — TELEPHONE (OUTPATIENT)
Dept: CARDIOLOGY | Facility: CLINIC | Age: 82
End: 2022-03-02
Payer: MEDICARE

## 2022-03-02 ENCOUNTER — TELEPHONE (OUTPATIENT)
Dept: CARDIOLOGY | Facility: CLINIC | Age: 82
End: 2022-03-02

## 2022-03-02 ENCOUNTER — OFFICE VISIT (OUTPATIENT)
Dept: CARDIOLOGY | Facility: CLINIC | Age: 82
End: 2022-03-02
Payer: MEDICARE

## 2022-03-02 VITALS
OXYGEN SATURATION: 98 % | DIASTOLIC BLOOD PRESSURE: 50 MMHG | HEIGHT: 70 IN | BODY MASS INDEX: 32.21 KG/M2 | WEIGHT: 225 LBS | HEART RATE: 66 BPM | SYSTOLIC BLOOD PRESSURE: 96 MMHG

## 2022-03-02 DIAGNOSIS — R42 DIZZINESS: ICD-10-CM

## 2022-03-02 DIAGNOSIS — I10 ESSENTIAL HYPERTENSION: Primary | ICD-10-CM

## 2022-03-02 PROCEDURE — 99214 PR OFFICE/OUTPT VISIT, EST, LEVL IV, 30-39 MIN: ICD-10-PCS | Mod: S$GLB,,, | Performed by: INTERNAL MEDICINE

## 2022-03-02 PROCEDURE — 99214 OFFICE O/P EST MOD 30 MIN: CPT | Mod: S$GLB,,, | Performed by: INTERNAL MEDICINE

## 2022-03-02 RX ORDER — LOSARTAN POTASSIUM 25 MG/1
25 TABLET ORAL DAILY
Qty: 90 TABLET | Refills: 3 | Status: SHIPPED | OUTPATIENT
Start: 2022-03-02 | End: 2022-03-09

## 2022-03-02 RX ORDER — LOSARTAN POTASSIUM 50 MG/1
50 TABLET ORAL DAILY
Qty: 90 TABLET | Refills: 3 | Status: SHIPPED | OUTPATIENT
Start: 2022-03-02 | End: 2022-03-02

## 2022-03-02 RX ORDER — ASPIRIN 325 MG
325 TABLET ORAL DAILY
COMMUNITY
End: 2022-04-07

## 2022-03-02 NOTE — TELEPHONE ENCOUNTER
----- Message from Lisbeth Loredo sent at 3/2/2022  9:10 AM CST -----  Type:  Same Day Appointment Request    Caller is requesting a same day appointment.  Caller declined first available appointment listed below.      Name of Caller:  WifeAlisa  Symptoms:  Dizziness and his blood pressure was 90/? At Dr Cespedes's office this morning  Best Call Back Number:  [phone] or [phone] or [place] # [phone]   Additional Information:   thank you

## 2022-03-02 NOTE — PROGRESS NOTES
[facility]    Subjective:     Patient ID:  Nikolay Barraza is a 81 y.o. male patient here for evaluation Establish Care (Patient having low blood pressure )      HPI:  81-year-old male here for evaluation of borderline low blood pressure.  Patient was evaluated previously for preop for his knee replacement.  He had knee replacement done following which his blood pressure has been staying low.  He reports feeling dizziness this morning.    Review of Systems   All other systems reviewed and are negative.       Past Medical History:   Diagnosis Date    Allergy     Codeine    Closed fracture dislocation of lumbar spine 7/6/2018    #2 vertebra. Patient went to the emergency Department.  Followup with Erik:    GERD (gastroesophageal reflux disease)     Hyperlipidemia     Hypertension     Hypertensive retinopathy of both eyes 5/11/2021    As per ophthalmology notes.  Eye care 20/20    Right inguinal hernia     Thyroid disease        Past Surgical History:   Procedure Laterality Date    ADENOIDECTOMY      INGUINAL HERNIA REPAIR Right 07/12/2018    Dr[facility]    KNEE SURGERY Left 07/31/2019    TONSILLECTOMY      VASECTOMY         Family History   Problem Relation Age of Onset    Heart disease Father     Miscarriages / Stillbirths Father     Skin cancer Mother     Cancer Maternal Grandfather     Stomach cancer Paternal Grandfather        Social History     Socioeconomic History    Marital status:      Spouse name: Alisa barraza    Number of children: 3   Occupational History    Occupation: [employer]-      Comment:     Tobacco Use    Smoking status: Never Smoker    Smokeless tobacco: Never Used   Substance and Sexual Activity    Alcohol use: No    Drug use: No    Sexual activity: Not Currently     Partners: Female     Social Determinants of Health     Financial Resource Strain: Low Risk     Difficulty of Paying Living  Expenses: Not very hard   Food Insecurity: No Food Insecurity    Worried About Running Out of Food in the Last Year: Never true    Ran Out of Food in the Last Year: Never true   Transportation Needs: No Transportation Needs    Lack of Transportation (Medical): No    Lack of Transportation (Non-Medical): No   Physical Activity: Inactive    Days of Exercise per Week: 0 days    Minutes of Exercise per Session: 0 min   Stress: Stress Concern Present    Feeling of Stress : To some extent   Social Connections: Socially Integrated    Frequency of Communication with Friends and Family: Three times a week    Frequency of Social Gatherings with Friends and Family: Three times a week    Attends Spiritism Services: More than 4 times per year    Active Member of Clubs or Organizations: Yes    Attends Club or Organization Meetings: 1 to 4 times per year    Marital Status:    Housing Stability: Low Risk     Unable to Pay for Housing in the Last Year: No    Number of Places Lived in the Last Year: 1    Unstable Housing in the Last Year: No       Current Outpatient Medications   Medication Sig Dispense Refill    ascorbic acid, vitamin C, (VITAMIN C) 500 MG tablet Take 500 mg by mouth once daily.      aspirin 325 MG tablet Take 325 mg by mouth once daily.      augmented betamethasone dipropionate (DIPROLENE-AF) 0.05 % cream 1 Dose daily as needed.  1    b complex vitamins tablet Take 1 tablet by mouth once daily.      CALCIUM CARBONATE/VITAMIN D3 (VITAMIN D-3 ORAL) Take by mouth.      docusate sodium (COLACE) 50 MG capsule Take by mouth 2 (two) times daily as needed for Constipation.      doxycycline (ORACEA) 40 mg capsule TAKE 1 CAPSULE BY MOUTH EVERY DAY 90 capsule 3    finasteride (PROSCAR) 5 mg tablet Take 1 tablet (5 mg total) by mouth once daily. 90 tablet 3    fish oil-omega-3 fatty acids 300-1,000 mg capsule Take 2 g by mouth once daily.      multivitamin (THERAGRAN) per tablet Take 1 tablet  by mouth once daily.      pravastatin (PRAVACHOL) 20 MG tablet Take 1 tablet (20 mg total) by mouth once daily. 90 tablet 2    SYNTHROID 200 mcg tablet TAKE 1 TABLET ONCE DAILY 90 tablet 3    tamsulosin (FLOMAX) 0.4 mg Cap Take 1 capsule (0.4 mg total) by mouth once daily. Take at bedtime 90 capsule 3    aspirin (ECOTRIN) 81 MG EC tablet Take 81 mg by mouth once daily.      losartan (COZAAR) 25 MG tablet Take 1 tablet (25 mg total) by mouth once daily. 90 tablet 3     No current facility-administered medications for this visit.       Review of patient's allergies indicates:   Allergen Reactions    Codeine Other (See Comments)     mood changes  Makes him feel violent         Objective:        Vitals:    03/02/22 1450   BP: (!) 96/50   Pulse: 66       Physical Exam  Vitals reviewed.   Constitutional:       Appearance: Normal appearance.   HENT:      Mouth/Throat:      Mouth: Mucous membranes are moist.   Eyes:      Extraocular Movements: Extraocular movements intact.      Pupils: Pupils are equal, round, and reactive to light.   Cardiovascular:      Rate and Rhythm: Normal rate and regular rhythm.      Pulses: Normal pulses.      Heart sounds: Normal heart sounds. No murmur heard.    No gallop.   Pulmonary:      Effort: Pulmonary effort is normal.      Breath sounds: Normal breath sounds.   Abdominal:      General: Bowel sounds are normal.      Palpations: Abdomen is soft.   Musculoskeletal:      Cervical back: Normal range of motion.   Skin:     General: Skin is warm and dry.   Neurological:      General: No focal deficit present.      Mental Status: He is alert and oriented to person, place, and time.   Psychiatric:         Mood and Affect: Mood normal.         LIPIDS - LAST 2   Lab Results   Component Value Date    CHOL 157 12/27/2020    CHOL 169 12/17/2020    HDL 37 (L) 12/27/2020    HDL 41 12/17/2020    LDLCALC 93.6 12/27/2020    LDLCALC 108.4 12/17/2020    TRIG 132 12/27/2020    TRIG 98 12/17/2020     CHOLHDL 23.6 12/27/2020    CHOLHDL 24.3 12/17/2020       CBC - LAST 2  Lab Results   Component Value Date    WBC 5.07 12/27/2020    WBC 5.93 12/26/2020    RBC 4.44 (L) 12/27/2020    RBC 4.92 12/26/2020    HGB 13.2 (L) 12/27/2020    HGB 14.5 12/26/2020    HCT 39.8 (L) 12/27/2020    HCT 43.4 12/26/2020    MCV 90 12/27/2020    MCV 88 12/26/2020    MCH 29.7 12/27/2020    MCH 29.5 12/26/2020    MCHC 33.2 12/27/2020    MCHC 33.4 12/26/2020    RDW 13.3 12/27/2020    RDW 13.3 12/26/2020     12/27/2020     12/26/2020    MPV 9.8 12/27/2020    MPV 9.8 12/26/2020    GRAN 2.7 12/27/2020    GRAN 53.4 12/27/2020    LYMPH 1.7 12/27/2020    LYMPH 33.7 12/27/2020    MONO 0.5 12/27/2020    MONO 8.9 12/27/2020    BASO 0.03 12/27/2020    BASO 0.05 12/26/2020    NRBC 0 12/27/2020    NRBC 0 12/26/2020       CHEMISTRY & LIVER FUNCTION - LAST 2  Lab Results   Component Value Date     01/18/2022     07/20/2021    K 4.2 01/18/2022    K 4.3 07/20/2021     01/18/2022     07/20/2021    CO2 25 01/18/2022    CO2 22 (L) 07/20/2021    ANIONGAP 9 01/18/2022    ANIONGAP 12 07/20/2021    BUN 23 01/18/2022    BUN 19 07/20/2021    CREATININE 1.2 01/18/2022    CREATININE 1.1 07/20/2021     01/18/2022     07/20/2021    CALCIUM 9.5 01/18/2022    CALCIUM 9.4 07/20/2021    MG 1.9 07/20/2021    MG 1.9 07/14/2020    ALBUMIN 4.0 01/18/2022    ALBUMIN 3.9 07/20/2021    PROT 7.9 12/26/2020    PROT 7.3 12/17/2020    ALKPHOS 98 12/26/2020    ALKPHOS 75 12/17/2020    ALT 24 12/26/2020    ALT 20 12/17/2020    AST 34 12/26/2020    AST 24 12/17/2020    BILITOT 0.7 12/26/2020    BILITOT 0.9 12/17/2020        CARDIAC PROFILE - LAST 2  Lab Results   Component Value Date    CPK 32 12/27/2020    CPK 37 12/26/2020    CPKMB 0.6 12/27/2020    CPKMB 0.9 12/26/2020    TROPONINI <0.006 12/27/2020    TROPONINI <0.006 12/27/2020        COAGULATION - LAST 2  Lab Results   Component Value Date    INR 1.0 12/26/2020    APTT 27.3  12/26/2020       ENDOCRINE & PSA - LAST 2  Lab Results   Component Value Date    HGBA1C 5.8 08/22/2017    TSH 1.600 09/09/2021    TSH 1.483 12/27/2020    PSA 0.11 06/28/2019    PSA 0.1 05/22/2018        ECHOCARDIOGRAM RESULTS  Results for orders placed during the hospital encounter of 12/26/20    Echo Color Flow Doppler? Yes    Interpretation Summary  · The left ventricle is normal in size with mild concentric hypertrophy and normal systolic function. The estimated ejection fraction is 60%  · Normal left ventricular diastolic function.  · Normal right ventricular size with normal right ventricular systolic function.  · Mild left atrial enlargement.  · Normal central venous pressure (3 mmHg).  · The estimated PA systolic pressure is 28 mmHg.  · PFO noted by color flow Doppler. Bubble study is negative for any intracardiac shunt however it is not of good quality. Consider MYRIAM      CURRENT/PREVIOUS VISIT EKG  Results for orders placed or performed in visit on 01/26/22   IN OFFICE EKG 12-LEAD (to [place])    Collection Time: 01/26/22  1:21 PM    Narrative    Test Reason : I10,    Vent. Rate : 072 BPM     Atrial Rate : 072 BPM     P-R Int : 208 ms          QRS Dur : 074 ms      QT Int : 402 ms       P-R-T Axes : 063 -45 069 degrees     QTc Int : 440 ms    Normal sinus rhythm  Left axis deviation  Low voltage QRS  Possible Inferior infarct ,age undetermined  Abnormal ECG  When compared with ECG of 26-DEC-2020 14:34,  Borderline criteria for Inferior infarct are now Present  Confirmed by Dima Vergara MD ([id number]) on 2/27/2022 9:53:20 PM    Referred By: TRAVIS   SELF           Confirmed By:Dima Vergara MD     No valid procedures specified.   No results found for this or any previous visit.    No valid procedures specified.        Assessment:       1. Essential hypertension    2. Dizziness           Plan:       Essential hypertension    Dizziness    Other orders  -     Discontinue: losartan (COZAAR) 50 MG tablet; Take 1  tablet (50 mg total) by mouth once daily.  Dispense: 90 tablet; Refill: 3  -     losartan (COZAAR) 25 MG tablet; Take 1 tablet (25 mg total) by mouth once daily.  Dispense: 90 tablet; Refill: 3    He was dizzy likely due to low blood pressure.  Discontinue felodipine and reduce losartan to 25 mg q.day.  Patient instructed to check blood pressure at home.  If it continues to stay low, can stop losartan as well.  Will follow up in 1 week for evaluation of the same.    Follow up in about 1 week (around 3/9/2022) for Hypertension.          MD Fantasma Keene [facility] [place]

## 2022-03-09 ENCOUNTER — TELEPHONE (OUTPATIENT)
Dept: CARDIOLOGY | Facility: CLINIC | Age: 82
End: 2022-03-09

## 2022-03-09 ENCOUNTER — OFFICE VISIT (OUTPATIENT)
Dept: CARDIOLOGY | Facility: CLINIC | Age: 82
End: 2022-03-09
Payer: MEDICARE

## 2022-03-09 VITALS
SYSTOLIC BLOOD PRESSURE: 118 MMHG | WEIGHT: 220 LBS | OXYGEN SATURATION: 98 % | HEART RATE: 74 BPM | DIASTOLIC BLOOD PRESSURE: 58 MMHG | HEIGHT: 70 IN | BODY MASS INDEX: 31.5 KG/M2

## 2022-03-09 DIAGNOSIS — I10 PRIMARY HYPERTENSION: Primary | ICD-10-CM

## 2022-03-09 PROCEDURE — 99214 OFFICE O/P EST MOD 30 MIN: CPT | Mod: S$GLB,,, | Performed by: INTERNAL MEDICINE

## 2022-03-09 PROCEDURE — 99214 PR OFFICE/OUTPT VISIT, EST, LEVL IV, 30-39 MIN: ICD-10-PCS | Mod: S$GLB,,, | Performed by: INTERNAL MEDICINE

## 2022-03-09 RX ORDER — LOSARTAN POTASSIUM 25 MG/1
12.5 TABLET ORAL 2 TIMES DAILY
Qty: 90 TABLET | Refills: 3 | Status: SHIPPED | OUTPATIENT
Start: 2022-03-09 | End: 2022-07-28

## 2022-03-09 NOTE — PROGRESS NOTES
[facility] [place]    Subjective:     Patient ID:  Nikolay Barraza is a 81 y.o. male patient here for evaluation Follow-up (Follow up 1 week for low Blood pressure)      HPI:  81-year-old male here for follow-up of his hypertension.  Patient has been having hypotension issues since his knee surgery.  Patient reports his blood pressures are better but he continues to report intermittent dizziness.  He brings a blood pressure log with most blood pressures with systolics 140 to 160s and diastolics in 60 to 70s.  Occasionally blood pressures are 84/50.    Review of Systems   All other systems reviewed and are negative.       Past Medical History:   Diagnosis Date    Allergy     Codeine    Closed fracture dislocation of lumbar spine 7/6/2018    #2 vertebra. Patient went to the emergency Department.  Followup with Erik:    GERD (gastroesophageal reflux disease)     Hyperlipidemia     Hypertension     Hypertensive retinopathy of both eyes 5/11/2021    As per ophthalmology notes.  Eye care 20/20    Right inguinal hernia     Thyroid disease        Past Surgical History:   Procedure Laterality Date    ADENOIDECTOMY      INGUINAL HERNIA REPAIR Right 07/12/2018    [facility]    KNEE SURGERY Left 07/31/2019    TONSILLECTOMY      VASECTOMY         Family History   Problem Relation Age of Onset    Heart disease Father     Miscarriages / Stillbirths Father     Skin cancer Mother     Cancer Maternal Grandfather     Stomach cancer Paternal Grandfather        Social History     Socioeconomic History    Marital status:      Spouse name: Alisa barraza    Number of children: 3   Occupational History    Occupation: [employer]-      Comment:     Tobacco Use    Smoking status: Never Smoker    Smokeless tobacco: Never Used   Substance and Sexual Activity    Alcohol use: No    Drug use: No    Sexual activity: Not Currently     Partners: Female      Social Determinants of Health     Financial Resource Strain: Low Risk     Difficulty of Paying Living Expenses: Not very hard   Food Insecurity: No Food Insecurity    Worried About Running Out of Food in the Last Year: Never true    Ran Out of Food in the Last Year: Never true   Transportation Needs: No Transportation Needs    Lack of Transportation (Medical): No    Lack of Transportation (Non-Medical): No   Physical Activity: Inactive    Days of Exercise per Week: 0 days    Minutes of Exercise per Session: 0 min   Stress: Stress Concern Present    Feeling of Stress : To some extent   Social Connections: Socially Integrated    Frequency of Communication with Friends and Family: Three times a week    Frequency of Social Gatherings with Friends and Family: Three times a week    Attends Baptism Services: More than 4 times per year    Active Member of Clubs or Organizations: Yes    Attends Club or Organization Meetings: 1 to 4 times per year    Marital Status:    Housing Stability: Low Risk     Unable to Pay for Housing in the Last Year: No    Number of Places Lived in the Last Year: 1    Unstable Housing in the Last Year: No       Current Outpatient Medications   Medication Sig Dispense Refill    aspirin (ECOTRIN) 81 MG EC tablet Take 81 mg by mouth once daily.      aspirin 325 MG tablet Take 325 mg by mouth once daily.      augmented betamethasone dipropionate (DIPROLENE-AF) 0.05 % cream 1 Dose daily as needed.  1    b complex vitamins tablet Take 1 tablet by mouth once daily.      CALCIUM CARBONATE/VITAMIN D3 (VITAMIN D-3 ORAL) Take by mouth.      docusate sodium (COLACE) 50 MG capsule Take by mouth 2 (two) times daily as needed for Constipation.      doxycycline (ORACEA) 40 mg capsule TAKE 1 CAPSULE BY MOUTH EVERY DAY 90 capsule 3    finasteride (PROSCAR) 5 mg tablet Take 1 tablet (5 mg total) by mouth once daily. 90 tablet 3    fish oil-omega-3 fatty acids 300-1,000 mg capsule  Take 2 g by mouth once daily.      multivitamin (THERAGRAN) per tablet Take 1 tablet by mouth once daily.      pravastatin (PRAVACHOL) 20 MG tablet Take 1 tablet (20 mg total) by mouth once daily. 90 tablet 2    SYNTHROID 200 mcg tablet TAKE 1 TABLET ONCE DAILY 90 tablet 3    tamsulosin (FLOMAX) 0.4 mg Cap Take 1 capsule (0.4 mg total) by mouth once daily. Take at bedtime 90 capsule 3    ascorbic acid, vitamin C, (VITAMIN C) 500 MG tablet Take 500 mg by mouth once daily.      losartan (COZAAR) 25 MG tablet Take 0.5 tablets (12.5 mg total) by mouth 2 (two) times a day. 90 tablet 3     No current facility-administered medications for this visit.       Review of patient's allergies indicates:   Allergen Reactions    Codeine Other (See Comments)     mood changes  Makes him feel violent         Objective:        Vitals:    03/09/22 1353   BP: (!) 118/58   Pulse: 74       Physical Exam  Vitals reviewed.   HENT:      Mouth/Throat:      Mouth: Mucous membranes are moist.   Eyes:      Extraocular Movements: Extraocular movements intact.      Pupils: Pupils are equal, round, and reactive to light.   Cardiovascular:      Rate and Rhythm: Normal rate.      Pulses: Normal pulses.      Heart sounds: Normal heart sounds. No murmur heard.    No gallop.   Pulmonary:      Effort: Pulmonary effort is normal.      Breath sounds: Normal breath sounds.   Abdominal:      General: Bowel sounds are normal.      Palpations: Abdomen is soft.   Musculoskeletal:      Cervical back: Normal range of motion.   Skin:     General: Skin is warm and dry.   Neurological:      Mental Status: He is alert and oriented to person, place, and time.   Psychiatric:         Mood and Affect: Mood normal.         LIPIDS - LAST 2   Lab Results   Component Value Date    CHOL 157 12/27/2020    CHOL 169 12/17/2020    HDL 37 (L) 12/27/2020    HDL 41 12/17/2020    LDLCALC 93.6 12/27/2020    LDLCALC 108.4 12/17/2020    TRIG 132 12/27/2020    TRIG 98 12/17/2020     CHOLHDL 23.6 12/27/2020    CHOLHDL 24.3 12/17/2020       CBC - LAST 2  Lab Results   Component Value Date    WBC 5.07 12/27/2020    WBC 5.93 12/26/2020    RBC 4.44 (L) 12/27/2020    RBC 4.92 12/26/2020    HGB 13.2 (L) 12/27/2020    HGB 14.5 12/26/2020    HCT 39.8 (L) 12/27/2020    HCT 43.4 12/26/2020    MCV 90 12/27/2020    MCV 88 12/26/2020    MCH 29.7 12/27/2020    MCH 29.5 12/26/2020    MCHC 33.2 12/27/2020    MCHC 33.4 12/26/2020    RDW 13.3 12/27/2020    RDW 13.3 12/26/2020     12/27/2020     12/26/2020    MPV 9.8 12/27/2020    MPV 9.8 12/26/2020    GRAN 2.7 12/27/2020    GRAN 53.4 12/27/2020    LYMPH 1.7 12/27/2020    LYMPH 33.7 12/27/2020    MONO 0.5 12/27/2020    MONO 8.9 12/27/2020    BASO 0.03 12/27/2020    BASO 0.05 12/26/2020    NRBC 0 12/27/2020    NRBC 0 12/26/2020       CHEMISTRY & LIVER FUNCTION - LAST 2  Lab Results   Component Value Date     01/18/2022     07/20/2021    K 4.2 01/18/2022    K 4.3 07/20/2021     01/18/2022     07/20/2021    CO2 25 01/18/2022    CO2 22 (L) 07/20/2021    ANIONGAP 9 01/18/2022    ANIONGAP 12 07/20/2021    BUN 23 01/18/2022    BUN 19 07/20/2021    CREATININE 1.2 01/18/2022    CREATININE 1.1 07/20/2021     01/18/2022     07/20/2021    CALCIUM 9.5 01/18/2022    CALCIUM 9.4 07/20/2021    MG 1.9 07/20/2021    MG 1.9 07/14/2020    ALBUMIN 4.0 01/18/2022    ALBUMIN 3.9 07/20/2021    PROT 7.9 12/26/2020    PROT 7.3 12/17/2020    ALKPHOS 98 12/26/2020    ALKPHOS 75 12/17/2020    ALT 24 12/26/2020    ALT 20 12/17/2020    AST 34 12/26/2020    AST 24 12/17/2020    BILITOT 0.7 12/26/2020    BILITOT 0.9 12/17/2020        CARDIAC PROFILE - LAST 2  Lab Results   Component Value Date    CPK 32 12/27/2020    CPK 37 12/26/2020    CPKMB 0.6 12/27/2020    CPKMB 0.9 12/26/2020    TROPONINI <0.006 12/27/2020    TROPONINI <0.006 12/27/2020        COAGULATION - LAST 2  Lab Results   Component Value Date    INR 1.0 12/26/2020    APTT 27.3  12/26/2020       ENDOCRINE & PSA - LAST 2  Lab Results   Component Value Date    HGBA1C 5.8 08/22/2017    TSH 1.600 09/09/2021    TSH 1.483 12/27/2020    PSA 0.11 06/28/2019    PSA 0.1 05/22/2018        ECHOCARDIOGRAM RESULTS  Results for orders placed during the hospital encounter of 12/26/20    Echo Color Flow Doppler? Yes    Interpretation Summary  · The left ventricle is normal in size with mild concentric hypertrophy and normal systolic function. The estimated ejection fraction is 60%  · Normal left ventricular diastolic function.  · Normal right ventricular size with normal right ventricular systolic function.  · Mild left atrial enlargement.  · Normal central venous pressure (3 mmHg).  · The estimated PA systolic pressure is 28 mmHg.  · PFO noted by color flow Doppler. Bubble study is negative for any intracardiac shunt however it is not of good quality. Consider MYRIAM      CURRENT/PREVIOUS VISIT EKG  Results for orders placed or performed in visit on 01/26/22   IN OFFICE EKG 12-LEAD (to [place])    Collection Time: 01/26/22  1:21 PM    Narrative    Test Reason : I10,    Vent. Rate : 072 BPM     Atrial Rate : 072 BPM     P-R Int : 208 ms          QRS Dur : 074 ms      QT Int : 402 ms       P-R-T Axes : 063 -45 069 degrees     QTc Int : 440 ms    Normal sinus rhythm  Left axis deviation  Low voltage QRS  Possible Inferior infarct ,age undetermined  Abnormal ECG  When compared with ECG of 26-DEC-2020 14:34,  Borderline criteria for Inferior infarct are now Present  Confirmed by Dima Vergara MD ([id number]) on 2/27/2022 9:53:20 PM    Referred By: TRAVIS   SELF           Confirmed By:Dima Vergara MD     No valid procedures specified.   No results found for this or any previous visit.    No valid procedures specified.        Assessment:       1. Primary hypertension           Plan:       Primary hypertension    Other orders  -     losartan (COZAAR) 25 MG tablet; Take 0.5 tablets (12.5 mg total) by mouth 2 (two) times a  day.  Dispense: 90 tablet; Refill: 3    Hypertension with elevated blood pressure readings at home with occasional low blood pressure readings.  Will continue with current medical therapy but will monitor for losartan to 12.5 twice a day and see if his hypotension improves.  Would likely have to wait it out till his knee issues have improved before getting stable blood pressure.    Follow up in about 4 weeks (around 4/6/2022) for HTN.          MD Fantasma Keene [facility] [place]

## 2022-03-10 ENCOUNTER — TELEPHONE (OUTPATIENT)
Dept: CARDIOLOGY | Facility: CLINIC | Age: 82
End: 2022-03-10
Payer: MEDICARE

## 2022-03-10 ENCOUNTER — OFFICE VISIT (OUTPATIENT)
Dept: FAMILY MEDICINE | Facility: CLINIC | Age: 82
End: 2022-03-10
Payer: MEDICARE

## 2022-03-10 VITALS
BODY MASS INDEX: 31.21 KG/M2 | DIASTOLIC BLOOD PRESSURE: 60 MMHG | HEIGHT: 70 IN | SYSTOLIC BLOOD PRESSURE: 109 MMHG | WEIGHT: 218 LBS | HEART RATE: 77 BPM

## 2022-03-10 DIAGNOSIS — I10 BENIGN ESSENTIAL HYPERTENSION: Primary | ICD-10-CM

## 2022-03-10 DIAGNOSIS — E03.8 SECONDARY HYPOTHYROIDISM: ICD-10-CM

## 2022-03-10 DIAGNOSIS — N40.0 BENIGN PROSTATIC HYPERPLASIA WITHOUT URINARY OBSTRUCTION: ICD-10-CM

## 2022-03-10 DIAGNOSIS — I67.82 SUBCORTICAL MICROVASCULAR ISCHEMIC OCCLUSIVE DISEASE: ICD-10-CM

## 2022-03-10 DIAGNOSIS — R91.1 PULMONARY NODULE LESS THAN 1 CM IN DIAMETER WITH LOW RISK FOR MALIGNANT NEOPLASM: ICD-10-CM

## 2022-03-10 DIAGNOSIS — R42 DIZZINESS ON STANDING: ICD-10-CM

## 2022-03-10 DIAGNOSIS — E78.2 MULTIPLE-TYPE HYPERLIPIDEMIA: ICD-10-CM

## 2022-03-10 DIAGNOSIS — Z96.651 HISTORY OF TOTAL RIGHT KNEE REPLACEMENT: ICD-10-CM

## 2022-03-10 DIAGNOSIS — I71.40 ABDOMINAL AORTIC ANEURYSM (AAA) WITHOUT RUPTURE: ICD-10-CM

## 2022-03-10 DIAGNOSIS — Z91.89 PULMONARY NODULE LESS THAN 1 CM IN DIAMETER WITH LOW RISK FOR MALIGNANT NEOPLASM: ICD-10-CM

## 2022-03-10 PROCEDURE — 99213 OFFICE O/P EST LOW 20 MIN: CPT | Performed by: INTERNAL MEDICINE

## 2022-03-10 PROCEDURE — 99214 PR OFFICE/OUTPT VISIT, EST, LEVL IV, 30-39 MIN: ICD-10-PCS | Mod: S$PBB,AQ,, | Performed by: INTERNAL MEDICINE

## 2022-03-10 PROCEDURE — 99214 OFFICE O/P EST MOD 30 MIN: CPT | Mod: S$PBB,AQ,, | Performed by: INTERNAL MEDICINE

## 2022-03-10 NOTE — PROGRESS NOTES
Subjective:       Patient ID: Nikolay Barraza is a 81 y.o. male.    Chief Complaint: Hyperlipidemia, Hypertension, Thyroid Problem, Gastroesophageal Reflux, Prostate Problem, and Postoperative knee    Pt is a  81-year-old gentleman who comes for follow-up.  Underlying medical issues of hypertension, dyslipidemia, hypothyroidism, prostate enlargement have been noted.    He recently  had a right knee(TKA) surgery and has been struggling for an eventless and complete recovery.  It seems his right knee had to be drained at least 3 times.  He still has  Steri-Strips on.  He is going through physical therapy for the same.  His left knee (TKA)surgery years ago was wonderful and without any complications or problems.    He has the following medical issues:-    1.-hypertension   2.-dyslipidemia  3.-hypothyroidism  4.  Benign prostatic hypertrophy  5.  Obstructive sleep apnea since last 20 years or so.    6.-He also follows up with Dr. Smith for kidney issues though his kidney functions are good.    In past  his MRI of the brain was reviewed which had shown microvascular changes which is consistent with his mild cognitive change and decline.  He does  tend to forget names.  He is fairly functional at this point and is able to drive.    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    His prostate symptoms seem to be doing okay.  Constipation seems to be doing okay.    Other medical issues of AAA without rupture have been noticed which is kept under observation.  He also has a pulmonary nodule which is benign in nature.    MRI of the brain had shown microvascular changes consistent with mild cognitive changes and decline.  He tends to forget names.  Generally is fairly functional for activities of daily living, driving and other social issues.    Hyperlipidemia  This is a chronic problem. The current episode  started more than 1 year ago. The problem is controlled. Exacerbating diseases include obesity. He has no history of diabetes or liver disease. Chronic renal disease: Patient is following Nephrology for the same but creatinine seems to be okay. Current antihyperlipidemic treatment includes statins. The current treatment provides moderate improvement of lipids. Compliance problems include psychosocial issues.  Risk factors for coronary artery disease include a sedentary lifestyle, hypertension, male sex, dyslipidemia and obesity.   Hypertension  This is a chronic problem. The current episode started more than 1 year ago. The problem is controlled. Pertinent negatives include no palpitations. Risk factors for coronary artery disease include sedentary lifestyle, male gender and dyslipidemia. Past treatments include calcium channel blockers and angiotensin blockers. The current treatment provides moderate improvement. Compliance problems include psychosocial issues.  Identifiable causes of hypertension include a thyroid problem. There is no history of coarctation of the aorta or hyperaldosteronism. Chronic renal disease: Patient is following Nephrology for the same but creatinine seems to be okay.   Thyroid Problem  Presents for follow-up visit. Patient reports no anxiety, cold intolerance, constipation, diarrhea, dry skin, fatigue, heat intolerance, nail problem, palpitations or tremors. The symptoms have been stable. His past medical history is significant for hyperlipidemia. There is no history of diabetes.   Benign Prostatic Hypertrophy  This is a chronic problem. The current episode started more than 1 year ago. The problem has been gradually improving since onset. Irritative symptoms include frequency and urgency. Pertinent negatives include no dysuria. He is not sexually active. Past treatments include tamsulosin and finasteride. The treatment provided moderate relief. He has been using treatment for 2 or more  years.       Past Medical History:   Diagnosis Date    Allergy     Codeine    Closed fracture dislocation of lumbar spine 7/6/2018    #2 vertebra. Patient went to the emergency Department.  Followup with Erik:    GERD (gastroesophageal reflux disease)     Hyperlipidemia     Hypertension     Hypertensive retinopathy of both eyes 5/11/2021    As per ophthalmology notes.  Eye care 20/20    Right inguinal hernia     Thyroid disease      Social History     Socioeconomic History    Marital status:      Spouse name: Alisa galvin    Number of children: 3   Occupational History    Occupation: [employer]-      Comment:     Tobacco Use    Smoking status: Never Smoker    Smokeless tobacco: Never Used   Substance and Sexual Activity    Alcohol use: No    Drug use: No    Sexual activity: Not Currently     Partners: Female     Social Determinants of Health     Financial Resource Strain: Low Risk     Difficulty of Paying Living Expenses: Not very hard   Food Insecurity: No Food Insecurity    Worried About Running Out of Food in the Last Year: Never true    Ran Out of Food in the Last Year: Never true   Transportation Needs: No Transportation Needs    Lack of Transportation (Medical): No    Lack of Transportation (Non-Medical): No   Physical Activity: Inactive    Days of Exercise per Week: 0 days    Minutes of Exercise per Session: 0 min   Stress: Stress Concern Present    Feeling of Stress : To some extent   Social Connections: Socially Integrated    Frequency of Communication with Friends and Family: Three times a week    Frequency of Social Gatherings with Friends and Family: Three times a week    Attends Sabianist Services: More than 4 times per year    Active Member of Clubs or Organizations: Yes    Attends Club or Organization Meetings: 1 to 4 times per year    Marital Status:    Housing Stability: Low Risk     Unable to Pay for Housing in the  Last Year: No    Number of Places Lived in the Last Year: 1    Unstable Housing in the Last Year: No     Past Surgical History:   Procedure Laterality Date    ADENOIDECTOMY      INGUINAL HERNIA REPAIR Right 07/12/2018    Dr[facility]    KNEE SURGERY Left 07/31/2019    TONSILLECTOMY      VASECTOMY       Family History   Problem Relation Age of Onset    Heart disease Father     Miscarriages / Stillbirths Father     Skin cancer Mother     Cancer Maternal Grandfather     Stomach cancer Paternal Grandfather        Review of Systems   Constitutional: Negative for activity change, appetite change, fatigue, fever and unexpected weight change.        Patient is going through a little rough patch postoperatively.  On occasions he has felt lightheaded.  Patient tells me that he has been stopped off losartan and finasteride by the Cardiology.  Not sure.   HENT: Negative for nosebleeds, rhinorrhea and tinnitus.         Frequent sinus problems-seeing Dr. Jann Carrizales for the same.   Eyes: Negative for pain, discharge, itching and visual disturbance.   Respiratory: Positive for apnea (Known sleep apnea for the last 20 years.). Negative for chest tightness and stridor.         Pulmonary nodule on incidental CT scan.  Uses a CPAP device for sleep apnea.   Cardiovascular: Negative for palpitations and leg swelling.        Patient has hypertension and hyperlipidemia.   Gastrointestinal: Negative for abdominal distention, blood in stool, constipation and diarrhea.        Stable GERD  Occasional hemorrhoidal bleeding. Secondary to constipation.   Endocrine: Negative for cold intolerance, heat intolerance, polydipsia and polyphagia.        Patient has hypothyroidism. Stable on levothyroxine. Current labs are good. Blood sugars are slightly elevated.   Genitourinary: Positive for frequency and urgency. Negative for dysuria.        Benign prostate hypertrophy stable on tamsulosin and finasteride.  Recently had seen   "Timmy Alaniz who had done a CT scan on him.  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    Unclassified renal issues.  Sees Dr. Smith.   Musculoskeletal: Positive for arthralgias and joint swelling. Negative for back pain and gait problem.        S/P Left knee TKR  He recently had a right total knee replacement and is going through a little bit of rough patch postoperatively for recovery.  He had to have his knee drained about 3 times.  Please see the picture.   Skin: Negative for pallor. Wound: left ankle.   Neurological: Positive for dizziness and light-headedness. Negative for tremors, seizures and speech difficulty.        Some memory issues.   Hematological: Negative for adenopathy. Does not bruise/bleed easily.        He is taking a full aspirin for the time being postoperatively after the knee surgery 325 mg.   Psychiatric/Behavioral: Negative for agitation, behavioral problems and confusion. The patient is not nervous/anxious.          Objective:      Blood pressure 109/60, pulse 77, height 5' 10" (1.778 m), weight 98.9 kg (218 lb). Body mass index is 31.28 kg/m².  Physical Exam  Vitals and nursing note reviewed.   Constitutional:       General: He is not in acute distress.     Appearance: He is well-developed. He is obese. He is not ill-appearing, toxic-appearing or diaphoretic.      Comments: BMI is 31.28   HENT:      Head: Atraumatic.      Nose:      Right Sinus: No frontal sinus tenderness.      Left Sinus: No maxillary sinus tenderness or frontal sinus tenderness.   Eyes:      General: No scleral icterus.        Right eye: No discharge.         Left eye: No discharge.      Conjunctiva/sclera: Conjunctivae normal.   Neck:      Vascular: No JVD.      Trachea: No tracheal deviation.   Cardiovascular:      Rate and Rhythm: Normal rate and regular rhythm.      Heart sounds: Normal heart sounds.   Pulmonary:      Effort: Pulmonary effort is normal. No respiratory distress.      Breath sounds: Normal " breath sounds. No wheezing or rales.   Abdominal:      General: Bowel sounds are normal. There is no distension or abdominal bruit.      Palpations: Abdomen is soft.      Tenderness: There is no abdominal tenderness.   Musculoskeletal:         General: No tenderness.      Cervical back: Neck supple.      Right lower leg: Deformity present. Edema (1+ edema which is probably reactive to secondary surgery on the right side.) present.      Left lower leg: No edema.        Legs:       Comments: Postoperative right knee with some effusion and limitation of movements.  Steri-Strips are still on.  Slight warmth which is normal is felt.   Lymphadenopathy:      Cervical: No cervical adenopathy.   Skin:     General: Skin is warm and dry.      Findings: No erythema or rash.      Comments: C/O Dry skin   Neurological:      Mental Status: He is alert. Mental status is at baseline.      Deep Tendon Reflexes: Reflexes are normal and symmetric.   Psychiatric:         Mood and Affect: Mood normal.         Behavior: Behavior normal.      Comments: Beginnings of cognitive changes.             Right knee total knee replacement under Steri-Strips and some effusion.  Assessment:       1. Benign essential hypertension    2. Multiple-type hyperlipidemia    3. Secondary hypothyroidism    4. Benign prostatic hyperplasia without urinary obstruction    5. Abdominal aortic aneurysm (AAA) without rupture    6. Pulmonary nodule less than 1 cm in diameter with low risk for malignant neoplasm    7. Subcortical microvascular ischemic occlusive disease    8. Dizziness on standing    9. History of total right knee replacement           Lab Visit on 01/18/2022   Component Date Value Ref Range Status    Glucose 01/18/2022 106  70 - 110 mg/dL Final    Sodium 01/18/2022 140  136 - 145 mmol/L Final    Potassium 01/18/2022 4.2  3.5 - 5.1 mmol/L Final    Chloride 01/18/2022 106  95 - 110 mmol/L Final    CO2 01/18/2022 25  23 - 29 mmol/L Final    BUN  01/18/2022 23  8 - 23 mg/dL Final    Calcium 01/18/2022 9.5  8.7 - 10.5 mg/dL Final    Creatinine 01/18/2022 1.2  0.5 - 1.4 mg/dL Final    Albumin 01/18/2022 4.0  3.5 - 5.2 g/dL Final    Phosphorus 01/18/2022 2.8  2.7 - 4.5 mg/dL Final    eGFR if African American 01/18/2022 >60.0  >60 mL/min/1.73 m^2 Final    eGFR if non  01/18/2022 56.0 (A) >60 mL/min/1.73 m^2 Final    Anion Gap 01/18/2022 9  8 - 16 mmol/L Final    Specimen UA 01/18/2022 Urine, Clean Catch   Final    Color, UA 01/18/2022 Yellow  Yellow, Straw, Raina Final    Appearance, UA 01/18/2022 Clear  Clear Final    pH, UA 01/18/2022 6.0  5.0 - 8.0 Final    Specific Gravity, UA 01/18/2022 1.010  1.005 - 1.030 Final    Protein, UA 01/18/2022 Negative  Negative Final    Glucose, UA 01/18/2022 Negative  Negative Final    Ketones, UA 01/18/2022 Negative  Negative Final    Bilirubin (UA) 01/18/2022 Negative  Negative Final    Occult Blood UA 01/18/2022 Negative  Negative Final    Nitrite, UA 01/18/2022 Negative  Negative Final    Urobilinogen, UA 01/18/2022 Negative  Negative EU/dL Final    Leukocytes, UA 01/18/2022 Negative  Negative Final    RBC, UA 01/18/2022 0  0 - 4 /hpf Final    WBC, UA 01/18/2022 0  0 - 5 /hpf Final    Bacteria 01/18/2022 Negative  None-Occ /hpf Final    Squam Epithel, UA 01/18/2022 0  /hpf Final    Hyaline Casts, UA 01/18/2022 0.00 (A) 0-1/lpf /lpf Final    Microscopic Comment 01/18/2022 SEE COMMENT   Final    Protein, Urine Random 01/18/2022 <6  6 - 15 mg/dL Final    Creatinine, Urine 01/18/2022 63.0  23.0 - 375.0 mg/dL Final    Prot/Creat Ratio, Urine 01/18/2022 Unable to calculate  0.00 - 0.20 Final       ECHOCARDIOGRAM RESULTS  Results for orders placed during the hospital encounter of 12/26/20     Echo Color Flow Doppler? Yes     Interpretation Summary  · The left ventricle is normal in size with mild concentric hypertrophy and normal systolic function. The estimated ejection fraction is  60%  · Normal left ventricular diastolic function.  · Normal right ventricular size with normal right ventricular systolic function.  · Mild left atrial enlargement.  · Normal central venous pressure (3 mmHg).  · The estimated PA systolic pressure is 28 mmHg.  · PFO noted by color flow Doppler. Bubble study is negative for any intracardiac shunt however it is not of good quality. Consider MYRIAM  Plan:           Benign essential hypertension    Multiple-type hyperlipidemia    Secondary hypothyroidism    Benign prostatic hyperplasia without urinary obstruction    Abdominal aortic aneurysm (AAA) without rupture    Pulmonary nodule less than 1 cm in diameter with low risk for malignant neoplasm    Subcortical microvascular ischemic occlusive disease    Dizziness on standing    History of total right knee replacement      Patient's medical issues have been reviewed.  His blood pressures are slightly on the low side and I am not sure if he is on losartan or he is off losartan.  The cause of this low blood pressure is unclear.    I have advised him to increase his fluid intake and add perhaps a little amount of salt in his fluids.  I did read Dr. Pérez note and he had indicated to keep his blood pressure med losartan 25 mg-half pill twice a day.    Patient points out to finasteride as being advised to stop but I did not find that this medication was asked to  be stopped by Dr. Salomon.  There is no other medication which he had indicated to stop at least in his notes.    There is a possibility that tamsulosin may cause him to lower the blood pressure.  But thus far there is no mention about having to stop it.    Main issue seems to be the right knee which seems to have some effusion and there might be  concern for potential infection in future.  He is going to follow-up with Dr. Cespedes today.    Issues of cognition and astute understanding of medical issues and complex medications will continue to be an problem.    He continues  on aspirin 325 at this point.  This is usually given as a DVT prophylaxis by our orthopedic colleagues instead of Eliquis.    I would like to see him back sooner rather than later and check his blood pressures again.    Continue with fall and injury precautions.  Continue with driving precautions.  Continue with COVID precautions.    Advised Mr. Barraza about age and season appropriate immunizations/ cancer screenings.  Also seasonal influenza vaccine, update on tetanus diphtheria vaccination every 10 years.    Cognitive issues are likely to room in future and thus far I have not identified any younger family member to step up to the plate to fill in the gaps in coverage of their medical care.    Follow-up in a month to review his blood pressures and general medical condition.    Spent roland 30 minutes with patient which involved review of pts medical conditions, labs, medications and with 50% of time face-to-face discussion about medical problems, management and any applicable changes.        Current Outpatient Medications:     ascorbic acid, vitamin C, (VITAMIN C) 500 MG tablet, Take 500 mg by mouth once daily., Disp: , Rfl:     aspirin 325 MG tablet, Take 325 mg by mouth once daily., Disp: , Rfl:     augmented betamethasone dipropionate (DIPROLENE-AF) 0.05 % cream, 1 Dose daily as needed., Disp: , Rfl: 1    b complex vitamins tablet, Take 1 tablet by mouth once daily., Disp: , Rfl:     CALCIUM CARBONATE/VITAMIN D3 (VITAMIN D-3 ORAL), Take by mouth., Disp: , Rfl:     docusate sodium (COLACE) 50 MG capsule, Take by mouth 2 (two) times daily as needed for Constipation., Disp: , Rfl:     fish oil-omega-3 fatty acids 300-1,000 mg capsule, Take 2 g by mouth once daily., Disp: , Rfl:     multivitamin (THERAGRAN) per tablet, Take 1 tablet by mouth once daily., Disp: , Rfl:     pravastatin (PRAVACHOL) 20 MG tablet, Take 1 tablet (20 mg total) by mouth once daily., Disp: 90 tablet, Rfl: 2    SYNTHROID 200 mcg tablet,  TAKE 1 TABLET ONCE DAILY, Disp: 90 tablet, Rfl: 3    tamsulosin (FLOMAX) 0.4 mg Cap, Take 1 capsule (0.4 mg total) by mouth once daily. Take at bedtime, Disp: 90 capsule, Rfl: 3    aspirin (ECOTRIN) 81 MG EC tablet, Take 81 mg by mouth once daily., Disp: , Rfl:     doxycycline (ORACEA) 40 mg capsule, TAKE 1 CAPSULE BY MOUTH EVERY DAY (Patient not taking: Reported on 3/10/2022), Disp: 90 capsule, Rfl: 3    finasteride (PROSCAR) 5 mg tablet, Take 1 tablet (5 mg total) by mouth once daily. (Patient not taking: Reported on 3/10/2022), Disp: 90 tablet, Rfl: 3    losartan (COZAAR) 25 MG tablet, Take 0.5 tablets (12.5 mg total) by mouth 2 (two) times a day. (Patient not taking: Reported on 3/10/2022), Disp: 90 tablet, Rfl: 3

## 2022-03-10 NOTE — TELEPHONE ENCOUNTER
----- Message from Cj Florence sent at 3/10/2022  3:02 PM CST -----  Contact: pt  Pt missed call please call back     [phone]

## 2022-03-10 NOTE — TELEPHONE ENCOUNTER
----- Message from Nicole Jane sent at 3/10/2022 10:54 AM CST -----  Regarding: Med Questions  Please call patient he has med questions

## 2022-03-30 ENCOUNTER — OFFICE VISIT (OUTPATIENT)
Dept: CARDIOLOGY | Facility: CLINIC | Age: 82
End: 2022-03-30
Payer: MEDICARE

## 2022-03-30 VITALS
SYSTOLIC BLOOD PRESSURE: 128 MMHG | WEIGHT: 215 LBS | OXYGEN SATURATION: 95 % | HEART RATE: 64 BPM | DIASTOLIC BLOOD PRESSURE: 64 MMHG | BODY MASS INDEX: 30.78 KG/M2 | HEIGHT: 70 IN

## 2022-03-30 DIAGNOSIS — I10 BENIGN ESSENTIAL HYPERTENSION: Primary | ICD-10-CM

## 2022-03-30 DIAGNOSIS — E78.2 MIXED HYPERLIPIDEMIA: ICD-10-CM

## 2022-03-30 PROCEDURE — 99214 PR OFFICE/OUTPT VISIT, EST, LEVL IV, 30-39 MIN: ICD-10-PCS | Mod: S$GLB,,, | Performed by: INTERNAL MEDICINE

## 2022-03-30 PROCEDURE — 99214 OFFICE O/P EST MOD 30 MIN: CPT | Mod: S$GLB,,, | Performed by: INTERNAL MEDICINE

## 2022-03-30 NOTE — PROGRESS NOTES
[facility] [place]    Subjective:     Patient ID:  Nikolay Barraza is a 81 y.o. male patient here for evaluation Follow-up (3 week medication )      HPI:  81-year-old male here for follow-up of his hypertension.  Patient has had hypertension problem since his knee surgery.  He reports his blood pressure has finally stabilized and his dizziness has resolved.  His blood pressure has been stable at home and is normal here as well.  Does not have any acute cardiac complaints today.    Review of Systems   All other systems reviewed and are negative.       Past Medical History:   Diagnosis Date    Allergy     Codeine    Closed fracture dislocation of lumbar spine 7/6/2018    #2 vertebra. Patient went to the emergency Department.  Followup with Erik:    GERD (gastroesophageal reflux disease)     Hyperlipidemia     Hypertension     Hypertensive retinopathy of both eyes 5/11/2021    As per ophthalmology notes.  Eye care 20/20    Right inguinal hernia     Thyroid disease        Past Surgical History:   Procedure Laterality Date    ADENOIDECTOMY      INGUINAL HERNIA REPAIR Right 07/12/2018    Dr. Thomas - [facility]    KNEE SURGERY Left 07/31/2019    TONSILLECTOMY      VASECTOMY         Family History   Problem Relation Age of Onset    Heart disease Father     Miscarriages / Stillbirths Father     Skin cancer Mother     Cancer Maternal Grandfather     Stomach cancer Paternal Grandfather        Social History     Socioeconomic History    Marital status:      Spouse name: Alisa barraza    Number of children: 3   Occupational History    Occupation: [employer]-      Comment:     Tobacco Use    Smoking status: Never Smoker    Smokeless tobacco: Never Used   Substance and Sexual Activity    Alcohol use: No    Drug use: No    Sexual activity: Not Currently     Partners: Female     Social Determinants of Health     Financial Resource Strain: Low Risk      Difficulty of Paying Living Expenses: Not very hard   Food Insecurity: No Food Insecurity    Worried About Running Out of Food in the Last Year: Never true    Ran Out of Food in the Last Year: Never true   Transportation Needs: No Transportation Needs    Lack of Transportation (Medical): No    Lack of Transportation (Non-Medical): No   Physical Activity: Inactive    Days of Exercise per Week: 0 days    Minutes of Exercise per Session: 0 min   Stress: Stress Concern Present    Feeling of Stress : To some extent   Social Connections: Socially Integrated    Frequency of Communication with Friends and Family: Three times a week    Frequency of Social Gatherings with Friends and Family: Three times a week    Attends Anabaptism Services: More than 4 times per year    Active Member of Clubs or Organizations: Yes    Attends Club or Organization Meetings: 1 to 4 times per year    Marital Status:    Housing Stability: Low Risk     Unable to Pay for Housing in the Last Year: No    Number of Places Lived in the Last Year: 1    Unstable Housing in the Last Year: No       Current Outpatient Medications   Medication Sig Dispense Refill    ascorbic acid, vitamin C, (VITAMIN C) 500 MG tablet Take 500 mg by mouth once daily.      aspirin (ECOTRIN) 81 MG EC tablet Take 81 mg by mouth once daily.      aspirin 325 MG tablet Take 325 mg by mouth once daily.      augmented betamethasone dipropionate (DIPROLENE-AF) 0.05 % cream 1 Dose daily as needed.  1    b complex vitamins tablet Take 1 tablet by mouth once daily.      CALCIUM CARBONATE/VITAMIN D3 (VITAMIN D-3 ORAL) Take by mouth.      docusate sodium (COLACE) 50 MG capsule Take by mouth 2 (two) times daily as needed for Constipation.      doxycycline (ORACEA) 40 mg capsule TAKE 1 CAPSULE BY MOUTH EVERY DAY 90 capsule 3    fish oil-omega-3 fatty acids 300-1,000 mg capsule Take 2 g by mouth once daily.      losartan (COZAAR) 25 MG tablet Take 0.5  tablets (12.5 mg total) by mouth 2 (two) times a day. 90 tablet 3    multivitamin (THERAGRAN) per tablet Take 1 tablet by mouth once daily.      pravastatin (PRAVACHOL) 20 MG tablet Take 1 tablet (20 mg total) by mouth once daily. 90 tablet 2    SYNTHROID 200 mcg tablet TAKE 1 TABLET ONCE DAILY 90 tablet 3    tamsulosin (FLOMAX) 0.4 mg Cap Take 1 capsule (0.4 mg total) by mouth once daily. Take at bedtime 90 capsule 3    finasteride (PROSCAR) 5 mg tablet Take 1 tablet (5 mg total) by mouth once daily. (Patient not taking: Reported on 3/10/2022) 90 tablet 3     No current facility-administered medications for this visit.       Review of patient's allergies indicates:   Allergen Reactions    Codeine Other (See Comments)     mood changes  Makes him feel violent         Objective:        Vitals:    03/30/22 1318   BP: 128/64   Pulse: 64       Physical Exam  Vitals reviewed.   Constitutional:       Appearance: Normal appearance.   HENT:      Mouth/Throat:      Mouth: Mucous membranes are moist.   Eyes:      Extraocular Movements: Extraocular movements intact.      Pupils: Pupils are equal, round, and reactive to light.   Cardiovascular:      Rate and Rhythm: Normal rate and regular rhythm.      Pulses: Normal pulses.      Heart sounds: Normal heart sounds. No murmur heard.    No gallop.   Pulmonary:      Effort: Pulmonary effort is normal.      Breath sounds: Normal breath sounds.   Abdominal:      General: Bowel sounds are normal.      Palpations: Abdomen is soft.   Musculoskeletal:         General: Normal range of motion.      Cervical back: Normal range of motion.   Skin:     General: Skin is warm and dry.   Neurological:      General: No focal deficit present.      Mental Status: He is alert and oriented to person, place, and time.   Psychiatric:         Mood and Affect: Mood normal.         LIPIDS - LAST 2   Lab Results   Component Value Date    CHOL 157 12/27/2020    CHOL 169 12/17/2020    HDL 37 (L)  12/27/2020    HDL 41 12/17/2020    LDLCALC 93.6 12/27/2020    LDLCALC 108.4 12/17/2020    TRIG 132 12/27/2020    TRIG 98 12/17/2020    CHOLHDL 23.6 12/27/2020    CHOLHDL 24.3 12/17/2020       CBC - LAST 2  Lab Results   Component Value Date    WBC 5.07 12/27/2020    WBC 5.93 12/26/2020    RBC 4.44 (L) 12/27/2020    RBC 4.92 12/26/2020    HGB 13.2 (L) 12/27/2020    HGB 14.5 12/26/2020    HCT 39.8 (L) 12/27/2020    HCT 43.4 12/26/2020    MCV 90 12/27/2020    MCV 88 12/26/2020    MCH 29.7 12/27/2020    MCH 29.5 12/26/2020    MCHC 33.2 12/27/2020    MCHC 33.4 12/26/2020    RDW 13.3 12/27/2020    RDW 13.3 12/26/2020     12/27/2020     12/26/2020    MPV 9.8 12/27/2020    MPV 9.8 12/26/2020    GRAN 2.7 12/27/2020    GRAN 53.4 12/27/2020    LYMPH 1.7 12/27/2020    LYMPH 33.7 12/27/2020    MONO 0.5 12/27/2020    MONO 8.9 12/27/2020    BASO 0.03 12/27/2020    BASO 0.05 12/26/2020    NRBC 0 12/27/2020    NRBC 0 12/26/2020       CHEMISTRY & LIVER FUNCTION - LAST 2  Lab Results   Component Value Date     01/18/2022     07/20/2021    K 4.2 01/18/2022    K 4.3 07/20/2021     01/18/2022     07/20/2021    CO2 25 01/18/2022    CO2 22 (L) 07/20/2021    ANIONGAP 9 01/18/2022    ANIONGAP 12 07/20/2021    BUN 23 01/18/2022    BUN 19 07/20/2021    CREATININE 1.2 01/18/2022    CREATININE 1.1 07/20/2021     01/18/2022     07/20/2021    CALCIUM 9.5 01/18/2022    CALCIUM 9.4 07/20/2021    MG 1.9 07/20/2021    MG 1.9 07/14/2020    ALBUMIN 4.0 01/18/2022    ALBUMIN 3.9 07/20/2021    PROT 7.9 12/26/2020    PROT 7.3 12/17/2020    ALKPHOS 98 12/26/2020    ALKPHOS 75 12/17/2020    ALT 24 12/26/2020    ALT 20 12/17/2020    AST 34 12/26/2020    AST 24 12/17/2020    BILITOT 0.7 12/26/2020    BILITOT 0.9 12/17/2020        CARDIAC PROFILE - LAST 2  Lab Results   Component Value Date    CPK 32 12/27/2020    CPK 37 12/26/2020    CPKMB 0.6 12/27/2020    CPKMB 0.9 12/26/2020    TROPONINI <0.006 12/27/2020     TROPONINI <0.006 12/27/2020        COAGULATION - LAST 2  Lab Results   Component Value Date    INR 1.0 12/26/2020    APTT 27.3 12/26/2020       ENDOCRINE & PSA - LAST 2  Lab Results   Component Value Date    HGBA1C 5.8 08/22/2017    TSH 1.600 09/09/2021    TSH 1.483 12/27/2020    PSA 0.11 06/28/2019    PSA 0.1 05/22/2018        ECHOCARDIOGRAM RESULTS  Results for orders placed during the hospital encounter of 12/26/20    Echo Color Flow Doppler? Yes    Interpretation Summary  · The left ventricle is normal in size with mild concentric hypertrophy and normal systolic function. The estimated ejection fraction is 60%  · Normal left ventricular diastolic function.  · Normal right ventricular size with normal right ventricular systolic function.  · Mild left atrial enlargement.  · Normal central venous pressure (3 mmHg).  · The estimated PA systolic pressure is 28 mmHg.  · PFO noted by color flow Doppler. Bubble study is negative for any intracardiac shunt however it is not of good quality. Consider MYRIAM      CURRENT/PREVIOUS VISIT EKG  Results for orders placed or performed in visit on 01/26/22   IN OFFICE EKG 12-LEAD (to [place])    Collection Time: 01/26/22  1:21 PM    Narrative    Test Reason : I10,    Vent. Rate : 072 BPM     Atrial Rate : 072 BPM     P-R Int : 208 ms          QRS Dur : 074 ms      QT Int : 402 ms       P-R-T Axes : 063 -45 069 degrees     QTc Int : 440 ms    Normal sinus rhythm  Left axis deviation  Low voltage QRS  Possible Inferior infarct ,age undetermined  Abnormal ECG  When compared with ECG of 26-DEC-2020 14:34,  Borderline criteria for Inferior infarct are now Present  Confirmed by Dima Vergara MD ([id number]) on 2/27/2022 9:53:20 PM    Referred By: [email or web address]   SELF           Confirmed By:Dima Vergara MD     No valid procedures specified.   No results found for this or any previous visit.    No valid procedures specified.        Assessment:       1. Benign essential hypertension    2. Mixed  hyperlipidemia           Plan:       Benign essential hypertension    Mixed hyperlipidemia    Hypertension well controlled now.  Continue with losartan at current doses.  Continue with statin and current doses, last LDL was reasonably well controlled.  Patient informed to continue with regular physical exercise and heart healthy diet.    Follow up in about 6 months (around 9/30/2022) for HTN, HLD.          Tomas Salomon MD  [facility] [place]

## 2022-04-06 ENCOUNTER — TELEPHONE (OUTPATIENT)
Dept: CARDIOLOGY | Facility: CLINIC | Age: 82
End: 2022-04-06
Payer: MEDICARE

## 2022-04-06 NOTE — TELEPHONE ENCOUNTER
----- Message from Lisbeth Loredo sent at 4/6/2022  2:50 PM CDT -----  Patient is calling to schedule an appt.  He said he gets light headed on and off for the last 2 weeks.  Please call him to schedule at [phone].  Thank you

## 2022-04-07 ENCOUNTER — OFFICE VISIT (OUTPATIENT)
Dept: CARDIOLOGY | Facility: CLINIC | Age: 82
End: 2022-04-07
Payer: MEDICARE

## 2022-04-07 ENCOUNTER — LAB VISIT (OUTPATIENT)
Dept: LAB | Facility: HOSPITAL | Age: 82
End: 2022-04-07
Attending: INTERNAL MEDICINE
Payer: MEDICARE

## 2022-04-07 VITALS
WEIGHT: 215 LBS | BODY MASS INDEX: 30.78 KG/M2 | HEART RATE: 67 BPM | DIASTOLIC BLOOD PRESSURE: 64 MMHG | OXYGEN SATURATION: 97 % | SYSTOLIC BLOOD PRESSURE: 120 MMHG | HEIGHT: 70 IN

## 2022-04-07 DIAGNOSIS — R42 DIZZINESS: Primary | ICD-10-CM

## 2022-04-07 DIAGNOSIS — I10 PRIMARY HYPERTENSION: ICD-10-CM

## 2022-04-07 DIAGNOSIS — R42 DIZZINESS: ICD-10-CM

## 2022-04-07 LAB
ANION GAP SERPL CALC-SCNC: 7 MMOL/L (ref 8–16)
BASOPHILS # BLD AUTO: 0.05 K/UL (ref 0–0.2)
BASOPHILS NFR BLD: 1.1 % (ref 0–1.9)
BNP SERPL-MCNC: 123 PG/ML (ref 0–99)
BUN SERPL-MCNC: 21 MG/DL (ref 8–23)
CALCIUM SERPL-MCNC: 8.9 MG/DL (ref 8.7–10.5)
CHLORIDE SERPL-SCNC: 107 MMOL/L (ref 95–110)
CO2 SERPL-SCNC: 22 MMOL/L (ref 23–29)
CREAT SERPL-MCNC: 1.1 MG/DL (ref 0.5–1.4)
DIFFERENTIAL METHOD: ABNORMAL
EOSINOPHIL # BLD AUTO: 0.2 K/UL (ref 0–0.5)
EOSINOPHIL NFR BLD: 3.6 % (ref 0–8)
ERYTHROCYTE [DISTWIDTH] IN BLOOD BY AUTOMATED COUNT: 14.3 % (ref 11.5–14.5)
EST. GFR  (AFRICAN AMERICAN): >60 ML/MIN/1.73 M^2
EST. GFR  (NON AFRICAN AMERICAN): >60 ML/MIN/1.73 M^2
GLUCOSE SERPL-MCNC: 94 MG/DL (ref 70–110)
HCT VFR BLD AUTO: 38.7 % (ref 40–54)
HGB BLD-MCNC: 12.7 G/DL (ref 14–18)
IMM GRANULOCYTES # BLD AUTO: 0.01 K/UL (ref 0–0.04)
IMM GRANULOCYTES NFR BLD AUTO: 0.2 % (ref 0–0.5)
LYMPHOCYTES # BLD AUTO: 1.1 K/UL (ref 1–4.8)
LYMPHOCYTES NFR BLD: 23.7 % (ref 18–48)
MCH RBC QN AUTO: 28.4 PG (ref 27–31)
MCHC RBC AUTO-ENTMCNC: 32.8 G/DL (ref 32–36)
MCV RBC AUTO: 87 FL (ref 82–98)
MONOCYTES # BLD AUTO: 0.4 K/UL (ref 0.3–1)
MONOCYTES NFR BLD: 8.8 % (ref 4–15)
NEUTROPHILS # BLD AUTO: 3 K/UL (ref 1.8–7.7)
NEUTROPHILS NFR BLD: 62.6 % (ref 38–73)
NRBC BLD-RTO: 0 /100 WBC
PLATELET # BLD AUTO: 256 K/UL (ref 150–450)
PMV BLD AUTO: 9.1 FL (ref 9.2–12.9)
POTASSIUM SERPL-SCNC: 4.1 MMOL/L (ref 3.5–5.1)
RBC # BLD AUTO: 4.47 M/UL (ref 4.6–6.2)
SODIUM SERPL-SCNC: 136 MMOL/L (ref 136–145)
WBC # BLD AUTO: 4.76 K/UL (ref 3.9–12.7)

## 2022-04-07 PROCEDURE — 36415 COLL VENOUS BLD VENIPUNCTURE: CPT | Performed by: INTERNAL MEDICINE

## 2022-04-07 PROCEDURE — 99214 PR OFFICE/OUTPT VISIT, EST, LEVL IV, 30-39 MIN: ICD-10-PCS | Mod: S$GLB,,, | Performed by: INTERNAL MEDICINE

## 2022-04-07 PROCEDURE — 83880 ASSAY OF NATRIURETIC PEPTIDE: CPT | Performed by: INTERNAL MEDICINE

## 2022-04-07 PROCEDURE — 99214 OFFICE O/P EST MOD 30 MIN: CPT | Mod: S$GLB,,, | Performed by: INTERNAL MEDICINE

## 2022-04-07 PROCEDURE — 85025 COMPLETE CBC W/AUTO DIFF WBC: CPT | Performed by: INTERNAL MEDICINE

## 2022-04-07 PROCEDURE — 80048 BASIC METABOLIC PNL TOTAL CA: CPT | Performed by: INTERNAL MEDICINE

## 2022-04-07 RX ORDER — TAMSULOSIN HYDROCHLORIDE 0.4 MG/1
0.4 CAPSULE ORAL NIGHTLY
Qty: 90 CAPSULE | Refills: 3
Start: 2022-04-07 | End: 2023-02-03

## 2022-04-07 NOTE — PROGRESS NOTES
[facility] - Cardiology    Subjective:     Patient ID:  Nikolay Barraza is a 81 y.o. male patient here for evaluation Establish Care (Dizziness and Low Blood Pressure )      HPI:  81-year-old male here for follow-up of hypertension.  Patient has had blood pressure issues since getting his knee surgery.  We had decreased his losartan medication as he was getting postural dizziness.  He presents today for occasional episodes of dizziness that are happening about once a week and last about 1-2 minutes.  He reports that he suddenly gets dizzy while is trying to do things.  Does not report palpitations or chest pain.  Symptoms resolved on their own in a few minutes.  He reports that he has checked his blood pressures a few times when he gets dizzy like that and his blood pressure is sometimes of borderline low with mildly elevated heart rate.  His other blood pressure readings at home are normal to mildly elevated systolics in 130 to 140s.    Review of Systems   All other systems reviewed and are negative.       Past Medical History:   Diagnosis Date    Allergy     Codeine    Closed fracture dislocation of lumbar spine 7/6/2018    #2 vertebra. Patient went to the emergency Department.  Followup with Erik:    GERD (gastroesophageal reflux disease)     Hyperlipidemia     Hypertension     Hypertensive retinopathy of both eyes 5/11/2021    As per ophthalmology notes.  Eye care 20/20    Right inguinal hernia     Thyroid disease        Past Surgical History:   Procedure Laterality Date    ADENOIDECTOMY      INGUINAL HERNIA REPAIR Right 07/12/2018    Dr. 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     Comment:     Tobacco Use    Smoking status: Never Smoker    Smokeless tobacco: Never Used   Substance and Sexual Activity    Alcohol use: No    Drug use: No    Sexual activity: Not Currently     Partners: Female     Social Determinants of Health     Financial Resource Strain: Low Risk     Difficulty of Paying Living Expenses: Not very hard   Food Insecurity: No Food Insecurity    Worried About Running Out of Food in the Last Year: Never true    Ran Out of Food in the Last Year: Never true   Transportation Needs: No Transportation Needs    Lack of Transportation (Medical): No    Lack of Transportation (Non-Medical): No   Physical Activity: Inactive    Days of Exercise per Week: 0 days    Minutes of Exercise per Session: 0 min   Stress: Stress Concern Present    Feeling of Stress : To some extent   Social Connections: Socially Integrated    Frequency of Communication with Friends and Family: Three times a week    Frequency of Social Gatherings with Friends and Family: Three times a week    Attends Nondenominational Services: More than 4 times per year    Active Member of Clubs or Organizations: Yes    Attends Club or Organization Meetings: 1 to 4 times per year    Marital Status:    Housing Stability: Low Risk     Unable to Pay for Housing in the Last Year: No    Number of Places Lived in the Last Year: 1    Unstable Housing in the Last Year: No       Current Outpatient Medications   Medication Sig Dispense Refill    ascorbic acid, vitamin C, (VITAMIN C) 500 MG tablet Take 500 mg by mouth once daily.      aspirin (ECOTRIN) 81 MG EC tablet Take 81 mg by mouth once daily.      augmented betamethasone dipropionate (DIPROLENE-AF) 0.05 % cream 1 Dose daily as needed.  1    b complex vitamins tablet Take 1 tablet by mouth once daily.      CALCIUM  CARBONATE/VITAMIN D3 (VITAMIN D-3 ORAL) Take by mouth.      docusate sodium (COLACE) 50 MG capsule Take by mouth 2 (two) times daily as needed for Constipation.      doxycycline (ORACEA) 40 mg capsule TAKE 1 CAPSULE BY MOUTH EVERY DAY 90 capsule 3    fish oil-omega-3 fatty acids 300-1,000 mg capsule Take 2 g by mouth once daily.      fluocinonide (LIDEX) 0.05 % ointment Apply to AA lower leg bid max use one month 45 g 0    losartan (COZAAR) 25 MG tablet Take 0.5 tablets (12.5 mg total) by mouth 2 (two) times a day. 90 tablet 3    multivitamin (THERAGRAN) per tablet Take 1 tablet by mouth once daily.      pravastatin (PRAVACHOL) 20 MG tablet Take 1 tablet (20 mg total) by mouth once daily. 90 tablet 2    SYNTHROID 200 mcg tablet TAKE 1 TABLET ONCE DAILY 90 tablet 3    amoxicillin-clavulanate 875-125mg (AUGMENTIN) 875-125 mg per tablet SMARTSI Tablet(s) By Mouth Every 12 Hours      aspirin 325 MG tablet Take 325 mg by mouth once daily.      finasteride (PROSCAR) 5 mg tablet Take 1 tablet (5 mg total) by mouth once daily. (Patient not taking: Reported on 3/10/2022) 90 tablet 3    tamsulosin (FLOMAX) 0.4 mg Cap Take 1 capsule (0.4 mg total) by mouth every evening. Take at bedtime 90 capsule 3     No current facility-administered medications for this visit.       Review of patient's allergies indicates:   Allergen Reactions    Codeine Other (See Comments)     mood changes  Makes him feel violent         Objective:        Vitals:    22 1018   BP: 120/64   Pulse: 67       Physical Exam  Vitals reviewed.   Constitutional:       Appearance: Normal appearance.   HENT:      Mouth/Throat:      Mouth: Mucous membranes are moist.   Eyes:      Extraocular Movements: Extraocular movements intact.      Pupils: Pupils are equal, round, and reactive to light.   Cardiovascular:      Rate and Rhythm: Normal rate and regular rhythm.      Pulses: Normal pulses.      Heart sounds: Normal heart sounds. No murmur  heard.    No gallop.   Pulmonary:      Effort: Pulmonary effort is normal.      Breath sounds: Normal breath sounds.   Abdominal:      General: Bowel sounds are normal.      Palpations: Abdomen is soft.   Musculoskeletal:         General: Normal range of motion.      Cervical back: Normal range of motion.   Skin:     General: Skin is warm and dry.   Neurological:      General: No focal deficit present.      Mental Status: He is alert and oriented to person, place, and time.   Psychiatric:         Mood and Affect: Mood normal.         LIPIDS - LAST 2   Lab Results   Component Value Date    CHOL 157 12/27/2020    CHOL 169 12/17/2020    HDL 37 (L) 12/27/2020    HDL 41 12/17/2020    LDLCALC 93.6 12/27/2020    LDLCALC 108.4 12/17/2020    TRIG 132 12/27/2020    TRIG 98 12/17/2020    CHOLHDL 23.6 12/27/2020    CHOLHDL 24.3 12/17/2020       CBC - LAST 2  Lab Results   Component Value Date    WBC 5.07 12/27/2020    WBC 5.93 12/26/2020    RBC 4.44 (L) 12/27/2020    RBC 4.92 12/26/2020    HGB 13.2 (L) 12/27/2020    HGB 14.5 12/26/2020    HCT 39.8 (L) 12/27/2020    HCT 43.4 12/26/2020    MCV 90 12/27/2020    MCV 88 12/26/2020    MCH 29.7 12/27/2020    MCH 29.5 12/26/2020    MCHC 33.2 12/27/2020    MCHC 33.4 12/26/2020    RDW 13.3 12/27/2020    RDW 13.3 12/26/2020     12/27/2020     12/26/2020    MPV 9.8 12/27/2020    MPV 9.8 12/26/2020    GRAN 2.7 12/27/2020    GRAN 53.4 12/27/2020    LYMPH 1.7 12/27/2020    LYMPH 33.7 12/27/2020    MONO 0.5 12/27/2020    MONO 8.9 12/27/2020    BASO 0.03 12/27/2020    BASO 0.05 12/26/2020    NRBC 0 12/27/2020    NRBC 0 12/26/2020       CHEMISTRY & LIVER FUNCTION - LAST 2  Lab Results   Component Value Date     01/18/2022     07/20/2021    K 4.2 01/18/2022    K 4.3 07/20/2021     01/18/2022     07/20/2021    CO2 25 01/18/2022    CO2 22 (L) 07/20/2021    ANIONGAP 9 01/18/2022    ANIONGAP 12 07/20/2021    BUN 23 01/18/2022    BUN 19 07/20/2021    CREATININE  1.2 01/18/2022    CREATININE 1.1 07/20/2021     01/18/2022     07/20/2021    CALCIUM 9.5 01/18/2022    CALCIUM 9.4 07/20/2021    MG 1.9 07/20/2021    MG 1.9 07/14/2020    ALBUMIN 4.0 01/18/2022    ALBUMIN 3.9 07/20/2021    PROT 7.9 12/26/2020    PROT 7.3 12/17/2020    ALKPHOS 98 12/26/2020    ALKPHOS 75 12/17/2020    ALT 24 12/26/2020    ALT 20 12/17/2020    AST 34 12/26/2020    AST 24 12/17/2020    BILITOT 0.7 12/26/2020    BILITOT 0.9 12/17/2020        CARDIAC PROFILE - LAST 2  Lab Results   Component Value Date    CPK 32 12/27/2020    CPK 37 12/26/2020    CPKMB 0.6 12/27/2020    CPKMB 0.9 12/26/2020    TROPONINI <0.006 12/27/2020    TROPONINI <0.006 12/27/2020        COAGULATION - LAST 2  Lab Results   Component Value Date    INR 1.0 12/26/2020    APTT 27.3 12/26/2020       ENDOCRINE & PSA - LAST 2  Lab Results   Component Value Date    HGBA1C 5.8 08/22/2017    TSH 1.600 09/09/2021    TSH 1.483 12/27/2020    PSA 0.11 06/28/2019    PSA 0.1 05/22/2018        ECHOCARDIOGRAM RESULTS  Results for orders placed during the hospital encounter of 12/26/20    Echo Color Flow Doppler? Yes    Interpretation Summary  · The left ventricle is normal in size with mild concentric hypertrophy and normal systolic function. The estimated ejection fraction is 60%  · Normal left ventricular diastolic function.  · Normal right ventricular size with normal right ventricular systolic function.  · Mild left atrial enlargement.  · Normal central venous pressure (3 mmHg).  · The estimated PA systolic pressure is 28 mmHg.  · PFO noted by color flow Doppler. Bubble study is negative for any intracardiac shunt however it is not of good quality. Consider MYRIAM      CURRENT/PREVIOUS VISIT EKG  Results for orders placed or performed in visit on 01/26/22   IN OFFICE EKG 12-LEAD (to [place])    Collection Time: 01/26/22  1:21 PM    Narrative    Test Reason : I10,    Vent. Rate : 072 BPM     Atrial Rate : 072 BPM     P-R Int : 208 ms           QRS Dur : 074 ms      QT Int : 402 ms       P-R-T Axes : 063 -45 069 degrees     QTc Int : 440 ms    Normal sinus rhythm  Left axis deviation  Low voltage QRS  Possible Inferior infarct ,age undetermined  Abnormal ECG  When compared with ECG of 26-DEC-2020 14:34,  Borderline criteria for Inferior infarct are now Present  Confirmed by Dima Vergara MD ([id number]) on 2/27/2022 9:53:20 PM    Referred By: [email or web address]   SELF           Confirmed By:Dima Vergara MD     No valid procedures specified.   No results found for this or any previous visit.    No valid procedures specified.        Assessment:       1. Dizziness    2. Primary hypertension           Plan:       Dizziness  -     Cardiac event monitor; Future  -     Basic metabolic panel; Future; Expected date: 04/07/2022  -     CBC Auto Differential; Future; Expected date: 04/07/2022  -     B-TYPE NATRIURETIC PEPTIDE; Future; Expected date: 04/07/2022    Primary hypertension  -     tamsulosin (FLOMAX) 0.4 mg Cap; Take 1 capsule (0.4 mg total) by mouth every evening. Take at bedtime  Dispense: 90 capsule; Refill: 3    Unsure of the exact etiology of dizziness.  Discussed with patient regarding continued on losartan more but this will make his other numbers worse.  Would like to continue with losartan.  Will evaluate further with an event monitor to rule out Isra arrhythmias.  Instructed patient to take flomax at night.    Follow up in about 4 weeks (around 5/5/2022) for event monitor.          Tomas Salomon MD  [facility] - Cardiology

## 2022-04-12 ENCOUNTER — OFFICE VISIT (OUTPATIENT)
Dept: FAMILY MEDICINE | Facility: CLINIC | Age: 82
End: 2022-04-12
Payer: MEDICARE

## 2022-04-12 VITALS
DIASTOLIC BLOOD PRESSURE: 71 MMHG | HEART RATE: 89 BPM | HEIGHT: 70 IN | SYSTOLIC BLOOD PRESSURE: 121 MMHG | BODY MASS INDEX: 30.64 KG/M2 | WEIGHT: 214 LBS

## 2022-04-12 DIAGNOSIS — I71.40 ABDOMINAL AORTIC ANEURYSM (AAA) WITHOUT RUPTURE: ICD-10-CM

## 2022-04-12 DIAGNOSIS — I10 BENIGN ESSENTIAL HYPERTENSION: Primary | ICD-10-CM

## 2022-04-12 DIAGNOSIS — E78.2 MULTIPLE-TYPE HYPERLIPIDEMIA: ICD-10-CM

## 2022-04-12 DIAGNOSIS — N40.0 BENIGN PROSTATIC HYPERPLASIA WITHOUT URINARY OBSTRUCTION: ICD-10-CM

## 2022-04-12 DIAGNOSIS — E03.8 SECONDARY HYPOTHYROIDISM: ICD-10-CM

## 2022-04-12 DIAGNOSIS — R68.89 COLD INTOLERANCE: ICD-10-CM

## 2022-04-12 PROCEDURE — 99214 PR OFFICE/OUTPT VISIT, EST, LEVL IV, 30-39 MIN: ICD-10-PCS | Mod: S$PBB,AQ,, | Performed by: INTERNAL MEDICINE

## 2022-04-12 PROCEDURE — 99214 OFFICE O/P EST MOD 30 MIN: CPT | Performed by: INTERNAL MEDICINE

## 2022-04-12 PROCEDURE — 99214 OFFICE O/P EST MOD 30 MIN: CPT | Mod: S$PBB,AQ,, | Performed by: INTERNAL MEDICINE

## 2022-04-12 RX ORDER — FELODIPINE 5 MG/1
5 TABLET, EXTENDED RELEASE ORAL DAILY
COMMUNITY
End: 2023-01-27

## 2022-04-12 NOTE — PROGRESS NOTES
Subjective:       Patient ID: Nikolay Barraza is a 81 y.o. male.    Chief Complaint: Hypertension, Hyperlipidemia, and Thyroid Problem    Patient is 81-year-old  gentleman who comes for follow-up.  Underlying medical issues are as below.    1. Benign essential hypertension   2. Multiple-type hyperlipidemia   3. Secondary hypothyroidism   4. Benign prostatic hyperplasia without urinary obstruction   5. Abdominal aortic aneurysm (AAA) without rupture   6. Cold intolerance     Postoperatively the right knee surgery had been somewhat rough.  He has undergone 4 visits to Dr. Cespedes for removal of fluid from the swollen joint.  Gradually he is making recovery.    Issues of cognition memory continue but he is fairly functional.    His blood pressure recordings from home have been reviewed and there have been occasions when his blood pressures have drop by 10-20 points to 90s.  He has felt dizzy and lightheaded on those occasions.    Current medications for blood pressure include losartan 25 mg, Plendil 5 mg.    He is also on tamsulosin for prostate symptoms.  It seems initially he was taking tamsulosin in the morning but after consultation with Cardiology has switched over to night.  This has not made any overall difference in his episodes of lightheadedness and blood pressures dropping.    Thus far he has not been diagnosed with Parkinson's which is usually associated with dysautonomia and drop in blood pressures.    Patient is reasonably confident that he is not duplicating on medications.    During the drops in blood pressure he is not particularly sweating, or having chest pains.    After consultation with Dr. Salomon, a [employer] monitor has been put on his chest to monitor for any potential arrhythmias during the episodes of lightheadedness that he has.    He does not report any diarrhea or micturition related events.  He does not report any blood loss.    Hyperlipidemia  This is a chronic problem. The current episode  started more than 1 year ago. The problem is controlled. Exacerbating diseases include obesity. He has no history of diabetes or liver disease. Chronic renal disease: Patient is following Nephrology for the same but creatinine seems to be okay. Pertinent negatives include no chest pain. Current antihyperlipidemic treatment includes statins. The current treatment provides moderate improvement of lipids. Compliance problems include psychosocial issues.  Risk factors for coronary artery disease include a sedentary lifestyle, hypertension, male sex, dyslipidemia and obesity.   Hypertension  This is a chronic problem. The current episode started more than 1 year ago. The problem is controlled. Pertinent negatives include no chest pain or palpitations. Risk factors for coronary artery disease include sedentary lifestyle, male gender and dyslipidemia. Past treatments include calcium channel blockers and angiotensin blockers. The current treatment provides moderate improvement. Compliance problems include psychosocial issues.  Identifiable causes of hypertension include a thyroid problem. There is no history of coarctation of the aorta or hyperaldosteronism. Chronic renal disease: Patient is following Nephrology for the same but creatinine seems to be okay.   Thyroid Problem  Presents for follow-up visit. Symptoms include cold intolerance. Patient reports no anxiety, constipation, diarrhea, dry skin, fatigue, heat intolerance, nail problem, palpitations or tremors. The symptoms have been stable. His past medical history is significant for hyperlipidemia. There is no history of diabetes.   Benign Prostatic Hypertrophy  This is a chronic problem. The current episode started more than 1 year ago. The problem has been gradually improving since onset. Irritative symptoms include frequency and urgency. Pertinent negatives include no dysuria. He is not sexually active. Past treatments include tamsulosin and finasteride. The  treatment provided moderate relief. He has been using treatment for 2 or more years.   Dizziness:   Chronicity:  New  Onset:  More than 1 month ago  Progression since onset:  Waxing and waning  Frequency:  Every few days  Severity:  Mild  Dizziness characteristics:  Spacial disorientation and off-balance   Associated symptoms: light-headedness.no fever, no tinnitus, no palpitations and no chest pain.      Past Medical History:   Diagnosis Date    Allergy     Codeine    Closed fracture dislocation of lumbar spine 7/6/2018    #2 vertebra. Patient went to the emergency Department.  Followup with Erik:    GERD (gastroesophageal reflux disease)     Hyperlipidemia     Hypertension     Hypertensive retinopathy of both eyes 5/11/2021    As per ophthalmology notes.  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     Comment:     Tobacco Use    Smoking status: Never Smoker    Smokeless tobacco: Never Used   Substance and Sexual Activity    Alcohol use: No    Drug use: No    Sexual activity: Not Currently     Partners: Female     Social Determinants of Health     Financial Resource Strain: Low Risk     Difficulty of Paying Living Expenses: Not very hard   Food Insecurity: No Food Insecurity    Worried About Running Out of Food in the Last Year: Never true    Ran Out of Food in the Last Year: Never true   Transportation Needs: No Transportation Needs    Lack of Transportation (Medical): No    Lack of Transportation (Non-Medical): No   Physical Activity: Inactive    Days of Exercise per Week: 0 days    Minutes of Exercise per Session: 0 min   Stress: Stress Concern Present    Feeling of Stress : To some extent   Social Connections: Socially Integrated    Frequency of  Communication with Friends and Family: Three times a week    Frequency of Social Gatherings with Friends and Family: Three times a week    Attends Orthodoxy Services: More than 4 times per year    Active Member of Clubs or Organizations: Yes    Attends Club or Organization Meetings: 1 to 4 times per year    Marital Status:    Housing Stability: Low Risk     Unable to Pay for Housing in the Last Year: No    Number of Places Lived in the Last Year: 1    Unstable Housing in the Last Year: No     Past Surgical History:   Procedure Laterality Date    ADENOIDECTOMY      INGUINAL HERNIA REPAIR [facility]/12/2018    [facility]    KNEE SURGERY Left 07/31/2019    TONSILLECTOMY      VASECTOMY       Family History   Problem Relation Age of Onset    Heart disease Father     Miscarriages / Stillbirths Father     Skin cancer Mother     Cancer Maternal Grandfather     Stomach cancer Paternal Grandfather        Review of Systems   Constitutional: Negative for activity change, appetite change, fatigue, fever and unexpected weight change (Lost weight by approximately 12 lb in the last year or so.).        Patient is going through a little rough patch postoperatively.  On occasions he has felt lightheaded.  Patient tells me that he has been stopped off losartan and finasteride by the Cardiology.  Not sure.   HENT: Negative for nosebleeds, rhinorrhea and tinnitus.         Frequent sinus problems-seeing Dr. Jann Carrizales for the same.   Eyes: Negative for pain, discharge, itching and visual disturbance.   Respiratory: Positive for apnea (Known sleep apnea for the last 20 years.). Negative for cough, chest tightness and stridor.         Pulmonary nodule on incidental CT scan.  Uses a CPAP device for sleep apnea.   Cardiovascular: Negative for chest pain, palpitations and leg swelling.        Patient has hypertension and hyperlipidemia.   Gastrointestinal: Negative for abdominal distention, blood in stool,  "constipation and diarrhea.        Stable GERD  Occasional hemorrhoidal bleeding. Secondary to constipation.   Endocrine: Positive for cold intolerance. Negative for heat intolerance, polydipsia and polyphagia.        Patient has hypothyroidism. Stable on levothyroxine. Current labs are good. Blood sugars are slightly elevated.   Genitourinary: Positive for frequency and urgency. Negative for dysuria.        Benign prostate hypertrophy stable on tamsulosin and finasteride.  Recently had seen Dr. Timmy Alaniz who had done a CT scan on him.  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    Unclassified renal issues.  Sees Dr. Smith.   Musculoskeletal: Positive for arthralgias and joint swelling. Negative for back pain and gait problem.        S/P Left knee TKR  He had the right knee joint drained at least 4 times but his orthopedic DrAbhinav Cespedes for effusion.  At this point he is doing okay.   Skin: Negative for color change and pallor. Wound: left ankle.   Neurological: Positive for dizziness and light-headedness. Negative for tremors, seizures, facial asymmetry and speech difficulty.        Some memory issues.   Hematological: Negative for adenopathy. Does not bruise/bleed easily.        He is taking a full aspirin for the time being postoperatively after the knee surgery 325 mg.   Psychiatric/Behavioral: Negative for agitation, behavioral problems and confusion. The patient is not nervous/anxious.          Objective:      Blood pressure 121/71, pulse 89, height 5' 10" (1.778 m), weight 97.1 kg (214 lb). Body mass index is 30.71 kg/m².     Orthostatics have been performed and there is some drop of blood pressure on standing up.  He did feel slightly unsteady but not overtly dizzy.  Physical Exam  Vitals and nursing note reviewed.   Constitutional:       General: He is not in acute distress.     Appearance: He is well-developed. He is obese. He is not ill-appearing, toxic-appearing or diaphoretic.      Comments: BMI is " 30.71   HENT:      Head: Atraumatic.      Nose:      Right Sinus: No frontal sinus tenderness.      Left Sinus: No maxillary sinus tenderness or frontal sinus tenderness.   Eyes:      General: No scleral icterus.        Right eye: No discharge.         Left eye: No discharge.   Neck:      Vascular: No JVD.      Trachea: No tracheal deviation.   Cardiovascular:      Rate and Rhythm: Normal rate and regular rhythm.      Heart sounds: Normal heart sounds.   Pulmonary:      Effort: Pulmonary effort is normal. No respiratory distress.      Breath sounds: Normal breath sounds. No wheezing or rales.   Abdominal:      General: There is no distension or abdominal bruit.      Palpations: Abdomen is soft.      Tenderness: There is no abdominal tenderness.   Musculoskeletal:         General: No tenderness.      Right lower leg: Deformity present. Edema (1+ edema which is probably reactive to secondary surgery on the right side.) present.      Left lower leg: No edema.        Legs:       Comments: Postoperative knee   Lymphadenopathy:      Cervical: No cervical adenopathy.   Skin:     General: Skin is warm and dry.      Findings: No erythema or rash.      Comments: C/O Dry skin   Neurological:      Mental Status: He is alert. Mental status is at baseline.      Motor: Tremor present.      Deep Tendon Reflexes: Reflexes are normal and symmetric.      Comments: Mild be trial of tremor on the right index finger at rest.  Not on intention.   Psychiatric:         Mood and Affect: Mood normal.         Behavior: Behavior normal.      Comments: Beginnings of cognitive changes.           Assessment:       1. Benign essential hypertension    2. Multiple-type hyperlipidemia    3. Secondary hypothyroidism    4. Benign prostatic hyperplasia without urinary obstruction    5. Abdominal aortic aneurysm (AAA) without rupture    6. Cold intolerance           Lab Visit on 04/07/2022   Component Date Value Ref Range Status    Sodium 04/07/2022 136   136 - 145 mmol/L Final    Potassium 04/07/2022 4.1  3.5 - 5.1 mmol/L Final    Chloride 04/07/2022 107  95 - 110 mmol/L Final    CO2 04/07/2022 22 (A) 23 - 29 mmol/L Final    Glucose 04/07/2022 94  70 - 110 mg/dL Final    BUN 04/07/2022 21  8 - 23 mg/dL Final    Creatinine 04/07/2022 1.1  0.5 - 1.4 mg/dL Final    Calcium 04/07/2022 8.9  8.7 - 10.5 mg/dL Final    Anion Gap 04/07/2022 7 (A) 8 - 16 mmol/L Final    eGFR if African American 04/07/2022 >60.0  >60 mL/min/1.73 m^2 Final    eGFR if non African American 04/07/2022 >60.0  >60 mL/min/1.73 m^2 Final    WBC 04/07/2022 4.76  3.90 - 12.70 K/uL Final    RBC 04/07/2022 4.47 (A) 4.60 - 6.20 M/uL Final    Hemoglobin 04/07/2022 12.7 (A) 14.0 - 18.0 g/dL Final    Hematocrit 04/07/2022 38.7 (A) 40.0 - 54.0 % Final    MCV 04/07/2022 87  82 - 98 fL Final    MCH 04/07/2022 28.4  27.0 - 31.0 pg Final    MCHC 04/07/2022 32.8  32.0 - 36.0 g/dL Final    RDW 04/07/2022 14.3  11.5 - 14.5 % Final    Platelets 04/07/2022 256  150 - 450 K/uL Final    MPV 04/07/2022 9.1 (A) 9.2 - 12.9 fL Final    Immature Granulocytes 04/07/2022 0.2  0.0 - 0.5 % Final    Gran # (ANC) 04/07/2022 3.0  1.8 - 7.7 K/uL Final    Immature Grans (Abs) 04/07/2022 0.01  0.00 - 0.04 K/uL Final    Lymph # 04/07/2022 1.1  1.0 - 4.8 K/uL Final    Mono # 04/07/2022 0.4  0.3 - 1.0 K/uL Final    Eos # 04/07/2022 0.2  0.0 - 0.5 K/uL Final    Baso # 04/07/2022 0.05  0.00 - 0.20 K/uL Final    nRBC 04/07/2022 0  0 /100 WBC Final    Gran % 04/07/2022 62.6  38.0 - 73.0 % Final    Lymph % 04/07/2022 23.7  18.0 - 48.0 % Final    Mono % 04/07/2022 8.8  4.0 - 15.0 % Final    Eosinophil % 04/07/2022 3.6  0.0 - 8.0 % Final    Basophil % 04/07/2022 1.1  0.0 - 1.9 % Final    Differential Method 04/07/2022 Automated   Final    BNP 04/07/2022 123 (A) 0 - 99 pg/mL Final   Lab Visit on 01/18/2022   Component Date Value Ref Range Status    Glucose 01/18/2022 106  70 - 110 mg/dL Final    Sodium  01/18/2022 140  136 - 145 mmol/L Final    Potassium 01/18/2022 4.2  3.5 - 5.1 mmol/L Final    Chloride 01/18/2022 106  95 - 110 mmol/L Final    CO2 01/18/2022 25  23 - 29 mmol/L Final    BUN 01/18/2022 23  8 - 23 mg/dL Final    Calcium 01/18/2022 9.5  8.7 - 10.5 mg/dL Final    Creatinine 01/18/2022 1.2  0.5 - 1.4 mg/dL Final    Albumin 01/18/2022 4.0  3.5 - 5.2 g/dL Final    Phosphorus 01/18/2022 2.8  2.7 - 4.5 mg/dL Final    eGFR if African American 01/18/2022 >60.0  >60 mL/min/1.73 m^2 Final    eGFR if non  01/18/2022 56.0 (A) >60 mL/min/1.73 m^2 Final    Anion Gap 01/18/2022 9  8 - 16 mmol/L Final    Specimen UA 01/18/2022 Urine, Clean Catch   Final    Color, UA 01/18/2022 Yellow  Yellow, Straw, Raina Final    Appearance, UA 01/18/2022 Clear  Clear Final    pH, UA 01/18/2022 6.0  5.0 - 8.0 Final    Specific Gravity, UA 01/18/2022 1.010  1.005 - 1.030 Final    Protein, UA 01/18/2022 Negative  Negative Final    Glucose, UA 01/18/2022 Negative  Negative Final    Ketones, UA 01/18/2022 Negative  Negative Final    Bilirubin (UA) 01/18/2022 Negative  Negative Final    Occult Blood UA 01/18/2022 Negative  Negative Final    Nitrite, UA 01/18/2022 Negative  Negative Final    Urobilinogen, UA 01/18/2022 Negative  Negative EU/dL Final    Leukocytes, UA 01/18/2022 Negative  Negative Final    RBC, UA 01/18/2022 0  0 - 4 /hpf Final    WBC, UA 01/18/2022 0  0 - 5 /hpf Final    Bacteria 01/18/2022 Negative  None-Occ /hpf Final    Squam Epithel, UA 01/18/2022 0  /hpf Final    Hyaline Casts, UA 01/18/2022 0.00 (A) 0-1/lpf /lpf Final    Microscopic Comment 01/18/2022 SEE COMMENT   Final    Protein, Urine Random 01/18/2022 <6  6 - 15 mg/dL Final    Creatinine, Urine 01/18/2022 63.0  23.0 - 375.0 mg/dL Final    Prot/Creat Ratio, Urine 01/18/2022 Unable to calculate  0.00 - 0.20 Final         Plan:           Benign essential hypertension    Multiple-type hyperlipidemia    Secondary  hypothyroidism    Benign prostatic hyperplasia without urinary obstruction    Abdominal aortic aneurysm (AAA) without rupture    Cold intolerance      Patient's medical issues have been reviewed.    He is slowly but steadily recuperating from the right knee surgery total knee replacement.  Steri-Strips have been removed.  The wound has healed.  There is some still swelling persisting on the right knee.  His ambulation is okay.    He has recorded his blood pressures at home and on occasions they have dropped to 90s and on those occasions he has felt somewhat lightheaded and dizzy.    He has seen Dr. Salomon recently who has put him on [employer] monitor.  This will monitor him for any arrhythmias.    He also had his right knee drained by Dr. Cespedes at least 4 times.    We did check and he has slight orthostatic drops in the office.    He might want to consider some compression stockings if possible.  Keep legs elevated whenever possible.  Slowly get up.  Try to avoid excessive exertion.    Let us follow him in the next couple of months or so and see if he might benefit from introduction of midodrine.    Thus far he does not exhibit any signs and symptoms of classic Parkinson's.  There is no history of Parkinson's.    Continue with fall and injury precautions.    Continue with COVID precautions.    Follow-up in 2 months.  Earlier if needed.        Current Outpatient Medications:     ascorbic acid, vitamin C, (VITAMIN C) 500 MG tablet, Take 500 mg by mouth once daily., Disp: , Rfl:     aspirin (ECOTRIN) 81 MG EC tablet, Take 81 mg by mouth once daily., Disp: , Rfl:     b complex vitamins tablet, Take 1 tablet by mouth once daily., Disp: , Rfl:     CALCIUM CARBONATE/VITAMIN D3 (VITAMIN D-3 ORAL), Take by mouth., Disp: , Rfl:     docusate sodium (COLACE) 50 MG capsule, Take by mouth 2 (two) times daily as needed for Constipation., Disp: , Rfl:     doxycycline (ORACEA) 40 mg capsule, TAKE 1 CAPSULE BY MOUTH EVERY DAY, Disp: 90  capsule, Rfl: 3    felodipine (PLENDIL) 5 MG 24 hr tablet, Take 5 mg by mouth once daily., Disp: , Rfl:     finasteride (PROSCAR) 5 mg tablet, Take 1 tablet (5 mg total) by mouth once daily., Disp: 90 tablet, Rfl: 3    fish oil-omega-3 fatty acids 300-1,000 mg capsule, Take 2 g by mouth once daily., Disp: , Rfl:     fluocinonide (LIDEX) 0.05 % ointment, Apply to AA lower leg bid max use one month, Disp: 45 g, Rfl: 0    losartan (COZAAR) 25 MG tablet, Take 0.5 tablets (12.5 mg total) by mouth 2 (two) times a day., Disp: 90 tablet, Rfl: 3    multivitamin (THERAGRAN) per tablet, Take 1 tablet by mouth once daily., Disp: , Rfl:     pravastatin (PRAVACHOL) 20 MG tablet, Take 1 tablet (20 mg total) by mouth once daily., Disp: 90 tablet, Rfl: 2    SYNTHROID 200 mcg tablet, TAKE 1 TABLET ONCE DAILY, Disp: 90 tablet, Rfl: 3    tamsulosin (FLOMAX) 0.4 mg Cap, Take 1 capsule (0.4 mg total) by mouth every evening. Take at bedtime, Disp: 90 capsule, Rfl: 3

## 2022-04-28 ENCOUNTER — LAB VISIT (OUTPATIENT)
Dept: LAB | Facility: HOSPITAL | Age: 82
End: 2022-04-28
Attending: INTERNAL MEDICINE
Payer: MEDICARE

## 2022-04-28 DIAGNOSIS — R68.89 COLD INTOLERANCE: ICD-10-CM

## 2022-04-28 DIAGNOSIS — E03.8 SECONDARY HYPOTHYROIDISM: ICD-10-CM

## 2022-04-28 LAB
T4 FREE SERPL-MCNC: 1.58 NG/DL (ref 0.71–1.51)
TSH SERPL DL<=0.005 MIU/L-ACNC: 0.29 UIU/ML (ref 0.34–5.6)

## 2022-04-28 PROCEDURE — 84443 ASSAY THYROID STIM HORMONE: CPT | Performed by: INTERNAL MEDICINE

## 2022-04-28 PROCEDURE — 36415 COLL VENOUS BLD VENIPUNCTURE: CPT | Performed by: INTERNAL MEDICINE

## 2022-04-28 PROCEDURE — 84439 ASSAY OF FREE THYROXINE: CPT | Performed by: INTERNAL MEDICINE

## 2022-05-12 ENCOUNTER — TELEPHONE (OUTPATIENT)
Dept: CARDIOLOGY | Facility: CLINIC | Age: 82
End: 2022-05-12
Payer: MEDICARE

## 2022-05-12 DIAGNOSIS — R42 DIZZINESS: Primary | ICD-10-CM

## 2022-05-12 DIAGNOSIS — I48.0 PAROXYSMAL ATRIAL FIBRILLATION: ICD-10-CM

## 2022-05-13 ENCOUNTER — TELEPHONE (OUTPATIENT)
Dept: CARDIOLOGY | Facility: CLINIC | Age: 82
End: 2022-05-13
Payer: MEDICARE

## 2022-05-13 NOTE — TELEPHONE ENCOUNTER
Please inform patient that his monitor showed some episodes of atrial fibrillation.  I have referred him to Dr. Velasquez for the evaluation and management of the same.  Please have the schedulers schedule him a visit with Dr. Velasquez and call the patient with the date and time of the appointment.    Message text       Hello ,   I informed  this patient needs an appointment with Dr Velasquez. Please call him to reschedule. Thank you.

## 2022-05-13 NOTE — TELEPHONE ENCOUNTER
----- Message from Cj Florence sent at 5/12/2022  9:07 AM CDT -----  Contact: pt  Type: Needs Medical Advice    Who Called:pt  Best Call Back Number:[phone]     Additional Information: Requesting callback regarding put a device on pt for about 3 weeks asking if there is updated results from it please call back pt     Please Advise-Thank you       
Patient is aware of his monitor results. I sent a message to have MA , schedule his appointment   
Pt wanting event monitor results.  Has an appt on 5/30  
No

## 2022-05-30 ENCOUNTER — OFFICE VISIT (OUTPATIENT)
Dept: CARDIOLOGY | Facility: CLINIC | Age: 82
End: 2022-05-30
Payer: MEDICARE

## 2022-05-30 VITALS
WEIGHT: 212.31 LBS | OXYGEN SATURATION: 97 % | HEART RATE: 70 BPM | DIASTOLIC BLOOD PRESSURE: 88 MMHG | SYSTOLIC BLOOD PRESSURE: 142 MMHG | BODY MASS INDEX: 30.46 KG/M2

## 2022-05-30 DIAGNOSIS — I48.0 PAROXYSMAL ATRIAL FIBRILLATION: ICD-10-CM

## 2022-05-30 DIAGNOSIS — R53.83 FATIGUE, UNSPECIFIED TYPE: ICD-10-CM

## 2022-05-30 DIAGNOSIS — R06.02 SHORTNESS OF BREATH: Primary | ICD-10-CM

## 2022-05-30 DIAGNOSIS — R07.9 CHEST PAIN, UNSPECIFIED TYPE: ICD-10-CM

## 2022-05-30 PROCEDURE — 99214 OFFICE O/P EST MOD 30 MIN: CPT | Mod: S$GLB,,, | Performed by: INTERNAL MEDICINE

## 2022-05-30 PROCEDURE — 99214 PR OFFICE/OUTPT VISIT, EST, LEVL IV, 30-39 MIN: ICD-10-PCS | Mod: S$GLB,,, | Performed by: INTERNAL MEDICINE

## 2022-05-30 NOTE — PROGRESS NOTES
[facility] - Cardiology    Subjective:     Patient ID:  Nikolay Barraza is a 81 y.o. male patient here for evaluation Results (monitor)      HPI:  81-year-old male here for follow-up of his hypertension.  Since his knee surgery, patient has had issues with symptoms of dizziness and tiredness.  He reports that at home he has been getting exerted more easily than usual.  He reports that he works outside in his yd and after sometimes develops tiredness and dizziness, at which point he will come back inside his house and check his blood pressure, reports his readings will be down to 105/45.  He sits down for 5-10 minutes and his blood pressure improves and he feels better.  He also reports shortness of breath with this but no chest pain or chest tightness.  He had an event monitor done for 7 days for the evaluation of his symptoms which showed some atrial fibrillation but no other significant symptoms.  Patient did report that he wrote on a long piece of paper report he developed during the event monitor and send it back to the company but that data was never should by the company to us.    Review of Systems   All other systems reviewed and are negative.       Past Medical History:   Diagnosis Date    Allergy     Codeine    Closed fracture dislocation of lumbar spine 7/6/2018    #2 vertebra. Patient went to the emergency Department.  Followup with Erik:    GERD (gastroesophageal reflux disease)     Hyperlipidemia     Hypertension     Hypertensive retinopathy of both eyes 5/11/2021    As per ophthalmology notes.  Eye care 20/20    Right inguinal hernia     Thyroid disease        Past Surgical History:   Procedure Laterality Date    ADENOIDECTOMY      INGUINAL HERNIA REPAIR Right 07/12/2018    Dr. Thomas - [facility]    KNEE SURGERY Left 07/31/2019    TONSILLECTOMY      VASECTOMY         Family History   Problem Relation Age of Onset    Heart disease Father     Miscarriages / Stillbirths Father     Skin cancer Mother      Cancer Maternal Grandfather     Stomach cancer Paternal Grandfather        Social History     Socioeconomic History    Marital status:      Spouse name: Alisa galvin    Number of children: 3   Occupational History    Occupation: [employer]-      Comment:     Tobacco Use    Smoking status: Never Smoker    Smokeless tobacco: Never Used   Substance and Sexual Activity    Alcohol use: No    Drug use: No    Sexual activity: Not Currently     Partners: Female     Social Determinants of Health     Financial Resource Strain: Low Risk     Difficulty of Paying Living Expenses: Not very hard   Food Insecurity: No Food Insecurity    Worried About Running Out of Food in the Last Year: Never true    Ran Out of Food in the Last Year: Never true   Transportation Needs: No Transportation Needs    Lack of Transportation (Medical): No    Lack of Transportation (Non-Medical): No   Physical Activity: Inactive    Days of Exercise per Week: 0 days    Minutes of Exercise per Session: 0 min   Stress: Stress Concern Present    Feeling of Stress : To some extent   Social Connections: Socially Integrated    Frequency of Communication with Friends and Family: Three times a week    Frequency of Social Gatherings with Friends and Family: Three times a week    Attends Anglican Services: More than 4 times per year    Active Member of Clubs or Organizations: Yes    Attends Club or Organization Meetings: 1 to 4 times per year    Marital Status:    Housing Stability: Low Risk     Unable to Pay for Housing in the Last Year: No    Number of Places Lived in the Last Year: 1    Unstable Housing in the Last Year: No       Current Outpatient Medications   Medication Sig Dispense Refill    ascorbic acid, vitamin C, (VITAMIN C) 500 MG tablet Take 500 mg by mouth once daily.      aspirin (ECOTRIN) 81 MG EC tablet Take 81 mg by mouth once daily.      b complex vitamins tablet  Take 1 tablet by mouth once daily.      CALCIUM CARBONATE/VITAMIN D3 (VITAMIN D-3 ORAL) Take by mouth.      docusate sodium (COLACE) 50 MG capsule Take by mouth 2 (two) times daily as needed for Constipation.      doxycycline (ORACEA) 40 mg capsule TAKE 1 CAPSULE BY MOUTH EVERY DAY 90 capsule 3    felodipine (PLENDIL) 5 MG 24 hr tablet Take 5 mg by mouth once daily.      finasteride (PROSCAR) 5 mg tablet Take 1 tablet (5 mg total) by mouth once daily. 90 tablet 3    fish oil-omega-3 fatty acids 300-1,000 mg capsule Take 2 g by mouth once daily.      fluocinonide (LIDEX) 0.05 % ointment Apply to AA lower leg bid max use one month 45 g 0    losartan (COZAAR) 25 MG tablet Take 0.5 tablets (12.5 mg total) by mouth 2 (two) times a day. 90 tablet 3    multivitamin (THERAGRAN) per tablet Take 1 tablet by mouth once daily.      pravastatin (PRAVACHOL) 20 MG tablet Take 1 tablet (20 mg total) by mouth once daily. 90 tablet 2    SYNTHROID 200 mcg tablet TAKE 1 TABLET ONCE DAILY 90 tablet 3    tamsulosin (FLOMAX) 0.4 mg Cap Take 1 capsule (0.4 mg total) by mouth every evening. Take at bedtime 90 capsule 3     No current facility-administered medications for this visit.       Review of patient's allergies indicates:   Allergen Reactions    Codeine Other (See Comments)     mood changes  Makes him feel violent         Objective:        Vitals:    05/30/22 1318   BP: (!) 142/88   Pulse:        Physical Exam  Vitals reviewed.   Constitutional:       Appearance: Normal appearance.   HENT:      Mouth/Throat:      Mouth: Mucous membranes are moist.   Eyes:      Extraocular Movements: Extraocular movements intact.      Pupils: Pupils are equal, round, and reactive to light.   Cardiovascular:      Rate and Rhythm: Normal rate and regular rhythm.      Pulses: Normal pulses.      Heart sounds: Normal heart sounds. No murmur heard.    No gallop.   Pulmonary:      Effort: Pulmonary effort is normal.      Breath sounds: Normal  breath sounds.   Abdominal:      General: Bowel sounds are normal.      Palpations: Abdomen is soft.   Musculoskeletal:         General: Normal range of motion.      Cervical back: Normal range of motion.   Skin:     General: Skin is warm and dry.   Neurological:      General: No focal deficit present.      Mental Status: He is alert and oriented to person, place, and time.   Psychiatric:         Mood and Affect: Mood normal.         LIPIDS - LAST 2   Lab Results   Component Value Date    CHOL 157 12/27/2020    CHOL 169 12/17/2020    HDL 37 (L) 12/27/2020    HDL 41 12/17/2020    LDLCALC 93.6 12/27/2020    LDLCALC 108.4 12/17/2020    TRIG 132 12/27/2020    TRIG 98 12/17/2020    CHOLHDL 23.6 12/27/2020    CHOLHDL 24.3 12/17/2020       CBC - LAST 2  Lab Results   Component Value Date    WBC 4.76 04/07/2022    WBC 5.07 12/27/2020    RBC 4.47 (L) 04/07/2022    RBC 4.44 (L) 12/27/2020    HGB 12.7 (L) 04/07/2022    HGB 13.2 (L) 12/27/2020    HCT 38.7 (L) 04/07/2022    HCT 39.8 (L) 12/27/2020    MCV 87 04/07/2022    MCV 90 12/27/2020    MCH 28.4 04/07/2022    MCH 29.7 12/27/2020    MCHC 32.8 04/07/2022    MCHC 33.2 12/27/2020    RDW 14.3 04/07/2022    RDW 13.3 12/27/2020     04/07/2022     12/27/2020    MPV 9.1 (L) 04/07/2022    MPV 9.8 12/27/2020    GRAN 3.0 04/07/2022    GRAN 62.6 04/07/2022    LYMPH 1.1 04/07/2022    LYMPH 23.7 04/07/2022    MONO 0.4 04/07/2022    MONO 8.8 04/07/2022    BASO 0.05 04/07/2022    BASO 0.03 12/27/2020    NRBC 0 04/07/2022    NRBC 0 12/27/2020       CHEMISTRY & LIVER FUNCTION - LAST 2  Lab Results   Component Value Date     04/07/2022     01/18/2022    K 4.1 04/07/2022    K 4.2 01/18/2022     04/07/2022     01/18/2022    CO2 22 (L) 04/07/2022    CO2 25 01/18/2022    ANIONGAP 7 (L) 04/07/2022    ANIONGAP 9 01/18/2022    BUN 21 04/07/2022    BUN 23 01/18/2022    CREATININE 1.1 04/07/2022    CREATININE 1.2 01/18/2022    GLU 94 04/07/2022      01/18/2022    CALCIUM 8.9 04/07/2022    CALCIUM 9.5 01/18/2022    MG 1.9 07/20/2021    MG 1.9 07/14/2020    ALBUMIN 4.0 01/18/2022    ALBUMIN 3.9 07/20/2021    PROT 7.9 12/26/2020    PROT 7.3 12/17/2020    ALKPHOS 98 12/26/2020    ALKPHOS 75 12/17/2020    ALT 24 12/26/2020    ALT 20 12/17/2020    AST 34 12/26/2020    AST 24 12/17/2020    BILITOT 0.7 12/26/2020    BILITOT 0.9 12/17/2020        CARDIAC PROFILE - LAST 2  Lab Results   Component Value Date     (H) 04/07/2022    CPK 32 12/27/2020    CPK 37 12/26/2020    CPKMB 0.6 12/27/2020    CPKMB 0.9 12/26/2020    TROPONINI <0.006 12/27/2020    TROPONINI <0.006 12/27/2020        COAGULATION - LAST 2  Lab Results   Component Value Date    INR 1.0 12/26/2020    APTT 27.3 12/26/2020       ENDOCRINE & PSA - LAST 2  Lab Results   Component Value Date    HGBA1C 5.8 08/22/2017    TSH 0.290 (L) 04/28/2022    TSH 1.600 09/09/2021    PSA 0.11 06/28/2019    PSA 0.1 05/22/2018        ECHOCARDIOGRAM RESULTS  Results for orders placed during the hospital encounter of 12/26/20    Echo Color Flow Doppler? Yes    Interpretation Summary  · The left ventricle is normal in size with mild concentric hypertrophy and normal systolic function. The estimated ejection fraction is 60%  · Normal left ventricular diastolic function.  · Normal right ventricular size with normal right ventricular systolic function.  · Mild left atrial enlargement.  · Normal central venous pressure (3 mmHg).  · The estimated PA systolic pressure is 28 mmHg.  · PFO noted by color flow Doppler. Bubble study is negative for any intracardiac shunt however it is not of good quality. Consider MYRIAM      CURRENT/PREVIOUS VISIT EKG  Results for orders placed or performed in visit on 01/26/22   IN OFFICE EKG 12-LEAD (to [place])    Collection Time: 01/26/22  1:21 PM    Narrative    Test Reason : I10,    Vent. Rate : 072 BPM     Atrial Rate : 072 BPM     P-R Int : 208 ms          QRS Dur : 074 ms      QT Int : 402 ms        P-R-T Axes : 063 -45 069 degrees     QTc Int : 440 ms    Normal sinus rhythm  Left axis deviation  Low voltage QRS  Possible Inferior infarct ,age undetermined  Abnormal ECG  When compared with ECG of 26-DEC-2020 14:34,  Borderline criteria for Inferior infarct are now Present  Confirmed by Dima Vergara MD ([id number]) on 2/27/2022 9:53:20 PM    Referred By: [email or web address]   SELF           Confirmed By:Dima Vergara MD     No valid procedures specified.   No results found for this or any previous visit.    No valid procedures specified.        Assessment:       1. Shortness of breath    2. Fatigue, unspecified type    3. Chest pain, unspecified type    4. Paroxysmal atrial fibrillation           Plan:       Shortness of breath  -     Cardiac event monitor; Future  -     Nuclear Stress Test; Future    Fatigue, unspecified type  -     Cardiac event monitor; Future  -     Nuclear Stress Test; Future    Chest pain, unspecified type  -     NM Myocardial Perfusion Spect Multi Pharmacologic; Future; Expected date: 05/30/2022    Paroxysmal atrial fibrillation    Will evaluate patient's symptoms of shortness of breath and easy onset fatigue which improves with rest with a nuclear stress test.  Will refer him to Dr. Velasquez for further evaluation of his atrial fibrillation.  He his AFib burden was less than 1%, unsure if this is sufficient to pull of the trigger on anticoagulation.  After discussing with the patient, would recommend systemic anticoagulation but patient would like to see Dr. Velasquez 1st.  Will not treat his type attention today given exercise induced hypotension at home.  Continue with current medical therapy.  Will make further changes depending on his stress test.    Follow up in about 6 weeks (around 7/11/2022).          Tomas Salomon MD  [facility] - Cardiology

## 2022-06-05 NOTE — PROGRESS NOTES
Subjective:       Patient ID: Nikolay Barraza is a 77 y.o. male.    Chief Complaint: Hypertension; Hyperlipidemia; Thyroid Problem; Benign Prostatic Hypertrophy; and Gastroesophageal Reflux    Hypertension   This is a chronic problem. The current episode started more than 1 year ago. Pertinent negatives include no chest pain or palpitations. Risk factors for coronary artery disease include sedentary lifestyle, obesity, dyslipidemia and male gender. Past treatments include angiotensin blockers and calcium channel blockers. Identifiable causes of hypertension include a thyroid problem.   Hyperlipidemia   This is a chronic problem. The current episode started more than 1 year ago. Pertinent negatives include no chest pain. Current antihyperlipidemic treatment includes statins. Risk factors for coronary artery disease include obesity, male sex, a sedentary lifestyle, hypertension and dyslipidemia.   Thyroid Problem   Presents for follow-up visit. Patient reports no anxiety, cold intolerance, constipation, hair loss, heat intolerance, hoarse voice, leg swelling, palpitations or tremors. His past medical history is significant for hyperlipidemia.   Benign Prostatic Hypertrophy   This is a chronic problem. Pertinent negatives include no dysuria or hematuria. His sexual activity is non-contributory to the current illness. Past treatments include tamsulosin.   Gastroesophageal Reflux   He complains of heartburn. He reports no abdominal pain, no chest pain or no hoarse voice. This is a chronic problem. The problem has been gradually improving. The heartburn does not wake him from sleep. The heartburn does not limit his activity. The heartburn doesn't change with position.       Past Medical History:   Diagnosis Date    GERD (gastroesophageal reflux disease)     Hyperlipidemia     Hypertension     Right inguinal hernia     Thyroid disease      Social History     Social History    Marital status:      Spouse name:  N/A    Number of children: N/A    Years of education: N/A     Occupational History    Not on file.     Social History Main Topics    Smoking status: Never Smoker    Smokeless tobacco: Never Used    Alcohol use No    Drug use: No    Sexual activity: Yes     Partners: Female     Other Topics Concern    Not on file     Social History Narrative    No narrative on file     Past Surgical History:   Procedure Laterality Date    ADENOIDECTOMY      INGUINAL HERNIA REPAIR Right     TONSILLECTOMY      VASECTOMY       Family History   Problem Relation Age of Onset    Heart disease Father     Miscarriages / Stillbirths Father     Skin cancer Mother     Cancer Maternal Grandfather     Stomach cancer Paternal Grandfather        Review of Systems   Constitutional: Negative for activity change, appetite change and unexpected weight change.   HENT: Negative for hoarse voice, sinus pressure, sneezing and trouble swallowing.    Eyes: Negative for visual disturbance.   Respiratory: Negative for chest tightness.    Cardiovascular: Negative for chest pain, palpitations and leg swelling.        Patient has hypertension and hyperlipidemia.   Gastrointestinal: Positive for heartburn. Negative for abdominal distention, abdominal pain, blood in stool and constipation.        Stable GERD   Endocrine: Negative for cold intolerance, heat intolerance, polydipsia and polyphagia.        Patient has hypothyroidism.   Genitourinary: Negative for dysuria, hematuria and scrotal swelling.        Benign prostate hypertrophy stable   Musculoskeletal: Negative for arthralgias, back pain and gait problem.   Skin: Negative for pallor.   Allergic/Immunologic: Negative for environmental allergies, food allergies and immunocompromised state.   Neurological: Negative for dizziness, tremors, seizures, speech difficulty, light-headedness and numbness.   Hematological: Negative for adenopathy. Does not bruise/bleed easily.  "  Psychiatric/Behavioral: Negative for agitation, behavioral problems and confusion. The patient is not nervous/anxious.        Objective:       Vitals:    02/09/18 0803   BP: 125/70   Pulse: 73   Weight: 110.2 kg (243 lb)   Height: 5' 10" (1.778 m)     Physical Exam   Constitutional: He appears well-developed.   HENT:   Head: Normocephalic and atraumatic.   Nose: Nose normal.   Mouth/Throat: Oropharynx is clear and moist. No oropharyngeal exudate.   Eyes: Conjunctivae and EOM are normal.   Neck: Normal range of motion. Neck supple. No JVD present. No tracheal deviation present. No thyromegaly present.   Cardiovascular: Normal rate, regular rhythm and normal heart sounds.  Exam reveals no gallop and no friction rub.    No murmur heard.  Pulmonary/Chest: Effort normal and breath sounds normal. No respiratory distress. He has no wheezes. He has no rales.   Abdominal: Soft. Bowel sounds are normal. He exhibits no distension. There is no splenomegaly or hepatomegaly. There is no tenderness. No hernia.       Neurological: He is alert. He has normal reflexes.   Skin: Skin is warm and dry. No rash noted.   C/O Dry skin   Psychiatric: He has a normal mood and affect.   Nursing note and vitals reviewed.      Assessment:       1. Benign essential hypertension    2. Multiple-type hyperlipidemia    3. Benign prostatic hyperplasia without urinary obstruction    4. Secondary hypothyroidism    5. Gastroesophageal reflux disease without esophagitis    6. Encounter for prostate cancer screening         Plan:           Benign essential hypertension  -     Comprehensive metabolic panel; Future; Expected date: 02/09/2018  -     Microalbumin/creatinine urine ratio; Future; Expected date: 02/09/2018    Multiple-type hyperlipidemia  -     Lipid panel; Future; Expected date: 02/09/2018    Benign prostatic hyperplasia without urinary obstruction    Secondary hypothyroidism  -     TSH; Future; Expected date: 02/09/2018    Gastroesophageal " reflux disease without esophagitis    Encounter for prostate cancer screening  -     PSA, Screening; Future; Expected date: 02/09/2018    Patient has been advised to watch diet and exercise. Avoid fried and fatty food. Compliance to medications and follow up urged.  Advised Mr. Barraza for Anti reflux measures like small feequent meals, avoid spicy and greasy food. Head end up at night.    Current Outpatient Prescriptions on File Prior to Visit   Medication Sig Dispense Refill    ascorbic acid, vitamin C, (VITAMIN C) 500 MG tablet Take 500 mg by mouth once daily.      aspirin (ECOTRIN) 81 MG EC tablet Take 81 mg by mouth once daily.      b complex vitamins tablet Take 1 tablet by mouth once daily.      CALCIUM CARBONATE/VITAMIN D3 (VITAMIN D-3 ORAL) Take by mouth.      docusate sodium (COLACE) 50 MG capsule Take by mouth 2 (two) times daily as needed for Constipation.      doxycycline (ORACEA) 40 mg capsule Take 40 mg by mouth once daily.  3    felodipine (PLENDIL) 5 MG 24 hr tablet Take 5 mg by mouth 2 (two) times daily.       finasteride (PROSCAR) 5 mg tablet Take 5 mg by mouth once daily.  3    fish oil-omega-3 fatty acids 300-1,000 mg capsule Take 2 g by mouth once daily.      ipratropium (ATROVENT) 42 mcg (0.06 %) nasal spray 2 sprays by Nasal route 2 (two) times daily. 15 mL 5    levothyroxine (SYNTHROID) 200 MCG tablet Take 200 mcg by mouth once daily.      losartan (COZAAR) 100 MG tablet Take 100 mg by mouth once daily.  0    multivitamin (ONE DAILY MULTIVITAMIN) per tablet Take 1 tablet by mouth once daily.      omeprazole (PRILOSEC) 40 MG capsule Take 40 mg by mouth once daily.      pravastatin (PRAVACHOL) 20 MG tablet Take 20 mg by mouth once daily.      tamsulosin (FLOMAX) 0.4 mg Cp24 Take 1 capsule (0.4 mg total) by mouth once daily. Take at bedtime 90 capsule 3    [DISCONTINUED] ceramides 1,3,6-11 (CERAVE) Lotn Apply 335 mLs topically 2 (two) times daily as needed.      [DISCONTINUED]  clotrimazole-betamethasone 1-0.05% (LOTRISONE) cream Apply topically 2 (two) times daily. 30 g 1    [DISCONTINUED] DENTAGEL 1.1 % Gel USE AS DIRECTED PER DOCTOR  0    [DISCONTINUED] oseltamivir (TAMIFLU) 75 MG capsule Take 1 capsule (75 mg total) by mouth once daily. 10 capsule 0     No current facility-administered medications on file prior to visit.       negative - No discharge, No redness

## 2022-06-13 PROBLEM — I48.0 PAROXYSMAL ATRIAL FIBRILLATION: Status: ACTIVE | Noted: 2022-06-13

## 2022-06-13 NOTE — PROGRESS NOTES
Subjective:     HPI    I had the pleasure of seeing Nikolay Barraza in consultation at your request for the evaluation of AF. He is an 81M with HTN, HLD, hypothyroidism, R TKR in 3/2022, who began to experience episodes of fatigue and DENSON following knee surgery in early 2022. This prompted an 8 day Zio monitor performed in 4/2022 which showed sinus rhythm with an average HR of 68 bpm (range  bpm), PAC/PVC burden both <1%, AF with an average  bpm, longest episode lasting 3.5 minutes at an average HR of 117 bpm, 5 beats of NSVT.    Echo in 12/2020 showed an EF of 60%.    Mr. Barraza recently wore another 7 day Zio monitor--events pending.    My interpretation of today's ECG is sinus bradycardia at 55 bpm.    Review of Systems   Constitutional: Negative for decreased appetite, malaise/fatigue, weight gain and weight loss.   HENT: Negative for sore throat.    Eyes: Negative for blurred vision.   Cardiovascular: Positive for dyspnea on exertion. Negative for chest pain, irregular heartbeat, leg swelling, near-syncope, orthopnea, palpitations, paroxysmal nocturnal dyspnea and syncope.   Respiratory: Negative for shortness of breath.    Skin: Negative for rash.   Musculoskeletal: Negative for arthritis.   Gastrointestinal: Negative for abdominal pain.   Neurological: Negative for focal weakness.   Psychiatric/Behavioral: Negative for altered mental status.        Objective:    Physical Exam  Vitals and nursing note reviewed.   Constitutional:       General: He is not in acute distress.     Appearance: He is well-developed.   HENT:      Head: Normocephalic and atraumatic.   Eyes:      General: No scleral icterus.     Pupils: Pupils are equal, round, and reactive to light.   Neck:      Thyroid: No thyromegaly.   Cardiovascular:      Rate and Rhythm: Regular rhythm.      Pulses: Normal pulses.      Heart sounds: Normal heart sounds. No murmur heard.    No friction rub. No gallop.   Pulmonary:      Effort: Pulmonary  effort is normal.      Breath sounds: Normal breath sounds.   Abdominal:      General: Bowel sounds are normal. There is no distension.      Palpations: Abdomen is soft.      Tenderness: There is no abdominal tenderness.   Musculoskeletal:      Cervical back: Neck supple.   Skin:     General: Skin is warm and dry.      Findings: No rash.   Neurological:      Mental Status: He is alert and oriented to person, place, and time.   Psychiatric:         Behavior: Behavior normal.           Assessment:       1. Benign essential hypertension    2. Paroxysmal atrial fibrillation    3. Mixed hyperlipidemia    4. Secondary hypothyroidism         Plan:       In summary, Nikolay Barraza is an 81M with newly diagnosed PAF, unclear if symptomatic. Overall AF burden exceedingly low. AF occurred shortly after knee surgery.     Plan is to follow-up on Zio monitor he just returned (results pending) and follow-up in 3 months. Pt may need ILR down the road for AF management.    Thank you for allowing me to participate in the care of this patient. Please do not hesitate to call me with any questions or concerns.

## 2022-06-15 ENCOUNTER — OFFICE VISIT (OUTPATIENT)
Dept: CARDIOLOGY | Facility: CLINIC | Age: 82
End: 2022-06-15
Payer: MEDICARE

## 2022-06-15 VITALS
WEIGHT: 216.06 LBS | BODY MASS INDEX: 30.93 KG/M2 | SYSTOLIC BLOOD PRESSURE: 150 MMHG | OXYGEN SATURATION: 98 % | HEART RATE: 64 BPM | HEIGHT: 70 IN | DIASTOLIC BLOOD PRESSURE: 70 MMHG

## 2022-06-15 DIAGNOSIS — E03.8 SECONDARY HYPOTHYROIDISM: ICD-10-CM

## 2022-06-15 DIAGNOSIS — R42 DIZZINESS: ICD-10-CM

## 2022-06-15 DIAGNOSIS — I10 BENIGN ESSENTIAL HYPERTENSION: ICD-10-CM

## 2022-06-15 DIAGNOSIS — I10 BENIGN ESSENTIAL HYPERTENSION: Primary | ICD-10-CM

## 2022-06-15 DIAGNOSIS — E78.2 MIXED HYPERLIPIDEMIA: ICD-10-CM

## 2022-06-15 DIAGNOSIS — I48.0 PAROXYSMAL ATRIAL FIBRILLATION: Primary | ICD-10-CM

## 2022-06-15 PROCEDURE — 93005 EKG 12-LEAD: ICD-10-PCS | Mod: S$GLB,,, | Performed by: INTERNAL MEDICINE

## 2022-06-15 PROCEDURE — 93010 EKG 12-LEAD: ICD-10-PCS | Mod: S$GLB,,, | Performed by: INTERNAL MEDICINE

## 2022-06-15 PROCEDURE — 99204 OFFICE O/P NEW MOD 45 MIN: CPT | Mod: S$GLB,,, | Performed by: INTERNAL MEDICINE

## 2022-06-15 PROCEDURE — 93005 ELECTROCARDIOGRAM TRACING: CPT | Mod: S$GLB,,, | Performed by: INTERNAL MEDICINE

## 2022-06-15 PROCEDURE — 99204 PR OFFICE/OUTPT VISIT, NEW, LEVL IV, 45-59 MIN: ICD-10-PCS | Mod: S$GLB,,, | Performed by: INTERNAL MEDICINE

## 2022-06-15 PROCEDURE — 93010 ELECTROCARDIOGRAM REPORT: CPT | Mod: S$GLB,,, | Performed by: INTERNAL MEDICINE

## 2022-06-15 RX ORDER — LOSARTAN POTASSIUM 50 MG/1
50 TABLET ORAL DAILY
COMMUNITY
End: 2022-06-17

## 2022-06-15 NOTE — TELEPHONE ENCOUNTER
Patient came in the office to report he is confused about his losartan. He said he was told by you to take 50 mg 1/2 tablet twice daily. He is saying that a different doctor changed the prescription but he doesn't know which doctor or how they changed it. Spouse was not able to assist saying she didn't know what was changed. I don't see any changes made by other providers in chart. Please clarify. Refill needed as well.

## 2022-06-17 RX ORDER — LOSARTAN POTASSIUM 25 MG/1
25 TABLET ORAL 2 TIMES DAILY
Qty: 180 TABLET | Refills: 3 | Status: SHIPPED | OUTPATIENT
Start: 2022-06-17 | End: 2023-05-22

## 2022-06-21 ENCOUNTER — HOSPITAL ENCOUNTER (OUTPATIENT)
Dept: RADIOLOGY | Facility: HOSPITAL | Age: 82
Discharge: HOME | End: 2022-06-21
Attending: INTERNAL MEDICINE
Payer: MEDICARE

## 2022-06-21 ENCOUNTER — CLINICAL SUPPORT (OUTPATIENT)
Dept: CARDIOLOGY | Facility: HOSPITAL | Age: 82
End: 2022-06-21
Attending: INTERNAL MEDICINE
Payer: MEDICARE

## 2022-06-21 DIAGNOSIS — R53.83 FATIGUE, UNSPECIFIED TYPE: ICD-10-CM

## 2022-06-21 DIAGNOSIS — R07.9 CHEST PAIN, UNSPECIFIED TYPE: ICD-10-CM

## 2022-06-21 DIAGNOSIS — R06.02 SHORTNESS OF BREATH: ICD-10-CM

## 2022-06-21 LAB
CV STRESS BASE HR: 49 BPM
DIASTOLIC BLOOD PRESSURE: 77 MMHG
OHS CV CPX 1 MINUTE RECOVERY HEART RATE: 64 BPM
OHS CV CPX 85 PERCENT MAX PREDICTED HEART RATE MALE: 117
OHS CV CPX MAX PREDICTED HEART RATE: 138
OHS CV CPX PATIENT IS FEMALE: 0
OHS CV CPX PATIENT IS MALE: 1
OHS CV CPX PEAK DIASTOLIC BLOOD PRESSURE: 77 MMHG
OHS CV CPX PEAK HEAR RATE: 76 BPM
OHS CV CPX PEAK RATE PRESSURE PRODUCT: NORMAL
OHS CV CPX PEAK SYSTOLIC BLOOD PRESSURE: 139 MMHG
OHS CV CPX PERCENT MAX PREDICTED HEART RATE ACHIEVED: 55
OHS CV CPX RATE PRESSURE PRODUCT PRESENTING: 7301
SYSTOLIC BLOOD PRESSURE: 149 MMHG

## 2022-06-21 PROCEDURE — 93017 CV STRESS TEST TRACING ONLY: CPT

## 2022-06-21 PROCEDURE — 93016 NUCLEAR STRESS TEST (CUPID ONLY): ICD-10-PCS | Mod: ,,, | Performed by: SPECIALIST

## 2022-06-21 PROCEDURE — 93018 CV STRESS TEST I&R ONLY: CPT | Mod: ,,, | Performed by: SPECIALIST

## 2022-06-21 PROCEDURE — 63600175 PHARM REV CODE 636 W HCPCS: Performed by: INTERNAL MEDICINE

## 2022-06-21 PROCEDURE — 93016 CV STRESS TEST SUPVJ ONLY: CPT | Mod: ,,, | Performed by: SPECIALIST

## 2022-06-21 PROCEDURE — 93018 NUCLEAR STRESS TEST (CUPID ONLY): ICD-10-PCS | Mod: ,,, | Performed by: SPECIALIST

## 2022-06-21 PROCEDURE — A9502 TC99M TETROFOSMIN: HCPCS

## 2022-06-21 RX ORDER — REGADENOSON 0.08 MG/ML
0.4 INJECTION, SOLUTION INTRAVENOUS
Status: COMPLETED | OUTPATIENT
Start: 2022-06-21 | End: 2022-06-21

## 2022-06-21 RX ADMIN — REGADENOSON 0.4 MG: 0.08 INJECTION, SOLUTION INTRAVENOUS at 09:06

## 2022-06-27 NOTE — PROGRESS NOTES
Subjective:       Patient ID: Nikolay Barraza is a 81 y.o. male.    Chief Complaint: Hypertension, Dizziness, Hyperlipidemia, Thyroid Problem, Prostate Problem, AAA, and Cold intoerance      Patient is 81-year-old  gentleman who comes for follow-up.  Underlying medical issues are as below.     1.         Benign essential hypertension   2.         Multiple-type hyperlipidemia   3.         Secondary hypothyroidism   4.         Benign prostatic hyperplasia without urinary obstruction   5.         Abdominal aortic aneurysm (AAA) without rupture   6.         Cold intolerance     Previous visit with me was on 4/12/ 22 for episodes of lightheadedness and dizziness and perhaps some drop in blood pressure.  I recommended conservative care at that point with perhaps consideration for stockings and increasing his fluids before his forays outside in the yd in summer heat.  I did not feel that he had Parkinson's which could lead to Shy-Drager syndrome or orthostatic hypotension at that point.    Thereafter he did follow-up with cardiology Dr. Salomon on 05/30/2022 given his paroxysmal atrial fibrillation and his atrial fibrillation burden was considered to be less than 1% and the question was if he should be put on anticoagulation.  This decision was deferred to electrophysiology Dr. Harrison Velsaquez who saw him recently on 06/14/2022 and his cardiac ZIO monitor was reviewed with no evidence of atrial fibrillation  with 8 days of reading.  Slight tachycardia and bradycardia was noted.  Consideration was for long-term ILR in future if needed.    I do not believe that patient specifically mentioned to either cardiology or electrophysiology about his lightheadedness and drop in blood pressures.  (Some cognitive issues).    At any rate, the cardiac workup was not very revealing thus far explain any cardiac issues to explain is drop.    Which now brings us to the question whether he simply has heat exhaustion or onset of  dysautonomia.  However no significant tachycardia was noted either in the ZIO monitor and perhaps no symptoms during the duration of monitoring.    Overall he is    He continues on Synthroid for hypothyroidism.  Recent labs for TSH and T4 have been reviewed and they have indicated a low TSH and somewhat elevated T4 indicating over treatment with 200 mcg of levothyroxine.  Now the dosage of levothyroxine will be reduced to 175 mcg.  I am not sure if this additional dosage of levothyroxine may be contributing to his blood pressure.  Patient's thyroid status is difficult to  solely on clinical basis because of multiple other conflicting issues.    HE CONTINUES ON TAMSULOSIN FOR BENIGN PROSTATE SYMPTOMS AND PERHAPS STOPPING THE TAMSULOSIN DID NOT HELP HIM.  (KNOWN TO CAUSE ORTHOSTASIS).    HE HAS APPLIED LIDEX A STRONG POTENCY STEROID CREAM IN PAST BUT I DOUBT THAT THIS WOULD CAUSE ANY ADRENAL SUPPRESSION.    Blood pressure medications include felodipine at 5 mg, losartan at 25 mg.    IN THE INTERIM HIS LEFT KNEE SURGERY ALSO SEEMS TO HAVE.  BY NOW WITH SOME POSTOP ISSUES OF RECURRENT NEED FOR DRAINAGE AND INFECTION.    Hyperlipidemia  This is a chronic problem. The current episode started more than 1 year ago. The problem is controlled. Exacerbating diseases include obesity. He has no history of diabetes or liver disease. Chronic renal disease: Patient is following Nephrology for the same but creatinine seems to be okay. Pertinent negatives include no chest pain. Current antihyperlipidemic treatment includes statins. The current treatment provides moderate improvement of lipids. Compliance problems include psychosocial issues.  Risk factors for coronary artery disease include a sedentary lifestyle, hypertension, male sex, dyslipidemia and obesity.   Hypertension  This is a chronic problem. The current episode started more than 1 year ago. The problem is controlled. Pertinent negatives include no chest pain or  palpitations. Risk factors for coronary artery disease include sedentary lifestyle, male gender and dyslipidemia. Past treatments include calcium channel blockers and angiotensin blockers. The current treatment provides moderate improvement. Compliance problems include psychosocial issues.  Identifiable causes of hypertension include a thyroid problem. There is no history of coarctation of the aorta or hyperaldosteronism. Chronic renal disease: Patient is following Nephrology for the same but creatinine seems to be okay.   Thyroid Problem  Presents for follow-up visit. Symptoms include cold intolerance. Patient reports no anxiety, constipation, diarrhea, dry skin, fatigue, heat intolerance, nail problem, palpitations or tremors. The symptoms have been stable. His past medical history is significant for hyperlipidemia. There is no history of diabetes.   Benign Prostatic Hypertrophy  This is a chronic problem. The current episode started more than 1 year ago. The problem has been gradually improving since onset. Irritative symptoms include frequency and urgency. Pertinent negatives include no dysuria. He is not sexually active. Past treatments include tamsulosin and finasteride. The treatment provided moderate relief. He has been using treatment for 2 or more years.   Dizziness:   Chronicity:  New  Onset:  More than 1 month ago  Progression since onset:  Waxing and waning  Frequency:  Every few days  Severity:  Mild  Dizziness characteristics:  Spacial disorientation and off-balance   Associated symptoms: light-headedness.no fever, no tinnitus, no palpitations and no chest pain.      Past Medical History:   Diagnosis Date    Allergy     Codeine    Closed fracture dislocation of lumbar spine 7/6/2018    #2 vertebra. Patient went to the emergency Department.  Followup with Erik:    GERD (gastroesophageal reflux disease)     Hyperlipidemia     Hypertension     Hypertensive retinopathy of both eyes 5/11/2021    As  per ophthalmology notes.  Eye care 20/20    Right inguinal hernia     Thyroid disease      Social History     Socioeconomic History    Marital status:      Spouse name: Alisa galvin    Number of children: 3   Occupational History    Occupation: [employer]-      Comment:     Tobacco Use    Smoking status: Never Smoker    Smokeless tobacco: Never Used   Substance and Sexual Activity    Alcohol use: No    Drug use: No    Sexual activity: Not Currently     Partners: Female     Social Determinants of Health     Financial Resource Strain: Low Risk     Difficulty of Paying Living Expenses: Not very hard   Food Insecurity: No Food Insecurity    Worried About Running Out of Food in the Last Year: Never true    Ran Out of Food in the Last Year: Never true   Transportation Needs: No Transportation Needs    Lack of Transportation (Medical): No    Lack of Transportation (Non-Medical): No   Physical Activity: Inactive    Days of Exercise per Week: 0 days    Minutes of Exercise per Session: 0 min   Stress: Stress Concern Present    Feeling of Stress : To some extent   Social Connections: Socially Integrated    Frequency of Communication with Friends and Family: Three times a week    Frequency of Social Gatherings with Friends and Family: Three times a week    Attends Tenriism Services: More than 4 times per year    Active Member of Clubs or Organizations: Yes    Attends Club or Organization Meetings: 1 to 4 times per year    Marital Status:    Housing Stability: Low Risk     Unable to Pay for Housing in the Last Year: No    Number of Places Lived in the Last Year: 1    Unstable Housing in the Last Year: No     Past Surgical History:   Procedure Laterality Date    ADENOIDECTOMY      INGUINAL HERNIA REPAIR Right 07/12/2018    Dr. [facility]    KNEE SURGERY Left 07/31/2019    TONSILLECTOMY      VASECTOMY       Family History   Problem Relation Age of  Onset    Heart disease Father     Miscarriages / Stillbirths Father     Skin cancer Mother     Cancer Maternal Grandfather     Stomach cancer Paternal Grandfather        Review of Systems   Constitutional: Negative for activity change, appetite change, fatigue, fever and unexpected weight change (Has gained about 4 lb of weight in the last few months.  Generally weight has been stable otherwise.).        Patient is going through a little rough patch postoperatively.  On occasions he has felt lightheaded.  Patient tells me that he has been stopped off losartan and finasteride by the Cardiology.  Not sure.   HENT: Negative for nosebleeds, rhinorrhea and tinnitus.         Frequent sinus problems-seeing Dr. Jann Carrizales for the same.   Eyes: Negative for pain, discharge, itching and visual disturbance.   Respiratory: Positive for apnea (Known sleep apnea for the last 20 years.). Negative for cough, chest tightness and stridor.         Pulmonary nodule on incidental CT scan.  Uses a CPAP device for sleep apnea.   Cardiovascular: Negative for chest pain, palpitations and leg swelling.        Patient has hypertension and hyperlipidemia.  Recent evaluation by Cardiology for atrial fibrillation and review of ZIO monitor.   Gastrointestinal: Negative for abdominal distention, blood in stool, constipation and diarrhea.        Stable GERD  Occasional hemorrhoidal bleeding. Secondary to constipation.   Endocrine: Positive for cold intolerance. Negative for heat intolerance, polydipsia and polyphagia.        Patient has hypothyroidism. Stable on levothyroxine. Current labs are good. Blood sugars are slightly elevated.   Genitourinary: Positive for frequency and urgency. Negative for dysuria.        Benign prostate hypertrophy stable on tamsulosin and finasteride.  Recently had seen Dr. Timmy Alaniz who had done a CT scan on him.  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    Unclassified renal issues.  Sees Dr. Smith.  "  Musculoskeletal: Positive for arthralgias. Negative for back pain, gait problem and joint swelling.        S/P Left knee TKR -4 years back.  Recent right total knee replacement which was followed with complications.  He had the right knee joint drained at least 4 times but his orthopedic Dr. Cespedes for effusion.  At this point he is doing okay.  He has stop physical therapy and reached maximal improvement though he continues with some discomfort.   Skin: Negative for color change and pallor. Wound: left ankle.   Neurological: Positive for dizziness and light-headedness. Negative for tremors, seizures, facial asymmetry and speech difficulty.        Some memory issues.  Lightheadedness and dizziness is overall better and he is more stable.  He does feel little sway when he gets up.   Hematological: Negative for adenopathy. Does not bruise/bleed easily.        He is taking a full aspirin for the time being postoperatively after the knee surgery 325 mg.   Psychiatric/Behavioral: Negative for agitation, behavioral problems and confusion. The patient is not nervous/anxious.         Some memory issues which is commensurate with age and station of his life.         Objective:      Blood pressure (!) 151/69, pulse 63, height 5' 10" (1.778 m), weight 98 kg (216 lb). Body mass index is 30.99 kg/m².  Physical Exam  Vitals and nursing note reviewed.   Constitutional:       General: He is not in acute distress.     Appearance: He is well-developed. He is obese. He is not ill-appearing, toxic-appearing or diaphoretic.      Comments: BMI is 30.99   HENT:      Head: Atraumatic.      Nose:      Right Sinus: No frontal sinus tenderness.      Left Sinus: No maxillary sinus tenderness or frontal sinus tenderness.   Eyes:      General: No scleral icterus.        Right eye: No discharge.         Left eye: No discharge.   Neck:      Vascular: No JVD.      Trachea: No tracheal deviation.   Cardiovascular:      Rate and Rhythm: Normal rate " and regular rhythm.      Heart sounds: Normal heart sounds.   Pulmonary:      Effort: Pulmonary effort is normal. No respiratory distress.      Breath sounds: Normal breath sounds. No wheezing or rales.   Abdominal:      General: There is no distension or abdominal bruit.      Palpations: Abdomen is soft.      Tenderness: There is no abdominal tenderness.   Musculoskeletal:         General: No tenderness.      Right lower leg: Deformity present. Edema (1+ edema which is probably reactive to secondary surgery on the right side.) present.      Left lower leg: No edema.        Legs:       Comments: Postoperative knee   Lymphadenopathy:      Cervical: No cervical adenopathy.   Skin:     General: Skin is warm and dry.      Findings: No erythema or rash.      Comments: C/O Dry skin   Neurological:      Mental Status: He is alert. Mental status is at baseline.      Motor: Tremor present.      Coordination: Romberg sign positive.      Comments: Mild be trial of tremor on the right index finger at rest.  Not on intention.    Slightly positive Romberg on closing the eyes.  Push test for Parkinson's is negative.  Gait is slow and cautious.   Psychiatric:         Mood and Affect: Mood normal.         Behavior: Behavior normal.      Comments: Beginnings of cognitive changes.           Assessment:       1. Benign essential hypertension    2. Multiple-type hyperlipidemia    3. Secondary hypothyroidism    4. Benign prostatic hyperplasia without urinary obstruction    5. Abdominal aortic aneurysm (AAA) without rupture    6. Episode of dizziness    7. Paroxysmal atrial fibrillation           Clinical Support on 06/21/2022   Component Date Value Ref Range Status    85% Max Predicted HR 06/21/2022 117   Final    Max Predicted HR 06/21/2022 138   Final    OHS CV CPX PATIENT IS MALE 06/21/2022 1.0   Final    OHS CV CPX PATIENT IS FEMALE 06/21/2022 0.0   Final    HR at rest 06/21/2022 49  bpm Final    Systolic blood pressure  06/21/2022 149  mmHg Final    Diastolic blood pressure 06/21/2022 77  mmHg Final    RPP 06/21/2022 7,301   Final    Peak HR 06/21/2022 76  bpm Final    Peak Systolic BP 06/21/2022 139  mmHg Final    Peak Diatolic BP 06/21/2022 77  mmHg Final    Peak RPP 06/21/2022 10,564   Final    % Max HR Achieved 06/21/2022 55   Final    1 Minute Recovery HR 06/21/2022 64  bpm Final   Lab Visit on 04/28/2022   Component Date Value Ref Range Status    TSH 04/28/2022 0.290 (A) 0.340 - 5.600 uIU/mL Final    Free T4 04/28/2022 1.58 (A) 0.71 - 1.51 ng/dL Final   Lab Visit on 04/07/2022   Component Date Value Ref Range Status    Sodium 04/07/2022 136  136 - 145 mmol/L Final    Potassium 04/07/2022 4.1  3.5 - 5.1 mmol/L Final    Chloride 04/07/2022 107  95 - 110 mmol/L Final    CO2 04/07/2022 22 (A) 23 - 29 mmol/L Final    Glucose 04/07/2022 94  70 - 110 mg/dL Final    BUN 04/07/2022 21  8 - 23 mg/dL Final    Creatinine 04/07/2022 1.1  0.5 - 1.4 mg/dL Final    Calcium 04/07/2022 8.9  8.7 - 10.5 mg/dL Final    Anion Gap 04/07/2022 7 (A) 8 - 16 mmol/L Final    eGFR if African American 04/07/2022 >60.0  >60 mL/min/1.73 m^2 Final    eGFR if non African American 04/07/2022 >60.0  >60 mL/min/1.73 m^2 Final    WBC 04/07/2022 4.76  3.90 - 12.70 K/uL Final    RBC 04/07/2022 4.47 (A) 4.60 - 6.20 M/uL Final    Hemoglobin 04/07/2022 12.7 (A) 14.0 - 18.0 g/dL Final    Hematocrit 04/07/2022 38.7 (A) 40.0 - 54.0 % Final    MCV 04/07/2022 87  82 - 98 fL Final    MCH 04/07/2022 28.4  27.0 - 31.0 pg Final    MCHC 04/07/2022 32.8  32.0 - 36.0 g/dL Final    RDW 04/07/2022 14.3  11.5 - 14.5 % Final    Platelets 04/07/2022 256  150 - 450 K/uL Final    MPV 04/07/2022 9.1 (A) 9.2 - 12.9 fL Final    Immature Granulocytes 04/07/2022 0.2  0.0 - 0.5 % Final    Gran # (ANC) 04/07/2022 3.0  1.8 - 7.7 K/uL Final    Immature Grans (Abs) 04/07/2022 0.01  0.00 - 0.04 K/uL Final    Lymph # 04/07/2022 1.1  1.0 - 4.8 K/uL Final    Mono #  04/07/2022 0.4  0.3 - 1.0 K/uL Final    Eos # 04/07/2022 0.2  0.0 - 0.5 K/uL Final    Baso # 04/07/2022 0.05  0.00 - 0.20 K/uL Final    nRBC 04/07/2022 0  0 /100 WBC Final    Gran % 04/07/2022 62.6  38.0 - 73.0 % Final    Lymph % 04/07/2022 23.7  18.0 - 48.0 % Final    Mono % 04/07/2022 8.8  4.0 - 15.0 % Final    Eosinophil % 04/07/2022 3.6  0.0 - 8.0 % Final    Basophil % 04/07/2022 1.1  0.0 - 1.9 % Final    Differential Method 04/07/2022 Automated   Final    BNP 04/07/2022 123 (A) 0 - 99 pg/mL Final     ECHOCARDIOGRAM RESULTS  Results for orders placed during the hospital encounter of 12/26/20     Echo Color Flow Doppler? Yes     Interpretation Summary  · The left ventricle is normal in size with mild concentric hypertrophy and normal systolic function. The estimated ejection fraction is 60%  · Normal left ventricular diastolic function.  · Normal right ventricular size with normal right ventricular systolic function.  · Mild left atrial enlargement.  · Normal central venous pressure (3 mmHg).  · The estimated PA systolic pressure is 28 mmHg.  · PFO noted by color flow Doppler. Bubble study is negative for any intracardiac shunt however it is not of good quality. Consider MYRIAM       Plan:           Benign essential hypertension    Multiple-type hyperlipidemia    Secondary hypothyroidism  -     levothyroxine (SYNTHROID, LEVOTHROID) 175 MCG tablet; Take 1 tablet (175 mcg total) by mouth before breakfast.  Dispense: 90 tablet; Refill: 3    Benign prostatic hyperplasia without urinary obstruction    Abdominal aortic aneurysm (AAA) without rupture    Episode of dizziness    Paroxysmal atrial fibrillation      Patient's medical issues have been reviewed.    Blood pressure control is uncontrolled.  PATIENT'S BLOOD PRESSURE MACHINE WAS REVIEWED AND CALIBRATED TODAY.    Episodes of dizziness on standing:-dizziness  has dissipated though he feels a little sway when he stands up and his eyes closed.    Paroxysmal  atrial fibrillation also has been noted with low risk for blood clots and he is not on anticoagulation.    Prostate symptoms are stable at this point with no major issues.    Right knee pain is stable at this point.  He had multiple effusions which needed recurrent drainage after the surgery.  He was going through physical therapy and he felt that physical therapy is not going to improve any better and he stopped it.    Cognition and memory issues have been reviewed.  He has some age related and age-appropriate cognitive issues.  He is still very functional.  He would not be able to recall the name of his medications however.  He was never able to do that.    Preventive care issues including shingles vaccine, booster dose of COVID vaccine have also been noted.    Had another discussion on colonoscopy and I believe that at this point it will be timed out given his advanced age at this point.  He is equivocal about it.  He leaves it to us.  Will have discussion with his family members also concerning the same.    He has got 2 vaccinations for COVID including the booster dosage.    He has been recommended eye checkup.    FUP 3 M    Spent roland 30 minutes with patient which involved review of pts medical conditions, labs, medications and with 50% of time face-to-face discussion about medical problems, management and any applicable changes.    I would like you to have the following discussions with your family members.    1.-if you should get a colonoscopy done.  Usually colonoscopy is optional after age of 75. It depends upon as to what you would do if a colonoscopy was done and there was a finding of a cancer.  Would you go through surgery, chemotherapy for the same.  Surgery and chemotherapy carry there risk as the age advances.    2.-I would like you to get a ophthalmology or eye checkup on a regular basis.    3.-continue with fall precautions and injury precautions.      . 4-YOUR BLOOD PRESSURES ARE ELEVATED TODAY.   NOT SURE IF THIS IS A REGULAR TREND OR A TEMPORARY PHASE.  PLEASE CONTINUE TO MONITOR YOUR BLOOD PRESSURES.  I WOULD LIKE YOUR TOP NUMBER TO BE AROUND 140 AND .     5.-more importantly it seems as per your labs your thyroid treatment was excess.  I am reducing your dosage of thyroid to 175 mcg and I have sent a prescription to your local [facility].  If you would like to send to mail order-let me know as soon as possible.  Perhaps the 200 mcg of thyroxine that you taking might be contributing to your elevated blood pressures.    No labs at this time for the next visit in 3 months but after that I will order some chemistry and thyroid test again.  A copy of my instructions have been given to the patient.      Current Outpatient Medications:     ascorbic acid, vitamin C, (VITAMIN C) 500 MG tablet, Take 500 mg by mouth once daily., Disp: , Rfl:     aspirin (ECOTRIN) 81 MG EC tablet, Take 81 mg by mouth once daily., Disp: , Rfl:     b complex vitamins tablet, Take 1 tablet by mouth once daily., Disp: , Rfl:     CALCIUM CARBONATE/VITAMIN D3 (VITAMIN D-3 ORAL), Take by mouth., Disp: , Rfl:     clobetasol 0.05% (TEMOVATE) 0.05 % Oint, Apply to rash on feet bid., Disp: 45 g, Rfl: 0    docusate sodium (COLACE) 50 MG capsule, Take by mouth 2 (two) times daily as needed for Constipation., Disp: , Rfl:     doxycycline (ORACEA) 40 mg capsule, TAKE 1 CAPSULE BY MOUTH EVERY DAY, Disp: 90 capsule, Rfl: 3    felodipine (PLENDIL) 5 MG 24 hr tablet, Take 5 mg by mouth once daily., Disp: , Rfl:     finasteride (PROSCAR) 5 mg tablet, Take 1 tablet (5 mg total) by mouth once daily., Disp: 90 tablet, Rfl: 3    fish oil-omega-3 fatty acids 300-1,000 mg capsule, Take 2 g by mouth once daily., Disp: , Rfl:     fluocinonide (LIDEX) 0.05 % ointment, Apply to AA lower leg bid max use one month, Disp: 45 g, Rfl: 0    losartan (COZAAR) 25 MG tablet, Take 0.5 tablets (12.5 mg total) by mouth 2 (two) times a day., Disp: 90 tablet, Rfl:  3    losartan (COZAAR) 25 MG tablet, Take 1 tablet (25 mg total) by mouth 2 (two) times a day., Disp: 180 tablet, Rfl: 3    multivitamin (THERAGRAN) per tablet, Take 1 tablet by mouth once daily., Disp: , Rfl:     mupirocin (BACTROBAN) 2 % ointment, by Nasal route 3 (three) times daily., Disp: 20 g, Rfl: 0    pravastatin (PRAVACHOL) 20 MG tablet, Take 1 tablet (20 mg total) by mouth once daily., Disp: 90 tablet, Rfl: 2    tamsulosin (FLOMAX) 0.4 mg Cap, Take 1 capsule (0.4 mg total) by mouth every evening. Take at bedtime, Disp: 90 capsule, Rfl: 3    levothyroxine (SYNTHROID, LEVOTHROID) 175 MCG tablet, Take 1 tablet (175 mcg total) by mouth before breakfast., Disp: 90 tablet, Rfl: 3

## 2022-06-29 ENCOUNTER — OFFICE VISIT (OUTPATIENT)
Dept: FAMILY MEDICINE | Facility: CLINIC | Age: 82
End: 2022-06-29
Payer: MEDICARE

## 2022-06-29 VITALS
SYSTOLIC BLOOD PRESSURE: 151 MMHG | WEIGHT: 216 LBS | BODY MASS INDEX: 30.92 KG/M2 | HEIGHT: 70 IN | DIASTOLIC BLOOD PRESSURE: 69 MMHG | HEART RATE: 63 BPM

## 2022-06-29 DIAGNOSIS — E03.8 SECONDARY HYPOTHYROIDISM: ICD-10-CM

## 2022-06-29 DIAGNOSIS — I48.0 PAROXYSMAL ATRIAL FIBRILLATION: ICD-10-CM

## 2022-06-29 DIAGNOSIS — I71.40 ABDOMINAL AORTIC ANEURYSM (AAA) WITHOUT RUPTURE: ICD-10-CM

## 2022-06-29 DIAGNOSIS — R42 EPISODE OF DIZZINESS: ICD-10-CM

## 2022-06-29 DIAGNOSIS — E78.2 MULTIPLE-TYPE HYPERLIPIDEMIA: ICD-10-CM

## 2022-06-29 DIAGNOSIS — N40.0 BENIGN PROSTATIC HYPERPLASIA WITHOUT URINARY OBSTRUCTION: ICD-10-CM

## 2022-06-29 DIAGNOSIS — I10 BENIGN ESSENTIAL HYPERTENSION: Primary | ICD-10-CM

## 2022-06-29 PROCEDURE — 99214 OFFICE O/P EST MOD 30 MIN: CPT | Mod: S$PBB,AQ,, | Performed by: INTERNAL MEDICINE

## 2022-06-29 PROCEDURE — 99214 PR OFFICE/OUTPT VISIT, EST, LEVL IV, 30-39 MIN: ICD-10-PCS | Mod: S$PBB,AQ,, | Performed by: INTERNAL MEDICINE

## 2022-06-29 PROCEDURE — 99213 OFFICE O/P EST LOW 20 MIN: CPT | Performed by: INTERNAL MEDICINE

## 2022-06-29 RX ORDER — LEVOTHYROXINE SODIUM 175 UG/1
175 TABLET ORAL
Qty: 90 TABLET | Refills: 3 | Status: SHIPPED | OUTPATIENT
Start: 2022-06-29 | End: 2023-09-07

## 2022-07-18 ENCOUNTER — LAB VISIT (OUTPATIENT)
Dept: LAB | Facility: HOSPITAL | Age: 82
End: 2022-07-18
Attending: INTERNAL MEDICINE
Payer: MEDICARE

## 2022-07-18 DIAGNOSIS — N25.81 SECONDARY HYPERPARATHYROIDISM OF RENAL ORIGIN: ICD-10-CM

## 2022-07-18 DIAGNOSIS — I10 ESSENTIAL HYPERTENSION, MALIGNANT: ICD-10-CM

## 2022-07-18 DIAGNOSIS — N18.30 CHRONIC KIDNEY DISEASE, STAGE III (MODERATE): Primary | ICD-10-CM

## 2022-07-18 LAB
ALBUMIN SERPL BCP-MCNC: 4.2 G/DL (ref 3.5–5.2)
ANION GAP SERPL CALC-SCNC: 6 MMOL/L (ref 8–16)
BACTERIA #/AREA URNS HPF: NEGATIVE /HPF
BUN SERPL-MCNC: 18 MG/DL (ref 8–23)
CALCIUM SERPL-MCNC: 9.5 MG/DL (ref 8.7–10.5)
CHLORIDE SERPL-SCNC: 105 MMOL/L (ref 95–110)
CO2 SERPL-SCNC: 29 MMOL/L (ref 23–29)
CREAT SERPL-MCNC: 1.1 MG/DL (ref 0.5–1.4)
CREAT UR-MCNC: 44 MG/DL (ref 23–375)
EST. GFR  (AFRICAN AMERICAN): >60 ML/MIN/1.73 M^2
EST. GFR  (NON AFRICAN AMERICAN): >60 ML/MIN/1.73 M^2
GLUCOSE SERPL-MCNC: 91 MG/DL (ref 70–110)
HYALINE CASTS #/AREA URNS LPF: 0 /LPF
MAGNESIUM SERPL-MCNC: 2.1 MG/DL (ref 1.6–2.6)
MICROSCOPIC COMMENT: ABNORMAL
PHOSPHATE SERPL-MCNC: 2.9 MG/DL (ref 2.7–4.5)
POTASSIUM SERPL-SCNC: 4 MMOL/L (ref 3.5–5.1)
PROT UR-MCNC: <6 MG/DL (ref 6–15)
PROT/CREAT UR: NORMAL MG/G{CREAT} (ref 0–0.2)
PTH-INTACT SERPL-MCNC: 24.7 PG/ML (ref 9–77)
RBC #/AREA URNS HPF: 0 /HPF (ref 0–4)
SODIUM SERPL-SCNC: 140 MMOL/L (ref 136–145)
SQUAMOUS #/AREA URNS HPF: 0 /HPF
URATE SERPL-MCNC: 5.5 MG/DL (ref 3.4–7)
WBC #/AREA URNS HPF: 0 /HPF (ref 0–5)

## 2022-07-18 PROCEDURE — 81001 URINALYSIS AUTO W/SCOPE: CPT | Performed by: INTERNAL MEDICINE

## 2022-07-18 PROCEDURE — 36415 COLL VENOUS BLD VENIPUNCTURE: CPT | Performed by: INTERNAL MEDICINE

## 2022-07-18 PROCEDURE — 83735 ASSAY OF MAGNESIUM: CPT | Performed by: INTERNAL MEDICINE

## 2022-07-18 PROCEDURE — 83970 ASSAY OF PARATHORMONE: CPT | Performed by: INTERNAL MEDICINE

## 2022-07-18 PROCEDURE — 84550 ASSAY OF BLOOD/URIC ACID: CPT | Performed by: INTERNAL MEDICINE

## 2022-07-18 PROCEDURE — 80069 RENAL FUNCTION PANEL: CPT | Performed by: INTERNAL MEDICINE

## 2022-07-18 PROCEDURE — 84156 ASSAY OF PROTEIN URINE: CPT | Performed by: INTERNAL MEDICINE

## 2022-07-19 ENCOUNTER — TELEPHONE (OUTPATIENT)
Dept: FAMILY MEDICINE | Facility: CLINIC | Age: 82
End: 2022-07-19

## 2022-07-19 DIAGNOSIS — E03.8 SECONDARY HYPOTHYROIDISM: Primary | ICD-10-CM

## 2022-07-19 NOTE — TELEPHONE ENCOUNTER
Pt requesting ref to Dr. Cardoza. Referral pended.    Secondary hypothyroidism  -     Ambulatory referral/consult to Endocrinology; Future; Expected date: 07/26/2022

## 2022-07-28 ENCOUNTER — OFFICE VISIT (OUTPATIENT)
Dept: CARDIOLOGY | Facility: CLINIC | Age: 82
End: 2022-07-28
Payer: MEDICARE

## 2022-07-28 VITALS
HEART RATE: 52 BPM | SYSTOLIC BLOOD PRESSURE: 136 MMHG | WEIGHT: 218.69 LBS | OXYGEN SATURATION: 97 % | DIASTOLIC BLOOD PRESSURE: 78 MMHG | BODY MASS INDEX: 31.38 KG/M2

## 2022-07-28 DIAGNOSIS — R79.89 ELEVATED BRAIN NATRIURETIC PEPTIDE (BNP) LEVEL: Primary | ICD-10-CM

## 2022-07-28 DIAGNOSIS — E78.2 MIXED HYPERLIPIDEMIA: ICD-10-CM

## 2022-07-28 DIAGNOSIS — I10 BENIGN ESSENTIAL HYPERTENSION: ICD-10-CM

## 2022-07-28 PROCEDURE — 99214 OFFICE O/P EST MOD 30 MIN: CPT | Mod: S$GLB,,, | Performed by: INTERNAL MEDICINE

## 2022-07-28 PROCEDURE — 99214 PR OFFICE/OUTPT VISIT, EST, LEVL IV, 30-39 MIN: ICD-10-PCS | Mod: S$GLB,,, | Performed by: INTERNAL MEDICINE

## 2022-07-28 NOTE — PROGRESS NOTES
[facility] - Cardiology    Subjective:     Patient ID:  Nikolay Barraza is a 81 y.o. male patient here for evaluation Results      HPI:  81-year-old male here for follow-up of his shortness of breath and fatigue.  He had a stress test done which was negative for ischemia.  He reports that his shortness of breath and fatigue have improved and he is back to doing more activities as he is recovering from his knee surgery.  He also had some postoperative atrial fibrillation.  He was evaluated by Dr. Velasquez for the same and had a event monitor done which does not show any atrial fibrillation.    Review of Systems   All other systems reviewed and are negative.       Past Medical History:   Diagnosis Date    Allergy     Codeine    Closed fracture dislocation of lumbar spine 7/6/2018    #2 vertebra. Patient went to the emergency Department.  Followup with Erik:    GERD (gastroesophageal reflux disease)     Hyperlipidemia     Hypertension     Hypertensive retinopathy of both eyes 5/11/2021    As per ophthalmology notes.  Eye care 20/20    Right inguinal hernia     Thyroid disease        Past Surgical History:   Procedure Laterality Date    ADENOIDECTOMY      INGUINAL HERNIA REPAIR Right 07/12/2018    Dr. Thomas - [facility]    KNEE SURGERY Left 07/31/2019    TONSILLECTOMY      VASECTOMY         Family History   Problem Relation Age of Onset    Heart disease Father     Miscarriages / Stillbirths Father     Skin cancer Mother     Cancer Maternal Grandfather     Stomach cancer Paternal Grandfather        Social History     Socioeconomic History    Marital status:      Spouse name: Alisa barraza    Number of children: 3   Occupational History    Occupation: [employer]-      Comment:     Tobacco Use    Smoking status: Never Smoker    Smokeless tobacco: Never Used   Substance and Sexual Activity    Alcohol use: No    Drug use: No    Sexual activity: Not Currently     Partners:  Female     Social Determinants of Health     Financial Resource Strain: Low Risk     Difficulty of Paying Living Expenses: Not very hard   Food Insecurity: No Food Insecurity    Worried About Running Out of Food in the Last Year: Never true    Ran Out of Food in the Last Year: Never true   Transportation Needs: No Transportation Needs    Lack of Transportation (Medical): No    Lack of Transportation (Non-Medical): No   Physical Activity: Inactive    Days of Exercise per Week: 0 days    Minutes of Exercise per Session: 0 min   Stress: Stress Concern Present    Feeling of Stress : To some extent   Social Connections: Socially Integrated    Frequency of Communication with Friends and Family: Three times a week    Frequency of Social Gatherings with Friends and Family: Three times a week    Attends Buddhism Services: More than 4 times per year    Active Member of Clubs or Organizations: Yes    Attends Club or Organization Meetings: 1 to 4 times per year    Marital Status:    Housing Stability: Low Risk     Unable to Pay for Housing in the Last Year: No    Number of Places Lived in the Last Year: 1    Unstable Housing in the Last Year: No       Current Outpatient Medications   Medication Sig Dispense Refill    ascorbic acid, vitamin C, (VITAMIN C) 500 MG tablet Take 500 mg by mouth once daily.      aspirin (ECOTRIN) 81 MG EC tablet Take 81 mg by mouth once daily.      b complex vitamins tablet Take 1 tablet by mouth once daily.      CALCIUM CARBONATE/VITAMIN D3 (VITAMIN D-3 ORAL) Take by mouth.      clobetasol 0.05% (TEMOVATE) 0.05 % Oint Apply to rash on feet bid. 45 g 0    docusate sodium (COLACE) 50 MG capsule Take by mouth 2 (two) times daily as needed for Constipation.      doxycycline (ORACEA) 40 mg capsule TAKE 1 CAPSULE BY MOUTH EVERY DAY 90 capsule 3    felodipine (PLENDIL) 5 MG 24 hr tablet Take 5 mg by mouth once daily.      fish oil-omega-3 fatty acids 300-1,000 mg capsule  Take 2 g by mouth once daily.      fluocinonide (LIDEX) 0.05 % ointment Apply to AA lower leg bid max use one month 45 g 0    levothyroxine (SYNTHROID, LEVOTHROID) 175 MCG tablet Take 1 tablet (175 mcg total) by mouth before breakfast. 90 tablet 3    losartan (COZAAR) 25 MG tablet Take 1 tablet (25 mg total) by mouth 2 (two) times a day. 180 tablet 3    multivitamin (THERAGRAN) per tablet Take 1 tablet by mouth once daily.      mupirocin (BACTROBAN) 2 % ointment by Nasal route 3 (three) times daily. 20 g 0    pravastatin (PRAVACHOL) 20 MG tablet Take 1 tablet (20 mg total) by mouth once daily. 90 tablet 2    tamsulosin (FLOMAX) 0.4 mg Cap Take 1 capsule (0.4 mg total) by mouth every evening. Take at bedtime 90 capsule 3    finasteride (PROSCAR) 5 mg tablet Take 1 tablet (5 mg total) by mouth once daily. 90 tablet 3     No current facility-administered medications for this visit.       Review of patient's allergies indicates:   Allergen Reactions    Codeine Other (See Comments)     mood changes  Makes him feel violent         Objective:        Vitals:    07/28/22 1128   BP: 136/78   Pulse: (!) 52       Physical Exam  Vitals reviewed.   Constitutional:       Appearance: Normal appearance.   HENT:      Mouth/Throat:      Mouth: Mucous membranes are moist.   Eyes:      Extraocular Movements: Extraocular movements intact.      Pupils: Pupils are equal, round, and reactive to light.   Cardiovascular:      Rate and Rhythm: Normal rate and regular rhythm.      Pulses: Normal pulses.      Heart sounds: Normal heart sounds. No murmur heard.    No gallop.   Pulmonary:      Effort: Pulmonary effort is normal.      Breath sounds: Normal breath sounds.   Abdominal:      General: Bowel sounds are normal.      Palpations: Abdomen is soft.   Musculoskeletal:         General: Normal range of motion.      Cervical back: Normal range of motion.   Skin:     General: Skin is warm and dry.   Neurological:      General: No focal  deficit present.      Mental Status: He is alert and oriented to person, place, and time.   Psychiatric:         Mood and Affect: Mood normal.         LIPIDS - LAST 2   Lab Results   Component Value Date    CHOL 157 12/27/2020    CHOL 169 12/17/2020    HDL 37 (L) 12/27/2020    HDL 41 12/17/2020    LDLCALC 93.6 12/27/2020    LDLCALC 108.4 12/17/2020    TRIG 132 12/27/2020    TRIG 98 12/17/2020    CHOLHDL 23.6 12/27/2020    CHOLHDL 24.3 12/17/2020       CBC - LAST 2  Lab Results   Component Value Date    WBC 4.76 04/07/2022    WBC 5.07 12/27/2020    RBC 4.47 (L) 04/07/2022    RBC 4.44 (L) 12/27/2020    HGB 12.7 (L) 04/07/2022    HGB 13.2 (L) 12/27/2020    HCT 38.7 (L) 04/07/2022    HCT 39.8 (L) 12/27/2020    MCV 87 04/07/2022    MCV 90 12/27/2020    MCH 28.4 04/07/2022    MCH 29.7 12/27/2020    MCHC 32.8 04/07/2022    MCHC 33.2 12/27/2020    RDW 14.3 04/07/2022    RDW 13.3 12/27/2020     04/07/2022     12/27/2020    MPV 9.1 (L) 04/07/2022    MPV 9.8 12/27/2020    GRAN 3.0 04/07/2022    GRAN 62.6 04/07/2022    LYMPH 1.1 04/07/2022    LYMPH 23.7 04/07/2022    MONO 0.4 04/07/2022    MONO 8.8 04/07/2022    BASO 0.05 04/07/2022    BASO 0.03 12/27/2020    NRBC 0 04/07/2022    NRBC 0 12/27/2020       CHEMISTRY & LIVER FUNCTION - LAST 2  Lab Results   Component Value Date     07/18/2022     04/07/2022    K 4.0 07/18/2022    K 4.1 04/07/2022     07/18/2022     04/07/2022    CO2 29 07/18/2022    CO2 22 (L) 04/07/2022    ANIONGAP 6 (L) 07/18/2022    ANIONGAP 7 (L) 04/07/2022    BUN 18 07/18/2022    BUN 21 04/07/2022    CREATININE 1.1 07/18/2022    CREATININE 1.1 04/07/2022    GLU 91 07/18/2022    GLU 94 04/07/2022    CALCIUM 9.5 07/18/2022    CALCIUM 8.9 04/07/2022    MG 2.1 07/18/2022    MG 1.9 07/20/2021    ALBUMIN 4.2 07/18/2022    ALBUMIN 4.0 01/18/2022    PROT 7.9 12/26/2020    PROT 7.3 12/17/2020    ALKPHOS 98 12/26/2020    ALKPHOS 75 12/17/2020    ALT 24 12/26/2020    ALT 20  12/17/2020    AST 34 12/26/2020    AST 24 12/17/2020    BILITOT 0.7 12/26/2020    BILITOT 0.9 12/17/2020        CARDIAC PROFILE - LAST 2  Lab Results   Component Value Date     (H) 04/07/2022    CPK 32 12/27/2020    CPK 37 12/26/2020    CPKMB 0.6 12/27/2020    CPKMB 0.9 12/26/2020    TROPONINI <0.006 12/27/2020    TROPONINI <0.006 12/27/2020        COAGULATION - LAST 2  Lab Results   Component Value Date    INR 1.0 12/26/2020    APTT 27.3 12/26/2020       ENDOCRINE & PSA - LAST 2  Lab Results   Component Value Date    HGBA1C 5.8 08/22/2017    TSH 0.290 (L) 04/28/2022    TSH 1.600 09/09/2021    PSA 0.11 06/28/2019    PSA 0.1 05/22/2018        ECHOCARDIOGRAM RESULTS  Results for orders placed during the hospital encounter of 12/26/20    Echo Color Flow Doppler? Yes    Interpretation Summary  · The left ventricle is normal in size with mild concentric hypertrophy and normal systolic function. The estimated ejection fraction is 60%  · Normal left ventricular diastolic function.  · Normal right ventricular size with normal right ventricular systolic function.  · Mild left atrial enlargement.  · Normal central venous pressure (3 mmHg).  · The estimated PA systolic pressure is 28 mmHg.  · PFO noted by color flow Doppler. Bubble study is negative for any intracardiac shunt however it is not of good quality. Consider MYRIAM      CURRENT/PREVIOUS VISIT EKG  Results for orders placed or performed in visit on 06/15/22   IN OFFICE EKG 12-LEAD (to [place])    Collection Time: 06/15/22  9:41 AM    Narrative    Test Reason : I48.0,    Vent. Rate : 055 BPM     Atrial Rate : 055 BPM     P-R Int : 220 ms          QRS Dur : 082 ms      QT Int : 424 ms       P-R-T Axes : 063 -39 074 degrees     QTc Int : 405 ms    Sinus bradycardia with 1st degree A-V block  Left axis deviation  Abnormal ECG  When compared with ECG of 26-JAN-2022 13:21,  No significant change was found  Confirmed by Gerald Vergara MD ([id number]) on 6/26/2022 6:46:37  PM    Referred By: TOMAS KEMP           Confirmed By:Gerald Vergara MD     No valid procedures specified.   Results for orders placed in visit on 06/21/22    Nuclear Stress Test    Interpretation Summary    The nuclear portion of this study will be reported separately.    The EKG portion of this study is negative for ischemia.    The patient reported chest pain during the stress test.    There were no arrhythmias during stress.    No valid procedures specified.        Assessment:       1. Elevated brain natriuretic peptide (BNP) level    2. Mixed hyperlipidemia    3. Benign essential hypertension           Plan:       Elevated brain natriuretic peptide (BNP) level    Mixed hyperlipidemia    Benign essential hypertension    Symptoms of fatigue and shortness of breath have improved.  No evidence of ischemia the stress test.  Continue with medical management and risk factor control.  Discussed with patient regarding importance of regular physical exercise and Mediterranean diet along with good control of hypertension and hyperlipidemia.  Continue with statin at current doses.  Last LDL was at target.  Would target LDL of around 100. Hypertension is well controlled.  Discussed with patient regarding the elevated BNP level.  Patient reports no swelling issues, pedal edema or shortness of breath and is extremely active at home.  Patient was instructed to call us if he has any exertion shortness of breath or chest discomfort.    Follow up in about 1 year (around 7/28/2023).          Tomas Kemp MD  [facility] - Cardiology

## 2022-09-12 NOTE — PROGRESS NOTES
Subjective:     HPI    I had the pleasure of seeing Nikolay Barraza in follow-up for his history of AF. He last saw me in 6/2022. He is an 81M with HTN, HLD, hypothyroidism, R TKR in 3/2022, who began to experience episodes of fatigue and DENSON following knee surgery in early 2022. This prompted an 8 day Zio monitor performed in 4/2022 which showed sinus rhythm with an average HR of 68 bpm (range  bpm), PAC/PVC burden both <1%, AF with an average  bpm, longest episode lasting 3.5 minutes at an average HR of 117 bpm, 5 beats of NSVT.    Echo in 12/2020 showed an EF of 60%.    An 8 day Zio performed in 6/2022 showed sinus rhythm with an average HR of 63 bpm (range  bpm), rare ectopy, no arrhythmias.    My interpretation of today's ECG is sinus rhythm at 63 bpm.    Review of Systems   Constitutional: Negative for decreased appetite, malaise/fatigue, weight gain and weight loss.   HENT:  Negative for sore throat.    Eyes:  Negative for blurred vision.   Cardiovascular:  Negative for chest pain, dyspnea on exertion, irregular heartbeat, leg swelling, near-syncope, orthopnea, palpitations, paroxysmal nocturnal dyspnea and syncope.   Respiratory:  Negative for shortness of breath.    Skin:  Negative for rash.   Musculoskeletal:  Negative for arthritis.   Gastrointestinal:  Negative for abdominal pain.   Neurological:  Negative for focal weakness.   Psychiatric/Behavioral:  Negative for altered mental status.       Objective:    Physical Exam  Vitals and nursing note reviewed.   Constitutional:       General: He is not in acute distress.     Appearance: He is well-developed.   HENT:      Head: Normocephalic and atraumatic.   Eyes:      General: No scleral icterus.     Pupils: Pupils are equal, round, and reactive to light.   Neck:      Thyroid: No thyromegaly.   Cardiovascular:      Rate and Rhythm: Regular rhythm.      Pulses: Normal pulses.      Heart sounds: Normal heart sounds. No murmur heard.    No  friction rub. No gallop.   Pulmonary:      Effort: Pulmonary effort is normal.      Breath sounds: Normal breath sounds.   Abdominal:      General: Bowel sounds are normal. There is no distension.      Palpations: Abdomen is soft.      Tenderness: There is no abdominal tenderness.   Musculoskeletal:      Cervical back: Neck supple.   Skin:     General: Skin is warm and dry.      Findings: No rash.   Neurological:      Mental Status: He is alert and oriented to person, place, and time.   Psychiatric:         Behavior: Behavior normal.         Assessment:       1. Mixed hyperlipidemia    2. Benign essential hypertension    3. Obstructive sleep apnea    4. Secondary hypothyroidism         Plan:       In summary, Nikolay Barraza is an 81M with newly diagnosed PAF, unclear if symptomatic. Overall AF burden exceedingly low. AF occurred shortly after knee surgery. Recent 8 day Zio with no AF seen.    We briefly discussed ILR implantation for AF management, but he would prefer to hold the course. Plan is for PRN follow-up.    Thank you for allowing me to participate in the care of this patient. Please do not hesitate to call me with any questions or concerns.

## 2022-09-14 ENCOUNTER — OFFICE VISIT (OUTPATIENT)
Dept: CARDIOLOGY | Facility: CLINIC | Age: 82
End: 2022-09-14
Payer: MEDICARE

## 2022-09-14 VITALS
WEIGHT: 214.31 LBS | RESPIRATION RATE: 16 BRPM | BODY MASS INDEX: 30.68 KG/M2 | OXYGEN SATURATION: 98 % | SYSTOLIC BLOOD PRESSURE: 140 MMHG | HEIGHT: 70 IN | DIASTOLIC BLOOD PRESSURE: 88 MMHG | HEART RATE: 74 BPM

## 2022-09-14 DIAGNOSIS — E78.2 MIXED HYPERLIPIDEMIA: Primary | ICD-10-CM

## 2022-09-14 DIAGNOSIS — E03.8 SECONDARY HYPOTHYROIDISM: ICD-10-CM

## 2022-09-14 DIAGNOSIS — I10 BENIGN ESSENTIAL HYPERTENSION: ICD-10-CM

## 2022-09-14 DIAGNOSIS — G47.33 OBSTRUCTIVE SLEEP APNEA: ICD-10-CM

## 2022-09-14 PROCEDURE — 99214 OFFICE O/P EST MOD 30 MIN: CPT | Mod: S$GLB,,, | Performed by: INTERNAL MEDICINE

## 2022-09-14 PROCEDURE — 93010 ELECTROCARDIOGRAM REPORT: CPT | Mod: S$GLB,,, | Performed by: SPECIALIST

## 2022-09-14 PROCEDURE — 93005 ELECTROCARDIOGRAM TRACING: CPT | Mod: S$GLB,,, | Performed by: INTERNAL MEDICINE

## 2022-09-14 PROCEDURE — 93005 EKG 12-LEAD: ICD-10-PCS | Mod: S$GLB,,, | Performed by: INTERNAL MEDICINE

## 2022-09-14 PROCEDURE — 99214 PR OFFICE/OUTPT VISIT, EST, LEVL IV, 30-39 MIN: ICD-10-PCS | Mod: S$GLB,,, | Performed by: INTERNAL MEDICINE

## 2022-09-14 PROCEDURE — 93010 EKG 12-LEAD: ICD-10-PCS | Mod: S$GLB,,, | Performed by: SPECIALIST

## 2022-09-22 ENCOUNTER — LAB VISIT (OUTPATIENT)
Dept: LAB | Facility: HOSPITAL | Age: 82
End: 2022-09-22
Attending: INTERNAL MEDICINE
Payer: MEDICARE

## 2022-09-22 DIAGNOSIS — I51.9 MYXEDEMA HEART DISEASE: Primary | ICD-10-CM

## 2022-09-22 DIAGNOSIS — E03.9 MYXEDEMA HEART DISEASE: Primary | ICD-10-CM

## 2022-09-22 LAB
T4 FREE SERPL-MCNC: 1.08 NG/DL (ref 0.71–1.51)
TSH SERPL DL<=0.005 MIU/L-ACNC: 2.06 UIU/ML (ref 0.34–5.6)

## 2022-09-22 PROCEDURE — 84443 ASSAY THYROID STIM HORMONE: CPT | Performed by: INTERNAL MEDICINE

## 2022-09-22 PROCEDURE — 84439 ASSAY OF FREE THYROXINE: CPT | Performed by: INTERNAL MEDICINE

## 2022-09-22 PROCEDURE — 36415 COLL VENOUS BLD VENIPUNCTURE: CPT | Performed by: INTERNAL MEDICINE

## 2022-12-15 ENCOUNTER — TELEPHONE (OUTPATIENT)
Dept: FAMILY MEDICINE | Facility: CLINIC | Age: 82
End: 2022-12-15

## 2022-12-15 VITALS — SYSTOLIC BLOOD PRESSURE: 140 MMHG | DIASTOLIC BLOOD PRESSURE: 88 MMHG

## 2023-01-17 ENCOUNTER — LAB VISIT (OUTPATIENT)
Dept: LAB | Facility: HOSPITAL | Age: 83
End: 2023-01-17
Attending: INTERNAL MEDICINE
Payer: MEDICARE

## 2023-01-17 DIAGNOSIS — N18.30 CHRONIC KIDNEY DISEASE, STAGE III (MODERATE): ICD-10-CM

## 2023-01-17 DIAGNOSIS — N18.30 CHRONIC KIDNEY DISEASE, STAGE III (MODERATE): Primary | ICD-10-CM

## 2023-01-17 LAB
ALBUMIN SERPL BCP-MCNC: 4 G/DL (ref 3.5–5.2)
ANION GAP SERPL CALC-SCNC: 11 MMOL/L (ref 8–16)
BUN SERPL-MCNC: 21 MG/DL (ref 8–23)
CALCIUM SERPL-MCNC: 9.4 MG/DL (ref 8.7–10.5)
CHLORIDE SERPL-SCNC: 106 MMOL/L (ref 95–110)
CO2 SERPL-SCNC: 26 MMOL/L (ref 23–29)
CREAT SERPL-MCNC: 1.2 MG/DL (ref 0.5–1.4)
CREAT UR-MCNC: 79 MG/DL (ref 23–375)
EST. GFR  (NO RACE VARIABLE): >60 ML/MIN/1.73 M^2
GLUCOSE SERPL-MCNC: 89 MG/DL (ref 70–110)
PHOSPHATE SERPL-MCNC: 3.6 MG/DL (ref 2.7–4.5)
POTASSIUM SERPL-SCNC: 4.6 MMOL/L (ref 3.5–5.1)
PROT UR-MCNC: <6 MG/DL (ref 6–15)
PROT/CREAT UR: NORMAL MG/G{CREAT} (ref 0–0.2)
SODIUM SERPL-SCNC: 143 MMOL/L (ref 136–145)

## 2023-01-17 PROCEDURE — 80069 RENAL FUNCTION PANEL: CPT | Performed by: INTERNAL MEDICINE

## 2023-01-17 PROCEDURE — 84156 ASSAY OF PROTEIN URINE: CPT | Performed by: INTERNAL MEDICINE

## 2023-01-17 PROCEDURE — 36415 COLL VENOUS BLD VENIPUNCTURE: CPT | Performed by: INTERNAL MEDICINE

## 2023-01-27 ENCOUNTER — OFFICE VISIT (OUTPATIENT)
Dept: CARDIOLOGY | Facility: CLINIC | Age: 83
End: 2023-01-27
Payer: MEDICARE

## 2023-01-27 ENCOUNTER — HOSPITAL ENCOUNTER (OUTPATIENT)
Dept: RADIOLOGY | Facility: HOSPITAL | Age: 83
Discharge: HOME | End: 2023-01-27
Attending: INTERNAL MEDICINE
Payer: MEDICARE

## 2023-01-27 VITALS
OXYGEN SATURATION: 96 % | BODY MASS INDEX: 31.88 KG/M2 | WEIGHT: 222.69 LBS | SYSTOLIC BLOOD PRESSURE: 126 MMHG | HEIGHT: 70 IN | HEART RATE: 64 BPM | DIASTOLIC BLOOD PRESSURE: 80 MMHG

## 2023-01-27 DIAGNOSIS — R06.02 SHORTNESS OF BREATH: ICD-10-CM

## 2023-01-27 DIAGNOSIS — R79.89 ELEVATED BRAIN NATRIURETIC PEPTIDE (BNP) LEVEL: ICD-10-CM

## 2023-01-27 DIAGNOSIS — I10 BENIGN ESSENTIAL HYPERTENSION: Primary | ICD-10-CM

## 2023-01-27 PROCEDURE — 99214 PR OFFICE/OUTPT VISIT, EST, LEVL IV, 30-39 MIN: ICD-10-PCS | Mod: S$PBB,,, | Performed by: INTERNAL MEDICINE

## 2023-01-27 PROCEDURE — 99214 OFFICE O/P EST MOD 30 MIN: CPT | Mod: PBBFAC,PN | Performed by: INTERNAL MEDICINE

## 2023-01-27 PROCEDURE — 93010 ELECTROCARDIOGRAM REPORT: CPT | Mod: S$PBB,,, | Performed by: INTERNAL MEDICINE

## 2023-01-27 PROCEDURE — 71046 X-RAY EXAM CHEST 2 VIEWS: CPT | Mod: TC

## 2023-01-27 PROCEDURE — 93005 ELECTROCARDIOGRAM TRACING: CPT | Mod: PBBFAC,PN | Performed by: INTERNAL MEDICINE

## 2023-01-27 PROCEDURE — 99214 OFFICE O/P EST MOD 30 MIN: CPT | Mod: S$PBB,,, | Performed by: INTERNAL MEDICINE

## 2023-01-27 PROCEDURE — 99999 PR PBB SHADOW E&M-EST. PATIENT-LVL IV: ICD-10-PCS | Mod: PBBFAC,,, | Performed by: INTERNAL MEDICINE

## 2023-01-27 PROCEDURE — 99999 PR PBB SHADOW E&M-EST. PATIENT-LVL IV: CPT | Mod: PBBFAC,,, | Performed by: INTERNAL MEDICINE

## 2023-01-27 PROCEDURE — 93010 EKG 12-LEAD: ICD-10-PCS | Mod: S$PBB,,, | Performed by: INTERNAL MEDICINE

## 2023-01-27 RX ORDER — FUROSEMIDE 20 MG/1
20 TABLET ORAL DAILY
Qty: 90 TABLET | Refills: 3 | Status: SHIPPED | OUTPATIENT
Start: 2023-01-27 | End: 2023-04-11

## 2023-01-27 NOTE — PROGRESS NOTES
[facility] [place] - Cardiology    Subjective:     Patient ID:  Nikolay Barraza is a 82 y.o. male patient here for evaluation Follow-up (1 Year)      HPI:  82-year-old male here for follow-up.  He reports he is beginning short of breath with certain exertional activities.  Also reports shortness of breath when he bends down and stands up.  Does not report any chest pain or chest tightness.  No dizziness or lightheadedness.    Review of Systems   All other systems reviewed and are negative.     Past Medical History:   Diagnosis Date    Allergy     Codeine    Closed fracture dislocation of lumbar spine 7/6/2018    #2 vertebra. Patient went to the emergency Department.  Followup with Erik:    GERD (gastroesophageal reflux disease)     Hyperlipidemia     Hypertension     Hypertensive retinopathy of both eyes 5/11/2021    As per ophthalmology notes.  Eye care 20/20    Right inguinal hernia     Thyroid disease        Past Surgical History:   Procedure Laterality Date    ADENOIDECTOMY      INGUINAL HERNIA REPAIR Right 07/12/2018    Dr. Thomas - [facility]    KNEE SURGERY Left 07/31/2019    TONSILLECTOMY      VASECTOMY         Family History   Problem Relation Age of Onset    Heart disease Father     Miscarriages / Stillbirths Father     Skin cancer Mother     Cancer Maternal Grandfather     Stomach cancer Paternal Grandfather        Social History     Socioeconomic History    Marital status:      Spouse name: Alisa barraza    Number of children: 3   Occupational History    Occupation: [employer]-      Comment:     Tobacco Use    Smoking status: Never    Smokeless tobacco: Never   Substance and Sexual Activity    Alcohol use: No    Drug use: No    Sexual activity: Not Currently     Partners: Female       Current Outpatient Medications   Medication Sig Dispense Refill    ascorbic acid, vitamin C, (VITAMIN C) 500 MG tablet Take 500 mg by mouth once daily.       aspirin (ECOTRIN) 81 MG EC tablet Take 81 mg by mouth once daily.      b complex vitamins tablet Take 1 tablet by mouth once daily.      CALCIUM CARBONATE/VITAMIN D3 (VITAMIN D-3 ORAL) Take by mouth.      docusate sodium (COLACE) 50 MG capsule Take by mouth 2 (two) times daily as needed for Constipation.      doxycycline (ORACEA) 40 mg capsule TAKE 1 CAPSULE BY MOUTH EVERY DAY 90 capsule 3    felodipine (PLENDIL) 5 MG 24 hr tablet Take 5 mg by mouth once daily.      finasteride (PROSCAR) 5 mg tablet Take 1 tablet (5 mg total) by mouth once daily. 90 tablet 3    fish oil-omega-3 fatty acids 300-1,000 mg capsule Take 2 g by mouth once daily.      fluocinonide (LIDEX) 0.05 % ointment Apply to AA lower leg bid max use one month 45 g 0    levothyroxine (SYNTHROID, LEVOTHROID) 175 MCG tablet Take 1 tablet (175 mcg total) by mouth before breakfast. 90 tablet 3    losartan (COZAAR) 25 MG tablet Take 1 tablet (25 mg total) by mouth 2 (two) times a day. 180 tablet 3    multivitamin (THERAGRAN) per tablet Take 1 tablet by mouth once daily.      mupirocin (BACTROBAN) 2 % ointment by Nasal route 3 (three) times daily. 20 g 0    pravastatin (PRAVACHOL) 20 MG tablet TAKE 1 TABLET BY MOUTH EVERY DAY 90 tablet 2    tamsulosin (FLOMAX) 0.4 mg Cap Take 1 capsule (0.4 mg total) by mouth every evening. Take at bedtime 90 capsule 3    triamcinolone acetonide 0.1% (KENALOG) 0.1 % cream Apply topically 2 (two) times daily. For rash on legs. 80 g 1    ibuprofen (ADVIL,MOTRIN) 800 MG tablet TAKE 1 TABLET BY MOUTH THREE TIMES A DAY FOR 10 DAYS       No current facility-administered medications for this visit.       Review of patient's allergies indicates:   Allergen Reactions    Codeine Other (See Comments)     mood changes  Makes him feel violent         Objective:        Vitals:    01/27/23 0936   BP: 126/80   Pulse: 64       Physical Exam  Vitals reviewed.   Constitutional:       Appearance: Normal appearance.   HENT:      Mouth/Throat:       Mouth: Mucous membranes are moist.   Eyes:      Extraocular Movements: Extraocular movements intact.      Pupils: Pupils are equal, round, and reactive to light.   Cardiovascular:      Rate and Rhythm: Normal rate and regular rhythm.      Pulses: Normal pulses.      Heart sounds: Normal heart sounds. No murmur heard.    No gallop.   Pulmonary:      Effort: Pulmonary effort is normal.      Breath sounds: Normal breath sounds.   Abdominal:      General: Bowel sounds are normal.      Palpations: Abdomen is soft.   Musculoskeletal:         General: Normal range of motion.      Cervical back: Normal range of motion.   Skin:     General: Skin is warm and dry.   Neurological:      General: No focal deficit present.      Mental Status: He is alert and oriented to person, place, and time.   Psychiatric:         Mood and Affect: Mood normal.       LIPIDS - LAST 2   Lab Results   Component Value Date    CHOL 157 12/27/2020    CHOL 169 12/17/2020    HDL 37 (L) 12/27/2020    HDL 41 12/17/2020    LDLCALC 93.6 12/27/2020    LDLCALC 108.4 12/17/2020    TRIG 132 12/27/2020    TRIG 98 12/17/2020    CHOLHDL 23.6 12/27/2020    CHOLHDL 24.3 12/17/2020       CBC - LAST 2  Lab Results   Component Value Date    WBC 4.76 04/07/2022    WBC 5.07 12/27/2020    RBC 4.47 (L) 04/07/2022    RBC 4.44 (L) 12/27/2020    HGB 12.7 (L) 04/07/2022    HGB 13.2 (L) 12/27/2020    HCT 38.7 (L) 04/07/2022    HCT 39.8 (L) 12/27/2020    MCV 87 04/07/2022    MCV 90 12/27/2020    MCH 28.4 04/07/2022    MCH 29.7 12/27/2020    MCHC 32.8 04/07/2022    MCHC 33.2 12/27/2020    RDW 14.3 04/07/2022    RDW 13.3 12/27/2020     04/07/2022     12/27/2020    MPV 9.1 (L) 04/07/2022    MPV 9.8 12/27/2020    GRAN 3.0 04/07/2022    GRAN 62.6 04/07/2022    LYMPH 1.1 04/07/2022    LYMPH 23.7 04/07/2022    MONO 0.4 04/07/2022    MONO 8.8 04/07/2022    BASO 0.05 04/07/2022    BASO 0.03 12/27/2020    NRBC 0 04/07/2022    NRBC 0 12/27/2020       CHEMISTRY & LIVER  FUNCTION - LAST 2  Lab Results   Component Value Date     01/17/2023     07/18/2022    K 4.6 01/17/2023    K 4.0 07/18/2022     01/17/2023     07/18/2022    CO2 26 01/17/2023    CO2 29 07/18/2022    ANIONGAP 11 01/17/2023    ANIONGAP 6 (L) 07/18/2022    BUN 21 01/17/2023    BUN 18 07/18/2022    CREATININE 1.2 01/17/2023    CREATININE 1.1 07/18/2022    GLU 89 01/17/2023    GLU 91 07/18/2022    CALCIUM 9.4 01/17/2023    CALCIUM 9.5 07/18/2022    MG 2.1 07/18/2022    MG 1.9 07/20/2021    ALBUMIN 4.0 01/17/2023    ALBUMIN 4.2 07/18/2022    PROT 7.9 12/26/2020    PROT 7.3 12/17/2020    ALKPHOS 98 12/26/2020    ALKPHOS 75 12/17/2020    ALT 24 12/26/2020    ALT 20 12/17/2020    AST 34 12/26/2020    AST 24 12/17/2020    BILITOT 0.7 12/26/2020    BILITOT 0.9 12/17/2020        CARDIAC PROFILE - LAST 2  Lab Results   Component Value Date     (H) 04/07/2022    CPK 32 12/27/2020    CPK 37 12/26/2020    CPKMB 0.6 12/27/2020    CPKMB 0.9 12/26/2020    TROPONINI <0.006 12/27/2020    TROPONINI <0.006 12/27/2020        COAGULATION - LAST 2  Lab Results   Component Value Date    INR 1.0 12/26/2020    APTT 27.3 12/26/2020       ENDOCRINE & PSA - LAST 2  Lab Results   Component Value Date    HGBA1C 5.8 08/22/2017    TSH 2.060 09/22/2022    TSH 0.290 (L) 04/28/2022    PSA 0.11 06/28/2019    PSA 0.1 05/22/2018        ECHOCARDIOGRAM RESULTS  Results for orders placed during the hospital encounter of 12/26/20    Echo Color Flow Doppler? Yes    Interpretation Summary  · The left ventricle is normal in size with mild concentric hypertrophy and normal systolic function. The estimated ejection fraction is 60%  · Normal left ventricular diastolic function.  · Normal right ventricular size with normal right ventricular systolic function.  · Mild left atrial enlargement.  · Normal central venous pressure (3 mmHg).  · The estimated PA systolic pressure is 28 mmHg.  · PFO noted by color flow Doppler. Bubble study is  negative for any intracardiac shunt however it is not of good quality. Consider MYRIAM      CURRENT/PREVIOUS VISIT EKG  Results for orders placed or performed in visit on 09/14/22   IN OFFICE EKG 12-LEAD (to [place])    Collection Time: 09/14/22  9:00 AM    Narrative    Test Reason : E78.2,    Vent. Rate : 063 BPM     Atrial Rate : 063 BPM     P-R Int : 200 ms          QRS Dur : 088 ms      QT Int : 416 ms       P-R-T Axes : 073 -42 086 degrees     QTc Int : 425 ms    Normal sinus rhythm  Left axis deviation  Abnormal ECG  When compared with ECG of 15-VASHTI-2022 09:41,  No significant change was found  Confirmed by Ethan GOINS, Tez HOLLAND ([id number]) on 10/26/2022 8:16:20 PM    Referred By: [email or web address]   SELF           Confirmed By:Tez Long MD     No valid procedures specified.   Results for orders placed in visit on 06/21/22    Nuclear Stress Test    Interpretation Summary    The nuclear portion of this study will be reported separately.    The EKG portion of this study is negative for ischemia.    The patient reported chest pain during the stress test.    There were no arrhythmias during stress.    No valid procedures specified.        Assessment:       1. Benign essential hypertension    2. Elevated brain natriuretic peptide (BNP) level    3. Shortness of breath             Plan:       Benign essential hypertension  -     IN OFFICE EKG 12-LEAD (to [place])    Elevated brain natriuretic peptide (BNP) level    Shortness of breath  -     X-Ray Chest PA And Lateral; Future; Expected date: 01/27/2023  -     B-TYPE NATRIURETIC PEPTIDE; Future; Expected date: 01/27/2023  -     Basic metabolic panel; Future; Expected date: 01/27/2023  -     Echo; Future    Will evaluate shortness of breath further with a BMP, BNP and chest x-ray.  Will get echocardiogram as well.  He had elevated BNP level in the past but it was not treated as he was feeling fairly well.  If BNP continues to be elevated, will attempt low-dose Lasix with follow-up BMP  and BNP.  If his symptoms continue to bother him, will consider left and right heart catheterization.    Follow up in about 4 weeks (around 2/24/2023) for Follow-up echocardiogram and blood work, shortness of breath.        Patient's chest x-ray showed some kinds of pulmonary congestion.  Will stop felodipine and start low-dose Lasix.  Patient instructed to start taking Lasix if he starts developing dizziness or does not feel well on it.  Repeat BMP prior to next follow-up.  Patient and family verbalized understanding.    Tomas Salomon MD  [facility] - Cardiology

## 2023-02-08 ENCOUNTER — TELEPHONE (OUTPATIENT)
Dept: CARDIOLOGY | Facility: CLINIC | Age: 83
End: 2023-02-08
Payer: MEDICARE

## 2023-02-08 NOTE — TELEPHONE ENCOUNTER
----- Message from Pedro Coppola sent at 2/8/2023 12:49 PM CST -----  Type:  Needs Medical Advice    Who Called: pt's Wife  Symptoms (please be specific): issues with RX     Would the patient rather a call back or a response via [facility]? Call    Best Call Back Number: [phone] or [phone]    Additional Information: Sts that she would like to talk to someone about an RX that he was having issues with.  Please advise -- Thank you

## 2023-02-16 ENCOUNTER — OFFICE VISIT (OUTPATIENT)
Dept: PODIATRY | Facility: CLINIC | Age: 83
End: 2023-02-16
Payer: MEDICARE

## 2023-02-16 VITALS — HEIGHT: 70 IN | WEIGHT: 216 LBS | BODY MASS INDEX: 30.92 KG/M2

## 2023-02-16 DIAGNOSIS — M20.12 HALLUX VALGUS OF LEFT FOOT: Primary | ICD-10-CM

## 2023-02-16 PROCEDURE — 99203 PR OFFICE/OUTPT VISIT, NEW, LEVL III, 30-44 MIN: ICD-10-PCS | Mod: S$GLB,,, | Performed by: PODIATRIST

## 2023-02-16 PROCEDURE — 99203 OFFICE O/P NEW LOW 30 MIN: CPT | Mod: S$GLB,,, | Performed by: PODIATRIST

## 2023-02-16 RX ORDER — AMMONIUM LACTATE 12 G/100G
1 CREAM TOPICAL 2 TIMES DAILY
Qty: 140 G | Refills: 11 | Status: SHIPPED | OUTPATIENT
Start: 2023-02-16 | End: 2023-07-06

## 2023-02-16 NOTE — PATIENT INSTRUCTIONS
Bunion    You have a bunion. This is a bony bump at the base of your big toe, along the inside edge of your foot. As the bump gets bigger, it can become red, swollen, and painful with shoe wear.  Bunions may occur if you wear shoes that are too tight and pinch your toes together. High heels may make this worse. In some cases a bunion is due to poor alignment of the foot and ankle. This puts extra weight on the instep of each foot.  Once a bunion forms, it changes the way weight is spread all across your foot. This causes the bunion to get worse over time. The big toe will bend more and more toward the other toes.  A minor bunion can be treated by:  Wearing properly fitting shoes  Using bunion pads  Wearing shoe inserts, called orthotics, to better align the foot and ankle  Physical therapy with ultrasound or whirlpool baths can ease pain, redness, and swelling. Severe cases may require surgery. If you dont treat what is causing the bunion, it may get larger and more painful.  Home care  Limit high heels. These shoes force your foot forward, crowding the toes together.  Switch to comfortable shoes with a wide toe area. Or have your existing shoes stretched by a shoe repair shop.  Avoid shoes that are tight, narrow, or pointed.  If you are flat-footed, using arch supports may help prevent further deformity. The best shoe inserts are the ones custom made by a foot specialist, called a podiatrist, or other healthcare provider.  Put a bunion pad over the bunion to ease pressure of your shoe against the bunion. You can buy these pads at most pharmacies without a prescription  To reduce pain and swelling, apply an ice pack over the injured area for 15 to 20 minutes. Do this every 1 to 2 hours the first day. Keep using ice 3 to 4 times a day until the pain and swelling goes away.  To make an ice pack, put ice cubes in a sealed zip-lock plastic bag. Wrap the bag in a clean, thin towel or cloth. Never put ice or an ice pack  directly on the skin.  You may use over-the-counter pain medicine to control pain, unless another medicine was prescribed. Talk with your provider before using these medicines if you have chronic liver or kidney disease, or ever had a stomach ulcer or GI (gastrointestinal) bleeding.  Follow-up care  Follow up with a podiatrist or foot doctor, or as advised.  If X-rays were taken, you will be notified of any new findings that may affect your care.  When to seek medical care  Contact your healthcare provider if any of the following occur:  Increasing pain or redness around the base of the big toe  Painful ingrown toenail, with redness and swelling or pus around the nail  Date Last Reviewed: 11/21/2015  © 9128-6367 [employer]. [address] [place] [address], [place] [address]. All rights reserved. This information is not intended as a substitute for professional medical care. Always follow your healthcare professional's instructions.

## 2023-02-16 NOTE — PROGRESS NOTES
"  [address]  [place] [address]  Phone: [phone]   [phone]    Patient's PCP:Lalo Cuellar MD  Referring Provider: Aaareferral Self    Subjective:      Chief Complaint:: Bunions (Left) and Callouses (Left 1st toe and MPJ)    HPI  Nikolay Barraza is a 82 y.o. male who presents today with a complaint of left foot callus underneath 1st toe and MPJ and bunion lasting for awhile. Current symptoms include discomfort.  Aggravating factors are weight-bearing. Treatment to date have included none.    Vitals:    02/16/23 1503   Weight: 98 kg (216 lb)   Height: 5' 10" (1.778 m)   PainSc: 0-No pain      Shoe Size: 11 EEE    Past Surgical History:   Procedure Laterality Date    ADENOIDECTOMY      INGUINAL HERNIA REPAIR Right 07/12/2018    Dr. Thomas - [facility]    KNEE SURGERY Left 07/31/2019    TONSILLECTOMY      VASECTOMY       Past Medical History:   Diagnosis Date    Allergy     Codeine    Closed fracture dislocation of lumbar spine 7/6/2018    #2 vertebra. Patient went to the emergency Department.  Followup with Erik:    GERD (gastroesophageal reflux disease)     Hyperlipidemia     Hypertension     Hypertensive retinopathy of both eyes 5/11/2021    As per ophthalmology notes.  Eye care 20/20    Right inguinal hernia     Thyroid disease      Family History   Problem Relation Age of Onset    Heart disease Father     Miscarriages / Stillbirths Father     Skin cancer Mother     Cancer Maternal Grandfather     Stomach cancer Paternal Grandfather         Social History:   Marital Status:   Alcohol History:  reports no history of alcohol use.  Tobacco History:  reports that he has never smoked. He has never used smokeless tobacco.  Drug History:  reports no history of drug use.    Review of patient's allergies indicates:   Allergen Reactions    Codeine Other (See Comments)     mood changes  Makes him feel violent       Current Outpatient Medications   Medication Sig Dispense Refill    ascorbic " acid, vitamin C, (VITAMIN C) 500 MG tablet Take 500 mg by mouth once daily.      aspirin (ECOTRIN) 81 MG EC tablet Take 81 mg by mouth once daily.      b complex vitamins tablet Take 1 tablet by mouth once daily.      CALCIUM CARBONATE/VITAMIN D3 (VITAMIN D-3 ORAL) Take by mouth.      docusate sodium (COLACE) 50 MG capsule Take by mouth 2 (two) times daily as needed for Constipation.      doxycycline (ORACEA) 40 mg capsule TAKE 1 CAPSULE BY MOUTH EVERY DAY 90 capsule 3    fish oil-omega-3 fatty acids 300-1,000 mg capsule Take 2 g by mouth once daily.      fluocinonide (LIDEX) 0.05 % ointment Apply to AA lower leg bid max use one month 45 g 0    furosemide (LASIX) 20 MG tablet Take 1 tablet (20 mg total) by mouth once daily. 90 tablet 3    ibuprofen (ADVIL,MOTRIN) 800 MG tablet TAKE 1 TABLET BY MOUTH THREE TIMES A DAY FOR 10 DAYS      levothyroxine (SYNTHROID, LEVOTHROID) 175 MCG tablet Take 1 tablet (175 mcg total) by mouth before breakfast. 90 tablet 3    losartan (COZAAR) 25 MG tablet Take 1 tablet (25 mg total) by mouth 2 (two) times a day. 180 tablet 3    multivitamin (THERAGRAN) per tablet Take 1 tablet by mouth once daily.      mupirocin (BACTROBAN) 2 % ointment by Nasal route 3 (three) times daily. 20 g 0    pravastatin (PRAVACHOL) 20 MG tablet TAKE 1 TABLET BY MOUTH EVERY DAY 90 tablet 2    tamsulosin (FLOMAX) 0.4 mg Cap Take 1 capsule (0.4 mg total) by mouth once daily. 90 capsule 0    triamcinolone acetonide 0.1% (KENALOG) 0.1 % cream Apply topically 2 (two) times daily. For rash on legs. 80 g 1    ammonium lactate 12 % Crea Apply 1 application topically 2 (two) times daily. 140 g 11    finasteride (PROSCAR) 5 mg tablet Take 1 tablet (5 mg total) by mouth once daily. 90 tablet 3     No current facility-administered medications for this visit.       Review of Systems   Constitutional:  Negative for chills, fatigue, fever and unexpected weight change.   HENT:  Negative for hearing loss and trouble  swallowing.    Eyes:  Negative for photophobia and visual disturbance.   Respiratory:  Negative for cough, shortness of breath and wheezing.    Cardiovascular:  Negative for chest pain, palpitations and leg swelling.   Gastrointestinal:  Negative for abdominal pain and nausea.   Genitourinary:  Negative for dysuria and frequency.   Musculoskeletal:  Positive for gait problem. Negative for arthralgias, back pain, joint swelling and myalgias.   Skin:  Negative for rash and wound.   Neurological:  Negative for tremors, seizures, weakness, numbness and headaches.   Hematological:  Does not bruise/bleed easily.       Objective:        Physical Exam:   Foot Exam    General  General Appearance: appears stated age and healthy   Orientation: alert and oriented to person, place, and time   Affect: appropriate   Gait: unimpaired       Left Foot/Ankle      Inspection and Palpation  Ecchymosis: none  Tenderness: great toe metatarsophalangeal joint   Swelling: great toe metatarsophalangeal joint   Arch: pes planus  Hammertoes: second toe  Hallux valgus: yes (Moderate deformity)  Hallux limitus: yes  Skin Exam: callus (Plantar 1st MPJ and IPJ 1st toe);   Neurovascular  Dorsalis pedis: 1+  Posterior tibial: 2+  Capillary refill: 2+  Saphenous nerve sensation: normal  Tibial nerve sensation: normal  Superficial peroneal nerve sensation: normal  Deep peroneal nerve sensation: normal  Sural nerve sensation: normal      Physical Exam  Cardiovascular:      Pulses:           Dorsalis pedis pulses are 1+ on the left side.        Posterior tibial pulses are 2+ on the left side.   Musculoskeletal:      Left foot: Bunion (Moderate deformity) present.   Feet:      Left foot:      Skin integrity: Callus (Plantar 1st MPJ and IPJ 1st toe) present.             Left Ankle/Foot Exam     Swelling   The patient is swollen on the great toe metatarsophalangeal joint.    Tenderness   The patient is tender to palpation of the great toe  metatarsophalangeal joint.      Vascular Exam       Left Pulses  Dorsalis Pedis:      1+  Posterior Tibial:      2+         Imaging:            Assessment:       1. Hallux valgus of left foot      Plan:   Hallux valgus of left foot  -     Cancel: X-Ray Foot Complete Left    Other orders  -     ammonium lactate 12 % Crea; Apply 1 application topically 2 (two) times daily.  Dispense: 140 g; Refill: 11    Evaluated patient long discussion about the fact that he has monitor almost severe bunion deformity is causing his callus formation under his 1st metatarsal head and is IPJ of the 1st toe.  Explained to him that this is a surgical problem and that 82 years of age doing the surgeries more risky than doing it a younger age because quality of bone.  I am recommending he use accommodative inserts get some shoes and inserts made at 1 of the local  in [facility] to help take pressure off the area.  I am giving prescription for Lac-Hydrin 12%.  We are giving they will local pedicures since is not covered service by Medicare      Procedures          Counseling:     I provided patient education verbally regarding:   Patient diagnosis, treatment options, as well as alternatives, risks, and benefits.     I provided patient education regarding: Bunion deformity and causes. I discussed conservative treatment with shoe modification, pads, treating symptoms with OTC NSAIDs, pads between toes, inserts and pads over the bunion. I explained that conservative treatment is non-curative but may help with pain and slow down the progression of the deformity.       I discussed with the pt the medicare guidleines for routine foot care and that the services they are requesting does not meet the guidlines and will not be covered by medicare.  The service will be labled as an A-code and will not be submitted to medicare.  The cost of the services was discussed with the patient and/or family.   This note was created using Dragon voice  recognition software that occasionally misinterpreted phrases or words.

## 2023-02-22 ENCOUNTER — TELEPHONE (OUTPATIENT)
Dept: PODIATRY | Facility: CLINIC | Age: 83
End: 2023-02-22
Payer: MEDICARE

## 2023-02-22 NOTE — TELEPHONE ENCOUNTER
----- Message from Ava Benavidez sent at 2/20/2023  4:51 PM CST -----  Regarding:  called  Acacia at  called. She got the form for the above but also needs the script.    Thank you,  Ava

## 2023-02-23 ENCOUNTER — HOSPITAL ENCOUNTER (OUTPATIENT)
Dept: CARDIOLOGY | Facility: HOSPITAL | Age: 83
Discharge: HOME | End: 2023-02-23
Attending: INTERNAL MEDICINE
Payer: MEDICARE

## 2023-02-23 VITALS — BODY MASS INDEX: 30.92 KG/M2 | HEIGHT: 70 IN | WEIGHT: 216 LBS

## 2023-02-23 DIAGNOSIS — R06.02 SHORTNESS OF BREATH: ICD-10-CM

## 2023-02-23 PROCEDURE — 93306 TTE W/DOPPLER COMPLETE: CPT

## 2023-02-23 PROCEDURE — 93306 TTE W/DOPPLER COMPLETE: CPT | Mod: 26,,, | Performed by: INTERNAL MEDICINE

## 2023-02-23 PROCEDURE — 93306 ECHO (CUPID ONLY): ICD-10-PCS | Mod: 26,,, | Performed by: INTERNAL MEDICINE

## 2023-02-27 ENCOUNTER — TELEPHONE (OUTPATIENT)
Dept: CARDIOLOGY | Facility: CLINIC | Age: 83
End: 2023-02-27
Payer: MEDICARE

## 2023-02-27 NOTE — TELEPHONE ENCOUNTER
----- Message from Lidiain Valencia sent at 2/27/2023  9:14 AM CST -----  Regarding: Paperwork  Patient came in and dropped off paperwork he wanted Dr./ Salomon to have before his upcoming appointment. Patient was at [facility] yesterday for issues.  Paperwork was placed in Dr. Salomon's box.  Thank you!!!

## 2023-02-28 LAB
AORTIC VALVE CUSP SEPERATION: 2.1 CM
AV INDEX (PROSTH): 0.84
AV MEAN GRADIENT: 2 MMHG
AV PEAK GRADIENT: 4 MMHG
AV VALVE AREA: 2.92 CM2
AV VELOCITY RATIO: 0.9
BSA FOR ECHO PROCEDURE: 2.2 M2
CV ECHO LV RWT: 0.48 CM
DOP CALC AO PEAK VEL: 1.04 M/S
DOP CALC AO VTI: 26.1 CM
DOP CALC LVOT AREA: 3.5 CM2
DOP CALC LVOT DIAMETER: 2.1 CM
DOP CALC LVOT PEAK VEL: 0.94 M/S
DOP CALC LVOT STROKE VOLUME: 76.16 CM3
DOP CALCLVOT PEAK VEL VTI: 22 CM
E WAVE DECELERATION TIME: 317 MSEC
E/A RATIO: 0.55
E/E' RATIO: 6.31 M/S
ECHO LV POSTERIOR WALL: 1.2 CM (ref 0.6–1.1)
EJECTION FRACTION: 65 %
FRACTIONAL SHORTENING: 36 % (ref 28–44)
INTERVENTRICULAR SEPTUM: 1.45 CM (ref 0.6–1.1)
IVRT: 127 MSEC
LEFT ATRIUM SIZE: 4.2 CM
LEFT INTERNAL DIMENSION IN SYSTOLE: 3.16 CM (ref 2.1–4)
LEFT VENTRICLE DIASTOLIC VOLUME INDEX: 54.17 ML/M2
LEFT VENTRICLE DIASTOLIC VOLUME: 117 ML
LEFT VENTRICLE MASS INDEX: 123 G/M2
LEFT VENTRICLE SYSTOLIC VOLUME INDEX: 18.4 ML/M2
LEFT VENTRICLE SYSTOLIC VOLUME: 39.7 ML
LEFT VENTRICULAR INTERNAL DIMENSION IN DIASTOLE: 4.97 CM (ref 3.5–6)
LEFT VENTRICULAR MASS: 266.58 G
LV LATERAL E/E' RATIO: 5.86 M/S
LV SEPTAL E/E' RATIO: 6.83 M/S
LVOT MG: 2 MMHG
LVOT MV: 0.62 CM/S
MV PEAK A VEL: 0.74 M/S
MV PEAK E VEL: 0.41 M/S
MV STENOSIS PRESSURE HALF TIME: 88 MS
MV VALVE AREA P 1/2 METHOD: 2.5 CM2
PISA TR MAX VEL: 1.9 M/S
RA PRESSURE: 3 MMHG
RIGHT VENTRICULAR END-DIASTOLIC DIMENSION: 2.86 CM
TDI LATERAL: 0.07 M/S
TDI SEPTAL: 0.06 M/S
TDI: 0.07 M/S
TR MAX PG: 14 MMHG
TV REST PULMONARY ARTERY PRESSURE: 17 MMHG

## 2023-03-07 ENCOUNTER — OFFICE VISIT (OUTPATIENT)
Dept: FAMILY MEDICINE | Facility: CLINIC | Age: 83
End: 2023-03-07
Payer: MEDICARE

## 2023-03-07 VITALS
HEART RATE: 70 BPM | HEIGHT: 70 IN | DIASTOLIC BLOOD PRESSURE: 64 MMHG | BODY MASS INDEX: 31.07 KG/M2 | SYSTOLIC BLOOD PRESSURE: 131 MMHG | WEIGHT: 217 LBS

## 2023-03-07 DIAGNOSIS — R41.89 COGNITIVE CHANGES: ICD-10-CM

## 2023-03-07 DIAGNOSIS — E78.2 MULTIPLE-TYPE HYPERLIPIDEMIA: ICD-10-CM

## 2023-03-07 DIAGNOSIS — N40.0 BENIGN PROSTATIC HYPERPLASIA WITHOUT URINARY OBSTRUCTION: ICD-10-CM

## 2023-03-07 DIAGNOSIS — E03.8 SECONDARY HYPOTHYROIDISM: ICD-10-CM

## 2023-03-07 DIAGNOSIS — R42 DIZZINESS: ICD-10-CM

## 2023-03-07 DIAGNOSIS — I10 BENIGN ESSENTIAL HYPERTENSION: Primary | Chronic | ICD-10-CM

## 2023-03-07 DIAGNOSIS — I71.43 INFRARENAL ABDOMINAL AORTIC ANEURYSM (AAA) WITHOUT RUPTURE: ICD-10-CM

## 2023-03-07 DIAGNOSIS — I67.82 SUBCORTICAL MICROVASCULAR ISCHEMIC OCCLUSIVE DISEASE: Chronic | ICD-10-CM

## 2023-03-07 PROCEDURE — 99215 OFFICE O/P EST HI 40 MIN: CPT | Mod: S$PBB,AQ,, | Performed by: INTERNAL MEDICINE

## 2023-03-07 PROCEDURE — 99215 PR OFFICE/OUTPT VISIT, EST, LEVL V, 40-54 MIN: ICD-10-PCS | Mod: S$PBB,AQ,, | Performed by: INTERNAL MEDICINE

## 2023-03-07 PROCEDURE — 99214 OFFICE O/P EST MOD 30 MIN: CPT | Performed by: INTERNAL MEDICINE

## 2023-03-07 NOTE — Clinical Note
I am planning to make a neurology consultation with the neuro care group in [place].  Please fax it to the office and notify patient's daughter also concerning this and to call their office for a mutually convenient appointment.  Patient's daughter also needs to go for appointment.

## 2023-03-07 NOTE — PROGRESS NOTES
Subjective:       Patient ID: Nikolay Barraza is a 82 y.o. male.    Chief Complaint: Hypertension, Hyperlipidemia, Thyroid Problem, Prostate Problem, Dizziness, and Cognitive changes (This is not pointed by patient but observed by the physician and after discussion with daughter.)      Patient is 82-year-old  gentleman who comes for follow-up.  Underlying medical issues are as below.     1.         Benign essential hypertension   2.         Multiple-type hyperlipidemia   3.         Secondary hypothyroidism   4.         Benign prostatic hyperplasia without urinary obstruction   5.         Abdominal aortic aneurysm (AAA) without rupture   6.         Cold intolerance      Recent visits include seeing Podiatry Dr. Eason for bunions and calluses.  He was prescribed some insoles.      Prior to that he had seen Dr. Leighann Prince  for skin lesions     Prior to that he had seen Dr. Salomon.  No new changes and continue with medications.   Consideration for adding Lasix and discontinuing amlodipine.  I do see water pill furosemide or Lasix 20 mg the patient does not recall the use of each medication at this point.      As far as anticoagulation is concerned, please note that he is only on aspirin.  He is not on Eliquis given his history of atrial fibrillation.    Labs including BNP and basic metabolic panel were reviewed and were normal range.  BUN slightly elevated and he continues to follow-up with Dr. Smith.  .    He continues on Synthroid for hypothyroidism.    Past labs for TSH and T4 have been reviewed and they have indicated a low TSH and somewhat elevated T4 indicating over treatment with 200 mcg of levothyroxine.  Thereafter the dosage of levothyroxine  wasreduced to 175 mcg.  ifficult to  solely on clinical basis because of multiple other conflicting issues.    HE CONTINUES ON TAMSULOSIN FOR BENIGN PROSTATE SYMPTOMS AND PERHAPS STOPPING THE TAMSULOSIN DID NOT HELP HIM.  (KNOWN TO CAUSE  ORTHOSTASIS).    After office hours, I had a detailed discussion with patient's daughter also.Ms. Delia Costa.  The discussion was mostly to apprise her about cognitive changes and decline with Abe.  And that it would be important for her to take over the charge of his medical affairs.  Patient does have an appointment with Dr. Salomno on Friday to review the cardiac issues.    Miss Loera did apprise me that Abe had gone to [facility] in [facility] for significant dizziness and lightheadedness.  Family members almost thought that he had a heart attack.  He was worked up at the hospital including EKG and the workup was unremarkable.  They could not identify any immediate cardiac issue but they quit that he may need a 50,000 mile workup.  Daughter is concerned whether he has any blockages.      I did review his prior echocardiogram with good ejection fraction and a nuclear stress test done in past which was unremarkable.  He did have MRI of the brain in past which had shown cerebral atrophy and chronic microvascular ischemic changes.  These were done at [facility] in 2020.  Carotid Dopplers did not show any significant hemodynamic blockages.    Medical records from his visit to [facility] have been reviewed.  The medical records basically include labs and discharge instruction for patients.  It does not have the actual physician input records.  The labs had shown a glucose of 159, BUN of 26 and creatinine of 1.30.    EKG had shown sinus rhythm with first-degree AV block and marked left axis deviation.  Possible anterior myocardial infarction.  Cardiac enzymes and troponins were unremarkable.  Evidently he was asked to stay in the hospital but he decided that he was feeling better and was discharged.            Hypertension  This is a chronic problem. The current episode started more than 1 year ago. The problem is controlled. Pertinent negatives include no  chest pain or palpitations. Risk factors for coronary artery disease include sedentary lifestyle, male gender and dyslipidemia. Past treatments include calcium channel blockers and angiotensin blockers. The current treatment provides moderate improvement. Compliance problems include psychosocial issues.  Identifiable causes of hypertension include a thyroid problem. There is no history of coarctation of the aorta or hyperaldosteronism. Chronic renal disease: Patient is following Nephrology for the same but creatinine seems to be okay..  Hyperlipidemia  This is a chronic problem. The current episode started more than 1 year ago. The problem is controlled. Exacerbating diseases include obesity. He has no history of diabetes or liver disease. Chronic renal disease: Patient is following Nephrology for the same but creatinine seems to be okay..Pertinent negatives include no chest pain. Current antihyperlipidemic treatment includes statins. The current treatment provides moderate improvement of lipids. Compliance problems include psychosocial issues.  Risk factors for coronary artery disease include a sedentary lifestyle, hypertension, male sex, dyslipidemia and obesity.   Thyroid Problem  Presents for follow-up visit. Symptoms include cold intolerance. Patient reports no anxiety, constipation, diaphoresis, diarrhea, dry skin, fatigue, heat intolerance, nail problem, palpitations or tremors. The symptoms have been stable. His past medical history is significant for hyperlipidemia. There is no history of diabetes.   Benign Prostatic Hypertrophy  This is a chronic problem. The current episode started more than 1 year ago. The problem has been gradually improving since onset. Irritative symptoms include frequency and urgency. Pertinent negatives include no dysuria. He is not sexually active. Past treatments include tamsulosin and finasteride. The treatment provided moderate relief. He has been using treatment for 2 or more  years.   Dizziness:   Chronicity:  New  Onset:  More than 1 month ago  Progression since onset:  Waxing and waning  Frequency:  Every few days  Severity:  Mild  Dizziness characteristics:  Spacial disorientation and off-balanceno fever, no tinnitus, no diaphoresis, no light-headedness, no palpitations and no chest pain.    Past Medical History:   Diagnosis Date    Allergy     Codeine    Closed fracture dislocation of lumbar spine 7/6/2018    #2 vertebra. Patient went to the emergency Department.  Followup with Erik:    GERD (gastroesophageal reflux disease)     Hyperlipidemia     Hypertension     Hypertensive retinopathy of both eyes 5/11/2021    As per ophthalmology notes.  Eye care 20/20    Right inguinal hernia     Thyroid disease      Social History     Socioeconomic History    Marital status:      Spouse name: Alisa galvin    Number of children: 3   Occupational History    Occupation: [employer]-      Comment:     Tobacco Use    Smoking status: Never    Smokeless tobacco: Never   Substance and Sexual Activity    Alcohol use: No    Drug use: No    Sexual activity: Not Currently     Partners: Female     Social Determinants of Health     Financial Resource Strain: Low Risk     Difficulty of Paying Living Expenses: Not very hard   Food Insecurity: No Food Insecurity    Worried About Running Out of Food in the Last Year: Never true    Ran Out of Food in the Last Year: Never true   Transportation Needs: No Transportation Needs    Lack of Transportation (Medical): No    Lack of Transportation (Non-Medical): No   Physical Activity: Inactive    Days of Exercise per Week: 0 days    Minutes of Exercise per Session: 0 min   Stress: Stress Concern Present    Feeling of Stress : To some extent   Social Connections: Socially Integrated    Frequency of Communication with Friends and Family: More than three times a week    Frequency of Social Gatherings with Friends and Family: Three  times a week    Attends Cheondo[employer] Services: More than 4 times per year    Active Member of Clubs or Organizations: Yes    Attends Club or Organization Meetings: 1 to 4 times per year    Marital Status:    Housing Stability: Low Risk     Unable to Pay for Housing in the Last Year: No    Number of Places Lived in the Last Year: 1    Unstable Housing in the Last Year: No     Past Surgical History:   Procedure Laterality Date    ADENOIDECTOMY      INGUINAL HERNIA REPAIR Right 07/12/2018    Dr. Thomas - [facility]    KNEE SURGERY Left 07/31/2019    TONSILLECTOMY      VASECTOMY       Family History   Problem Relation Age of Onset    Heart disease Father     Miscarriages / Stillbirths Father     Skin cancer Mother     Cancer Maternal Grandfather     Stomach cancer Paternal Grandfather        Review of Systems   Constitutional:  Negative for activity change, appetite change, diaphoresis, fatigue, fever and unexpected weight change (Has gained about 4 lb of weight in the last few months.  Generally weight has been stable otherwise.).        Patient is going through a little rough patch postoperatively.  On occasions he has felt lightheaded.  Patient tells me that he has been stopped off losartan and finasteride by the Cardiology.  Not sure.   HENT:  Negative for nosebleeds, rhinorrhea and tinnitus.         Frequent sinus problems-seeing Dr. Jann Carrizales for the same.   Eyes:  Negative for pain, discharge and visual disturbance.   Respiratory:  Positive for apnea (Known sleep apnea for the last 20 years.). Negative for cough, chest tightness and stridor.         Pulmonary nodule on incidental CT scan.  Uses a CPAP device for sleep apnea.   Cardiovascular:  Negative for chest pain, palpitations and leg swelling.        Patient has hypertension and hyperlipidemia.  Recent evaluation by Cardiology for atrial fibrillation and review of ZIO monitor.   Gastrointestinal:  Negative for abdominal distention, blood in stool,  "constipation and diarrhea.        Stable GERD  Occasional hemorrhoidal bleeding. Secondary to constipation.   Endocrine: Positive for cold intolerance. Negative for heat intolerance, polydipsia and polyphagia.        Patient has hypothyroidism. Stable on levothyroxine. Current labs are good. Blood sugars are slightly elevated.   Genitourinary:  Positive for frequency and urgency. Negative for dysuria.        Benign prostate hypertrophy stable on tamsulosin and finasteride.  Recently had seen Dr. Timmy Alaniz who had done a CT scan on him.  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    Unclassified renal issues.  Sees Dr. Smith.   Musculoskeletal:  Positive for arthralgias. Negative for back pain, gait problem and joint swelling.        Bilateral knee arthroplasty on left about 5 years back and right side about a year or so back.  The right knee after the surgery did not do too well.  There was a lot of effusion in the interim and he had to be drained at least 4 times.  Dr. Cespedes       Skin:  Negative for color change and pallor. Wound: left ankle.  Neurological:  Negative for dizziness, tremors, seizures, facial asymmetry, speech difficulty and light-headedness.        Some memory issues.  Lightheadedness and dizziness is overall better and he is more stable.  He does feel little sway when he gets up.   Hematological:  Negative for adenopathy. Does not bruise/bleed easily.        He is taking a full aspirin for the time being postoperatively after the knee surgery 325 mg.   Psychiatric/Behavioral:  Negative for agitation, behavioral problems and confusion. The patient is not nervous/anxious.         Some memory issues which is commensurate with age and station of his life.       Objective:      Blood pressure 131/64, pulse 70, height 5' 10" (1.778 m), weight 98.4 kg (217 lb). Body mass index is 31.14 kg/m².  Physical Exam  Vitals and nursing note reviewed.   Constitutional:       General: He is not in acute " distress.     Appearance: He is well-developed. He is obese. He is not ill-appearing, toxic-appearing or diaphoretic.      Comments: BMI is 31.14   HENT:      Head: Atraumatic.      Nose:      Right Sinus: No frontal sinus tenderness.      Left Sinus: No maxillary sinus tenderness or frontal sinus tenderness.   Eyes:      General: No scleral icterus.        Right eye: No discharge.         Left eye: No discharge.   Neck:      Vascular: No JVD.      Trachea: No tracheal deviation.   Cardiovascular:      Rate and Rhythm: Normal rate and regular rhythm.      Heart sounds: Normal heart sounds.   Pulmonary:      Effort: Pulmonary effort is normal. No respiratory distress.      Breath sounds: Normal breath sounds. No wheezing or rales.   Abdominal:      General: There is no distension or abdominal bruit.      Palpations: Abdomen is soft.      Tenderness: There is no abdominal tenderness.   Musculoskeletal:         General: No tenderness.      Right lower leg: Deformity present. Edema (1+ edema which is probably reactive to secondary surgery on the right side.) present.      Left lower leg: No edema.        Legs:       Comments: Postoperative knee   Lymphadenopathy:      Cervical: No cervical adenopathy.   Skin:     General: Skin is warm and dry.      Findings: No erythema or rash.      Comments: C/O Dry skin-  Chronic skin dryness and bruising.  Also in the legs.  Bruises easily because of aspirin and probably aging process.   Neurological:      Mental Status: He is alert. Mental status is at baseline.      Motor: Tremor present.      Coordination: Romberg sign positive.      Comments: Mild be trial of tremor on the right index finger at rest.  Not on intention.    Slightly positive Romberg on closing the eyes.  Push test for Parkinson's is negative.  Gait is slow and cautious.   Psychiatric:         Mood and Affect: Mood normal.         Behavior: Behavior normal.      Comments: Beginnings of cognitive changes.          Assessment:       1. Benign essential hypertension    2. Multiple-type hyperlipidemia    3. Subcortical microvascular ischemic occlusive disease    4. Dizziness    5. Secondary hypothyroidism    6. Cognitive changes    7. Infrarenal abdominal aortic aneurysm (AAA) without rupture    8. Benign prostatic hyperplasia without urinary obstruction           Hospital Outpatient Visit on 02/23/2023   Component Date Value Ref Range Status    BSA 02/23/2023 2.2  m2 Final    TDI SEPTAL 02/23/2023 0.06  m/s Final    LV LATERAL E/E' RATIO 02/23/2023 5.86  m/s Final    LV SEPTAL E/E' RATIO 02/23/2023 6.83  m/s Final    Left Ventricular Outflow Tract Kari* 02/23/2023 0.62  cm/s Final    Left Ventricular Outflow Tract Kari* 02/23/2023 2.00  mmHg Final    AORTIC VALVE CUSP SEPERATION 02/23/2023 2.10  cm Final    TDI LATERAL 02/23/2023 0.07  m/s Final    LVIDd 02/23/2023 4.97  3.5 - 6.0 cm Final    IVS 02/23/2023 1.45 (A)  0.6 - 1.1 cm Final    Posterior Wall 02/23/2023 1.20 (A)  0.6 - 1.1 cm Final    LVIDs 02/23/2023 3.16  2.1 - 4.0 cm Final    FS 02/23/2023 36  28 - 44 % Final    LV mass 02/23/2023 266.58  g Final    LA size 02/23/2023 4.20  cm Final    RVDD 02/23/2023 2.86  cm Final    Left Ventricle Relative Wall Thick* 02/23/2023 0.48  cm Final    AV mean gradient 02/23/2023 2  mmHg Final    AV valve area 02/23/2023 2.92  cm2 Final    AV Velocity Ratio 02/23/2023 0.90   Final    AV index (prosthetic) 02/23/2023 0.84   Final    MV valve area p 1/2 method 02/23/2023 2.50  cm2 Final    E/A ratio 02/23/2023 0.55   Final    Mean e' 02/23/2023 0.07  m/s Final    E wave deceleration time 02/23/2023 317.00  msec Final    IVRT 02/23/2023 127.00  msec Final    LVOT diameter 02/23/2023 2.10  cm Final    LVOT area 02/23/2023 3.5  cm2 Final    LVOT peak neel 02/23/2023 0.94  m/s Final    LVOT peak VTI 02/23/2023 22.00  cm Final    Ao peak neel 02/23/2023 1.04  m/s Final    Ao VTI 02/23/2023 26.1  cm Final    LVOT stroke volume 02/23/2023  76.16  cm3 Final    AV peak gradient 02/23/2023 4  mmHg Final    E/E' ratio 02/23/2023 6.31  m/s Final    MV Peak E Kunal 02/23/2023 0.41  m/s Final    TR Max Kunal 02/23/2023 1.90  m/s Final    MV stenosis pressure 1/2 time 02/23/2023 88.00  ms Final    MV Peak A Kunal 02/23/2023 0.74  m/s Final    LV Systolic Volume 02/23/2023 39.70  mL Final    LV Systolic Volume Index 02/23/2023 18.4  mL/m2 Final    LV Diastolic Volume 02/23/2023 117.00  mL Final    LV Diastolic Volume Index 02/23/2023 54.17  mL/m2 Final    LV Mass Index 02/23/2023 123  g/m2 Final    Triscuspid Valve Regurgitation Pea* 02/23/2023 14  mmHg Final    Right Atrial Pressure (from IVC) 02/23/2023 3  mmHg Final    EF 02/23/2023 65  % Final    TV rest pulmonary artery pressure 02/23/2023 17  mmHg Final   Lab Visit on 01/27/2023   Component Date Value Ref Range Status    BNP 01/27/2023 68  0 - 99 pg/mL Final    Sodium 01/27/2023 139  136 - 145 mmol/L Final    Potassium 01/27/2023 4.7  3.5 - 5.1 mmol/L Final    Chloride 01/27/2023 103  95 - 110 mmol/L Final    CO2 01/27/2023 28  23 - 29 mmol/L Final    Glucose 01/27/2023 105  70 - 110 mg/dL Final    BUN 01/27/2023 25 (H)  8 - 23 mg/dL Final    Creatinine 01/27/2023 1.3  0.5 - 1.4 mg/dL Final    Calcium 01/27/2023 9.5  8.7 - 10.5 mg/dL Final    Anion Gap 01/27/2023 8  8 - 16 mmol/L Final    eGFR 01/27/2023 54.5 (A)  >60 mL/min/1.73 m^2 Final   Lab Visit on 01/17/2023   Component Date Value Ref Range Status    Glucose 01/17/2023 89  70 - 110 mg/dL Final    Sodium 01/17/2023 143  136 - 145 mmol/L Final    Potassium 01/17/2023 4.6  3.5 - 5.1 mmol/L Final    Chloride 01/17/2023 106  95 - 110 mmol/L Final    CO2 01/17/2023 26  23 - 29 mmol/L Final    BUN 01/17/2023 21  8 - 23 mg/dL Final    Calcium 01/17/2023 9.4  8.7 - 10.5 mg/dL Final    Creatinine 01/17/2023 1.2  0.5 - 1.4 mg/dL Final    Albumin 01/17/2023 4.0  3.5 - 5.2 g/dL Final    Phosphorus 01/17/2023 3.6  2.7 - 4.5 mg/dL Final    eGFR 01/17/2023 >60.0  >60  mL/min/1.73 m^2 Final    Anion Gap 01/17/2023 11  8 - 16 mmol/L Final    Protein, Urine Random 01/17/2023 <6  6 - 15 mg/dL Final    Creatinine, Urine 01/17/2023 79.0  23.0 - 375.0 mg/dL Final    Prot/Creat Ratio, Urine 01/17/2023 Unable to calculate  0.00 - 0.20 Final      Ref Range & Units 1 mo ago 11 mo ago   BNP 0 - 99 pg/mL 68  123 High    The patient has an infrarenal abdominal aortic aneurysm measuring 3.8 cm whose configuration suggests this may have been the result of ulcerated plaque.     Visualized loops of bowel are unremarkable.  There is no evidence abdominal lymphadenopathy.  There are degenerative changes seen throughout the spine.     Impression:     The patient has bilateral renal cysts without evidence enhancing renal lesion.     There is a 4 mm noncalcified pulmonary nodule seen in association with the right upper lobe of the lung.  Follow-up CT in 6-12 months time to ensure stability is recommended.     The patient has a 3.8 cm infrarenal abdominal aortic aneurysm.        Electronically signed by: Roseanne Cazares MD  Date:                                            02/26/2021  Time:                                           08:31        Plan:           Benign essential hypertension  Comments:  Blood pressures are elevated in office and patient has been advised to check blood pressures at home.  Probably he will forget.    Multiple-type hyperlipidemia  Comments:  Currently on pravastatin 20 mg at bedtime.    Subcortical microvascular ischemic occlusive disease  Comments:   issues of memory, cognition and reconciliation of several medical problems and issues.  Orders:  -     Ambulatory referral/consult to Neurology; Future; Expected date: 04/07/2023    Dizziness  Comments:  Was seen at [facility] in [place] for episode of intense dizziness.  Workup unremarkable.  Orders:  -     Ambulatory referral/consult to Neurology; Future; Expected date: 04/07/2023    Secondary  hypothyroidism  Comments:  Currently on levothyroxine.    Cognitive changes  Comments:  Patient has difficulty recalling the name of his medications, reason for his medications and workup done thus far.  Probably vascular dementia.Will need referra  Orders:  -     Ambulatory referral/consult to Neurology; Future; Expected date: 04/07/2023    Infrarenal abdominal aortic aneurysm (AAA) without rupture  Comments:   3.8 mm as per CT scan done in 2021    Benign prostatic hyperplasia without urinary obstruction      Overall patient seems to be doing okay.  His knee pain and arthritis continues with modest improvement only after the surgery.      Cognitive issues continue.    Trouble recalling the use and purpose of medications.    His blood pressures are elevated.  He does not really check his blood pressures at home.      No chest pain.      Exercise tolerance is somewhat limited.   work   Delia Costa-     Advised Mr. Barraza about age and season appropriate immunizations/ cancer screenings.  Also seasonal influenza vaccine, update on tetanus diphtheria vaccination every 10 years.      Spent roland 55 minutes with patient which involved review of pts medical conditions, labs, medications and with significant amount of time face-to-face discussion about medical problems, management and any applicable changes.  Also discussion with patient's family members subsequently after the office visit to collect further information and future plan of action.    Follow-up with me will be in 6 months time subject to any other interim changes or development of new symptoms.      Patient has follow-up appointments with the following:-    1.-cardiology this coming Friday  2.-Urology in future   3.-arrangements will be made for neurology consultation.    I have sent a communication to Dr. Salomon also.      .    Current Outpatient Medications:     ammonium lactate 12 % Crea, Apply 1 application topically 2 (two) times  daily., Disp: 140 g, Rfl: 11    ascorbic acid, vitamin C, (VITAMIN C) 500 MG tablet, Take 500 mg by mouth once daily., Disp: , Rfl:     aspirin (ECOTRIN) 81 MG EC tablet, Take 81 mg by mouth once daily., Disp: , Rfl:     b complex vitamins tablet, Take 1 tablet by mouth once daily., Disp: , Rfl:     CALCIUM CARBONATE/VITAMIN D3 (VITAMIN D-3 ORAL), Take by mouth., Disp: , Rfl:     docusate sodium (COLACE) 50 MG capsule, Take by mouth 2 (two) times daily as needed for Constipation., Disp: , Rfl:     doxycycline (ORACEA) 40 mg capsule, TAKE 1 CAPSULE BY MOUTH EVERY DAY, Disp: 90 capsule, Rfl: 3    finasteride (PROSCAR) 5 mg tablet, TAKE 1 TABLET BY MOUTH EVERY DAY, Disp: 90 tablet, Rfl: 1    fish oil-omega-3 fatty acids 300-1,000 mg capsule, Take 2 g by mouth once daily., Disp: , Rfl:     fluocinonide (LIDEX) 0.05 % ointment, Apply to AA lower leg bid max use one month, Disp: 45 g, Rfl: 0    furosemide (LASIX) 20 MG tablet, Take 1 tablet (20 mg total) by mouth once daily., Disp: 90 tablet, Rfl: 3    ibuprofen (ADVIL,MOTRIN) 800 MG tablet, TAKE 1 TABLET BY MOUTH THREE TIMES A DAY FOR 10 DAYS, Disp: , Rfl:     levothyroxine (SYNTHROID, LEVOTHROID) 175 MCG tablet, Take 1 tablet (175 mcg total) by mouth before breakfast., Disp: 90 tablet, Rfl: 3    losartan (COZAAR) 25 MG tablet, Take 1 tablet (25 mg total) by mouth 2 (two) times a day., Disp: 180 tablet, Rfl: 3    multivitamin (THERAGRAN) per tablet, Take 1 tablet by mouth once daily., Disp: , Rfl:     mupirocin (BACTROBAN) 2 % ointment, by Nasal route 3 (three) times daily., Disp: 20 g, Rfl: 0    pravastatin (PRAVACHOL) 20 MG tablet, TAKE 1 TABLET BY MOUTH EVERY DAY, Disp: 90 tablet, Rfl: 2    tamsulosin (FLOMAX) 0.4 mg Cap, Take 1 capsule (0.4 mg total) by mouth once daily., Disp: 90 capsule, Rfl: 0    triamcinolone acetonide 0.1% (KENALOG) 0.1 % cream, Apply topically 2 (two) times daily. For rash on legs., Disp: 80 g, Rfl: 1

## 2023-03-07 NOTE — Clinical Note
This patient will be following up with you on Friday.  He has cognitive impairment though not formally diagnosed with dementia by me.  Please see if you can allow his daughter to participate on the phone or audio platform to listen to the conversation.  Daughter was concerned mostly about his episode of dizziness for which he had to go to [facility] in [place] emergency department.  Cardiac workup was unchanged there.  I have advised the daughter that patient's echocardiogram and stress test were okay.  She was concerned if patient needed angiogram.  I am referral to Neurology for his cognitive changes and dizziness also.  Regards  Dr. Alisa GOINS

## 2023-03-08 ENCOUNTER — TELEPHONE (OUTPATIENT)
Dept: FAMILY MEDICINE | Facility: CLINIC | Age: 83
End: 2023-03-08

## 2023-03-08 NOTE — TELEPHONE ENCOUNTER
----- Message from Lalo Cuellar MD sent at 3/7/2023 10:04 PM CST -----  I am planning to make a neurology consultation with the neuro care group in [place].  Please fax it to the office and notify patient's daughter also concerning this and to call their office for a mutually convenient appointment.  Patient's daughter also needs to go for appointment.

## 2023-03-10 ENCOUNTER — LAB VISIT (OUTPATIENT)
Dept: LAB | Facility: HOSPITAL | Age: 83
End: 2023-03-10
Attending: INTERNAL MEDICINE
Payer: MEDICARE

## 2023-03-10 ENCOUNTER — TELEPHONE (OUTPATIENT)
Dept: PULMONOLOGY | Facility: CLINIC | Age: 83
End: 2023-03-10
Payer: MEDICARE

## 2023-03-10 ENCOUNTER — OFFICE VISIT (OUTPATIENT)
Dept: CARDIOLOGY | Facility: CLINIC | Age: 83
End: 2023-03-10
Payer: MEDICARE

## 2023-03-10 VITALS
SYSTOLIC BLOOD PRESSURE: 130 MMHG | HEIGHT: 70 IN | HEART RATE: 68 BPM | OXYGEN SATURATION: 98 % | WEIGHT: 213.88 LBS | BODY MASS INDEX: 30.62 KG/M2 | DIASTOLIC BLOOD PRESSURE: 68 MMHG

## 2023-03-10 DIAGNOSIS — I48.0 PAROXYSMAL ATRIAL FIBRILLATION: ICD-10-CM

## 2023-03-10 DIAGNOSIS — R07.89 CHEST DISCOMFORT: ICD-10-CM

## 2023-03-10 DIAGNOSIS — R06.02 SHORTNESS OF BREATH: Primary | ICD-10-CM

## 2023-03-10 DIAGNOSIS — R06.02 SHORTNESS OF BREATH: ICD-10-CM

## 2023-03-10 DIAGNOSIS — E78.2 MIXED HYPERLIPIDEMIA: ICD-10-CM

## 2023-03-10 LAB
ANION GAP SERPL CALC-SCNC: 8 MMOL/L (ref 8–16)
BASOPHILS # BLD AUTO: 0.04 K/UL (ref 0–0.2)
BASOPHILS NFR BLD: 0.7 % (ref 0–1.9)
BUN SERPL-MCNC: 23 MG/DL (ref 8–23)
CALCIUM SERPL-MCNC: 9.4 MG/DL (ref 8.7–10.5)
CHLORIDE SERPL-SCNC: 105 MMOL/L (ref 95–110)
CO2 SERPL-SCNC: 28 MMOL/L (ref 23–29)
CREAT SERPL-MCNC: 1.3 MG/DL (ref 0.5–1.4)
DIFFERENTIAL METHOD: NORMAL
EOSINOPHIL # BLD AUTO: 0.2 K/UL (ref 0–0.5)
EOSINOPHIL NFR BLD: 2.8 % (ref 0–8)
ERYTHROCYTE [DISTWIDTH] IN BLOOD BY AUTOMATED COUNT: 14 % (ref 11.5–14.5)
EST. GFR  (NO RACE VARIABLE): 54.5 ML/MIN/1.73 M^2
GLUCOSE SERPL-MCNC: 98 MG/DL (ref 70–110)
HCT VFR BLD AUTO: 43.7 % (ref 40–54)
HGB BLD-MCNC: 14.5 G/DL (ref 14–18)
IMM GRANULOCYTES # BLD AUTO: 0.02 K/UL (ref 0–0.04)
IMM GRANULOCYTES NFR BLD AUTO: 0.4 % (ref 0–0.5)
LYMPHOCYTES # BLD AUTO: 1.5 K/UL (ref 1–4.8)
LYMPHOCYTES NFR BLD: 28.5 % (ref 18–48)
MCH RBC QN AUTO: 28.8 PG (ref 27–31)
MCHC RBC AUTO-ENTMCNC: 33.2 G/DL (ref 32–36)
MCV RBC AUTO: 87 FL (ref 82–98)
MONOCYTES # BLD AUTO: 0.6 K/UL (ref 0.3–1)
MONOCYTES NFR BLD: 10.2 % (ref 4–15)
NEUTROPHILS # BLD AUTO: 3.1 K/UL (ref 1.8–7.7)
NEUTROPHILS NFR BLD: 57.4 % (ref 38–73)
NRBC BLD-RTO: 0 /100 WBC
PLATELET # BLD AUTO: 225 K/UL (ref 150–450)
PMV BLD AUTO: 9.9 FL (ref 9.2–12.9)
POTASSIUM SERPL-SCNC: 4.8 MMOL/L (ref 3.5–5.1)
RBC # BLD AUTO: 5.04 M/UL (ref 4.6–6.2)
SODIUM SERPL-SCNC: 141 MMOL/L (ref 136–145)
WBC # BLD AUTO: 5.4 K/UL (ref 3.9–12.7)

## 2023-03-10 PROCEDURE — 99999 PR PBB SHADOW E&M-EST. PATIENT-LVL V: ICD-10-PCS | Mod: PBBFAC,,, | Performed by: INTERNAL MEDICINE

## 2023-03-10 PROCEDURE — 99214 PR OFFICE/OUTPT VISIT, EST, LEVL IV, 30-39 MIN: ICD-10-PCS | Mod: S$PBB,,, | Performed by: INTERNAL MEDICINE

## 2023-03-10 PROCEDURE — 99999 PR PBB SHADOW E&M-EST. PATIENT-LVL V: CPT | Mod: PBBFAC,,, | Performed by: INTERNAL MEDICINE

## 2023-03-10 PROCEDURE — 99214 OFFICE O/P EST MOD 30 MIN: CPT | Mod: S$PBB,,, | Performed by: INTERNAL MEDICINE

## 2023-03-10 PROCEDURE — 93005 ELECTROCARDIOGRAM TRACING: CPT | Mod: PBBFAC,PN | Performed by: GENERAL PRACTICE

## 2023-03-10 PROCEDURE — 36415 COLL VENOUS BLD VENIPUNCTURE: CPT | Performed by: INTERNAL MEDICINE

## 2023-03-10 PROCEDURE — 99215 OFFICE O/P EST HI 40 MIN: CPT | Mod: PBBFAC,PN | Performed by: INTERNAL MEDICINE

## 2023-03-10 PROCEDURE — 85025 COMPLETE CBC W/AUTO DIFF WBC: CPT | Performed by: INTERNAL MEDICINE

## 2023-03-10 PROCEDURE — 80048 BASIC METABOLIC PNL TOTAL CA: CPT | Performed by: INTERNAL MEDICINE

## 2023-03-10 PROCEDURE — 93010 EKG 12-LEAD: ICD-10-PCS | Mod: S$PBB,,, | Performed by: GENERAL PRACTICE

## 2023-03-10 PROCEDURE — 93010 ELECTROCARDIOGRAM REPORT: CPT | Mod: S$PBB,,, | Performed by: GENERAL PRACTICE

## 2023-03-10 RX ORDER — DIPHENHYDRAMINE HCL 25 MG
50 CAPSULE ORAL
Status: CANCELLED | OUTPATIENT
Start: 2023-03-10

## 2023-03-10 RX ORDER — NITROGLYCERIN 0.4 MG/1
0.4 TABLET SUBLINGUAL EVERY 5 MIN PRN
COMMUNITY

## 2023-03-10 RX ORDER — SODIUM CHLORIDE 9 MG/ML
INJECTION, SOLUTION INTRAVENOUS ONCE
Status: CANCELLED | OUTPATIENT
Start: 2023-03-10 | End: 2023-03-10

## 2023-03-10 NOTE — TELEPHONE ENCOUNTER
Pulm referral scheduled with wife,Alisa (patient on speaker): 3/30/2023 1400  [facility].

## 2023-03-10 NOTE — H&P (VIEW-ONLY)
[facility] [place]  [place]    Subjective:     Patient ID:  Nikolay Barraza is a 82 y.o. male patient here for evaluation Results (ECHO . On Sunday 02/26/2023 patient has a funny feeling in his chest . He had light headed and weakness. /So he went to [facility] in [facility] . )      HPI:  82-year-old male here for follow-up of his shortness of breath.  In the past he is had mildly elevated BNP for which we tried diuretics on him but it has not improved his shortness of breath.  He had a normal stress test last year.  Continues to report shortness of breath with chest discomfort on minimal exertion.    Review of Systems   All other systems reviewed and are negative.     Past Medical History:   Diagnosis Date    Allergy     Codeine    Closed fracture dislocation of lumbar spine 7/6/2018    #2 vertebra. Patient went to the emergency Department.  Followup with Erik:    GERD (gastroesophageal reflux disease)     Hyperlipidemia     Hypertension     Hypertensive retinopathy of both eyes 5/11/2021    As per ophthalmology notes.  Eye care 20/20    Right inguinal hernia     Thyroid disease        Past Surgical History:   Procedure Laterality Date    ADENOIDECTOMY      INGUINAL HERNIA REPAIR Right 07/12/2018    Dr. Thomas [facility]    KNEE SURGERY Left 07/31/2019    TONSILLECTOMY      VASECTOMY         Family History   Problem Relation Age of Onset    Heart disease Father     Miscarriages / Stillbirths Father     Skin cancer Mother     Cancer Maternal Grandfather     Stomach cancer Paternal Grandfather        Social History     Socioeconomic History    Marital status:      Spouse name: Alisa barraza    Number of children: 3   Occupational History    Occupation: [employer]-      Comment:     Tobacco Use    Smoking status: Never    Smokeless tobacco: Never   Substance and Sexual Activity    Alcohol use:  No    Drug use: No    Sexual activity: Not Currently     Partners: Female     Social Determinants of Health     Financial Resource Strain: Low Risk     Difficulty of Paying Living Expenses: Not very hard   Food Insecurity: No Food Insecurity    Worried About Running Out of Food in the Last Year: Never true    Ran Out of Food in the Last Year: Never true   Transportation Needs: No Transportation Needs    Lack of Transportation (Medical): No    Lack of Transportation (Non-Medical): No   Physical Activity: Inactive    Days of Exercise per Week: 0 days    Minutes of Exercise per Session: 0 min   Stress: Stress Concern Present    Feeling of Stress : To some extent   Social Connections: Socially Integrated    Frequency of Communication with Friends and Family: More than three times a week    Frequency of Social Gatherings with Friends and Family: Three times a week    Attends Religion Services: More than 4 times per year    Active Member of Clubs or Organizations: Yes    Attends Club or Organization Meetings: 1 to 4 times per year    Marital Status:    Housing Stability: Low Risk     Unable to Pay for Housing in the Last Year: No    Number of Places Lived in the Last Year: 1    Unstable Housing in the Last Year: No       Current Outpatient Medications   Medication Sig Dispense Refill    ammonium lactate 12 % Crea Apply 1 application topically 2 (two) times daily. 140 g 11    ascorbic acid, vitamin C, (VITAMIN C) 500 MG tablet Take 500 mg by mouth once daily.      aspirin (ECOTRIN) 81 MG EC tablet Take 81 mg by mouth once daily.      b complex vitamins tablet Take 1 tablet by mouth once daily.      CALCIUM CARBONATE/VITAMIN D3 (VITAMIN D-3 ORAL) Take by mouth.      docusate sodium (COLACE) 50 MG capsule Take by mouth 2 (two) times daily as needed for Constipation.      doxycycline (ORACEA) 40 mg capsule TAKE 1 CAPSULE BY MOUTH EVERY DAY 90 capsule 3    finasteride (PROSCAR) 5 mg tablet TAKE 1 TABLET BY MOUTH EVERY  DAY 90 tablet 1    fish oil-omega-3 fatty acids 300-1,000 mg capsule Take 2 g by mouth once daily.      fluocinonide (LIDEX) 0.05 % ointment Apply to AA lower leg bid max use one month 45 g 0    furosemide (LASIX) 20 MG tablet Take 1 tablet (20 mg total) by mouth once daily. 90 tablet 3    levothyroxine (SYNTHROID, LEVOTHROID) 175 MCG tablet Take 1 tablet (175 mcg total) by mouth before breakfast. 90 tablet 3    losartan (COZAAR) 25 MG tablet Take 1 tablet (25 mg total) by mouth 2 (two) times a day. 180 tablet 3    multivitamin (THERAGRAN) per tablet Take 1 tablet by mouth once daily.      mupirocin (BACTROBAN) 2 % ointment by Nasal route 3 (three) times daily. 20 g 0    nitroGLYCERIN (NITROSTAT) 0.4 MG SL tablet Place 0.4 mg under the tongue every 5 (five) minutes as needed for Chest pain.      pravastatin (PRAVACHOL) 20 MG tablet TAKE 1 TABLET BY MOUTH EVERY DAY 90 tablet 2    tamsulosin (FLOMAX) 0.4 mg Cap Take 1 capsule (0.4 mg total) by mouth once daily. 90 capsule 0    triamcinolone acetonide 0.1% (KENALOG) 0.1 % cream Apply topically 2 (two) times daily. For rash on legs. 80 g 1     No current facility-administered medications for this visit.       Review of patient's allergies indicates:   Allergen Reactions    Codeine Other (See Comments)     mood changes  Makes him feel violent         Objective:        Vitals:    03/10/23 1133   BP: 130/68   Pulse: 68       Physical Exam  Vitals reviewed.   Constitutional:       Appearance: Normal appearance.   HENT:      Mouth/Throat:      Mouth: Mucous membranes are moist.   Eyes:      Extraocular Movements: Extraocular movements intact.      Pupils: Pupils are equal, round, and reactive to light.   Cardiovascular:      Rate and Rhythm: Normal rate and regular rhythm.      Pulses: Normal pulses.      Heart sounds: Normal heart sounds. No murmur heard.    No gallop.   Pulmonary:      Effort: Pulmonary effort is normal.      Breath sounds: Normal breath sounds.    Abdominal:      General: Bowel sounds are normal.      Palpations: Abdomen is soft.   Musculoskeletal:         General: Normal range of motion.      Cervical back: Normal range of motion.   Skin:     General: Skin is warm and dry.   Neurological:      General: No focal deficit present.      Mental Status: He is alert and oriented to person, place, and time.   Psychiatric:         Mood and Affect: Mood normal.       LIPIDS - LAST 2   Lab Results   Component Value Date    CHOL 157 12/27/2020    CHOL 169 12/17/2020    HDL 37 (L) 12/27/2020    HDL 41 12/17/2020    LDLCALC 93.6 12/27/2020    LDLCALC 108.4 12/17/2020    TRIG 132 12/27/2020    TRIG 98 12/17/2020    CHOLHDL 23.6 12/27/2020    CHOLHDL 24.3 12/17/2020       CBC - LAST 2  Lab Results   Component Value Date    WBC 5.40 03/10/2023    WBC 4.76 04/07/2022    RBC 5.04 03/10/2023    RBC 4.47 (L) 04/07/2022    HGB 14.5 03/10/2023    HGB 12.7 (L) 04/07/2022    HCT 43.7 03/10/2023    HCT 38.7 (L) 04/07/2022    MCV 87 03/10/2023    MCV 87 04/07/2022    MCH 28.8 03/10/2023    MCH 28.4 04/07/2022    MCHC 33.2 03/10/2023    MCHC 32.8 04/07/2022    RDW 14.0 03/10/2023    RDW 14.3 04/07/2022     03/10/2023     04/07/2022    MPV 9.9 03/10/2023    MPV 9.1 (L) 04/07/2022    GRAN 3.1 03/10/2023    GRAN 57.4 03/10/2023    LYMPH 1.5 03/10/2023    LYMPH 28.5 03/10/2023    MONO 0.6 03/10/2023    MONO 10.2 03/10/2023    BASO 0.04 03/10/2023    BASO 0.05 04/07/2022    NRBC 0 03/10/2023    NRBC 0 04/07/2022       CHEMISTRY & LIVER FUNCTION - LAST 2  Lab Results   Component Value Date     03/10/2023     01/27/2023    K 4.8 03/10/2023    K 4.7 01/27/2023     03/10/2023     01/27/2023    CO2 28 03/10/2023    CO2 28 01/27/2023    ANIONGAP 8 03/10/2023    ANIONGAP 8 01/27/2023    BUN 23 03/10/2023    BUN 25 (H) 01/27/2023    CREATININE 1.3 03/10/2023    CREATININE 1.3 01/27/2023    GLU 98 03/10/2023     01/27/2023    CALCIUM 9.4 03/10/2023     CALCIUM 9.5 01/27/2023    MG 2.1 07/18/2022    MG 1.9 07/20/2021    ALBUMIN 4.0 01/17/2023    ALBUMIN 4.2 07/18/2022    PROT 7.9 12/26/2020    PROT 7.3 12/17/2020    ALKPHOS 98 12/26/2020    ALKPHOS 75 12/17/2020    ALT 24 12/26/2020    ALT 20 12/17/2020    AST 34 12/26/2020    AST 24 12/17/2020    BILITOT 0.7 12/26/2020    BILITOT 0.9 12/17/2020        CARDIAC PROFILE - LAST 2  Lab Results   Component Value Date    BNP 68 01/27/2023     (H) 04/07/2022    CPK 32 12/27/2020    CPK 37 12/26/2020    CPKMB 0.6 12/27/2020    CPKMB 0.9 12/26/2020    TROPONINI <0.006 12/27/2020    TROPONINI <0.006 12/27/2020        COAGULATION - LAST 2  Lab Results   Component Value Date    INR 1.0 12/26/2020    APTT 27.3 12/26/2020       ENDOCRINE & PSA - LAST 2  Lab Results   Component Value Date    HGBA1C 5.8 08/22/2017    TSH 2.060 09/22/2022    TSH 0.290 (L) 04/28/2022    PSA 0.11 06/28/2019    PSA 0.1 05/22/2018        ECHOCARDIOGRAM RESULTS  Results for orders placed during the hospital encounter of 02/23/23    Echo    Interpretation Summary  · The left ventricle is normal in size with mild concentric hypertrophy and normal systolic function.  · The estimated ejection fraction is 65%.  · Indeterminate left ventricular diastolic function.  · Normal right ventricular size with normal right ventricular systolic function.  · Mild left atrial enlargement.  · Normal central venous pressure (3 mmHg).  · The estimated PA systolic pressure is 17 mmHg.      CURRENT/PREVIOUS VISIT EKG  Results for orders placed or performed in visit on 01/27/23   IN OFFICE EKG 12-LEAD (to [facility])    Collection Time: 01/27/23  9:49 AM    Narrative    Test Reason : I10,    Vent. Rate : 057 BPM     Atrial Rate : 057 BPM     P-R Int : 208 ms          QRS Dur : 088 ms      QT Int : 432 ms       P-R-T Axes : 067 -35 081 degrees     QTc Int : 420 ms    Sinus bradycardia  Left axis deviation  Abnormal ECG  When compared with ECG of 14-SEP-2022 09:00,  No  significant change was found  Confirmed by Dima Vergara MD ([id number]) on 2/18/2023 12:37:48 PM    Referred By:             Confirmed By:Dima Vergara MD     No valid procedures specified.   Results for orders placed in visit on 06/21/22    Nuclear Stress Test    Interpretation Summary    The nuclear portion of this study will be reported separately.    The EKG portion of this study is negative for ischemia.    The patient reported chest pain during the stress test.    There were no arrhythmias during stress.    No valid procedures specified.        Assessment:       1. Shortness of breath    2. Mixed hyperlipidemia    3. Paroxysmal atrial fibrillation    4. Chest discomfort             Plan:       Shortness of breath  -     Full code; Standing  -     Case Request-Cath Lab: Angiogram, Coronary, with Left Heart Cath; Standing  -     Height and weight; Standing  -     Insert 2 peripheral IVs; Standing  -     Void; Standing  -     Clip and Prep Right Radial, Right Groin, Left Groin; Standing  -     Verify Modification of Diabetic Agents; Standing  -     Verify informed consent; Standing  -     Vital signs; Standing  -     Cardiac Monitoring - Adult; Standing  -     Pulse Oximetry Q4H; Standing  -     Diet NPO; Standing  -     CBC auto differential; Standing  -     Basic metabolic panel; Standing  -     EKG 12-lead; Standing  -     Ambulatory referral/consult to Pulmonology; Future; Expected date: 03/17/2023  -     Basic metabolic panel; Future; Expected date: 03/10/2023  -     CBC Auto Differential; Future; Expected date: 03/10/2023    Mixed hyperlipidemia    Paroxysmal atrial fibrillation    Chest discomfort    Other orders  -     0.9%  NaCl infusion  -     diphenhydrAMINE capsule 50 mg      Given significant risk factors of coronary artery disease and severe symptoms, will evaluate further with a diagnostic angiogram.  Risks and benefits of the procedure were discussed with the patient including pain, bleeding,  infection, contrast induced nephropathy, vascular damage, stroke, heart attack and rare chance of death.  Discussed with him the need for dual antiplatelet therapy if he gets a PCI.  Patient verbalized understanding and would like to proceed with the procedure.    Discussed with patient regarding his atrial fibrillation.  Dr. Velasquez had recommended loop recorder at his last visit to monitor the AFib but patient declined to do so.  Discussed with him again and his family, they might reconsider it after the angiogram.    Follow up in about 4 weeks (around 4/7/2023) for f/u coroanry angiogram.          MD [facility]

## 2023-03-13 ENCOUNTER — TELEPHONE (OUTPATIENT)
Dept: CARDIOLOGY | Facility: CLINIC | Age: 83
End: 2023-03-13
Payer: MEDICARE

## 2023-03-13 DIAGNOSIS — I48.0 PAROXYSMAL ATRIAL FIBRILLATION: Primary | ICD-10-CM

## 2023-03-13 NOTE — TELEPHONE ENCOUNTER
----- Message from Anahi Vick RN sent at 3/10/2023  3:34 PM CST -----  Regarding: 3/10 EKG Order  The EKG performed on 3/10/23 is missing an order.  Please place an order so that the EKG can be read.    Thank You,  Anahi Vick RN  [facility] [place]   [phone]

## 2023-03-20 ENCOUNTER — HOSPITAL ENCOUNTER (OUTPATIENT)
Dept: PREADMISSION TESTING | Facility: HOSPITAL | Age: 83
Discharge: HOME | End: 2023-03-20
Attending: INTERNAL MEDICINE
Payer: MEDICARE

## 2023-03-20 DIAGNOSIS — R07.89 OTHER CHEST PAIN: ICD-10-CM

## 2023-03-20 DIAGNOSIS — R06.02 SHORTNESS OF BREATH: ICD-10-CM

## 2023-03-20 LAB
ANION GAP SERPL CALC-SCNC: 5 MMOL/L (ref 8–16)
BASOPHILS # BLD AUTO: 0.03 K/UL (ref 0–0.2)
BASOPHILS NFR BLD: 0.7 % (ref 0–1.9)
BUN SERPL-MCNC: 16 MG/DL (ref 8–23)
CALCIUM SERPL-MCNC: 9.5 MG/DL (ref 8.7–10.5)
CHLORIDE SERPL-SCNC: 107 MMOL/L (ref 95–110)
CO2 SERPL-SCNC: 29 MMOL/L (ref 23–29)
CREAT SERPL-MCNC: 1.2 MG/DL (ref 0.5–1.4)
DIFFERENTIAL METHOD: NORMAL
EOSINOPHIL # BLD AUTO: 0.1 K/UL (ref 0–0.5)
EOSINOPHIL NFR BLD: 2.8 % (ref 0–8)
ERYTHROCYTE [DISTWIDTH] IN BLOOD BY AUTOMATED COUNT: 13.8 % (ref 11.5–14.5)
EST. GFR  (NO RACE VARIABLE): >60 ML/MIN/1.73 M^2
GLUCOSE SERPL-MCNC: 127 MG/DL (ref 70–110)
HCT VFR BLD AUTO: 43.2 % (ref 40–54)
HGB BLD-MCNC: 14.4 G/DL (ref 14–18)
IMM GRANULOCYTES # BLD AUTO: 0.01 K/UL (ref 0–0.04)
IMM GRANULOCYTES NFR BLD AUTO: 0.2 % (ref 0–0.5)
LYMPHOCYTES # BLD AUTO: 1.1 K/UL (ref 1–4.8)
LYMPHOCYTES NFR BLD: 24.4 % (ref 18–48)
MCH RBC QN AUTO: 28.7 PG (ref 27–31)
MCHC RBC AUTO-ENTMCNC: 33.3 G/DL (ref 32–36)
MCV RBC AUTO: 86 FL (ref 82–98)
MONOCYTES # BLD AUTO: 0.4 K/UL (ref 0.3–1)
MONOCYTES NFR BLD: 8.6 % (ref 4–15)
NEUTROPHILS # BLD AUTO: 2.7 K/UL (ref 1.8–7.7)
NEUTROPHILS NFR BLD: 63.3 % (ref 38–73)
NRBC BLD-RTO: 0 /100 WBC
PLATELET # BLD AUTO: 197 K/UL (ref 150–450)
PMV BLD AUTO: 9.7 FL (ref 9.2–12.9)
POTASSIUM SERPL-SCNC: 4.2 MMOL/L (ref 3.5–5.1)
RBC # BLD AUTO: 5.01 M/UL (ref 4.6–6.2)
SODIUM SERPL-SCNC: 141 MMOL/L (ref 136–145)
WBC # BLD AUTO: 4.31 K/UL (ref 3.9–12.7)

## 2023-03-20 PROCEDURE — 93010 EKG 12-LEAD: ICD-10-PCS | Mod: ,,, | Performed by: INTERNAL MEDICINE

## 2023-03-20 PROCEDURE — 36415 COLL VENOUS BLD VENIPUNCTURE: CPT | Performed by: INTERNAL MEDICINE

## 2023-03-20 PROCEDURE — 93010 ELECTROCARDIOGRAM REPORT: CPT | Mod: ,,, | Performed by: INTERNAL MEDICINE

## 2023-03-20 PROCEDURE — 80048 BASIC METABOLIC PNL TOTAL CA: CPT | Performed by: INTERNAL MEDICINE

## 2023-03-20 PROCEDURE — 93005 ELECTROCARDIOGRAM TRACING: CPT | Performed by: INTERNAL MEDICINE

## 2023-03-20 PROCEDURE — 85025 COMPLETE CBC W/AUTO DIFF WBC: CPT | Performed by: INTERNAL MEDICINE

## 2023-03-20 NOTE — DISCHARGE INSTRUCTIONS
Nothing to eat or drink after midnight the night before your procedure.  Do not take any medications the morning of your procedure  Bring all your medications with you in the original pill bottles from pharmacy.  If you take blood thinners, ask your doctor if you should stop taking them. Aspirin is okay to take.  Do your chlorhexidine wash the night before and morning of your procedure.  If you use a CPAP or BiPAP at home, please bring it with you the day of your procedure.  Make arrangements for someone you know to drive you home after your procedure. Taxi and Uber are not acceptable.

## 2023-03-21 NOTE — LETTER
July 14, 2017      Artemio MCKENZIE MD  [address]  [address]  [facility] [address]           [place] [facility]  [address]  [place] [address]  Phone: [phone]          Patient: Nikolay Barraza   MR Number: [id number]   YOB: 1940   Date of Visit: 7/14/2017       Dear Dr. Artemio Thomas V:    Thank you for referring Nikolay Barraza to me for evaluation. Attached you will find relevant portions of my assessment and plan of care.    If you have questions, please do not hesitate to call me. I look forward to following Nikolay Barraza along with you.    Sincerely,    Aleida Duque, [facility]    Enclosure  CC:  No Recipients    If you would like to receive this communication electronically, please contact [email or web address] or [phone] to request more information on [email or web address] Link access.    For providers and/or their staff who would like to refer a patient to [facility], please contact us through our one-stop-shop provider referral line, [facility], at [phone].    If you feel you have received this communication in error or would no longer like to receive these types of communications, please e-mail [email or web address]          Estlander Flap (Upper To Lower Lip) Text: The defect of the lower lip was assessed and measured.  Given the location and size of the defect, an Estlander flap was deemed most appropriate.  Using a sterile surgical marker, an appropriate Estlander flap was measured and drawn on the upper lip. Local anesthesia was then infiltrated. A scalpel was then used to incise the lateral aspect of the flap, through skin, muscle and mucosa, leaving the flap pedicled medially.  The flap was then rotated and positioned to fill the lower lip defect.  The flap was then sutured into place with a three layer technique, closing the orbicularis oris muscle layer with subcutaneous buried sutures, followed by a mucosal layer and an epidermal layer.

## 2023-03-23 ENCOUNTER — HOSPITAL ENCOUNTER (OUTPATIENT)
Facility: HOSPITAL | Age: 83
Discharge: HOME | End: 2023-03-23
Attending: INTERNAL MEDICINE | Admitting: INTERNAL MEDICINE
Payer: MEDICARE

## 2023-03-23 VITALS
DIASTOLIC BLOOD PRESSURE: 67 MMHG | RESPIRATION RATE: 15 BRPM | HEART RATE: 55 BPM | HEIGHT: 70 IN | BODY MASS INDEX: 30.49 KG/M2 | OXYGEN SATURATION: 98 % | WEIGHT: 213 LBS | SYSTOLIC BLOOD PRESSURE: 150 MMHG

## 2023-03-23 DIAGNOSIS — I25.10 CAD (CORONARY ARTERY DISEASE): ICD-10-CM

## 2023-03-23 DIAGNOSIS — R06.02 SHORTNESS OF BREATH: Primary | ICD-10-CM

## 2023-03-23 PROCEDURE — 93458 L HRT ARTERY/VENTRICLE ANGIO: CPT | Mod: 26,,, | Performed by: INTERNAL MEDICINE

## 2023-03-23 PROCEDURE — 99152 MOD SED SAME PHYS/QHP 5/>YRS: CPT | Mod: ,,, | Performed by: INTERNAL MEDICINE

## 2023-03-23 PROCEDURE — 63600175 PHARM REV CODE 636 W HCPCS: Performed by: INTERNAL MEDICINE

## 2023-03-23 PROCEDURE — C1894 INTRO/SHEATH, NON-LASER: HCPCS | Performed by: INTERNAL MEDICINE

## 2023-03-23 PROCEDURE — 93458 PR CATH PLACE/CORON ANGIO, IMG SUPER/INTERP,W LEFT HEART VENTRICULOGRAPHY: ICD-10-PCS | Mod: 26,,, | Performed by: INTERNAL MEDICINE

## 2023-03-23 PROCEDURE — C1887 CATHETER, GUIDING: HCPCS | Performed by: INTERNAL MEDICINE

## 2023-03-23 PROCEDURE — C1769 GUIDE WIRE: HCPCS | Performed by: INTERNAL MEDICINE

## 2023-03-23 PROCEDURE — 93458 L HRT ARTERY/VENTRICLE ANGIO: CPT | Performed by: INTERNAL MEDICINE

## 2023-03-23 PROCEDURE — 99152 MOD SED SAME PHYS/QHP 5/>YRS: CPT | Performed by: INTERNAL MEDICINE

## 2023-03-23 PROCEDURE — 99152 PR MOD CONSCIOUS SEDATION, SAME PHYS, 5+ YRS, FIRST 15 MIN: ICD-10-PCS | Mod: ,,, | Performed by: INTERNAL MEDICINE

## 2023-03-23 PROCEDURE — 25500020 PHARM REV CODE 255: Performed by: INTERNAL MEDICINE

## 2023-03-23 PROCEDURE — 25000003 PHARM REV CODE 250: Performed by: INTERNAL MEDICINE

## 2023-03-23 PROCEDURE — 99153 MOD SED SAME PHYS/QHP EA: CPT | Performed by: INTERNAL MEDICINE

## 2023-03-23 PROCEDURE — 27201423 OPTIME MED/SURG SUP & DEVICES STERILE SUPPLY: Performed by: INTERNAL MEDICINE

## 2023-03-23 RX ORDER — SODIUM CHLORIDE 450 MG/100ML
INJECTION, SOLUTION INTRAVENOUS CONTINUOUS
Status: DISCONTINUED | OUTPATIENT
Start: 2023-03-23 | End: 2023-03-23 | Stop reason: HOSPADM

## 2023-03-23 RX ORDER — IODIXANOL 320 MG/ML
INJECTION, SOLUTION INTRAVASCULAR
Status: DISCONTINUED | OUTPATIENT
Start: 2023-03-23 | End: 2023-03-23 | Stop reason: HOSPADM

## 2023-03-23 RX ORDER — MIDAZOLAM HYDROCHLORIDE 1 MG/ML
INJECTION INTRAMUSCULAR; INTRAVENOUS
Status: DISCONTINUED | OUTPATIENT
Start: 2023-03-23 | End: 2023-03-23 | Stop reason: HOSPADM

## 2023-03-23 RX ORDER — LIDOCAINE HYDROCHLORIDE 10 MG/ML
INJECTION, SOLUTION EPIDURAL; INFILTRATION; INTRACAUDAL; PERINEURAL
Status: DISCONTINUED | OUTPATIENT
Start: 2023-03-23 | End: 2023-03-23 | Stop reason: HOSPADM

## 2023-03-23 RX ORDER — ACETAMINOPHEN 325 MG/1
650 TABLET ORAL EVERY 4 HOURS PRN
Status: DISCONTINUED | OUTPATIENT
Start: 2023-03-23 | End: 2023-03-23 | Stop reason: HOSPADM

## 2023-03-23 RX ORDER — DIPHENHYDRAMINE HCL 25 MG
50 CAPSULE ORAL
Status: DISCONTINUED | OUTPATIENT
Start: 2023-03-23 | End: 2023-03-23 | Stop reason: HOSPADM

## 2023-03-23 RX ORDER — SODIUM CHLORIDE 9 MG/ML
INJECTION, SOLUTION INTRAVENOUS ONCE
Status: COMPLETED | OUTPATIENT
Start: 2023-03-23 | End: 2023-03-23

## 2023-03-23 RX ORDER — NITROGLYCERIN 5 MG/ML
INJECTION, SOLUTION INTRAVENOUS
Status: DISCONTINUED | OUTPATIENT
Start: 2023-03-23 | End: 2023-03-23 | Stop reason: HOSPADM

## 2023-03-23 RX ORDER — FENTANYL CITRATE 50 UG/ML
INJECTION, SOLUTION INTRAMUSCULAR; INTRAVENOUS
Status: DISCONTINUED | OUTPATIENT
Start: 2023-03-23 | End: 2023-03-23 | Stop reason: HOSPADM

## 2023-03-23 RX ORDER — ONDANSETRON 4 MG/1
8 TABLET, ORALLY DISINTEGRATING ORAL EVERY 8 HOURS PRN
Status: DISCONTINUED | OUTPATIENT
Start: 2023-03-23 | End: 2023-03-23 | Stop reason: HOSPADM

## 2023-03-23 RX ORDER — AMLODIPINE BESYLATE 5 MG/1
5 TABLET ORAL DAILY
Qty: 90 TABLET | Refills: 3 | Status: SHIPPED | OUTPATIENT
Start: 2023-03-23 | End: 2023-06-20 | Stop reason: SDUPTHER

## 2023-03-23 RX ORDER — HEPARIN SODIUM 10000 [USP'U]/ML
INJECTION, SOLUTION INTRAVENOUS; SUBCUTANEOUS
Status: DISCONTINUED | OUTPATIENT
Start: 2023-03-23 | End: 2023-03-23 | Stop reason: HOSPADM

## 2023-03-23 RX ADMIN — SODIUM CHLORIDE: 0.9 INJECTION, SOLUTION INTRAVENOUS at 07:03

## 2023-03-23 RX ADMIN — DIPHENHYDRAMINE HYDROCHLORIDE 50 MG: 25 CAPSULE ORAL at 07:03

## 2023-03-23 RX ADMIN — SODIUM CHLORIDE: 0.45 INJECTION, SOLUTION INTRAVENOUS at 09:03

## 2023-03-23 NOTE — Clinical Note
The right groin and right radial was prepped. The site was prepped with ChloraPrep. The site was clipped. The patient was draped. The patient was positioned supine. The patient was secured using safety straps and to an armboard.

## 2023-03-23 NOTE — Clinical Note
65 ml of contrast were injected throughout the case. 20 mL of contrast was the total wasted during the case. 85 mL was the total amount used during the case.

## 2023-03-27 DIAGNOSIS — R41.3 MEMORY DEFICITS: ICD-10-CM

## 2023-03-27 DIAGNOSIS — R42 DIZZINESS: Primary | ICD-10-CM

## 2023-03-30 ENCOUNTER — OFFICE VISIT (OUTPATIENT)
Dept: PULMONOLOGY | Facility: CLINIC | Age: 83
End: 2023-03-30
Payer: MEDICARE

## 2023-03-30 VITALS
WEIGHT: 216.19 LBS | OXYGEN SATURATION: 95 % | BODY MASS INDEX: 30.95 KG/M2 | SYSTOLIC BLOOD PRESSURE: 123 MMHG | HEART RATE: 60 BPM | DIASTOLIC BLOOD PRESSURE: 67 MMHG | HEIGHT: 70 IN

## 2023-03-30 DIAGNOSIS — R06.02 SHORTNESS OF BREATH: ICD-10-CM

## 2023-03-30 PROCEDURE — 99213 OFFICE O/P EST LOW 20 MIN: CPT | Mod: PBBFAC,PO | Performed by: INTERNAL MEDICINE

## 2023-03-30 PROCEDURE — 99999 PR PBB SHADOW E&M-EST. PATIENT-LVL III: ICD-10-PCS | Mod: PBBFAC,,, | Performed by: INTERNAL MEDICINE

## 2023-03-30 PROCEDURE — 99204 OFFICE O/P NEW MOD 45 MIN: CPT | Mod: S$PBB,,, | Performed by: INTERNAL MEDICINE

## 2023-03-30 PROCEDURE — 99999 PR PBB SHADOW E&M-EST. PATIENT-LVL III: CPT | Mod: PBBFAC,,, | Performed by: INTERNAL MEDICINE

## 2023-03-30 PROCEDURE — 99204 PR OFFICE/OUTPT VISIT, NEW, LEVL IV, 45-59 MIN: ICD-10-PCS | Mod: S$PBB,,, | Performed by: INTERNAL MEDICINE

## 2023-03-30 RX ORDER — ALBUTEROL SULFATE 90 UG/1
1-2 AEROSOL, METERED RESPIRATORY (INHALATION) EVERY 4 HOURS PRN
Qty: 18 G | Refills: 11 | Status: SHIPPED | OUTPATIENT
Start: 2023-03-30 | End: 2023-08-18

## 2023-03-30 RX ORDER — TETRACYCLINE HYDROCHLORIDE 500 MG/1
500 CAPSULE ORAL 2 TIMES DAILY
COMMUNITY
End: 2024-03-19

## 2023-03-30 NOTE — PATIENT INSTRUCTIONS
Try albuterol inhaler to see if it helps - use 1-2 puffs as needed for shortness of breath  Continue cpap nightly use  Get pulmonary function tests  Walk in office to see if your sats drop

## 2023-03-30 NOTE — PROGRESS NOTES
3/30/2023    Nikolay Barraza  New Patient Consult    Chief Complaint   Patient presents with    Shortness of Breath       HPI: Pt is an 81 yo male with HTN, HLD, GERD, thyroid disease presenting for new evaluation.  Pt c/o feeling short of breath & lightheaded w/ exercise. Pt started noticing about 3-4mos ago w/ episode while walking from dinner felt lightheaded, presyncopal, band of discomfort over chest. Had angiogram recently w/ non-obstructive CAD. Has also seen neurology, brain MRI pending.  Episodes will happen predictably w/ exertion- starts breathing harder, gets light headed and feels like going to pass out, then sits down and resolves.  Wife states pt always coughs, clears throat- denies phlegm. Has been on cpap many years- follows w/ sleep dr Briseno.  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    The chief complaint problem is new to me.    PFSH:  Past Medical History:   Diagnosis Date    Allergy     Codeine    Closed fracture dislocation of lumbar spine 7/6/2018    #2 vertebra. Patient went to the emergency Department.  Followup with Erik:    GERD (gastroesophageal reflux disease)     Hyperlipidemia     Hypertension     Hypertensive retinopathy of both eyes 5/11/2021    As per ophthalmology notes.  Eye care 20/20    Right inguinal hernia     Thyroid disease          Past Surgical History:   Procedure Laterality Date    ADENOIDECTOMY      ANGIOGRAM, CORONARY, WITH LEFT HEART CATHETERIZATION N/A 3/23/2023    Procedure: Angiogram, Coronary, with Left Heart Cath;  Surgeon: Tomas Salomon MD;  Location: [facility] CATH/EP LAB;  Service: Cardiology;  Laterality: N/A;    INGUINAL HERNIA REPAIR Right 07/12/2018    Dr. [facility]    KNEE SURGERY Left 07/31/2019    TONSILLECTOMY      VASECTOMY       Social History     Tobacco Use    Smoking status: Never    Smokeless tobacco: Never   Substance Use Topics    Alcohol use: No    Drug use: No     Family History   Problem Relation  "Age of Onset    Heart disease Father     Miscarriages / Stillbirths Father     Skin cancer Mother     Cancer Maternal Grandfather     Stomach cancer Paternal Grandfather      Review of patient's allergies indicates:   Allergen Reactions    Codeine Other (See Comments)     mood changes  Makes him feel violent       Performance Status:The patient's activity level is functions out of house.      Review of Systems:  a review of eleven systems covering constitutional, Eye, HEENT, Psych, Respiratory, Cardiac, GI, , Musculoskeletal, Endocrine, Dermatologic was negative except for pertinent findings as listed ABOVE and below:  All negative with pertinent positives as above        Exam:Comprehensive exam done. /67 (BP Location: Right arm, Patient Position: Sitting, BP Method: Large (Automatic))   Pulse 60   Ht 5' 10" (1.778 m)   Wt 98.1 kg (216 lb 2.6 oz)   SpO2 95%   BMI 31.02 kg/m²   Exam included Vitals as listed, and patient's appearance and affect and alertness and mood, oral exam for yeast and hygiene and pharynx lesions and Mallapatti (M) score, neck with inspection for jvd and masses and thyroid abnormalities and lymph nodes (supraclavicular and infraclavicular nodes and axillary also examined and noted if abn), chest exam included symmetry and effort and fremitus and percussion and auscultation, cardiac exam included rhythm and gallops and murmur and rubs and jvd and edema, abdominal exam for mass and hepatosplenomegaly and tenderness and hernias and bowel sounds, Musculoskeletal exam with muscle tone and posture and mobility/gait and  strength, and skin for rashes and cyanosis and pallor and turgor, extremity for clubbing.  Findings were normal except for pertinent findings listed below:  Oropharynx clear, M4  HR regular  Carotid pulses normal  Breath sounds clear bilaterally  No edema/clubbing, radial pulses 2+  Ambulation in office 2-3 min sat 98%, ambulates at a normal pace " comfortably    Radiographs (ct chest and cxr) reviewed: view by direct vision   US carotids 12/27/20-   No evidence of a hemodynamically significant carotid bifurcation stenosis.  Less than 50% stenosis of either internal carotid artery.  Patent vertebral arteries.    CXR 1/27/23- clear    TTE 2/23/23-   The left ventricle is normal in size with mild concentric hypertrophy and normal systolic function.  The estimated ejection fraction is 65%.  Indeterminate left ventricular diastolic function.  Normal right ventricular size with normal right ventricular systolic function.  Mild left atrial enlargement.  Normal central venous pressure (3 mmHg).  The estimated PA systolic pressure is 17 mmHg.    Labs reviewed     Latest Reference Range & Units 03/20/23 10:50   WBC 3.90 - 12.70 K/uL 4.31   RBC 4.60 - 6.20 M/uL 5.01   Hemoglobin 14.0 - 18.0 g/dL 14.4   Hematocrit 40.0 - 54.0 % 43.2   MCV 82 - 98 fL 86   MCH 27.0 - 31.0 pg 28.7   MCHC 32.0 - 36.0 g/dL 33.3   RDW 11.5 - 14.5 % 13.8   Platelets 150 - 450 K/uL 197       Latest Reference Range & Units 03/20/23 10:50   Sodium 136 - 145 mmol/L 141   Potassium 3.5 - 5.1 mmol/L 4.2   Chloride 95 - 110 mmol/L 107   CO2 23 - 29 mmol/L 29   Anion Gap 8 - 16 mmol/L 5 (L)   BUN 8 - 23 mg/dL 16   Creatinine 0.5 - 1.4 mg/dL 1.2   eGFR >60 mL/min/1.73 m^2 >60.0   Glucose 70 - 110 mg/dL 127 (H)   Calcium 8.7 - 10.5 mg/dL 9.5       PFT will be done and results to be reviewed      Plan:  Clinical impression is reasonably certain and repeated evaluation prn +/- follow up will be needed as below. DENSON, presyncope- does not seem likely to be pulmonary related condition. Pt with normal sats w/ exertion. Follows w/ cardiology and neurology    Nikolay was seen today for shortness of breath.    Diagnoses and all orders for this visit:    Shortness of breath  -     Ambulatory referral/consult to Pulmonology  -     Complete PFT with bronchodilator; Future  -     albuterol (PROVENTIL/VENTOLIN HFA) 90  mcg/actuation inhaler; Inhale 1-2 puffs into the lungs every 4 (four) hours as needed for Shortness of Breath (coughing). Rescue        Follow up if symptoms worsen or fail to improve.    Discussed with patient above for education the following:      Patient Instructions   Try albuterol inhaler to see if it helps - use 1-2 puffs as needed for shortness of breath  Continue cpap nightly use  Get pulmonary function tests  Walk in office to see if your sats drop

## 2023-04-06 ENCOUNTER — HOSPITAL ENCOUNTER (OUTPATIENT)
Dept: RADIOLOGY | Facility: HOSPITAL | Age: 83
Discharge: HOME | End: 2023-04-06
Attending: NURSE PRACTITIONER
Payer: MEDICARE

## 2023-04-06 DIAGNOSIS — R41.3 MEMORY DEFICITS: ICD-10-CM

## 2023-04-06 DIAGNOSIS — R42 DIZZINESS: ICD-10-CM

## 2023-04-06 PROCEDURE — 70551 MRI BRAIN STEM W/O DYE: CPT | Mod: TC,PO

## 2023-04-10 ENCOUNTER — HOSPITAL ENCOUNTER (OUTPATIENT)
Dept: PULMONOLOGY | Facility: HOSPITAL | Age: 83
Discharge: HOME | End: 2023-04-10
Attending: INTERNAL MEDICINE
Payer: MEDICARE

## 2023-04-10 DIAGNOSIS — R06.02 SHORTNESS OF BREATH: ICD-10-CM

## 2023-04-10 PROCEDURE — 94060 EVALUATION OF WHEEZING: CPT

## 2023-04-10 PROCEDURE — 94060 PR EVAL OF BRONCHOSPASM: ICD-10-PCS | Mod: 26,,, | Performed by: INTERNAL MEDICINE

## 2023-04-10 PROCEDURE — 94729 DIFFUSING CAPACITY: CPT

## 2023-04-10 PROCEDURE — 94726 PLETHYSMOGRAPHY LUNG VOLUMES: CPT | Mod: 26,,, | Performed by: INTERNAL MEDICINE

## 2023-04-10 PROCEDURE — 94729 PR C02/MEMBANE DIFFUSE CAPACITY: ICD-10-PCS | Mod: 26,,, | Performed by: INTERNAL MEDICINE

## 2023-04-10 PROCEDURE — 94729 DIFFUSING CAPACITY: CPT | Mod: 26,,, | Performed by: INTERNAL MEDICINE

## 2023-04-10 PROCEDURE — 94726 PULM FUNCT TST PLETHYSMOGRAP: ICD-10-PCS | Mod: 26,,, | Performed by: INTERNAL MEDICINE

## 2023-04-10 PROCEDURE — 94726 PLETHYSMOGRAPHY LUNG VOLUMES: CPT

## 2023-04-10 PROCEDURE — 94060 EVALUATION OF WHEEZING: CPT | Mod: 26,,, | Performed by: INTERNAL MEDICINE

## 2023-04-10 RX ORDER — ALBUTEROL SULFATE 2.5 MG/.5ML
2.5 SOLUTION RESPIRATORY (INHALATION)
Status: COMPLETED | OUTPATIENT
Start: 2023-04-10 | End: 2023-04-10

## 2023-04-10 RX ADMIN — ALBUTEROL SULFATE 2.5 MG: 2.5 SOLUTION RESPIRATORY (INHALATION) at 12:04

## 2023-04-11 ENCOUNTER — OFFICE VISIT (OUTPATIENT)
Dept: CARDIOLOGY | Facility: CLINIC | Age: 83
End: 2023-04-11
Payer: MEDICARE

## 2023-04-11 VITALS
BODY MASS INDEX: 30.62 KG/M2 | OXYGEN SATURATION: 96 % | WEIGHT: 213.88 LBS | HEART RATE: 74 BPM | DIASTOLIC BLOOD PRESSURE: 62 MMHG | HEIGHT: 70 IN | SYSTOLIC BLOOD PRESSURE: 124 MMHG

## 2023-04-11 DIAGNOSIS — I48.0 PAROXYSMAL ATRIAL FIBRILLATION: ICD-10-CM

## 2023-04-11 DIAGNOSIS — R06.02 SHORTNESS OF BREATH: Primary | ICD-10-CM

## 2023-04-11 LAB
BRPFT: ABNORMAL
DLCO SINGLE BREATH LLN: 17.69
DLCO SINGLE BREATH PRE REF: 82.2 %
DLCO SINGLE BREATH REF: 24.62
DLCOC SBVA LLN: 2.33
DLCOC SBVA REF: 3.46
DLCOC SINGLE BREATH LLN: 17.69
DLCOC SINGLE BREATH REF: 24.62
DLCOVA LLN: 2.33
DLCOVA PRE REF: 96.6 %
DLCOVA PRE: 3.34 ML/(MIN*MMHG*L) (ref 2.33–4.58)
DLCOVA REF: 3.46
ERV LLN: -16449.11
ERV PRE REF: 204.7 %
ERV REF: 0.89
FEF 25 75 CHG: 28.1 %
FEF 25 75 LLN: 0.69
FEF 25 75 POST REF: 163.6 %
FEF 25 75 PRE REF: 127.8 %
FEF 25 75 REF: 1.91
FET100 CHG: -6.4 %
FEV1 CHG: 3.1 %
FEV1 FVC CHG: 5.8 %
FEV1 FVC LLN: 59
FEV1 FVC POST REF: 105.6 %
FEV1 FVC PRE REF: 99.8 %
FEV1 FVC REF: 74
FEV1 LLN: 1.9
FEV1 POST REF: 119.5 %
FEV1 PRE REF: 115.8 %
FEV1 REF: 2.78
FRCPLETH LLN: 2.83
FRCPLETH PREREF: 103.8 %
FRCPLETH REF: 3.82
FVC CHG: -2.5 %
FVC LLN: 2.7
FVC POST REF: 111.9 %
FVC PRE REF: 114.7 %
FVC REF: 3.78
IVC PRE: 4.18 L (ref 2.7–4.88)
IVC SINGLE BREATH LLN: 2.7
IVC SINGLE BREATH PRE REF: 110.5 %
IVC SINGLE BREATH REF: 3.78
MVV LLN: 93
MVV PRE REF: 106.3 %
MVV REF: 110
PEF CHG: 4.9 %
PEF LLN: 4.53
PEF POST REF: 103.8 %
PEF PRE REF: 99 %
PEF REF: 6.85
POST FEF 25 75: 3.13 L/S (ref 0.69–3.75)
POST FET 100: 10.24 SEC
POST FEV1 FVC: 78.43 % (ref 59.05–88.14)
POST FEV1: 3.32 L (ref 1.9–3.59)
POST FVC: 4.23 L (ref 2.7–4.88)
POST PEF: 7.11 L/S (ref 4.53–9.16)
PRE DLCO: 20.25 ML/(MIN*MMHG) (ref 17.69–31.55)
PRE ERV: 1.83 L (ref -16449.11–16450.89)
PRE FEF 25 75: 2.44 L/S (ref 0.69–3.75)
PRE FET 100: 10.94 SEC
PRE FEV1 FVC: 74.13 % (ref 59.05–88.14)
PRE FEV1: 3.22 L (ref 1.9–3.59)
PRE FRC PL: 3.96 L (ref 2.83–4.8)
PRE FVC: 4.34 L (ref 2.7–4.88)
PRE MVV: 116.91 L/MIN (ref 93.45–126.43)
PRE PEF: 6.78 L/S (ref 4.53–9.16)
PRE RV: 2.14 L (ref 2.25–3.6)
PRE TLC: 6.48 L (ref 5.97–8.28)
RAW LLN: 3.06
RAW PRE REF: 34.2 %
RAW PRE: 1.05 CMH2O*S/L (ref 3.06–3.06)
RAW REF: 3.06
RV LLN: 2.25
RV PRE REF: 73 %
RV REF: 2.93
RVTLC LLN: 37
RVTLC PRE REF: 71.2 %
RVTLC PRE: 32.98 % (ref 37.35–55.31)
RVTLC REF: 46
TLC LLN: 5.97
TLC PRE REF: 90.9 %
TLC REF: 7.13
VA PRE: 6.06 L (ref 6.98–6.98)
VA SINGLE BREATH LLN: 6.98
VA SINGLE BREATH PRE REF: 86.9 %
VA SINGLE BREATH REF: 6.98
VC LLN: 2.7
VC PRE REF: 114.7 %
VC PRE: 4.34 L (ref 2.7–4.88)
VC REF: 3.78

## 2023-04-11 PROCEDURE — 99214 PR OFFICE/OUTPT VISIT, EST, LEVL IV, 30-39 MIN: ICD-10-PCS | Mod: S$PBB,,, | Performed by: INTERNAL MEDICINE

## 2023-04-11 PROCEDURE — 99214 OFFICE O/P EST MOD 30 MIN: CPT | Mod: PBBFAC,PN | Performed by: INTERNAL MEDICINE

## 2023-04-11 PROCEDURE — 99999 PR PBB SHADOW E&M-EST. PATIENT-LVL IV: CPT | Mod: PBBFAC,,, | Performed by: INTERNAL MEDICINE

## 2023-04-11 PROCEDURE — 99999 PR PBB SHADOW E&M-EST. PATIENT-LVL IV: ICD-10-PCS | Mod: PBBFAC,,, | Performed by: INTERNAL MEDICINE

## 2023-04-11 PROCEDURE — 99214 OFFICE O/P EST MOD 30 MIN: CPT | Mod: S$PBB,,, | Performed by: INTERNAL MEDICINE

## 2023-04-21 ENCOUNTER — LAB VISIT (OUTPATIENT)
Dept: LAB | Facility: HOSPITAL | Age: 83
End: 2023-04-21
Attending: NURSE PRACTITIONER
Payer: MEDICARE

## 2023-04-21 DIAGNOSIS — R25.1 TREMOR: Primary | ICD-10-CM

## 2023-04-21 PROCEDURE — 82525 ASSAY OF COPPER: CPT | Performed by: NURSE PRACTITIONER

## 2023-04-21 PROCEDURE — 36415 COLL VENOUS BLD VENIPUNCTURE: CPT | Performed by: NURSE PRACTITIONER

## 2023-04-24 LAB — COPPER SERPL-MCNC: 93 UG/DL (ref 69–132)

## 2023-05-04 DIAGNOSIS — I10 PRIMARY HYPERTENSION: ICD-10-CM

## 2023-05-04 RX ORDER — TAMSULOSIN HYDROCHLORIDE 0.4 MG/1
CAPSULE ORAL
Qty: 90 CAPSULE | Refills: 0 | Status: SHIPPED | OUTPATIENT
Start: 2023-05-04 | End: 2023-05-08 | Stop reason: SDUPTHER

## 2023-05-08 ENCOUNTER — OFFICE VISIT (OUTPATIENT)
Dept: UROLOGY | Facility: CLINIC | Age: 83
End: 2023-05-08
Payer: MEDICARE

## 2023-05-08 VITALS
HEIGHT: 70 IN | SYSTOLIC BLOOD PRESSURE: 149 MMHG | BODY MASS INDEX: 30.49 KG/M2 | WEIGHT: 213 LBS | HEART RATE: 64 BPM | DIASTOLIC BLOOD PRESSURE: 64 MMHG

## 2023-05-08 DIAGNOSIS — R39.9 LOWER URINARY TRACT SYMPTOMS (LUTS): ICD-10-CM

## 2023-05-08 DIAGNOSIS — N28.1 CYST OF KIDNEY, ACQUIRED: ICD-10-CM

## 2023-05-08 DIAGNOSIS — Z87.898 HISTORY OF URINARY RETENTION: Primary | ICD-10-CM

## 2023-05-08 PROCEDURE — 99214 OFFICE O/P EST MOD 30 MIN: CPT | Mod: PBBFAC | Performed by: UROLOGY

## 2023-05-08 PROCEDURE — 99999 PR PBB SHADOW E&M-EST. PATIENT-LVL IV: CPT | Mod: PBBFAC,,, | Performed by: UROLOGY

## 2023-05-08 PROCEDURE — 99214 OFFICE O/P EST MOD 30 MIN: CPT | Mod: S$PBB,,, | Performed by: UROLOGY

## 2023-05-08 PROCEDURE — 99999 PR PBB SHADOW E&M-EST. PATIENT-LVL IV: ICD-10-PCS | Mod: PBBFAC,,, | Performed by: UROLOGY

## 2023-05-08 PROCEDURE — 99214 PR OFFICE/OUTPT VISIT, EST, LEVL IV, 30-39 MIN: ICD-10-PCS | Mod: S$PBB,,, | Performed by: UROLOGY

## 2023-05-08 RX ORDER — TAMSULOSIN HYDROCHLORIDE 0.4 MG/1
1 CAPSULE ORAL DAILY
Qty: 90 CAPSULE | Refills: 3 | Status: SHIPPED | OUTPATIENT
Start: 2023-05-08 | End: 2024-05-07

## 2023-05-08 RX ORDER — FINASTERIDE 5 MG/1
5 TABLET, FILM COATED ORAL DAILY
Qty: 90 TABLET | Refills: 3 | Status: SHIPPED | OUTPATIENT
Start: 2023-05-08 | End: 2024-05-07

## 2023-05-08 NOTE — PROGRESS NOTES
[facility] Established Patient/H&P:    Nikolay Barraza is a 82 y.o. male who presents for follow up for lower urinary tract symptoms and renal cysts.     Patient with a history of CKD and enlarged prostate complicated by lower urinary tract symptoms and urinary retention that have been managed with Dr. Galvin in the past.      On review of records, he has been on Flomax and Finasteride and feels as if he is voiding well. Per patient, his nephrologist Dr. Smith found that he had an elevated Creatinine of 1.6. It was rechecked on 2/2/21 and found to be 1.3. Referred to rule out urinary retention as a cause of his GUZMAN given his episode of acute urinary retention in 2017 after right inguinal hernia repair.         Interval History     2/9/22: He remains on Flomax 0.4 mg and Finasteride 5 mg PO daily for his well-controlled LUTS. IPSS 1 today. Renal ultrasound on 2/10/21 with right upper pole cystic lesions that grew in size. CT abdomen with contrast showed unremarkable cysts. Incidental lung nodule - referred to PCP.     5/8/23: Patient remains on Flomax 0.4 mg and Finasteride 5 mg PO daily for well-controlled urinary tract symptoms. No complaints. No recent imaging      Denies any significant lower urinary tract symptoms, gross hematuria, recent urinary tract infection,  trauma or history of  malignancy.        PSA  0.11                 6/28/19  0.1                   5/22/18     Creatinine  1.3                   2/2/21     IPSS    QoL  1 1 5/8/23  1          0          2/9/22  1          1          2/8/21     PVR  9 mL                2/8/21    Past Medical History:   Diagnosis Date    Allergy     Codeine    Closed fracture dislocation of lumbar spine 7/6/2018    #2 vertebra. Patient went to the emergency Department.  Followup with Erik:    GERD (gastroesophageal reflux disease)     Hyperlipidemia     Hypertension     Hypertensive retinopathy of both eyes 5/11/2021    As per ophthalmology  "notes.  Eye care 20/20    Right inguinal hernia     Thyroid disease        Past Surgical History:   Procedure Laterality Date    ADENOIDECTOMY      ANGIOGRAM, CORONARY, WITH LEFT HEART CATHETERIZATION N/A 3/23/2023    Procedure: Angiogram, Coronary, with Left Heart Cath;  Surgeon: Tomas Salomon MD;  Location: [facility] CATH/EP LAB;  Service: Cardiology;  Laterality: N/A;    INGUINAL HERNIA REPAIR Right 07/12/2018    Dr. Thomas - [facility]    KNEE SURGERY Left 07/31/2019    TONSILLECTOMY      VASECTOMY         Review of patient's allergies indicates:   Allergen Reactions    Codeine Other (See Comments)     mood changes  Makes him feel violent       Medications Reviewed: see MAR    FOCUSED PHYSICAL EXAM:    Vitals:    05/08/23 0922   BP: (!) 149/64   Pulse: 64     Body mass index is 30.56 kg/m². Weight: 96.6 kg (213 lb) Height: 5' 10" (177.8 cm)       General: Alert, cooperative, no distress, appears stated age  Abdomen: Soft, non-tender, no CVA tenderness, non-distended        LABS:    No results found for this or any previous visit (from the past 336 hour(s)).          Assessment/Diagnosis:    1. History of urinary retention  tamsulosin (FLOMAX) 0.4 mg Cap    finasteride (PROSCAR) 5 mg tablet      2. Cyst of kidney, acquired  US Retroperitoneal Complete      3. Lower urinary tract symptoms (LUTS)  tamsulosin (FLOMAX) 0.4 mg Cap    finasteride (PROSCAR) 5 mg tablet          Plans:    - I spent 30 minutes of the day of this encounter preparing for, treating and managing this patient. Extensive discussion with patient regarding the etiology and management of his lower urinary tract symptoms. Explained that LUTS are multifactorial and can be secondary to an enlarged prostate, PO intake of bladder irritants, overactive bladder, constipation, malignancy, trauma, infection, stones or medications.   - Continue Flomax 0.4 mg and Finasteride 5 mg PO daily. Refills ordered.   - Renal ultrasound next available.   - RTC in 1  year.      "

## 2023-05-22 DIAGNOSIS — I10 BENIGN ESSENTIAL HYPERTENSION: ICD-10-CM

## 2023-05-22 RX ORDER — LOSARTAN POTASSIUM 25 MG/1
TABLET ORAL
Qty: 180 TABLET | Refills: 3 | Status: SHIPPED | OUTPATIENT
Start: 2023-05-22 | End: 2024-01-18 | Stop reason: SDUPTHER

## 2023-05-30 ENCOUNTER — HOSPITAL ENCOUNTER (OUTPATIENT)
Dept: RADIOLOGY | Facility: HOSPITAL | Age: 83
Discharge: HOME | End: 2023-05-30
Attending: UROLOGY
Payer: MEDICARE

## 2023-05-30 ENCOUNTER — TELEPHONE (OUTPATIENT)
Dept: UROLOGY | Facility: CLINIC | Age: 83
End: 2023-05-30
Payer: MEDICARE

## 2023-05-30 DIAGNOSIS — N28.1 CYST OF KIDNEY, ACQUIRED: ICD-10-CM

## 2023-05-30 PROCEDURE — 76770 US EXAM ABDO BACK WALL COMP: CPT | Mod: TC

## 2023-05-30 PROCEDURE — 76770 US EXAM ABDO BACK WALL COMP: CPT | Mod: 26,,, | Performed by: RADIOLOGY

## 2023-05-30 PROCEDURE — 76770 US RETROPERITONEAL COMPLETE: ICD-10-PCS | Mod: 26,,, | Performed by: RADIOLOGY

## 2023-05-30 NOTE — TELEPHONE ENCOUNTER
----- Message from Simona Walker sent at 5/30/2023 12:51 PM CDT -----  Type: Needs Medical Advice  Who Called:  pt     Best Call Back Number: [phone]    Additional Information: pt needs a refill on his prescription ,and does n ot know the name . Please call back and advise

## 2023-05-30 NOTE — TELEPHONE ENCOUNTER
Called and spoke to patient. Informed that MD prescribed two medications both which have 3 refills from the pharmacy. Patient voiced okay.

## 2023-06-20 RX ORDER — FUROSEMIDE 20 MG/1
TABLET ORAL
Qty: 90 TABLET | Refills: 3 | Status: SHIPPED | OUTPATIENT
Start: 2023-06-20 | End: 2023-08-03

## 2023-06-20 RX ORDER — AMLODIPINE BESYLATE 5 MG/1
5 TABLET ORAL DAILY
Qty: 90 TABLET | Refills: 3 | Status: SHIPPED | OUTPATIENT
Start: 2023-06-20 | End: 2023-08-03

## 2023-06-20 RX ORDER — FUROSEMIDE 20 MG/1
TABLET ORAL
COMMUNITY
End: 2023-06-20 | Stop reason: SDUPTHER

## 2023-06-20 NOTE — TELEPHONE ENCOUNTER
----- Message from Gwensin Carranzavickie sent at 6/20/2023 10:21 AM CDT -----  Regarding: RX Refill  Contact: patient at [phone]  Type: RX Refill  Who Called:  patient at [phone]    Pharmacy name and phone #:    [facility] ([facility] ) - [place], [place] - [address]  [address]  [address]  [facility] [address]  Phone: [phone] Fax: [phone]    Additional Information: Patient is needing to have the medication below refilled. The first 2 medications were prescribed by Dr. Salomon. The last by Dr. Timmy Alaniz. Please call and advise. Thank you      1) Furosemide (not in chart)    2) amLODIPine (NORVASC) 5 MG tablet 90 tablet 3 3/23/2023 3/22/2024   Sig - Route: Take 1 tablet (5 mg total) by mouth once daily. - Oral   Sent to pharmacy as: amLODIPine (NORVASC) 5 MG tablet   Notes to Pharmacy: .   E-Prescribing Status: Receipt confirmed by pharmacy (3/23/2023  9:06 AM CDT)     3) tamsulosin (FLOMAX) 0.4 mg Cap 90 capsule 3 5/8/2023 5/7/2024   Sig - Route: Take 1 capsule (0.4 mg total) by mouth once daily. - Oral   Sent to pharmacy as: tamsulosin (FLOMAX) 0.4 mg Cap   E-Prescribing Status: Receipt confirmed by pharmacy (5/8/2023  9:44 AM CDT)

## 2023-07-18 ENCOUNTER — LAB VISIT (OUTPATIENT)
Dept: LAB | Facility: HOSPITAL | Age: 83
End: 2023-07-18
Attending: INTERNAL MEDICINE
Payer: MEDICARE

## 2023-07-18 DIAGNOSIS — I10 ESSENTIAL HYPERTENSION, MALIGNANT: ICD-10-CM

## 2023-07-18 DIAGNOSIS — N25.81 SECONDARY HYPERPARATHYROIDISM OF RENAL ORIGIN: ICD-10-CM

## 2023-07-18 DIAGNOSIS — N18.30 CHRONIC KIDNEY DISEASE, STAGE III (MODERATE): Primary | ICD-10-CM

## 2023-07-18 DIAGNOSIS — N18.30 CHRONIC KIDNEY DISEASE, STAGE III (MODERATE): ICD-10-CM

## 2023-07-18 LAB
25(OH)D3+25(OH)D2 SERPL-MCNC: 40 NG/ML (ref 30–96)
ALBUMIN SERPL BCP-MCNC: 3.9 G/DL (ref 3.5–5.2)
ANION GAP SERPL CALC-SCNC: 9 MMOL/L (ref 8–16)
BASOPHILS # BLD AUTO: 0.06 K/UL (ref 0–0.2)
BASOPHILS NFR BLD: 1.5 % (ref 0–1.9)
BILIRUB UR QL STRIP: NEGATIVE
BUN SERPL-MCNC: 20 MG/DL (ref 8–23)
CALCIUM SERPL-MCNC: 9.1 MG/DL (ref 8.7–10.5)
CHLORIDE SERPL-SCNC: 107 MMOL/L (ref 95–110)
CLARITY UR: CLEAR
CO2 SERPL-SCNC: 25 MMOL/L (ref 23–29)
COLOR UR: YELLOW
CREAT SERPL-MCNC: 1.2 MG/DL (ref 0.5–1.4)
CREAT UR-MCNC: 32 MG/DL (ref 23–375)
DIFFERENTIAL METHOD: ABNORMAL
EOSINOPHIL # BLD AUTO: 0.1 K/UL (ref 0–0.5)
EOSINOPHIL NFR BLD: 2.5 % (ref 0–8)
ERYTHROCYTE [DISTWIDTH] IN BLOOD BY AUTOMATED COUNT: 13.2 % (ref 11.5–14.5)
EST. GFR  (NO RACE VARIABLE): >60 ML/MIN/1.73 M^2
GLUCOSE SERPL-MCNC: 101 MG/DL (ref 70–110)
GLUCOSE UR QL STRIP: NEGATIVE
HCT VFR BLD AUTO: 42.2 % (ref 40–54)
HGB BLD-MCNC: 14.1 G/DL (ref 14–18)
HGB UR QL STRIP: NEGATIVE
IMM GRANULOCYTES # BLD AUTO: 0.01 K/UL (ref 0–0.04)
IMM GRANULOCYTES NFR BLD AUTO: 0.2 % (ref 0–0.5)
KETONES UR QL STRIP: NEGATIVE
LEUKOCYTE ESTERASE UR QL STRIP: NEGATIVE
LYMPHOCYTES # BLD AUTO: 0.9 K/UL (ref 1–4.8)
LYMPHOCYTES NFR BLD: 22.1 % (ref 18–48)
MAGNESIUM SERPL-MCNC: 2 MG/DL (ref 1.6–2.6)
MCH RBC QN AUTO: 29.6 PG (ref 27–31)
MCHC RBC AUTO-ENTMCNC: 33.4 G/DL (ref 32–36)
MCV RBC AUTO: 89 FL (ref 82–98)
MONOCYTES # BLD AUTO: 0.6 K/UL (ref 0.3–1)
MONOCYTES NFR BLD: 15.7 % (ref 4–15)
NEUTROPHILS # BLD AUTO: 2.3 K/UL (ref 1.8–7.7)
NEUTROPHILS NFR BLD: 58 % (ref 38–73)
NITRITE UR QL STRIP: NEGATIVE
NRBC BLD-RTO: 0 /100 WBC
PH UR STRIP: 6 [PH] (ref 5–8)
PHOSPHATE SERPL-MCNC: 2.8 MG/DL (ref 2.7–4.5)
PLATELET # BLD AUTO: 204 K/UL (ref 150–450)
PMV BLD AUTO: 9.9 FL (ref 9.2–12.9)
POTASSIUM SERPL-SCNC: 3.9 MMOL/L (ref 3.5–5.1)
PROT UR QL STRIP: NEGATIVE
PROT UR-MCNC: <6 MG/DL (ref 6–15)
PROT/CREAT UR: NORMAL MG/G{CREAT} (ref 0–0.2)
PTH-INTACT SERPL-MCNC: 41.8 PG/ML (ref 9–77)
RBC # BLD AUTO: 4.77 M/UL (ref 4.6–6.2)
SODIUM SERPL-SCNC: 141 MMOL/L (ref 136–145)
SP GR UR STRIP: 1 (ref 1–1.03)
URATE SERPL-MCNC: 6.7 MG/DL (ref 3.4–7)
URN SPEC COLLECT METH UR: NORMAL
UROBILINOGEN UR STRIP-ACNC: NEGATIVE EU/DL
WBC # BLD AUTO: 4.02 K/UL (ref 3.9–12.7)

## 2023-07-18 PROCEDURE — 36415 COLL VENOUS BLD VENIPUNCTURE: CPT | Performed by: INTERNAL MEDICINE

## 2023-07-18 PROCEDURE — 85025 COMPLETE CBC W/AUTO DIFF WBC: CPT | Performed by: INTERNAL MEDICINE

## 2023-07-18 PROCEDURE — 83970 ASSAY OF PARATHORMONE: CPT | Performed by: INTERNAL MEDICINE

## 2023-07-18 PROCEDURE — 83735 ASSAY OF MAGNESIUM: CPT | Performed by: INTERNAL MEDICINE

## 2023-07-18 PROCEDURE — 84156 ASSAY OF PROTEIN URINE: CPT | Performed by: INTERNAL MEDICINE

## 2023-07-18 PROCEDURE — 84550 ASSAY OF BLOOD/URIC ACID: CPT | Performed by: INTERNAL MEDICINE

## 2023-07-18 PROCEDURE — 82306 VITAMIN D 25 HYDROXY: CPT | Performed by: INTERNAL MEDICINE

## 2023-07-18 PROCEDURE — 80069 RENAL FUNCTION PANEL: CPT | Performed by: INTERNAL MEDICINE

## 2023-07-18 PROCEDURE — 81003 URINALYSIS AUTO W/O SCOPE: CPT | Performed by: INTERNAL MEDICINE

## 2023-08-03 RX ORDER — FUROSEMIDE 20 MG/1
TABLET ORAL
Qty: 90 TABLET | Refills: 3 | Status: SHIPPED | OUTPATIENT
Start: 2023-08-03 | End: 2023-08-18

## 2023-08-03 RX ORDER — AMLODIPINE BESYLATE 5 MG/1
TABLET ORAL
Qty: 90 TABLET | Refills: 3 | Status: SHIPPED | OUTPATIENT
Start: 2023-08-03 | End: 2024-01-18 | Stop reason: SDUPTHER

## 2023-08-18 ENCOUNTER — OFFICE VISIT (OUTPATIENT)
Dept: CARDIOLOGY | Facility: CLINIC | Age: 83
End: 2023-08-18
Payer: MEDICARE

## 2023-08-18 VITALS
DIASTOLIC BLOOD PRESSURE: 66 MMHG | OXYGEN SATURATION: 98 % | BODY MASS INDEX: 29.76 KG/M2 | WEIGHT: 207.88 LBS | SYSTOLIC BLOOD PRESSURE: 118 MMHG | HEART RATE: 61 BPM | HEIGHT: 70 IN

## 2023-08-18 DIAGNOSIS — E78.2 MIXED HYPERLIPIDEMIA: ICD-10-CM

## 2023-08-18 DIAGNOSIS — I10 BENIGN ESSENTIAL HYPERTENSION: ICD-10-CM

## 2023-08-18 DIAGNOSIS — I25.10 CORONARY ARTERY DISEASE INVOLVING NATIVE CORONARY ARTERY OF NATIVE HEART WITHOUT ANGINA PECTORIS: Primary | ICD-10-CM

## 2023-08-18 DIAGNOSIS — I48.0 PAROXYSMAL ATRIAL FIBRILLATION: ICD-10-CM

## 2023-08-18 PROCEDURE — 99214 OFFICE O/P EST MOD 30 MIN: CPT | Mod: S$PBB,,, | Performed by: INTERNAL MEDICINE

## 2023-08-18 PROCEDURE — 99999 PR PBB SHADOW E&M-EST. PATIENT-LVL IV: CPT | Mod: PBBFAC,,, | Performed by: INTERNAL MEDICINE

## 2023-08-18 PROCEDURE — 99999 PR PBB SHADOW E&M-EST. PATIENT-LVL IV: ICD-10-PCS | Mod: PBBFAC,,, | Performed by: INTERNAL MEDICINE

## 2023-08-18 PROCEDURE — 99214 PR OFFICE/OUTPT VISIT, EST, LEVL IV, 30-39 MIN: ICD-10-PCS | Mod: S$PBB,,, | Performed by: INTERNAL MEDICINE

## 2023-08-18 PROCEDURE — 99214 OFFICE O/P EST MOD 30 MIN: CPT | Mod: PBBFAC,PN | Performed by: INTERNAL MEDICINE

## 2023-08-18 RX ORDER — PRAVASTATIN SODIUM 20 MG/1
20 TABLET ORAL DAILY
Qty: 90 TABLET | Refills: 3 | Status: SHIPPED | OUTPATIENT
Start: 2023-08-18 | End: 2024-01-18 | Stop reason: SDUPTHER

## 2023-08-18 NOTE — PROGRESS NOTES
[facility]    Subjective:     Patient ID:  Nikolay Barraza is a 82 y.o. male patient here for evaluation No chief complaint on file.      HPI:  82-year-old male here for follow-up.  He had shortness of breath which we investigated with angiogram previously.  No significant disease.  Filling pressures were normal.  Patient reports he is doing much better from a shortness of breath standpoint.  Reports occasional dizziness in the middle of the night, reports that it feels like inner ear issues like he is had in the past.  Does not report any positional dizziness.    Review of Systems   All other systems reviewed and are negative.       Past Medical History:   Diagnosis Date    Allergy     Codeine    Closed fracture dislocation of lumbar spine 07/06/2018    #2 vertebra. Patient went to the emergency Department.  Followup with Erik:    GERD (gastroesophageal reflux disease)     Hyperlipidemia     Hypertension     Hypertensive retinopathy of both eyes 05/11/2021    As per ophthalmology notes.  Eye care 20/20    Right inguinal hernia     Squamous cell carcinoma of skin     Thyroid disease        Past Surgical History:   Procedure Laterality Date    ADENOIDECTOMY      ANGIOGRAM, CORONARY, WITH LEFT HEART CATHETERIZATION N/A 3/23/2023    Procedure: Angiogram, Coronary, with Left Heart Cath;  Surgeon: Tomas Salomon MD;  Location: [facility] CATH/EP LAB;  Service: Cardiology;  Laterality: N/A;    INGUINAL HERNIA REPAIR Right 07/12/2018    Dr. Thomas - [facility]    KNEE SURGERY Left 07/31/2019    TONSILLECTOMY      VASECTOMY         Family History   Problem Relation Age of Onset    Heart disease Father     Miscarriages / Stillbirths Father     Skin cancer Mother     Cancer Maternal Grandfather     Stomach cancer Paternal Grandfather        Social History     Socioeconomic History    Marital status:      Spouse name: Alisa barraza    Number of children: 3   Occupational  History    Occupation: [employer]-      Comment:     Tobacco Use    Smoking status: Never    Smokeless tobacco: Never   Substance and Sexual Activity    Alcohol use: No    Drug use: No    Sexual activity: Not Currently     Partners: Female     Social Determinants of Health     Financial Resource Strain: Low Risk  (3/7/2023)    Overall Financial Resource Strain (CARDIA)     Difficulty of Paying Living Expenses: Not very hard   Food Insecurity: No Food Insecurity (3/7/2023)    Hunger Vital Sign     Worried About Running Out of Food in the Last Year: Never true     Ran Out of Food in the Last Year: Never true   Transportation Needs: No Transportation Needs (3/7/2023)    PRAPARE - Transportation     Lack of Transportation (Medical): No     Lack of Transportation (Non-Medical): No   Physical Activity: Inactive (3/7/2023)    Exercise Vital Sign     Days of Exercise per Week: 0 days     Minutes of Exercise per Session: 0 min   Stress: Stress Concern Present (3/7/2023)    [place] [employer] - Occupational Stress Questionnaire     Feeling of Stress : To some extent   Social Connections: Socially Integrated (3/7/2023)    Social Connection and Isolation Panel [NHANES]     Frequency of Communication with Friends and Family: More than three times a week     Frequency of Social Gatherings with Friends and Family: Three times a week     Attends Latter day Services: More than 4 times per year     Active Member of Clubs or Organizations: Yes     Attends Club or Organization Meetings: 1 to 4 times per year     Marital Status:    Housing Stability: Low Risk  (3/7/2023)    Housing Stability Vital Sign     Unable to Pay for Housing in the Last Year: No     Number of Places Lived in the Last Year: 1     Unstable Housing in the Last Year: No       Current Outpatient Medications   Medication Sig Dispense Refill    amLODIPine (NORVASC) 5 MG tablet 1 tablet po qd 90 tablet 3     ascorbic acid, vitamin C, (VITAMIN C) 500 MG tablet Take 500 mg by mouth once daily.      aspirin (ECOTRIN) 81 MG EC tablet Take 81 mg by mouth once daily.      b complex vitamins tablet Take 1 tablet by mouth once daily.      CALCIUM CARBONATE/VITAMIN D3 (VITAMIN D-3 ORAL) Take by mouth.      docusate sodium (COLACE) 50 MG capsule Take by mouth 2 (two) times daily as needed for Constipation.      finasteride (PROSCAR) 5 mg tablet Take 1 tablet (5 mg total) by mouth once daily. 90 tablet 3    fish oil-omega-3 fatty acids 300-1,000 mg capsule Take 2 g by mouth once daily.      fluocinonide (LIDEX) 0.05 % ointment Apply to AA lower leg bid max use one month 45 g 0    levothyroxine (SYNTHROID, LEVOTHROID) 175 MCG tablet Take 1 tablet (175 mcg total) by mouth before breakfast. 90 tablet 3    losartan (COZAAR) 25 MG tablet TAKE 1 TABLET BY MOUTH TWICE  DAILY 180 tablet 3    multivitamin (THERAGRAN) per tablet Take 1 tablet by mouth once daily.      mupirocin (BACTROBAN) 2 % ointment by Nasal route 3 (three) times daily. 20 g 0    nitroGLYCERIN (NITROSTAT) 0.4 MG SL tablet Place 0.4 mg under the tongue every 5 (five) minutes as needed for Chest pain.      tamsulosin (FLOMAX) 0.4 mg Cap Take 1 capsule (0.4 mg total) by mouth once daily. 90 capsule 3    doxycycline (ORACEA) 40 mg capsule TAKE 1 CAPSULE BY MOUTH EVERY DAY (Patient not taking: Reported on 8/18/2023) 90 capsule 3    pravastatin (PRAVACHOL) 20 MG tablet Take 1 tablet (20 mg total) by mouth once daily. 90 tablet 3    tetracycline (ACHROMYCIN,SUMYCIN) 500 MG capsule Take 500 mg by mouth 2 (two) times daily.       No current facility-administered medications for this visit.       Review of patient's allergies indicates:   Allergen Reactions    Codeine Other (See Comments)     mood changes  Makes him feel violent         Objective:        Vitals:    08/18/23 1406   BP: 118/66   Pulse: 61       Physical Exam  Vitals reviewed.   Constitutional:       Appearance:  Normal appearance.   HENT:      Mouth/Throat:      Mouth: Mucous membranes are moist.   Eyes:      Extraocular Movements: Extraocular movements intact.      Pupils: Pupils are equal, round, and reactive to light.   Cardiovascular:      Rate and Rhythm: Normal rate and regular rhythm.      Pulses: Normal pulses.      Heart sounds: Normal heart sounds. No murmur heard.     No gallop.   Pulmonary:      Effort: Pulmonary effort is normal.      Breath sounds: Normal breath sounds.   Abdominal:      General: Bowel sounds are normal.      Palpations: Abdomen is soft.   Musculoskeletal:         General: Normal range of motion.      Cervical back: Normal range of motion.   Skin:     General: Skin is warm and dry.   Neurological:      General: No focal deficit present.      Mental Status: He is alert and oriented to person, place, and time.   Psychiatric:         Mood and Affect: Mood normal.         LIPIDS - LAST 2   Lab Results   Component Value Date    CHOL 157 12/27/2020    CHOL 169 12/17/2020    HDL 37 (L) 12/27/2020    HDL 41 12/17/2020    LDLCALC 93.6 12/27/2020    LDLCALC 108.4 12/17/2020    TRIG 132 12/27/2020    TRIG 98 12/17/2020    CHOLHDL 23.6 12/27/2020    CHOLHDL 24.3 12/17/2020       CBC - LAST 2  Lab Results   Component Value Date    WBC 4.02 07/18/2023    WBC 4.31 03/20/2023    RBC 4.77 07/18/2023    RBC 5.01 03/20/2023    HGB 14.1 07/18/2023    HGB 14.4 03/20/2023    HCT 42.2 07/18/2023    HCT 43.2 03/20/2023    MCV 89 07/18/2023    MCV 86 03/20/2023    MCH 29.6 07/18/2023    MCH 28.7 03/20/2023    MCHC 33.4 07/18/2023    MCHC 33.3 03/20/2023    RDW 13.2 07/18/2023    RDW 13.8 03/20/2023     07/18/2023     03/20/2023    MPV 9.9 07/18/2023    MPV 9.7 03/20/2023    GRAN 2.3 07/18/2023    GRAN 58.0 07/18/2023    LYMPH 0.9 (L) 07/18/2023    LYMPH 22.1 07/18/2023    MONO 0.6 07/18/2023    MONO 15.7 (H) 07/18/2023    BASO 0.06 07/18/2023    BASO 0.03 03/20/2023    NRBC 0 07/18/2023    NRBC 0  03/20/2023       CHEMISTRY & LIVER FUNCTION - LAST 2  Lab Results   Component Value Date     07/18/2023     03/20/2023    K 3.9 07/18/2023    K 4.2 03/20/2023     07/18/2023     03/20/2023    CO2 25 07/18/2023    CO2 29 03/20/2023    ANIONGAP 9 07/18/2023    ANIONGAP 5 (L) 03/20/2023    BUN 20 07/18/2023    BUN 16 03/20/2023    CREATININE 1.2 07/18/2023    CREATININE 1.2 03/20/2023     07/18/2023     (H) 03/20/2023    CALCIUM 9.1 07/18/2023    CALCIUM 9.5 03/20/2023    MG 2.0 07/18/2023    MG 2.1 07/18/2022    ALBUMIN 3.9 07/18/2023    ALBUMIN 4.0 01/17/2023    PROT 7.9 12/26/2020    PROT 7.3 12/17/2020    ALKPHOS 98 12/26/2020    ALKPHOS 75 12/17/2020    ALT 24 12/26/2020    ALT 20 12/17/2020    AST 34 12/26/2020    AST 24 12/17/2020    BILITOT 0.7 12/26/2020    BILITOT 0.9 12/17/2020        CARDIAC PROFILE - LAST 2  Lab Results   Component Value Date    BNP 68 01/27/2023     (H) 04/07/2022    CPK 32 12/27/2020    CPK 37 12/26/2020    CPKMB 0.6 12/27/2020    CPKMB 0.9 12/26/2020    TROPONINI <0.006 12/27/2020    TROPONINI <0.006 12/27/2020        COAGULATION - LAST 2  Lab Results   Component Value Date    INR 1.0 12/26/2020    APTT 27.3 12/26/2020       ENDOCRINE & PSA - LAST 2  Lab Results   Component Value Date    HGBA1C 5.8 08/22/2017    TSH 2.060 09/22/2022    TSH 0.290 (L) 04/28/2022    PSA 0.11 06/28/2019    PSA 0.1 05/22/2018        ECHOCARDIOGRAM RESULTS  Results for orders placed during the hospital encounter of 02/23/23    Echo    Interpretation Summary  · The left ventricle is normal in size with mild concentric hypertrophy and normal systolic function.  · The estimated ejection fraction is 65%.  · Indeterminate left ventricular diastolic function.  · Normal right ventricular size with normal right ventricular systolic function.  · Mild left atrial enlargement.  · Normal central venous pressure (3 mmHg).  · The estimated PA systolic pressure is 17  mmHg.      CURRENT/PREVIOUS VISIT EKG  Results for orders placed or performed during the hospital encounter of 03/20/23   EKG 12-lead    Collection Time: 03/20/23 11:05 AM    Narrative    Test Reason : R06.02,    Vent. Rate : 066 BPM     Atrial Rate : 087 BPM     P-R Int : 192 ms          QRS Dur : 072 ms      QT Int : 378 ms       P-R-T Axes : 047 -50 068 degrees     QTc Int : 396 ms    Normal sinus rhythm  Low voltage QRS  Left anterior fascicular block  Inferior infarct (cited on or before 10-MAR-2023)  Cannot rule out Anterior infarct ,age undetermined  Abnormal ECG  When compared with ECG of 10-MAR-2023 11:18,  Left anterior fascicular block is now Present  Minimal criteria for Anterior infarct are now Present  Questionable change in initial forces of Inferior leads  Confirmed by Dima Vergara MD ([id number]) on 3/20/2023 2:47:28 PM    Referred By:  JUSTO           Confirmed By:Dima Vergara MD     No valid procedures specified.   Results for orders placed in visit on 06/21/22    Nuclear Stress Test    Interpretation Summary    The nuclear portion of this study will be reported separately.    The EKG portion of this study is negative for ischemia.    The patient reported chest pain during the stress test.    There were no arrhythmias during stress.    No valid procedures specified.        Assessment:       1. Coronary artery disease involving native coronary artery of native heart without angina pectoris    2. Paroxysmal atrial fibrillation    3. Benign essential hypertension    4. Mixed hyperlipidemia           Plan:       Coronary artery disease involving native coronary artery of native heart without angina pectoris  -     IN OFFICE EKG 12-LEAD (to Muse)    Paroxysmal atrial fibrillation    Benign essential hypertension  -     IN OFFICE EKG 12-LEAD (to Muse)    Mixed hyperlipidemia  -     pravastatin (PRAVACHOL) 20 MG tablet; Take 1 tablet (20 mg total) by mouth once daily.  Dispense: 90 tablet; Refill: 3  -      Lipid Panel; Future; Expected date: 08/18/2023    Appears to be doing well from coronary artery disease standpoint.  Had mild-to-moderate coronary artery disease for which medical management is recommended.  Continue with statin.  For paroxysmal atrial fibrillation, he has seen electrophysiology who recommended a loop recorder but patient would like to follow-up based on his symptoms, does not report any palpitations, continue current therapy.  Hypertension is well controlled, continue current therapy.  Will get a lipid panel as noted panel help done in the past 3 years, continue pravastatin in the interim.  Patient will follow-up with the nurse practitioner to go over the lipid panel, recommend targeting LDL of around .    Follow up in about 6 weeks (around 9/29/2023) for f/u NP for Lipid panel.          MD [facility] [place]  Anne-Marie

## 2023-09-07 ENCOUNTER — OFFICE VISIT (OUTPATIENT)
Dept: FAMILY MEDICINE | Facility: CLINIC | Age: 83
End: 2023-09-07
Payer: MEDICARE

## 2023-09-07 VITALS
WEIGHT: 209 LBS | BODY MASS INDEX: 29.92 KG/M2 | HEIGHT: 70 IN | SYSTOLIC BLOOD PRESSURE: 124 MMHG | DIASTOLIC BLOOD PRESSURE: 62 MMHG | HEART RATE: 67 BPM

## 2023-09-07 DIAGNOSIS — R41.89 COGNITIVE CHANGES: ICD-10-CM

## 2023-09-07 DIAGNOSIS — I67.82 SUBCORTICAL MICROVASCULAR ISCHEMIC OCCLUSIVE DISEASE: ICD-10-CM

## 2023-09-07 DIAGNOSIS — I10 BENIGN ESSENTIAL HYPERTENSION: Primary | ICD-10-CM

## 2023-09-07 DIAGNOSIS — R42 DIZZINESS: ICD-10-CM

## 2023-09-07 DIAGNOSIS — I48.0 PAROXYSMAL ATRIAL FIBRILLATION: ICD-10-CM

## 2023-09-07 DIAGNOSIS — N40.0 BENIGN PROSTATIC HYPERPLASIA WITHOUT URINARY OBSTRUCTION: ICD-10-CM

## 2023-09-07 DIAGNOSIS — E03.8 SECONDARY HYPOTHYROIDISM: ICD-10-CM

## 2023-09-07 DIAGNOSIS — E78.2 MULTIPLE-TYPE HYPERLIPIDEMIA: ICD-10-CM

## 2023-09-07 PROCEDURE — 99214 OFFICE O/P EST MOD 30 MIN: CPT | Mod: S$PBB,AQ,, | Performed by: INTERNAL MEDICINE

## 2023-09-07 PROCEDURE — 99214 PR OFFICE/OUTPT VISIT, EST, LEVL IV, 30-39 MIN: ICD-10-PCS | Mod: S$PBB,AQ,, | Performed by: INTERNAL MEDICINE

## 2023-09-07 PROCEDURE — 99213 OFFICE O/P EST LOW 20 MIN: CPT | Performed by: INTERNAL MEDICINE

## 2023-09-07 NOTE — PROGRESS NOTES
Subjective:       Patient ID: Nikolay Barraza is a 82 y.o. male.    Chief Complaint: Hypertension, Hyperlipidemia, Thyroid Problem, and Prostate Problem      Patient is 82-year-old  gentleman who comes for  6 months follow-up.  Underlying medical issues are as below.     1.         Benign essential hypertension   2.         Multiple-type hyperlipidemia   3.         Secondary hypothyroidism   4.         Benign prostatic hyperplasia without urinary obstruction   5.         Abdominal aortic aneurysm (AAA) without rupture   6.         Cold intolerance     He did have a angiogram done by Dr. Salomon.  It did show mild-to-moderate blockages though no significant blockages.    He follows with Dr. Smith and he is under the impression that he has stage 3 chronic kidney disease.  Please note that his GFR is greater than 60 which does not qualify for stage 3 chronic kidney disease.    Used to previously follow-up with Dr. Cardoza but not at this point.  Need to check thyroid tests.    HE CONTINUES ON TAMSULOSIN FOR BENIGN PROSTATE SYMPTOMS AND PERHAPS STOPPING THE TAMSULOSIN DID NOT HELP HIM.  (KNOWN TO CAUSE ORTHOSTASIS).  In past I had a detailed discussion with patient's daughter also.Ms. Delia Costa.  The discussion was mostly to apprise her about cognitive changes and decline with Abe.  And that it would be important for her to take over the charge of his medical affairs.  Patient does have an appointment with Dr. Salomon on Friday to review the cardiac issues.    Miss Loera did apprise me that Abe had gone to [facility] in [facility] for significant dizziness and lightheadedness.  Family members almost thought that he had a heart attack.  He was worked up at the hospital including EKG and the workup was unremarkable.  They could not identify any immediate cardiac issue but they quit that he may need a 50,000 mile workup.  Daughter is concerned whether he has any blockages.    Past  echocardiogram had shown good ejection fraction and a nuclear stress test done in past which was unremarkable.  He did have MRI of the brain in past which had shown cerebral atrophy and chronic microvascular ischemic changes.  These were done at [facility] in 2020.  Carotid Dopplers did not show any significant hemodynamic blockages.    Medical records from his visit to [facility] have been reviewed.  The medical records basically include labs and discharge instruction for patients.  It does not have the actual physician input records.  The labs had shown a glucose of 159, BUN of 26 and creatinine of 1.30.    EKG had shown sinus rhythm with first-degree AV block and marked left axis deviation.  Possible anterior myocardial infarction.  Cardiac enzymes and troponins were unremarkable.  Evidently he was asked to stay in the hospital but he decided that he was feeling better and was discharged.                Hypertension  This is a chronic problem. The current episode started more than 1 year ago. The problem is controlled. Pertinent negatives include no chest pain or palpitations. Risk factors for coronary artery disease include sedentary lifestyle, male gender and dyslipidemia. Past treatments include calcium channel blockers and angiotensin blockers. The current treatment provides moderate improvement. Compliance problems include psychosocial issues.  Identifiable causes of hypertension include a thyroid problem. There is no history of coarctation of the aorta or hyperaldosteronism. Chronic renal disease: Patient is following Nephrology for the same but creatinine seems to be okay..  Hyperlipidemia  This is a chronic problem. The current episode started more than 1 year ago. The problem is controlled. Exacerbating diseases include obesity. He has no history of diabetes or liver disease. Chronic renal disease: Patient is following Nephrology for the same but creatinine seems  to be okay..Pertinent negatives include no chest pain. Current antihyperlipidemic treatment includes statins. The current treatment provides moderate improvement of lipids. Compliance problems include psychosocial issues.  Risk factors for coronary artery disease include a sedentary lifestyle, hypertension, male sex, dyslipidemia and obesity.   Thyroid Problem  Presents for follow-up visit. Symptoms include cold intolerance. Patient reports no constipation, diarrhea, dry skin, fatigue, heat intolerance, nail problem or palpitations. The symptoms have been stable. His past medical history is significant for hyperlipidemia. There is no history of diabetes.   Benign Prostatic Hypertrophy  This is a chronic problem. The current episode started more than 1 year ago. The problem has been gradually improving since onset. Irritative symptoms include frequency and urgency. Pertinent negatives include no dysuria. He is not sexually active. Past treatments include tamsulosin and finasteride. The treatment provided moderate relief. He has been using treatment for 2 or more years.       Past Medical History:   Diagnosis Date    Allergy     Codeine    Closed fracture dislocation of lumbar spine 07/06/2018    #2 vertebra. Patient went to the emergency Department.  Followup with Erik:    GERD (gastroesophageal reflux disease)     Hyperlipidemia     Hypertension     Hypertensive retinopathy of both eyes 05/11/2021    As per ophthalmology notes.  Eye care 20/20    Right inguinal hernia     Squamous cell carcinoma of skin     Thyroid disease      Social History     Socioeconomic History    Marital status:      Spouse name: Alisa galvin    Number of children: 3   Occupational History    Occupation: [employer]-      Comment:     Tobacco Use    Smoking status: Never    Smokeless tobacco: Never   Substance and Sexual Activity    Alcohol use: No    Drug use: No    Sexual activity: Not Currently      Partners: Female     Social Determinants of Health     Financial Resource Strain: Low Risk  (3/7/2023)    Overall Financial Resource Strain (CARDIA)     Difficulty of Paying Living Expenses: Not very hard   Food Insecurity: No Food Insecurity (3/7/2023)    Hunger Vital Sign     Worried About Running Out of Food in the Last Year: Never true     Ran Out of Food in the Last Year: Never true   Transportation Needs: No Transportation Needs (3/7/2023)    PRAPARE - Transportation     Lack of Transportation (Medical): No     Lack of Transportation (Non-Medical): No   Physical Activity: Inactive (3/7/2023)    Exercise Vital Sign     Days of Exercise per Week: 0 days     Minutes of Exercise per Session: 0 min   Stress: Stress Concern Present (3/7/2023)    [employer] [place] [employer] - Occupational Stress Questionnaire     Feeling of Stress : To some extent   Social Connections: Socially Integrated (3/7/2023)    Social Connection and Isolation Panel [NHANES]     Frequency of Communication with Friends and Family: More than three times a week     Frequency of Social Gatherings with Friends and Family: Three times a week     Attends Yazidism Services: More than 4 times per year     Active Member of Clubs or Organizations: Yes     Attends Club or Organization Meetings: 1 to 4 times per year     Marital Status:    Housing Stability: Low Risk  (3/7/2023)    Housing Stability Vital Sign     Unable to Pay for Housing in the Last Year: No     Number of Places Lived in the Last Year: 1     Unstable Housing in the Last Year: No     Past Surgical History:   Procedure Laterality Date    ADENOIDECTOMY      ANGIOGRAM, CORONARY, WITH LEFT HEART CATHETERIZATION N/A 3/23/2023    Procedure: Angiogram, Coronary, with Left Heart Cath;  Surgeon: Tomas Salomon MD;  Location: [facility] CATH/EP LAB;  Service: Cardiology;  Laterality: N/A;    INGUINAL HERNIA REPAIR Right 07/12/2018    Dr. Thomas - [facility]    KNEE SURGERY Left  07/31/2019    TONSILLECTOMY      VASECTOMY       Family History   Problem Relation Age of Onset    Heart disease Father     Miscarriages / Stillbirths Father     Skin cancer Mother     Cancer Maternal Grandfather     Stomach cancer Paternal Grandfather        Review of Systems   Constitutional:  Negative for activity change, appetite change, fatigue and unexpected weight change (Has gained about 4 lb of weight in the last few months.  Generally weight has been stable otherwise.).        Patient is going through a little rough patch postoperatively.  On occasions he has felt lightheaded.  Patient tells me that he has been stopped off losartan and finasteride by the Cardiology.  Not sure.   HENT:  Negative for nosebleeds and rhinorrhea.         Frequent sinus problems-seeing Dr. Jann Carrizales for the same.   Eyes:  Negative for pain, discharge and visual disturbance.   Respiratory:  Positive for apnea (Known sleep apnea for the last 20 years.). Negative for cough, chest tightness and stridor.         Pulmonary nodule on incidental CT scan.  Uses a CPAP device for sleep apnea.   Cardiovascular:  Negative for chest pain, palpitations and leg swelling.        Patient has hypertension and hyperlipidemia.  Past he had Zio monitor which had revealed possibility of atrial fibrillation.   Gastrointestinal:  Negative for abdominal distention, blood in stool, constipation and diarrhea.        Stable GERD  Occasional hemorrhoidal bleeding. Secondary to constipation.   Endocrine: Positive for cold intolerance. Negative for heat intolerance, polydipsia and polyphagia.        Patient has hypothyroidism. Stable on levothyroxine. Current labs are good. Blood sugars are slightly elevated.   Genitourinary:  Positive for frequency and urgency. Negative for dysuria.        Benign prostate hypertrophy stable on tamsulosin and finasteride.  Renal issues uncertain.  Patient's thinks he has stage 3 kidney disease but the labs do not indicate  "so.   Musculoskeletal:         Bilateral knee arthroplasty on left about 5 years back and right side about a year or so back.  The right knee after the surgery did not do too well.  There was a lot of effusion in the interim and he had to be drained at least 4 times.  Dr. Cespedes       Skin:  Negative for color change. Wound: left ankle.  Neurological:         Some memory issues.  Lightheadedness and dizziness is overall better and he is more stable.  He does feel little sway when he gets up.   Hematological:  Negative for adenopathy. Does not bruise/bleed easily.        He is taking a full aspirin for the time being postoperatively after the knee surgery 325 mg.   Psychiatric/Behavioral:  Negative for agitation and behavioral problems.         Some memory issues which is commensurate with age and station of his life.         Objective:      Blood pressure 124/62, pulse 67, height 5' 10" (1.778 m), weight 94.8 kg (209 lb). Body mass index is 29.99 kg/m².  Physical Exam  Vitals and nursing note reviewed.   Constitutional:       General: He is not in acute distress.     Appearance: He is well-developed. He is obese. He is not ill-appearing, toxic-appearing or diaphoretic.      Comments: BMI is 29.99   HENT:      Head: Atraumatic.      Nose:      Right Sinus: No frontal sinus tenderness.      Left Sinus: No maxillary sinus tenderness or frontal sinus tenderness.   Eyes:      General: No scleral icterus.        Right eye: No discharge.         Left eye: No discharge.   Neck:      Vascular: No JVD.      Trachea: No tracheal deviation.   Cardiovascular:      Rate and Rhythm: Normal rate and regular rhythm.      Heart sounds: Normal heart sounds.   Pulmonary:      Effort: Pulmonary effort is normal. No respiratory distress.      Breath sounds: Normal breath sounds. No wheezing or rales.   Abdominal:      General: There is no distension or abdominal bruit.      Palpations: Abdomen is soft.      Tenderness: There is no " abdominal tenderness.   Musculoskeletal:         General: No tenderness.      Right lower leg: Right lower leg edema: 1+ edema which is probably reactive to secondary surgery on the right side..      Left lower leg: No edema.      Comments: Postoperative knee   Lymphadenopathy:      Cervical: No cervical adenopathy.   Skin:     General: Skin is warm and dry.      Findings: No erythema or rash.      Comments: C/O Dry skin-  Chronic skin dryness and bruising.  Also in the legs.  Bruises easily because of aspirin and probably aging process.   Neurological:      Mental Status: He is alert. Mental status is at baseline.   Psychiatric:         Mood and Affect: Mood normal.         Behavior: Behavior normal.      Comments: Beginnings of cognitive changes.           Assessment:     Benign essential hypertension    Paroxysmal atrial fibrillation    Multiple-type hyperlipidemia    Subcortical microvascular ischemic occlusive disease    Dizziness    Secondary hypothyroidism    Cognitive changes    Benign prostatic hyperplasia without urinary obstruction                 Benign essential hypertension    Paroxysmal atrial fibrillation    Multiple-type hyperlipidemia    Subcortical microvascular ischemic occlusive disease    Dizziness    Secondary hypothyroidism    Cognitive changes    Benign prostatic hyperplasia without urinary obstruction         Dr Copeland  Appears to be doing well from coronary artery disease standpoint.  Had mild-to-moderate coronary artery disease for which medical management is recommended.  Continue with statin.  For paroxysmal atrial fibrillation, he has seen electrophysiology who recommended a loop recorder but patient would like to follow-up based on his symptoms, does not report any palpitations, continue current therapy.  Hypertension is well controlled, continue current therapy.  Will get a lipid panel as noted panel help done in the past 3 years, continue pravastatin in the interim.  Patient will  follow-up with the nurse practitioner to go over the lipid panel, recommend targeting LDL of around .     Lab Visit on 07/18/2023   Component Date Value Ref Range Status    Glucose 07/18/2023 101  70 - 110 mg/dL Final    Sodium 07/18/2023 141  136 - 145 mmol/L Final    Potassium 07/18/2023 3.9  3.5 - 5.1 mmol/L Final    Chloride 07/18/2023 107  95 - 110 mmol/L Final    CO2 07/18/2023 25  23 - 29 mmol/L Final    BUN 07/18/2023 20  8 - 23 mg/dL Final    Calcium 07/18/2023 9.1  8.7 - 10.5 mg/dL Final    Creatinine 07/18/2023 1.2  0.5 - 1.4 mg/dL Final    Albumin 07/18/2023 3.9  3.5 - 5.2 g/dL Final    Phosphorus 07/18/2023 2.8  2.7 - 4.5 mg/dL Final    eGFR 07/18/2023 >60.0  >60 mL/min/1.73 m^2 Final    Anion Gap 07/18/2023 9  8 - 16 mmol/L Final    Magnesium 07/18/2023 2.0  1.6 - 2.6 mg/dL Final    Vit D, 25-Hydroxy 07/18/2023 40  30 - 96 ng/mL Final    Specimen UA 07/18/2023 Urine, Clean Catch   Final    Color, UA 07/18/2023 Yellow  Yellow, Straw, Raina Final    Appearance, UA 07/18/2023 Clear  Clear Final    pH, UA 07/18/2023 6.0  5.0 - 8.0 Final    Specific Gravity, UA 07/18/2023 1.005  1.005 - 1.030 Final    Protein, UA 07/18/2023 Negative  Negative Final    Glucose, UA 07/18/2023 Negative  Negative Final    Ketones, UA 07/18/2023 Negative  Negative Final    Bilirubin (UA) 07/18/2023 Negative  Negative Final    Occult Blood UA 07/18/2023 Negative  Negative Final    Nitrite, UA 07/18/2023 Negative  Negative Final    Urobilinogen, UA 07/18/2023 Negative  Negative EU/dL Final    Leukocytes, UA 07/18/2023 Negative  Negative Final    Uric Acid 07/18/2023 6.7  3.4 - 7.0 mg/dL Final    PTH, Intact 07/18/2023 41.8  9.0 - 77.0 pg/mL Final    Protein, Urine Random 07/18/2023 <6  6 - 15 mg/dL Final    Creatinine, Urine 07/18/2023 32.0  23.0 - 375.0 mg/dL Final    Prot/Creat Ratio, Urine 07/18/2023 Unable to calculate  0.00 - 0.20 Final    WBC 07/18/2023 4.02  3.90 - 12.70 K/uL Final    RBC 07/18/2023 4.77  4.60 - 6.20  M/uL Final    Hemoglobin 07/18/2023 14.1  14.0 - 18.0 g/dL Final    Hematocrit 07/18/2023 42.2  40.0 - 54.0 % Final    MCV 07/18/2023 89  82 - 98 fL Final    MCH 07/18/2023 29.6  27.0 - 31.0 pg Final    MCHC 07/18/2023 33.4  32.0 - 36.0 g/dL Final    RDW 07/18/2023 13.2  11.5 - 14.5 % Final    Platelets 07/18/2023 204  150 - 450 K/uL Final    MPV 07/18/2023 9.9  9.2 - 12.9 fL Final    Immature Granulocytes 07/18/2023 0.2  0.0 - 0.5 % Final    Gran # (ANC) 07/18/2023 2.3  1.8 - 7.7 K/uL Final    Immature Grans (Abs) 07/18/2023 0.01  0.00 - 0.04 K/uL Final    Lymph # 07/18/2023 0.9 (L)  1.0 - 4.8 K/uL Final    Mono # 07/18/2023 0.6  0.3 - 1.0 K/uL Final    Eos # 07/18/2023 0.1  0.0 - 0.5 K/uL Final    Baso # 07/18/2023 0.06  0.00 - 0.20 K/uL Final    nRBC 07/18/2023 0  0 /100 WBC Final    Gran % 07/18/2023 58.0  38.0 - 73.0 % Final    Lymph % 07/18/2023 22.1  18.0 - 48.0 % Final    Mono % 07/18/2023 15.7 (H)  4.0 - 15.0 % Final    Eosinophil % 07/18/2023 2.5  0.0 - 8.0 % Final    Basophil % 07/18/2023 1.5  0.0 - 1.9 % Final    Differential Method 07/18/2023 Automated   Final     Component Ref Range & Units 1 mo ago  (7/18/23) 1 yr ago  (7/18/22) 2 yr ago  (7/20/21) 2 yr ago  (12/22/20) 3 yr ago  (7/14/20)   PTH, Intact 9.0 - 77.0 pg/mL 41.8  24.7  23.9  38.7  29.4        Plan:           Benign essential hypertension    Paroxysmal atrial fibrillation    Multiple-type hyperlipidemia    Subcortical microvascular ischemic occlusive disease    Dizziness    Secondary hypothyroidism    Cognitive changes    Benign prostatic hyperplasia without urinary obstruction      Patient's blood pressures are doing good.      Seems to be tolerating atrial fibrillation the blood pressures are slightly on the lower side.  Dizziness episodes have dissipated.  Continues on levothyroxine prescription.    Mild cognitive changes but manageable.  Able to understand the basics of his issues though details a viewed him.  Prostate symptoms are  stable.    Patient has been advised that his kidney functions are reasonably good.  He does not have chronic kidney disease stage 3.    Continue with COVID precautions.    Follow-up in 6 months.      Spent roland 30 minutes with patient which involved review of pts medical conditions, labs, medications and with 50% of time face-to-face discussion about medical problems, management and any applicable changes.      Current Outpatient Medications:     amLODIPine (NORVASC) 5 MG tablet, 1 tablet po qd, Disp: 90 tablet, Rfl: 3    ascorbic acid, vitamin C, (VITAMIN C) 500 MG tablet, Take 500 mg by mouth once daily., Disp: , Rfl:     aspirin (ECOTRIN) 81 MG EC tablet, Take 81 mg by mouth once daily., Disp: , Rfl:     b complex vitamins tablet, Take 1 tablet by mouth once daily., Disp: , Rfl:     CALCIUM CARBONATE/VITAMIN D3 (VITAMIN D-3 ORAL), Take by mouth., Disp: , Rfl:     docusate sodium (COLACE) 50 MG capsule, Take by mouth 2 (two) times daily as needed for Constipation., Disp: , Rfl:     finasteride (PROSCAR) 5 mg tablet, Take 1 tablet (5 mg total) by mouth once daily., Disp: 90 tablet, Rfl: 3    fish oil-omega-3 fatty acids 300-1,000 mg capsule, Take 2 g by mouth once daily., Disp: , Rfl:     levothyroxine (SYNTHROID, LEVOTHROID) 175 MCG tablet, Take 1 tablet (175 mcg total) by mouth before breakfast., Disp: 90 tablet, Rfl: 3    losartan (COZAAR) 25 MG tablet, TAKE 1 TABLET BY MOUTH TWICE  DAILY, Disp: 180 tablet, Rfl: 3    multivitamin (THERAGRAN) per tablet, Take 1 tablet by mouth once daily., Disp: , Rfl:     nitroGLYCERIN (NITROSTAT) 0.4 MG SL tablet, Place 0.4 mg under the tongue every 5 (five) minutes as needed for Chest pain., Disp: , Rfl:     pravastatin (PRAVACHOL) 20 MG tablet, Take 1 tablet (20 mg total) by mouth once daily., Disp: 90 tablet, Rfl: 3    tamsulosin (FLOMAX) 0.4 mg Cap, Take 1 capsule (0.4 mg total) by mouth once daily., Disp: 90 capsule, Rfl: 3    fluocinonide (LIDEX) 0.05 % ointment, Apply to  AA lower leg bid max use one month (Patient not taking: Reported on 9/7/2023), Disp: 45 g, Rfl: 0    mupirocin (BACTROBAN) 2 % ointment, by Nasal route 3 (three) times daily. (Patient not taking: Reported on 9/7/2023), Disp: 20 g, Rfl: 0    tetracycline (ACHROMYCIN,SUMYCIN) 500 MG capsule, Take 500 mg by mouth 2 (two) times daily., Disp: , Rfl:

## 2023-09-11 ENCOUNTER — LAB VISIT (OUTPATIENT)
Dept: LAB | Facility: HOSPITAL | Age: 83
End: 2023-09-11
Attending: INTERNAL MEDICINE
Payer: MEDICARE

## 2023-09-11 DIAGNOSIS — I51.9 MYXEDEMA HEART DISEASE: Primary | ICD-10-CM

## 2023-09-11 DIAGNOSIS — E03.9 MYXEDEMA HEART DISEASE: Primary | ICD-10-CM

## 2023-09-11 LAB
T4 FREE SERPL-MCNC: 1.28 NG/DL (ref 0.71–1.51)
TSH SERPL DL<=0.005 MIU/L-ACNC: 0.51 UIU/ML (ref 0.34–5.6)

## 2023-09-11 PROCEDURE — 84439 ASSAY OF FREE THYROXINE: CPT | Performed by: INTERNAL MEDICINE

## 2023-09-11 PROCEDURE — 84443 ASSAY THYROID STIM HORMONE: CPT | Performed by: INTERNAL MEDICINE

## 2023-09-11 PROCEDURE — 36415 COLL VENOUS BLD VENIPUNCTURE: CPT | Performed by: INTERNAL MEDICINE

## 2023-09-29 ENCOUNTER — OFFICE VISIT (OUTPATIENT)
Dept: CARDIOLOGY | Facility: CLINIC | Age: 83
End: 2023-09-29
Payer: MEDICARE

## 2023-09-29 VITALS
SYSTOLIC BLOOD PRESSURE: 128 MMHG | WEIGHT: 210.56 LBS | OXYGEN SATURATION: 98 % | HEART RATE: 68 BPM | DIASTOLIC BLOOD PRESSURE: 62 MMHG | HEIGHT: 70 IN | BODY MASS INDEX: 30.14 KG/M2

## 2023-09-29 DIAGNOSIS — I10 BENIGN ESSENTIAL HYPERTENSION: ICD-10-CM

## 2023-09-29 DIAGNOSIS — E78.2 MIXED HYPERLIPIDEMIA: Primary | ICD-10-CM

## 2023-09-29 PROCEDURE — 99214 OFFICE O/P EST MOD 30 MIN: CPT | Mod: PBBFAC,PN | Performed by: INTERNAL MEDICINE

## 2023-09-29 PROCEDURE — 99214 PR OFFICE/OUTPT VISIT, EST, LEVL IV, 30-39 MIN: ICD-10-PCS | Mod: S$PBB,,, | Performed by: INTERNAL MEDICINE

## 2023-09-29 PROCEDURE — 99214 OFFICE O/P EST MOD 30 MIN: CPT | Mod: S$PBB,,, | Performed by: INTERNAL MEDICINE

## 2023-09-29 PROCEDURE — 99999 PR PBB SHADOW E&M-EST. PATIENT-LVL IV: CPT | Mod: PBBFAC,,, | Performed by: INTERNAL MEDICINE

## 2023-09-29 PROCEDURE — 99999 PR PBB SHADOW E&M-EST. PATIENT-LVL IV: ICD-10-PCS | Mod: PBBFAC,,, | Performed by: INTERNAL MEDICINE

## 2023-09-29 RX ORDER — LEVOTHYROXINE SODIUM 150 MCG
150 TABLET ORAL
COMMUNITY
Start: 2023-09-12

## 2023-09-29 NOTE — PROGRESS NOTES
[facility]    Subjective:     Patient ID:  Nikolay Barraza is a 82 y.o. male patient here for evaluation Atrial Fibrillation (Follow up 6 weeks . Patient states he feels good. )      HPI:  82-year-old male here for follow-up of his lipid panel.  He forgot to do his lipid panel.  Continues to be active without any exertional shortness of breath or chest pain.  He is had an angiogram which did not show any significant coronary artery disease.  No significant valvular heart disease on echocardiogram with a normal EF.  No evidence of AFib on his most recent event monitor.  Does not report any palpitations.    Review of Systems   All other systems reviewed and are negative.       Past Medical History:   Diagnosis Date    Allergy     Codeine    Closed fracture dislocation of lumbar spine 07/06/2018    #2 vertebra. Patient went to the emergency Department.  Followup with Erik:    GERD (gastroesophageal reflux disease)     Hyperlipidemia     Hypertension     Hypertensive retinopathy of both eyes 05/11/2021    As per ophthalmology notes.  Eye care 20/20    Right inguinal hernia     Squamous cell carcinoma of skin     Thyroid disease        Past Surgical History:   Procedure Laterality Date    ADENOIDECTOMY      ANGIOGRAM, CORONARY, WITH LEFT HEART CATHETERIZATION N/A 3/23/2023    Procedure: Angiogram, Coronary, with Left Heart Cath;  Surgeon: Tomas Salomon MD;  Location: [facility] CATH/EP LAB;  Service: Cardiology;  Laterality: N/A;    INGUINAL HERNIA REPAIR Right 07/12/2018    Dr. Thomas - [facility]    KNEE SURGERY Left 07/31/2019    TONSILLECTOMY      VASECTOMY         Family History   Problem Relation Age of Onset    Heart disease Father     Miscarriages / Stillbirths Father     Skin cancer Mother     Cancer Maternal Grandfather     Stomach cancer Paternal Grandfather        Social History     Socioeconomic History    Marital status:      Spouse name: Alisa barraza     Number of children: 3   Occupational History    Occupation: [employer]-      Comment:     Tobacco Use    Smoking status: Never    Smokeless tobacco: Never   Substance and Sexual Activity    Alcohol use: No    Drug use: No    Sexual activity: Not Currently     Partners: Female     Social Determinants of Health     Financial Resource Strain: Low Risk  (3/7/2023)    Overall Financial Resource Strain (CARDIA)     Difficulty of Paying Living Expenses: Not very hard   Food Insecurity: No Food Insecurity (3/7/2023)    Hunger Vital Sign     Worried About Running Out of Food in the Last Year: Never true     Ran Out of Food in the Last Year: Never true   Transportation Needs: No Transportation Needs (3/7/2023)    PRAPARE - Transportation     Lack of Transportation (Medical): No     Lack of Transportation (Non-Medical): No   Physical Activity: Inactive (3/7/2023)    Exercise Vital Sign     Days of Exercise per Week: 0 days     Minutes of Exercise per Session: 0 min   Stress: Stress Concern Present (3/7/2023)    [place] [employer] - Occupational Stress Questionnaire     Feeling of Stress : To some extent   Social Connections: Socially Integrated (3/7/2023)    Social Connection and Isolation Panel [NHANES]     Frequency of Communication with Friends and Family: More than three times a week     Frequency of Social Gatherings with Friends and Family: Three times a week     Attends Anabaptist Services: More than 4 times per year     Active Member of Clubs or Organizations: Yes     Attends Club or Organization Meetings: 1 to 4 times per year     Marital Status:    Housing Stability: Low Risk  (3/7/2023)    Housing Stability Vital Sign     Unable to Pay for Housing in the Last Year: No     Number of Places Lived in the Last Year: 1     Unstable Housing in the Last Year: No       Current Outpatient Medications   Medication Sig Dispense Refill    amLODIPine (NORVASC) 5  MG tablet 1 tablet po qd 90 tablet 3    ascorbic acid, vitamin C, (VITAMIN C) 500 MG tablet Take 500 mg by mouth once daily.      aspirin (ECOTRIN) 81 MG EC tablet Take 81 mg by mouth once daily.      b complex vitamins tablet Take 1 tablet by mouth once daily.      CALCIUM CARBONATE/VITAMIN D3 (VITAMIN D-3 ORAL) Take by mouth.      docusate sodium (COLACE) 50 MG capsule Take by mouth 2 (two) times daily as needed for Constipation.      finasteride (PROSCAR) 5 mg tablet Take 1 tablet (5 mg total) by mouth once daily. 90 tablet 3    fish oil-omega-3 fatty acids 300-1,000 mg capsule Take 2 g by mouth once daily.      fluocinonide (LIDEX) 0.05 % ointment Apply to AA lower leg bid max use one month 45 g 0    losartan (COZAAR) 25 MG tablet TAKE 1 TABLET BY MOUTH TWICE  DAILY 180 tablet 3    multivitamin (THERAGRAN) per tablet Take 1 tablet by mouth once daily.      mupirocin (BACTROBAN) 2 % ointment by Nasal route 3 (three) times daily. 20 g 0    nitroGLYCERIN (NITROSTAT) 0.4 MG SL tablet Place 0.4 mg under the tongue every 5 (five) minutes as needed for Chest pain.      pravastatin (PRAVACHOL) 20 MG tablet Take 1 tablet (20 mg total) by mouth once daily. 90 tablet 3    SYNTHROID 150 mcg tablet Take 150 mcg by mouth.      tamsulosin (FLOMAX) 0.4 mg Cap Take 1 capsule (0.4 mg total) by mouth once daily. 90 capsule 3    tetracycline (ACHROMYCIN,SUMYCIN) 500 MG capsule Take 500 mg by mouth 2 (two) times daily.       No current facility-administered medications for this visit.       Review of patient's allergies indicates:   Allergen Reactions    Codeine Other (See Comments)     mood changes  Makes him feel violent         Objective:        Vitals:    09/29/23 0851   BP: 128/62   Pulse: 68       Physical Exam  Vitals reviewed.   Constitutional:       Appearance: Normal appearance.   HENT:      Mouth/Throat:      Mouth: Mucous membranes are moist.   Eyes:      Extraocular Movements: Extraocular movements intact.      Pupils:  Pupils are equal, round, and reactive to light.   Cardiovascular:      Rate and Rhythm: Normal rate and regular rhythm.      Pulses: Normal pulses.      Heart sounds: Normal heart sounds. No murmur heard.     No gallop.   Pulmonary:      Effort: Pulmonary effort is normal.      Breath sounds: Normal breath sounds.   Abdominal:      General: Bowel sounds are normal.      Palpations: Abdomen is soft.   Musculoskeletal:         General: Normal range of motion.      Cervical back: Normal range of motion.   Skin:     General: Skin is warm and dry.   Neurological:      General: No focal deficit present.      Mental Status: He is alert and oriented to person, place, and time.   Psychiatric:         Mood and Affect: Mood normal.         LIPIDS - LAST 2   Lab Results   Component Value Date    CHOL 157 12/27/2020    CHOL 169 12/17/2020    HDL 37 (L) 12/27/2020    HDL 41 12/17/2020    LDLCALC 93.6 12/27/2020    LDLCALC 108.4 12/17/2020    TRIG 132 12/27/2020    TRIG 98 12/17/2020    CHOLHDL 23.6 12/27/2020    CHOLHDL 24.3 12/17/2020       CBC - LAST 2  Lab Results   Component Value Date    WBC 4.02 07/18/2023    WBC 4.31 03/20/2023    RBC 4.77 07/18/2023    RBC 5.01 03/20/2023    HGB 14.1 07/18/2023    HGB 14.4 03/20/2023    HCT 42.2 07/18/2023    HCT 43.2 03/20/2023    MCV 89 07/18/2023    MCV 86 03/20/2023    MCH 29.6 07/18/2023    MCH 28.7 03/20/2023    MCHC 33.4 07/18/2023    MCHC 33.3 03/20/2023    RDW 13.2 07/18/2023    RDW 13.8 03/20/2023     07/18/2023     03/20/2023    MPV 9.9 07/18/2023    MPV 9.7 03/20/2023    GRAN 2.3 07/18/2023    GRAN 58.0 07/18/2023    LYMPH 0.9 (L) 07/18/2023    LYMPH 22.1 07/18/2023    MONO 0.6 07/18/2023    MONO 15.7 (H) 07/18/2023    BASO 0.06 07/18/2023    BASO 0.03 03/20/2023    NRBC 0 07/18/2023    NRBC 0 03/20/2023       CHEMISTRY & LIVER FUNCTION - LAST 2  Lab Results   Component Value Date     07/18/2023     03/20/2023    K 3.9 07/18/2023    K 4.2 03/20/2023      07/18/2023     03/20/2023    CO2 25 07/18/2023    CO2 29 03/20/2023    ANIONGAP 9 07/18/2023    ANIONGAP 5 (L) 03/20/2023    BUN 20 07/18/2023    BUN 16 03/20/2023    CREATININE 1.2 07/18/2023    CREATININE 1.2 03/20/2023     07/18/2023     (H) 03/20/2023    CALCIUM 9.1 07/18/2023    CALCIUM 9.5 03/20/2023    MG 2.0 07/18/2023    MG 2.1 07/18/2022    ALBUMIN 3.9 07/18/2023    ALBUMIN 4.0 01/17/2023    PROT 7.9 12/26/2020    PROT 7.3 12/17/2020    ALKPHOS 98 12/26/2020    ALKPHOS 75 12/17/2020    ALT 24 12/26/2020    ALT 20 12/17/2020    AST 34 12/26/2020    AST 24 12/17/2020    BILITOT 0.7 12/26/2020    BILITOT 0.9 12/17/2020        CARDIAC PROFILE - LAST 2  Lab Results   Component Value Date    BNP 68 01/27/2023     (H) 04/07/2022    CPK 32 12/27/2020    CPK 37 12/26/2020    CPKMB 0.6 12/27/2020    CPKMB 0.9 12/26/2020    TROPONINI <0.006 12/27/2020    TROPONINI <0.006 12/27/2020        COAGULATION - LAST 2  Lab Results   Component Value Date    INR 1.0 12/26/2020    APTT 27.3 12/26/2020       ENDOCRINE & PSA - LAST 2  Lab Results   Component Value Date    HGBA1C 5.8 08/22/2017    TSH 0.510 09/11/2023    TSH 2.060 09/22/2022    PSA 0.11 06/28/2019    PSA 0.1 05/22/2018        ECHOCARDIOGRAM RESULTS  Results for orders placed during the hospital encounter of 02/23/23    Echo    Interpretation Summary  · The left ventricle is normal in size with mild concentric hypertrophy and normal systolic function.  · The estimated ejection fraction is 65%.  · Indeterminate left ventricular diastolic function.  · Normal right ventricular size with normal right ventricular systolic function.  · Mild left atrial enlargement.  · Normal central venous pressure (3 mmHg).  · The estimated PA systolic pressure is 17 mmHg.      CURRENT/PREVIOUS VISIT EKG  Results for orders placed or performed during the hospital encounter of 03/20/23   EKG 12-lead    Collection Time: 03/20/23 11:05 AM    Narrative     Test Reason : R06.02,    Vent. Rate : 066 BPM     Atrial Rate : 087 BPM     P-R Int : 192 ms          QRS Dur : 072 ms      QT Int : 378 ms       P-R-T Axes : 047 -50 068 degrees     QTc Int : 396 ms    Normal sinus rhythm  Low voltage QRS  Left anterior fascicular block  Inferior infarct (cited on or before 10-MAR-2023)  Cannot rule out Anterior infarct ,age undetermined  Abnormal ECG  When compared with ECG of 10-MAR-2023 11:18,  Left anterior fascicular block is now Present  Minimal criteria for Anterior infarct are now Present  Questionable change in initial forces of Inferior leads  Confirmed by Dima Vergara MD ([id number]) on 3/20/2023 2:47:28 PM    Referred By:  JUSTO           Confirmed By:Dima Vergara MD     No valid procedures specified.   Results for orders placed in visit on 06/21/22    Nuclear Stress Test    Interpretation Summary    The nuclear portion of this study will be reported separately.    The EKG portion of this study is negative for ischemia.    The patient reported chest pain during the stress test.    There were no arrhythmias during stress.    No valid procedures specified.        Assessment:       1. Mixed hyperlipidemia    2. Benign essential hypertension           Plan:       Mixed hyperlipidemia  -     Lipid Panel; Future; Expected date: 09/29/2023    Benign essential hypertension    Check lipid panel.  Continue with pravastatin.  Target LDL of around .  Hypertension is well controlled continue with current antihypertensive therapy.  Patient instructed to call us or let us know if he develops worsening chest pain, shortness of breath or palpitations with exertion or at rest.    Follow up in about 4 weeks (around 10/27/2023) for f/u with NP for Lipid panel. .          MD [facility] [place]  [facility]

## 2023-10-03 ENCOUNTER — LAB VISIT (OUTPATIENT)
Dept: LAB | Facility: HOSPITAL | Age: 83
End: 2023-10-03
Attending: INTERNAL MEDICINE
Payer: MEDICARE

## 2023-10-03 DIAGNOSIS — E78.2 MIXED HYPERLIPIDEMIA: ICD-10-CM

## 2023-10-03 LAB
CHOLEST SERPL-MCNC: 167 MG/DL (ref 120–199)
CHOLEST/HDLC SERPL: 3.9 {RATIO} (ref 2–5)
HDLC SERPL-MCNC: 43 MG/DL (ref 40–75)
HDLC SERPL: 25.7 % (ref 20–50)
LDLC SERPL CALC-MCNC: 102 MG/DL (ref 63–159)
NONHDLC SERPL-MCNC: 124 MG/DL
TRIGL SERPL-MCNC: 110 MG/DL (ref 30–150)

## 2023-10-03 PROCEDURE — 80061 LIPID PANEL: CPT | Performed by: INTERNAL MEDICINE

## 2023-10-03 PROCEDURE — 36415 COLL VENOUS BLD VENIPUNCTURE: CPT | Performed by: INTERNAL MEDICINE

## 2024-01-08 ENCOUNTER — LAB VISIT (OUTPATIENT)
Dept: LAB | Facility: HOSPITAL | Age: 84
End: 2024-01-08
Attending: INTERNAL MEDICINE
Payer: MEDICARE

## 2024-01-08 DIAGNOSIS — E03.9 MYXEDEMA HEART DISEASE: Primary | ICD-10-CM

## 2024-01-08 DIAGNOSIS — I51.9 MYXEDEMA HEART DISEASE: Primary | ICD-10-CM

## 2024-01-08 LAB
T4 FREE SERPL-MCNC: 1 NG/DL (ref 0.71–1.51)
TSH SERPL DL<=0.005 MIU/L-ACNC: 2.92 UIU/ML (ref 0.34–5.6)

## 2024-01-08 PROCEDURE — 84439 ASSAY OF FREE THYROXINE: CPT | Performed by: INTERNAL MEDICINE

## 2024-01-08 PROCEDURE — 84443 ASSAY THYROID STIM HORMONE: CPT | Performed by: INTERNAL MEDICINE

## 2024-01-16 ENCOUNTER — TELEPHONE (OUTPATIENT)
Dept: CARDIOLOGY | Facility: CLINIC | Age: 84
End: 2024-01-16
Payer: MEDICARE

## 2024-01-16 NOTE — TELEPHONE ENCOUNTER
----- Message from Kevin Mendenhall sent at 1/16/2024 12:51 PM CST -----  Contact: Self  Type: Needs Medical Advice  Who Called:  Patient    Best Call Back Number: [phone]   Additional Information:  Called to speak with office regarding  amlodipine prescription. Please call.

## 2024-01-18 ENCOUNTER — OFFICE VISIT (OUTPATIENT)
Dept: CARDIOLOGY | Facility: CLINIC | Age: 84
End: 2024-01-18
Payer: MEDICARE

## 2024-01-18 VITALS
HEART RATE: 65 BPM | WEIGHT: 220.44 LBS | OXYGEN SATURATION: 98 % | SYSTOLIC BLOOD PRESSURE: 130 MMHG | HEIGHT: 70 IN | DIASTOLIC BLOOD PRESSURE: 80 MMHG | BODY MASS INDEX: 31.56 KG/M2

## 2024-01-18 DIAGNOSIS — I71.43 INFRARENAL ABDOMINAL AORTIC ANEURYSM (AAA) WITHOUT RUPTURE: ICD-10-CM

## 2024-01-18 DIAGNOSIS — I10 BENIGN ESSENTIAL HYPERTENSION: ICD-10-CM

## 2024-01-18 DIAGNOSIS — G47.33 OBSTRUCTIVE SLEEP APNEA: ICD-10-CM

## 2024-01-18 DIAGNOSIS — R06.02 SHORTNESS OF BREATH: Primary | ICD-10-CM

## 2024-01-18 DIAGNOSIS — E78.2 MIXED HYPERLIPIDEMIA: ICD-10-CM

## 2024-01-18 DIAGNOSIS — I48.0 PAROXYSMAL ATRIAL FIBRILLATION: ICD-10-CM

## 2024-01-18 PROCEDURE — 99999 PR PBB SHADOW E&M-EST. PATIENT-LVL IV: CPT | Mod: PBBFAC,,,

## 2024-01-18 PROCEDURE — 99214 OFFICE O/P EST MOD 30 MIN: CPT | Mod: S$PBB,,,

## 2024-01-18 PROCEDURE — 99214 OFFICE O/P EST MOD 30 MIN: CPT | Mod: PBBFAC,PN

## 2024-01-18 RX ORDER — AMLODIPINE BESYLATE 5 MG/1
5 TABLET ORAL DAILY
Qty: 90 TABLET | Refills: 3 | Status: SHIPPED | OUTPATIENT
Start: 2024-01-18

## 2024-01-18 RX ORDER — FUROSEMIDE 20 MG/1
20 TABLET ORAL DAILY
Qty: 90 TABLET | Refills: 3 | Status: SHIPPED | OUTPATIENT
Start: 2024-01-18 | End: 2024-03-19

## 2024-01-18 RX ORDER — LOSARTAN POTASSIUM 25 MG/1
25 TABLET ORAL 2 TIMES DAILY
Qty: 180 TABLET | Refills: 3 | Status: SHIPPED | OUTPATIENT
Start: 2024-01-18

## 2024-01-18 RX ORDER — PRAVASTATIN SODIUM 20 MG/1
20 TABLET ORAL DAILY
Qty: 90 TABLET | Refills: 3 | Status: SHIPPED | OUTPATIENT
Start: 2024-01-18 | End: 2025-01-17

## 2024-01-18 RX ORDER — AMOXICILLIN 500 MG
1 CAPSULE ORAL DAILY
Qty: 90 CAPSULE | Refills: 3 | Status: SHIPPED | OUTPATIENT
Start: 2024-01-18

## 2024-01-18 RX ORDER — ASPIRIN 81 MG/1
81 TABLET ORAL DAILY
Qty: 90 TABLET | Refills: 3 | Status: SHIPPED | OUTPATIENT
Start: 2024-01-18

## 2024-01-18 NOTE — ASSESSMENT & PLAN NOTE
/80.  Stable. Continue amlodipine 5 mg daily, lasix 20 mg daily, and losartan 25 mg daily.  Continue low sodium heart healthy diet.

## 2024-01-18 NOTE — PROGRESS NOTES
Subjective:    Patient ID:  Nikolay Barraza is a 83 y.o. male patient here for evaluation Hyperlipidemia (Follow up labs . No complaints . )      History of Present Illness:     Patient is here for a check up.  Denies CP, shortness of breath, lightheadedness, dizziness, jaw/neck/arm pain, edema and bleeding. BP stable 130/80.          Review of patient's allergies indicates:   Allergen Reactions    Codeine Other (See Comments)     mood changes  Makes him feel violent       Past Medical History:   Diagnosis Date    Allergy     Codeine    Closed fracture dislocation of lumbar spine 07/06/2018    #2 vertebra. Patient went to the emergency Department.  Followup with Erik:    GERD (gastroesophageal reflux disease)     Hyperlipidemia     Hypertension     Hypertensive retinopathy of both eyes 05/11/2021    As per ophthalmology notes.  Eye care 20/20    Right inguinal hernia     Squamous cell carcinoma of skin     Thyroid disease      Past Surgical History:   Procedure Laterality Date    ADENOIDECTOMY      ANGIOGRAM, CORONARY, WITH LEFT HEART CATHETERIZATION N/A 3/23/2023    Procedure: Angiogram, Coronary, with Left Heart Cath;  Surgeon: Tomas Salomon MD;  Location: [facility] CATH/EP LAB;  Service: Cardiology;  Laterality: N/A;    INGUINAL HERNIA REPAIR Right 07/12/2018    Dr. Thomas - [facility]    KNEE SURGERY Left 07/31/2019    TONSILLECTOMY      VASECTOMY       Social History     Tobacco Use    Smoking status: Never    Smokeless tobacco: Never   Substance Use Topics    Alcohol use: No    Drug use: No        Review of Systems:    As noted in HPI in addition      REVIEW OF SYSTEMS  CARDIOVASCULAR: No recent chest pain, palpitations, arm, neck, or jaw pain  RESPIRATORY: No recent fever, cough chills, SOB or congestion  : No blood in the urine  GI: No Nausea, vomiting, constipation, diarrhea, blood, or reflux.  MUSCULOSKELETAL: No myalgias  NEURO: No lightheadedness or dizziness  EYES: No Double vision, blurry, vision or headache               Objective        Vitals:    01/18/24 1501   BP: 130/80   Pulse: 65       LIPIDS - LAST 2   Lab Results   Component Value Date    CHOL 167 10/03/2023    CHOL 157 12/27/2020    HDL 43 10/03/2023    HDL 37 (L) 12/27/2020    LDLCALC 102.0 10/03/2023    LDLCALC 93.6 12/27/2020    TRIG 110 10/03/2023    TRIG 132 12/27/2020    CHOLHDL 25.7 10/03/2023    CHOLHDL 23.6 12/27/2020       CBC - LAST 2  Lab Results   Component Value Date    WBC 4.02 07/18/2023    WBC 4.31 03/20/2023    RBC 4.77 07/18/2023    RBC 5.01 03/20/2023    HGB 14.1 07/18/2023    HGB 14.4 03/20/2023    HCT 42.2 07/18/2023    HCT 43.2 03/20/2023    MCV 89 07/18/2023    MCV 86 03/20/2023    MCH 29.6 07/18/2023    MCH 28.7 03/20/2023    MCHC 33.4 07/18/2023    MCHC 33.3 03/20/2023    RDW 13.2 07/18/2023    RDW 13.8 03/20/2023     07/18/2023     03/20/2023    MPV 9.9 07/18/2023    MPV 9.7 03/20/2023    GRAN 2.3 07/18/2023    GRAN 58.0 07/18/2023    LYMPH 0.9 (L) 07/18/2023    LYMPH 22.1 07/18/2023    MONO 0.6 07/18/2023    MONO 15.7 (H) 07/18/2023    BASO 0.06 07/18/2023    BASO 0.03 03/20/2023    NRBC 0 07/18/2023    NRBC 0 03/20/2023       CHEMISTRY & LIVER FUNCTION - LAST 2  Lab Results   Component Value Date     01/08/2024     07/18/2023    K 4.5 01/08/2024    K 3.9 07/18/2023     01/08/2024     07/18/2023    CO2 25 01/08/2024    CO2 25 07/18/2023    ANIONGAP 8 01/08/2024    ANIONGAP 9 07/18/2023    BUN 24 (H) 01/08/2024    BUN 20 07/18/2023    CREATININE 1.1 01/08/2024    CREATININE 1.2 07/18/2023    GLU 96 01/08/2024     07/18/2023    CALCIUM 9.6 01/08/2024    CALCIUM 9.1 07/18/2023    MG 2.0 07/18/2023    MG 2.1 07/18/2022    ALBUMIN 4.2 01/08/2024    ALBUMIN 3.9 07/18/2023    PROT 7.9 12/26/2020    PROT 7.3 12/17/2020    ALKPHOS 98 12/26/2020    ALKPHOS 75 12/17/2020    ALT 24 12/26/2020    ALT 20 12/17/2020    AST 34 12/26/2020    AST 24 12/17/2020    BILITOT 0.7 12/26/2020    BILITOT 0.9  12/17/2020        CARDIAC PROFILE - LAST 2  Lab Results   Component Value Date    BNP 68 01/27/2023     (H) 04/07/2022    CPK 32 12/27/2020    CPK 37 12/26/2020    CPKMB 0.6 12/27/2020    CPKMB 0.9 12/26/2020    TROPONINI <0.006 12/27/2020    TROPONINI <0.006 12/27/2020        COAGULATION - LAST 2  Lab Results   Component Value Date    INR 1.0 12/26/2020    APTT 27.3 12/26/2020       ENDOCRINE & PSA - LAST 2  Lab Results   Component Value Date    HGBA1C 5.8 08/22/2017    TSH 2.915 01/08/2024    TSH 0.510 09/11/2023    PSA 0.11 06/28/2019    PSA 0.1 05/22/2018        ECHOCARDIOGRAM RESULTS  Results for orders placed during the hospital encounter of 02/23/23    Echo    Interpretation Summary  · The left ventricle is normal in size with mild concentric hypertrophy and normal systolic function.  · The estimated ejection fraction is 65%.  · Indeterminate left ventricular diastolic function.  · Normal right ventricular size with normal right ventricular systolic function.  · Mild left atrial enlargement.  · Normal central venous pressure (3 mmHg).  · The estimated PA systolic pressure is 17 mmHg.      CURRENT/PREVIOUS VISIT EKG  Results for orders placed or performed during the hospital encounter of 03/20/23   EKG 12-lead    Collection Time: 03/20/23 11:05 AM    Narrative    Test Reason : R06.02,    Vent. Rate : 066 BPM     Atrial Rate : 087 BPM     P-R Int : 192 ms          QRS Dur : 072 ms      QT Int : 378 ms       P-R-T Axes : 047 -50 068 degrees     QTc Int : 396 ms    Normal sinus rhythm  Low voltage QRS  Left anterior fascicular block  Inferior infarct (cited on or before 10-MAR-2023)  Cannot rule out Anterior infarct ,age undetermined  Abnormal ECG  When compared with ECG of 10-MAR-2023 11:18,  Left anterior fascicular block is now Present  Minimal criteria for Anterior infarct are now Present  Questionable change in initial forces of Inferior leads  Confirmed by Dima Vergara MD ([id number]) on 3/20/2023  2:47:28 PM    Referred By:  JUSTO           Confirmed By:Dima Vergara MD     No valid procedures specified.   Results for orders placed in visit on 06/21/22    Nuclear Stress Test    Interpretation Summary    The nuclear portion of this study will be reported separately.    The EKG portion of this study is negative for ischemia.    The patient reported chest pain during the stress test.    There were no arrhythmias during stress.    No valid procedures specified.    PHYSICAL EXAM  CONSTITUTIONAL: Well built, well nourished in no apparent distress  NECK: no carotid bruit, no JVD  LUNGS: CTA  CHEST WALL: no tenderness  HEART: regular rate and rhythm, S1, S2 normal, no murmur, click, rub or gallop   ABDOMEN: soft, non-tender; bowel sounds normal; no masses,  no organomegaly  EXTREMITIES: Extremities normal, no edema, no calf tenderness noted  NEURO: AAO X 3    I HAVE REVIEWED :    The vital signs, nurses notes, and all the pertinent radiology and labs.        Current Outpatient Medications   Medication Instructions    amLODIPine (NORVASC) 5 MG tablet 1 tablet po qd    ascorbic acid (vitamin C) (VITAMIN C) 500 mg, Oral, Daily    aspirin (ECOTRIN) 81 mg, Oral, Daily    b complex vitamins tablet 1 tablet, Oral, Daily    CALCIUM CARBONATE/VITAMIN D3 (VITAMIN D-3 ORAL) Oral    docusate sodium (COLACE) 50 MG capsule Oral, 2 times daily PRN    finasteride (PROSCAR) 5 mg, Oral, Daily    fish oil-omega-3 fatty acids 300-1,000 mg capsule 2 g, Oral, Daily    fluocinonide (LIDEX) 0.05 % ointment Apply up to twice a day to scaly rash on body for 3 wks or less    furosemide (LASIX) 20 mg, Oral    losartan (COZAAR) 25 MG tablet TAKE 1 TABLET BY MOUTH TWICE  DAILY    multivitamin (THERAGRAN) per tablet 1 tablet, Oral, Daily    mupirocin (BACTROBAN) 2 % ointment Nasal, 3 times daily    nitroGLYCERIN (NITROSTAT) 0.4 mg, Sublingual, Every 5 min PRN    pravastatin (PRAVACHOL) 20 mg, Oral, Daily    SYNTHROID 175 mcg tablet No dose, route,  or frequency recorded.    SYNTHROID 150 mcg, Oral    tamsulosin (FLOMAX) 0.4 mg, Oral, Daily    tetracycline (ACHROMYCIN,SUMYCIN) 500 mg, Oral, 2 times daily          Assessment & Plan     Shortness of breath  Denies shortness of breath.  Euvolemic. Continue current medication regimen.  He is active at the gym and tolerates exercise well.     Benign essential hypertension  /80.  Stable. Continue amlodipine 5 mg daily, lasix 20 mg daily, and losartan 25 mg daily.  Continue low sodium heart healthy diet.     Mixed hyperlipidemia  Recent .  Continue current medication regimen. Continue statin therapy.  Recommend reducing saturated fats.  Encouraged exercise as tolerated.    Obstructive sleep apnea  Compliant with wearing CPAP daily. Continue the same.     Infrarenal abdominal aortic aneurysm (AAA) without rupture  AAA 3.8 cm noted on CT scan from 2021. Repeat US AAA.           No follow-ups on file.

## 2024-01-18 NOTE — ASSESSMENT & PLAN NOTE
Denies shortness of breath.  Euvolemic. Continue current medication regimen.  He is active at the gym and tolerates exercise well.

## 2024-01-18 NOTE — ASSESSMENT & PLAN NOTE
Recent .  Continue current medication regimen. Continue statin therapy.  Recommend reducing saturated fats.  Encouraged exercise as tolerated.

## 2024-01-23 ENCOUNTER — HOSPITAL ENCOUNTER (OUTPATIENT)
Dept: RADIOLOGY | Facility: HOSPITAL | Age: 84
Discharge: HOME | End: 2024-01-23
Payer: MEDICARE

## 2024-01-23 DIAGNOSIS — I71.43 INFRARENAL ABDOMINAL AORTIC ANEURYSM (AAA) WITHOUT RUPTURE: ICD-10-CM

## 2024-01-23 PROCEDURE — 76775 US EXAM ABDO BACK WALL LIM: CPT | Mod: TC,PO

## 2024-03-17 PROBLEM — R91.1 INCIDENTAL LUNG NODULE, > 3MM AND < 8MM: Status: ACTIVE | Noted: 2024-03-17

## 2024-03-17 NOTE — ASSESSMENT & PLAN NOTE
Incidental finding of lung nodule on CT scanning abdomen done in 2021.  Was supposed to be repeated.

## 2024-03-17 NOTE — PROGRESS NOTES
Subjective:       Patient ID: Nikolay Barraza is a 83 y.o. male.    Chief Complaint: Hypertension, Hyperlipidemia, Thyroid Problem, Prostate Problem, AAA, and Memory issues      Patient is 83-year-old  gentleman who comes for  6 months follow-up.  Underlying medical issues are as below.     1.         Benign essential hypertension   2.         Multiple-type hyperlipidemia   3.         Secondary hypothyroidism   4.         Benign prostatic hyperplasia without urinary obstruction   5.         Abdominal aortic aneurysm (AAA) without rupture   6.         Cold intolerance   7.         Recent appointments with Dermatology-Dr. Leighann Ocampo for skin lesions  8.         Finding of a 4 mm nodule in the right lower lobe of lung during an incidental CT scan done for abdomen.    9.-        Memory issues    Best practice advisories indicate that he has hyperparathyroidism which I could not find any evidence.  It is difficult to even diagnosed chronic kidney disease in his case.  His GFR is greater than 60.  Slight Deplin GFR could be related to dehydration or acute events but we need a sustain in the creatinine levels to put a diagnosis of chronic kidney disease.    He did have a angiogram done in past by Dr. Salomon.  It did show mild-to-moderate blockages though no significant blockages.    Have an ultra Sound for AAA this year early in the beginning which did not show any increase in size of aneurysm.      He does follow-up with Dr. Cardoza.      HE CONTINUES ON TAMSULOSIN FOR BENIGN PROSTATE SYMPTOMS AND PERHAPS STOPPING THE TAMSULOSIN DID NOT HELP HIM.  (KNOWN TO CAUSE ORTHOSTASIS).    In past I had a detailed discussion with patient's daughter also.Ms. Delia Costa.  The discussion was mostly to apprise her about cognitive changes and decline with Abe.  And that it would be important for her to take over the charge of his medical affairs.                Hypertension  This is a chronic problem. The current  episode started more than 1 year ago. The problem is controlled. Pertinent negatives include no chest pain or palpitations. Risk factors for coronary artery disease include sedentary lifestyle, male gender and dyslipidemia. Past treatments include calcium channel blockers and angiotensin blockers. The current treatment provides moderate improvement. Compliance problems include psychosocial issues.  Identifiable causes of hypertension include a thyroid problem. There is no history of coarctation of the aorta or hyperaldosteronism. Chronic renal disease: Patient is following Nephrology for the same but creatinine seems to be okay..  Hyperlipidemia  This is a chronic problem. The current episode started more than 1 year ago. The problem is controlled. Exacerbating diseases include obesity. He has no history of diabetes or liver disease. Chronic renal disease: Patient is following Nephrology for the same but creatinine seems to be okay..Pertinent negatives include no chest pain. Current antihyperlipidemic treatment includes statins. The current treatment provides moderate improvement of lipids. Compliance problems include psychosocial issues.  Risk factors for coronary artery disease include a sedentary lifestyle, hypertension, male sex, dyslipidemia and obesity.   Thyroid Problem  Presents for follow-up visit. Symptoms include cold intolerance. Patient reports no constipation, diarrhea, dry skin, fatigue, heat intolerance, nail problem or palpitations. The symptoms have been stable. His past medical history is significant for hyperlipidemia. There is no history of diabetes.   Benign Prostatic Hypertrophy  This is a chronic problem. The current episode started more than 1 year ago. The problem has been gradually improving since onset. Irritative symptoms include frequency and urgency. Pertinent negatives include no dysuria. He is not sexually active. Past treatments include tamsulosin and finasteride. The treatment  provided moderate relief. He has been using treatment for 2 or more years.       Past Medical History:   Diagnosis Date    Allergy     Codeine    Closed fracture dislocation of lumbar spine 07/06/2018    #2 vertebra. Patient went to the emergency Department.  Followup with Erik:    GERD (gastroesophageal reflux disease)     Hyperlipidemia     Hypertension     Hypertensive retinopathy of both eyes 05/11/2021    As per ophthalmology notes.  Eye care 20/20    Incidental lung nodule, > 3mm and < 8mm 03/17/2024    There is a 4 mm noncalcified pulmonary nodule seen in association with the right upper lobe of the lung laterally.  Although the patient has prior CTs these are not of they will oval for viewing.     The liver, spleen, pancreas and gallbladder are unremarkable.  The portal vein, splenic vein and superior mesenteric veins are patent.     The patient has an infrarenal abdominal aortic aneurysm measu    Right inguinal hernia     Squamous cell carcinoma of skin     Thyroid disease      Social History     Socioeconomic History    Marital status:      Spouse name: Alisa galvin    Number of children: 3   Occupational History    Occupation: [employer]-      Comment:     Tobacco Use    Smoking status: Never    Smokeless tobacco: Never   Substance and Sexual Activity    Alcohol use: No    Drug use: No    Sexual activity: Not Currently     Partners: Female     Social Determinants of Health     Financial Resource Strain: Low Risk  (3/7/2023)    Overall Financial Resource Strain (CARDIA)     Difficulty of Paying Living Expenses: Not very hard   Food Insecurity: No Food Insecurity (3/7/2023)    Hunger Vital Sign     Worried About Running Out of Food in the Last Year: Never true     Ran Out of Food in the Last Year: Never true   Transportation Needs: No Transportation Needs (3/7/2023)    PRAPARE - Transportation     Lack of Transportation (Medical): No     Lack of Transportation  (Non-Medical): No   Physical Activity: Inactive (3/7/2023)    Exercise Vital Sign     Days of Exercise per Week: 0 days     Minutes of Exercise per Session: 0 min   Stress: Stress Concern Present (3/7/2023)    [employer] - Occupational Stress Questionnaire     Feeling of Stress : To some extent   Social Connections: Socially Integrated (3/7/2023)    Social Connection and Isolation Panel [NHANES]     Frequency of Communication with Friends and Family: More than three times a week     Frequency of Social Gatherings with Friends and Family: Three times a week     Attends Hindu Services: More than 4 times per year     Active Member of Clubs or Organizations: Yes     Attends Club or Organization Meetings: 1 to 4 times per year     Marital Status:    Housing Stability: Low Risk  (3/7/2023)    Housing Stability Vital Sign     Unable to Pay for Housing in the Last Year: No     Number of Places Lived in the Last Year: 1     Unstable Housing in the Last Year: No     Past Surgical History:   Procedure Laterality Date    ADENOIDECTOMY      ANGIOGRAM, CORONARY, WITH LEFT HEART CATHETERIZATION N/A 3/23/2023    Procedure: Angiogram, Coronary, with Left Heart Cath;  Surgeon: Tomas Salomon MD;  Location: [facility] CATH[facility]EP LAB;  Service: Cardiology;  Laterality: N/A;    INGUINAL HERNIA REPAIR Right 07/12/2018    Dr. Thomas - [facility]    KNEE SURGERY Left 07/31/2019    TONSILLECTOMY      VASECTOMY       Family History   Problem Relation Age of Onset    Heart disease Father     Miscarriages / Stillbirths Father     Skin cancer Mother     Cancer Maternal Grandfather     Stomach cancer Paternal Grandfather        Review of Systems   Constitutional:  Negative for activity change, appetite change, fatigue and unexpected weight change (Has gained about 4 lb of weight in the last few months.  Generally weight has been stable otherwise.).        Patient is going through a little rough patch postoperatively.  On  occasions he has felt lightheaded.  Patient tells me that he has been stopped off losartan and finasteride by the Cardiology.  Not sure.   HENT:  Negative for nosebleeds and rhinorrhea.         Frequent sinus problems-seeing Dr. Jann Carrizales for the same.   Eyes:  Negative for pain, discharge and visual disturbance.   Respiratory:  Positive for apnea (Known sleep apnea for the last 20 years.). Negative for cough, chest tightness and stridor.         Pulmonary nodule on incidental CT scan.  Uses a CPAP device for sleep apnea.   Cardiovascular:  Negative for chest pain, palpitations and leg swelling.        Patient has hypertension and hyperlipidemia.  Past he had Zio monitor which had revealed possibility of atrial fibrillation.   Gastrointestinal:  Negative for abdominal distention, blood in stool, constipation and diarrhea.        Stable GERD  Occasional hemorrhoidal bleeding. Secondary to constipation.   Endocrine: Positive for cold intolerance. Negative for heat intolerance, polydipsia and polyphagia.        Patient has hypothyroidism. Stable on levothyroxine. Current labs are good. Blood sugars are slightly elevated.   Genitourinary:  Positive for frequency and urgency. Negative for dysuria.        Benign prostate hypertrophy stable on tamsulosin and finasteride.  Renal issues uncertain.  Patient's thinks he has stage 3 kidney disease but the labs do not indicate so.   Musculoskeletal:         Bilateral knee arthroplasty on left about 5 years back and right side about a year or so back.  The right knee after the surgery did not do too well.  There was a lot of effusion in the interim and he had to be drained at least 4 times.  Dr. Cespedes       Skin:  Negative for color change. Wound: left ankle.  Neurological:         Some memory issues.  Lightheadedness and dizziness is overall better and he is more stable.  He does feel little sway when he gets up.   Hematological:  Negative for adenopathy. Does not bruise/bleed  "easily.        He is taking a full aspirin for the time being postoperatively after the knee surgery 325 mg.   Psychiatric/Behavioral:  Negative for agitation and behavioral problems.         Some memory issues which is commensurate with age and station of his life.         Objective:      Blood pressure (!) 123/53, pulse 77, height 5' 10" (1.778 m), weight 98.4 kg (217 lb). Body mass index is 31.14 kg/m².  Physical Exam  Vitals and nursing note reviewed.   Constitutional:       General: He is not in acute distress.     Appearance: He is well-developed. He is obese. He is not ill-appearing, toxic-appearing or diaphoretic.      Comments: BMI is 29.99   HENT:      Head: Atraumatic.      Nose:      Right Sinus: No frontal sinus tenderness.      Left Sinus: No maxillary sinus tenderness or frontal sinus tenderness.   Eyes:      General: No scleral icterus.        Right eye: No discharge.         Left eye: No discharge.   Neck:      Vascular: No JVD.      Trachea: No tracheal deviation.   Cardiovascular:      Rate and Rhythm: Normal rate and regular rhythm.      Heart sounds: Normal heart sounds.   Pulmonary:      Effort: Pulmonary effort is normal. No respiratory distress.      Breath sounds: Normal breath sounds. No wheezing or rales.   Abdominal:      General: There is no distension or abdominal bruit.      Palpations: Abdomen is soft.      Tenderness: There is no abdominal tenderness.   Musculoskeletal:         General: No tenderness.      Right lower leg: Right lower leg edema: 1+ edema which is probably reactive to secondary surgery on the right side..      Left lower leg: No edema.      Comments: Postoperative knee   Lymphadenopathy:      Cervical: No cervical adenopathy.   Skin:     General: Skin is warm and dry.      Findings: No erythema or rash.      Comments: C/O Dry skin-  Chronic skin dryness and bruising.  Also in the legs.  Bruises easily because of aspirin and probably aging process.   Neurological:      " Mental Status: He is alert. Mental status is at baseline.   Psychiatric:         Mood and Affect: Mood normal.         Behavior: Behavior normal.      Comments: Beginnings of cognitive changes.           Assessment:       Lab Visit on 01/08/2024   Component Date Value Ref Range Status    RBC, UA 01/08/2024 0  0 - 4 /hpf Final    WBC, UA 01/08/2024 1  0 - 5 /hpf Final    Bacteria 01/08/2024 Negative  None-Occ /hpf Final    Squam Epithel, UA 01/08/2024 0  /hpf Final    Hyaline Casts, UA 01/08/2024 1  0-1/lpf /lpf Final    Microscopic Comment 01/08/2024 SEE COMMENT   Final    Protein, Urine Random 01/08/2024 <4 (L)  6 - 15 mg/dL Final    Creatinine, Urine 01/08/2024 22.3 (L)  23.0 - 375.0 mg/dL Final    Prot/Creat Ratio, Urine 01/08/2024 Unable to calculate  0.00 - 0.20 Final    Protein, Urine Random 01/08/2024 <4 (L)  6 - 15 mg/dL Final    Glucose 01/08/2024 96  70 - 110 mg/dL Final    Sodium 01/08/2024 140  136 - 145 mmol/L Final    Potassium 01/08/2024 4.5  3.5 - 5.1 mmol/L Final    Chloride 01/08/2024 107  95 - 110 mmol/L Final    CO2 01/08/2024 25  23 - 29 mmol/L Final    BUN 01/08/2024 24 (H)  8 - 23 mg/dL Final    Calcium 01/08/2024 9.6  8.7 - 10.5 mg/dL Final    Creatinine 01/08/2024 1.1  0.5 - 1.4 mg/dL Final    Albumin 01/08/2024 4.2  3.5 - 5.2 g/dL Final    Phosphorus 01/08/2024 3.6  2.7 - 4.5 mg/dL Final    eGFR 01/08/2024 >60.0  >60 mL/min/1.73 m^2 Final    Anion Gap 01/08/2024 8  8 - 16 mmol/L Final   Lab Visit on 01/08/2024   Component Date Value Ref Range Status    TSH 01/08/2024 2.915  0.340 - 5.600 uIU/mL Final    Free T4 01/08/2024 1.00  0.71 - 1.51 ng/dL Final       1. Benign essential hypertension  Overview:  Blood pressures are still elevated today. It is felt that with further weight loss, perhaps his blood pressure may come down naturally and he may not need any new medications.His lipid panel shows borderline LDL and triglycerides. HDL is 30.      2. Paroxysmal atrial fibrillation    3.  Multiple-type hyperlipidemia    4. Cognitive changes  Comments:  Difficult for him to keep handle office medications, followups, details.  For example he did not recall that he was told about a lung nodule.    5. Infrarenal abdominal aortic aneurysm (AAA) without rupture  Comments:  This is a proximally 3.8 cm in 2021.  Will follow with an ultrasound  Overview:  FINDINGS:-CT scan of abdomen done on 02/26/2021  There are multiple renal cysts.  These lesions correlate with what was seen on ultrasound from February 10, 2021.     There is no evidence renal calculus nor hydronephrosis.  No enhancing renal lesion is identified.     There is a 4 mm noncalcified pulmonary nodule seen in association with the right upper lobe of the lung laterally.  Although the patient has prior CTs these are not of they will oval for viewing.     The liver, spleen, pancreas and gallbladder are unremarkable.  The portal vein, splenic vein and superior mesenteric veins are patent.     The patient has an infrarenal abdominal aortic aneurysm measuring 3.8 cm whose configuration suggests this may have been the result of ulcerated plaque.         6. Solitary pulmonary nodule  Comments:  Pulmonary nodule a proximally 4 mm noted incidentally on right lung low urine CT abdominal.  Will check again.  I doubt any significant change after 3 years.  Orders:  -     X-Ray Chest PA And Lateral; Future; Expected date: 03/19/2024    7. Incidental lung nodule, > 3mm and < 8mm  Overview:  There is a 4 mm noncalcified pulmonary nodule seen in association with the right upper lobe of the lung laterally.  Although the patient has prior CTs these are not of they will oval for viewing.     The liver, spleen, pancreas and gallbladder are unremarkable.  The portal vein, splenic vein and superior mesenteric veins are patent.     The patient has an infrarenal abdominal aortic aneurysm measuring 3.8 cm whose configuration suggests this may have been the result of  ulcerated plaque.     Visualized loops of bowel are unremarkable.  There is no evidence abdominal lymphadenopathy.  There are degenerative changes seen throughout the spine.     Impression:     The patient has bilateral renal cysts without evidence enhancing renal lesion.     There is a 4 mm noncalcified pulmonary nodule seen in association with the right upper lobe of the lung.  Follow-up CT in 6-12 months time to ensure stability is recommended.     The patient has a 3.8 cm infrarenal abdominal aortic aneurysm.        Electronically signed by: Roseanne Cazares MD  Date:                                            02/26/2021  Time:                                           08:31    Assessment & Plan:  Incidental finding of lung nodule on CT scanning abdomen done in 2021.  Was supposed to be repeated.               Plan:   Benign essential hypertension    Paroxysmal atrial fibrillation    Multiple-type hyperlipidemia    Cognitive changes  Comments:  Difficult for him to keep handle office medications, followups, details.  For example he did not recall that he was told about a lung nodule.    Infrarenal abdominal aortic aneurysm (AAA) without rupture  Comments:  This is a proximally 3.8 cm in 2021.  Will follow with an ultrasound    Solitary pulmonary nodule  Comments:  Pulmonary nodule a proximally 4 mm noted incidentally on right lung low urine CT abdominal.  Will check again.  I doubt any significant change after 3 years.  Orders:  -     X-Ray Chest PA And Lateral; Future; Expected date: 03/19/2024    Incidental lung nodule, > 3mm and < 8mm      Blood pressures are doing good.      Atrial fibrillation seems to be stable.      Cognitive changes are well known in his case with difficulty recalling his medical problems or names of medications.  However he plays his pseudo co.    AAA is stable at this point.      Solitary pulmonary nodule was noted on a incidental CT scan done couple of years back did so far he is  doing okay.  May consider a repeat CT scan or chest x-ray.  If it has grown in size a chest x-ray would catch it.  I will probably start off with a chest x-ray.    Continue with fall and injury precautions.      He is probably outdated for colonoscopy at this point.      He is updated on flu and pneumonia vaccines.      Consider shingles vaccine.  As far cognition is concerned, he is doing fairly okay with day-to-day and minute to minute issues.  As far as I can recall, he was never cognizant deeply about his medical issues are medications and there was not much decline in it.  His wife keeps tabs on his issues.  It is expected that as time moves, he might be making some errors and mistakes as he was on past and evaluation also.  I had a detailed discussion with patient's daughter also concerning his health and cognition.  Tends to forget and may not understand the subtleties and nuances of medical care.    Follow up in about 6 months (around 9/19/2024) for Hypertension/lipids.    Spent roland 30 minutes with patient which involved review of pts medical conditions, labs, medications and with 50% of time face-to-face discussion about medical problems, management and any applicable changes.      Current Outpatient Medications:     amLODIPine (NORVASC) 5 MG tablet, Take 1 tablet (5 mg total) by mouth once daily., Disp: 90 tablet, Rfl: 3    ascorbic acid, vitamin C, (VITAMIN C) 500 MG tablet, Take 500 mg by mouth once daily., Disp: , Rfl:     aspirin (ECOTRIN) 81 MG EC tablet, Take 1 tablet (81 mg total) by mouth once daily., Disp: 90 tablet, Rfl: 3    b complex vitamins tablet, Take 1 tablet by mouth once daily., Disp: , Rfl:     CALCIUM CARBONATE/VITAMIN D3 (VITAMIN D-3 ORAL), Take by mouth., Disp: , Rfl:     docusate sodium (COLACE) 50 MG capsule, Take by mouth 2 (two) times daily as needed for Constipation., Disp: , Rfl:     finasteride (PROSCAR) 5 mg tablet, Take 1 tablet (5 mg total) by mouth once daily., Disp: 90  tablet, Rfl: 3    fluocinonide (LIDEX) 0.05 % ointment, Apply up to twice a day to scaly rash on body for 3 wks or less, Disp: 60 g, Rfl: 1    losartan (COZAAR) 25 MG tablet, Take 1 tablet (25 mg total) by mouth 2 (two) times daily., Disp: 180 tablet, Rfl: 3    multivitamin (THERAGRAN) per tablet, Take 1 tablet by mouth once daily., Disp: , Rfl:     mupirocin (BACTROBAN) 2 % ointment, by Nasal route 3 (three) times daily., Disp: 20 g, Rfl: 0    nitroGLYCERIN (NITROSTAT) 0.4 MG SL tablet, Place 0.4 mg under the tongue every 5 (five) minutes as needed for Chest pain., Disp: , Rfl:     omega-3 fatty acids/fish oil (FISH OIL-OMEGA-3 FATTY ACIDS) 300-1,000 mg capsule, Take 1 capsule by mouth once daily., Disp: 90 capsule, Rfl: 3    pravastatin (PRAVACHOL) 20 MG tablet, Take 1 tablet (20 mg total) by mouth once daily., Disp: 90 tablet, Rfl: 3    SYNTHROID 150 mcg tablet, Take 150 mcg by mouth., Disp: , Rfl:     tamsulosin (FLOMAX) 0.4 mg Cap, Take 1 capsule (0.4 mg total) by mouth once daily., Disp: 90 capsule, Rfl: 3    Lalo Alisa

## 2024-03-19 ENCOUNTER — OFFICE VISIT (OUTPATIENT)
Dept: FAMILY MEDICINE | Facility: CLINIC | Age: 84
End: 2024-03-19
Payer: MEDICARE

## 2024-03-19 VITALS
HEART RATE: 77 BPM | SYSTOLIC BLOOD PRESSURE: 123 MMHG | WEIGHT: 217 LBS | DIASTOLIC BLOOD PRESSURE: 53 MMHG | BODY MASS INDEX: 31.07 KG/M2 | HEIGHT: 70 IN

## 2024-03-19 DIAGNOSIS — R91.1 INCIDENTAL LUNG NODULE, > 3MM AND < 8MM: ICD-10-CM

## 2024-03-19 DIAGNOSIS — I48.0 PAROXYSMAL ATRIAL FIBRILLATION: ICD-10-CM

## 2024-03-19 DIAGNOSIS — I71.43 INFRARENAL ABDOMINAL AORTIC ANEURYSM (AAA) WITHOUT RUPTURE: Chronic | ICD-10-CM

## 2024-03-19 DIAGNOSIS — I10 BENIGN ESSENTIAL HYPERTENSION: Primary | ICD-10-CM

## 2024-03-19 DIAGNOSIS — R41.89 COGNITIVE CHANGES: Chronic | ICD-10-CM

## 2024-03-19 DIAGNOSIS — R91.1 SOLITARY PULMONARY NODULE: Chronic | ICD-10-CM

## 2024-03-19 DIAGNOSIS — E78.2 MULTIPLE-TYPE HYPERLIPIDEMIA: ICD-10-CM

## 2024-03-19 PROCEDURE — 99213 OFFICE O/P EST LOW 20 MIN: CPT | Mod: PBBFAC,PN | Performed by: INTERNAL MEDICINE

## 2024-03-19 PROCEDURE — 99999 PR PBB SHADOW E&M-EST. PATIENT-LVL III: CPT | Mod: PBBFAC,,, | Performed by: INTERNAL MEDICINE

## 2024-03-19 PROCEDURE — 99214 OFFICE O/P EST MOD 30 MIN: CPT | Mod: S$PBB,AQ,, | Performed by: INTERNAL MEDICINE

## 2024-03-19 NOTE — Clinical Note
Please notify the patient that I have ordered a chest x-ray to see if the nodule in the right lung has grown.  Also please allow patient's daughter miss Loera to access patient's portal  Dr. Alisa GOINS

## 2024-03-20 ENCOUNTER — TELEPHONE (OUTPATIENT)
Dept: FAMILY MEDICINE | Facility: CLINIC | Age: 84
End: 2024-03-20
Payer: MEDICARE

## 2024-03-20 NOTE — TELEPHONE ENCOUNTER
----- Message from Lalo Cuellar MD sent at 3/19/2024  6:23 PM CDT -----  Please notify the patient that I have ordered a chest x-ray to see if the nodule in the right lung has grown.  Also please allow patient's daughter miss Loera to access patient's portal    Dr. Alisa GOINS

## 2024-03-22 ENCOUNTER — HOSPITAL ENCOUNTER (OUTPATIENT)
Dept: RADIOLOGY | Facility: HOSPITAL | Age: 84
Discharge: HOME | End: 2024-03-22
Attending: INTERNAL MEDICINE
Payer: MEDICARE

## 2024-03-22 DIAGNOSIS — R91.1 SOLITARY PULMONARY NODULE: Chronic | ICD-10-CM

## 2024-03-22 PROCEDURE — 71046 X-RAY EXAM CHEST 2 VIEWS: CPT | Mod: TC,PO

## 2024-03-26 ENCOUNTER — TELEPHONE (OUTPATIENT)
Dept: FAMILY MEDICINE | Facility: CLINIC | Age: 84
End: 2024-03-26
Payer: MEDICARE

## 2024-03-26 NOTE — TELEPHONE ENCOUNTER
----- Message from Lalo Cuellar MD sent at 3/25/2024 10:30 PM CDT -----  Notify patient the chest x-ray looks okay.  No spot or nodule seen.

## 2024-04-14 DIAGNOSIS — Z87.898 HISTORY OF URINARY RETENTION: ICD-10-CM

## 2024-04-14 DIAGNOSIS — R39.9 LOWER URINARY TRACT SYMPTOMS (LUTS): ICD-10-CM

## 2024-04-15 RX ORDER — FINASTERIDE 5 MG/1
5 TABLET, FILM COATED ORAL
Qty: 90 TABLET | Refills: 3 | OUTPATIENT
Start: 2024-04-15

## 2024-04-22 ENCOUNTER — TELEPHONE (OUTPATIENT)
Dept: UROLOGY | Facility: CLINIC | Age: 84
End: 2024-04-22
Payer: MEDICARE

## 2024-04-22 NOTE — TELEPHONE ENCOUNTER
----- Message from Celia Xu sent at 4/22/2024 11:26 AM CDT -----  Regarding: Needs return call/ refills  Type: Needs Medical Advice  Who Called:  Pt    Pharmacy name and phone #:       [facility] - [place], [place] - [address]  [address]  [address] [place] [id number]  Phone: [phone] Fax: [phone]            Best Call Back Number: [phone]      Additional Information: Pt uses [facility], he was told that he needs refills requested and they tried to request it but haven't heard back from the office, needs his Finesteride Tablet 5mg please galileoe

## 2024-04-29 ENCOUNTER — OFFICE VISIT (OUTPATIENT)
Dept: UROLOGY | Facility: CLINIC | Age: 84
End: 2024-04-29
Payer: MEDICARE

## 2024-04-29 DIAGNOSIS — Z87.898 HISTORY OF URINARY RETENTION: ICD-10-CM

## 2024-04-29 DIAGNOSIS — R39.9 LOWER URINARY TRACT SYMPTOMS (LUTS): Primary | ICD-10-CM

## 2024-04-29 DIAGNOSIS — N28.1 CYST OF KIDNEY, ACQUIRED: ICD-10-CM

## 2024-04-29 PROCEDURE — 99999 PR PBB SHADOW E&M-EST. PATIENT-LVL III: CPT | Mod: PBBFAC,,, | Performed by: NURSE PRACTITIONER

## 2024-04-29 PROCEDURE — 99213 OFFICE O/P EST LOW 20 MIN: CPT | Mod: PBBFAC,PO | Performed by: NURSE PRACTITIONER

## 2024-04-29 PROCEDURE — 99213 OFFICE O/P EST LOW 20 MIN: CPT | Mod: S$PBB,,, | Performed by: NURSE PRACTITIONER

## 2024-04-29 PROCEDURE — G2211 COMPLEX E/M VISIT ADD ON: HCPCS | Mod: S$PBB,,, | Performed by: NURSE PRACTITIONER

## 2024-04-29 RX ORDER — FINASTERIDE 5 MG/1
5 TABLET, FILM COATED ORAL DAILY
Qty: 90 TABLET | Refills: 3 | Status: SHIPPED | OUTPATIENT
Start: 2024-04-29 | End: 2025-04-29

## 2024-04-29 RX ORDER — TAMSULOSIN HYDROCHLORIDE 0.4 MG/1
1 CAPSULE ORAL DAILY
Qty: 90 CAPSULE | Refills: 3 | Status: SHIPPED | OUTPATIENT
Start: 2024-04-29 | End: 2025-04-29

## 2024-04-29 NOTE — PROGRESS NOTES
[facility] Note  Staff: DOT Esparza    PCP: MD Alisa  Urologist:  MD Corbin    Chief Complaint: Annual f/up    Subjective:        HPI: Nikolay Barraza is a 83 y.o. male presents today for evaluation of follow up for lower urinary tract symptoms and renal cysts.     Patient with a history of CKD and enlarged prostate complicated by lower urinary tract symptoms and urinary retention that have been managed with Dr. Galvin in the past.      On review of records, he has been on Flomax and Finasteride and feels as if he is voiding well. Per patient, his nephrologist Dr. Smith found that he had an elevated Creatinine of 1.6. It was rechecked on 2/2/21 and found to be 1.3. Referred to rule out urinary retention as a cause of his GUZMAN given his episode of acute urinary retention in 2017 after right inguinal hernia repair.         Interval History   2/9/22: He remains on Flomax 0.4 mg and Finasteride 5 mg PO daily for his well-controlled LUTS. IPSS 1 today. Renal ultrasound on 2/10/21 with right upper pole cystic lesions that grew in size. CT abdomen with contrast showed unremarkable cysts. Incidental lung nodule - referred to PCP.      5/8/23: Patient remains on Flomax 0.4 mg and Finasteride 5 mg PO daily for well-controlled urinary tract symptoms. No complaints. No recent imaging      4/29/24: Patient remains on Flomax 0.4 mg and Finasteride 5 mg PO daily for well-controlled urinary tract symptoms. No complaints.      Denies any significant lower urinary tract symptoms, gross hematuria, recent urinary tract infection,  trauma or history of  malignancy.    Last Imaging:  Renal/Bladder US on 5/30/2023:  IMPRESSION:  Chronic bilateral simple to mildly complicated renal cysts.      PSA  0.11                 6/28/19  0.1                   5/22/18     Creatinine  1.3                   2/2/21     IPSS    QoL  115/8/23  1          0          2/9/22  1          1          2/8/21     PVR  9 mL                 2/8/21    REVIEW OF SYSTEMS:  A comprehensive 10 system review was performed and is negative except as noted above in HPI    PMHx:  Past Medical History:   Diagnosis Date    Allergy     Codeine    Closed fracture dislocation of lumbar spine 07/06/2018    #2 vertebra. Patient went to the emergency Department.  Followup with Erik:    GERD (gastroesophageal reflux disease)     Hyperlipidemia     Hypertension     Hypertensive retinopathy of both eyes 05/11/2021    As per ophthalmology notes.  Eye care 20/20    Incidental lung nodule, > 3mm and < 8mm 03/17/2024    There is a 4 mm noncalcified pulmonary nodule seen in association with the right upper lobe of the lung laterally.  Although the patient has prior CTs these are not of they will oval for viewing.     The liver, spleen, pancreas and gallbladder are unremarkable.  The portal vein, splenic vein and superior mesenteric veins are patent.     The patient has an infrarenal abdominal aortic aneurysm measu    Right inguinal hernia     Squamous cell carcinoma of skin     Thyroid disease        PSHx:  Past Surgical History:   Procedure Laterality Date    ADENOIDECTOMY      ANGIOGRAM, CORONARY, WITH LEFT HEART CATHETERIZATION N/A 3/23/2023    Procedure: Angiogram, Coronary, with Left Heart Cath;  Surgeon: Tomas Salomon MD;  Location: [facility] CATH[facility]EP LAB;  Service: Cardiology;  Laterality: N/A;    INGUINAL HERNIA REPAIR Right 07/12/2018    Dr. Thomas - [facility]    KNEE SURGERY Left 07/31/2019    TONSILLECTOMY      VASECTOMY         Allergies:  Codeine    Medications: reviewed     Objective:   There were no vitals filed for this visit.    General:WDWN in NAD  Eyes: PERRLA, normal conjunctiva  Respiratory: no increased work on breathing, clear to auscultation  Cardiovascular: regular rate and rhythm. No obvious extremity edema.  GI: palpation of masses. No tenderness. No hepatosplenomegaly to palpation.  Musculoskeletal: normal range of motion of bilateral upper  extremities. Normal muscle strength and tone.  Skin: no obvious rashes or lesions. No tightening of skin noted.  Neurologic: CN grossly normal. Normal sensation.   Psychiatric: awake, alert and oriented x 3. Mood and affect normal. Cooperative.      Assessment:       1. Lower urinary tract symptoms (LUTS)    2. History of urinary retention    3. Cyst of kidney, acquired          Plan:     Flomax and Finasteride refilled for pt on conclusion of office visit today.  Overall RBUS from 2023 is unchanged with chronic renal cysts.  Therefore can repeat US in 1-2 yrs for recheck at that time.    F/u in one year for annual recheck.      Aleida Duque, DARYA-BERNARD  Visit today is associated with current or anticipated ongoing medical care related to this patient's single serious condition/complex condition.

## 2024-05-13 ENCOUNTER — OFFICE VISIT (OUTPATIENT)
Dept: FAMILY MEDICINE | Facility: CLINIC | Age: 84
End: 2024-05-13
Payer: MEDICARE

## 2024-05-13 VITALS
HEART RATE: 66 BPM | WEIGHT: 218 LBS | OXYGEN SATURATION: 99 % | HEIGHT: 70 IN | BODY MASS INDEX: 31.21 KG/M2 | TEMPERATURE: 99 F | SYSTOLIC BLOOD PRESSURE: 134 MMHG | DIASTOLIC BLOOD PRESSURE: 70 MMHG

## 2024-05-13 DIAGNOSIS — J04.0 LARYNGITIS: ICD-10-CM

## 2024-05-13 DIAGNOSIS — R09.82 POST-NASAL DRAINAGE: Primary | ICD-10-CM

## 2024-05-13 PROCEDURE — 99213 OFFICE O/P EST LOW 20 MIN: CPT | Mod: S$PBB,,, | Performed by: NURSE PRACTITIONER

## 2024-05-13 PROCEDURE — 99215 OFFICE O/P EST HI 40 MIN: CPT | Mod: PBBFAC,PN | Performed by: NURSE PRACTITIONER

## 2024-05-13 PROCEDURE — 99999 PR PBB SHADOW E&M-EST. PATIENT-LVL V: CPT | Mod: PBBFAC,,, | Performed by: NURSE PRACTITIONER

## 2024-05-13 RX ORDER — FLUTICASONE PROPIONATE 50 MCG
1 SPRAY, SUSPENSION (ML) NASAL 2 TIMES DAILY
Qty: 16 G | Refills: 1 | Status: SHIPPED | OUTPATIENT
Start: 2024-05-13 | End: 2024-06-04

## 2024-05-13 RX ORDER — AMOXICILLIN AND CLAVULANATE POTASSIUM 875; 125 MG/1; MG/1
1 TABLET, FILM COATED ORAL 2 TIMES DAILY
Qty: 20 TABLET | Refills: 0 | Status: SHIPPED | OUTPATIENT
Start: 2024-05-13

## 2024-05-13 NOTE — PROGRESS NOTES
Subjective:       Patient ID: Nikolay Barraza is a 83 y.o. male.    Chief Complaint: Sinus Problem, Sore Throat, Fatigue, and Hoarse    Patient presents today with symptoms of post nasal drainage, hoarse voice, and some nasal congestion over the last 5 days. Patient took at at home covid test this morning and states it was negative. Patient has some fatigue symmptoms associated with this current illness.  He has been taking Emergen-C twice daily to help with his immune system. He is unsure if this has been helpful.  Patient has not taken any other medications.  Patient is obese with a BMI of 31.28      Review of Systems   Constitutional:  Positive for activity change and fatigue. Negative for appetite change, chills, diaphoresis, fever and unexpected weight change.   HENT:  Positive for postnasal drip and rhinorrhea. Negative for congestion, ear discharge, ear pain, hearing loss, sore throat, trouble swallowing and voice change.    Eyes:  Negative for photophobia, pain and visual disturbance.   Respiratory:  Negative for cough, chest tightness and shortness of breath.    Cardiovascular:  Negative for chest pain.   Gastrointestinal:  Negative for abdominal pain, constipation, diarrhea, nausea and vomiting.   Genitourinary:  Negative for difficulty urinating, dysuria and flank pain.   Musculoskeletal:  Negative for joint swelling and myalgias.   Skin:  Negative for rash.   Allergic/Immunologic: Negative for immunocompromised state.   Neurological:  Negative for dizziness, weakness and headaches.   Hematological:  Negative for adenopathy.   Psychiatric/Behavioral:  Negative for agitation, confusion, self-injury and suicidal ideas.        Past Medical History:   Diagnosis Date    Allergy     Codeine    Closed fracture dislocation of lumbar spine 07/06/2018    #2 vertebra. Patient went to the emergency Department.  Followup with Erik:    GERD (gastroesophageal reflux disease)     Hyperlipidemia     Hypertension      Hypertensive retinopathy of both eyes 05/11/2021    As per ophthalmology notes.  Eye care 20/20    Incidental lung nodule, > 3mm and < 8mm 03/17/2024    There is a 4 mm noncalcified pulmonary nodule seen in association with the right upper lobe of the lung laterally.  Although the patient has prior CTs these are not of they will oval for viewing.     The liver, spleen, pancreas and gallbladder are unremarkable.  The portal vein, splenic vein and superior mesenteric veins are patent.     The patient has an infrarenal abdominal aortic aneurysm measu    Right inguinal hernia     Squamous cell carcinoma of skin     Thyroid disease       Past Surgical History:   Procedure Laterality Date    ADENOIDECTOMY      ANGIOGRAM, CORONARY, WITH LEFT HEART CATHETERIZATION N/A 3/23/2023    Procedure: Angiogram, Coronary, with Left Heart Cath;  Surgeon: Tomas Salomon MD;  Location: [facility] CATH/EP LAB;  Service: Cardiology;  Laterality: N/A;    INGUINAL HERNIA REPAIR Right 07/12/2018    Dr. Thomas - [facility]    KNEE SURGERY Left 07/31/2019    TONSILLECTOMY      VASECTOMY         Family History   Problem Relation Name Age of Onset    Heart disease Father      Miscarriages / Stillbirths Father      Skin cancer Mother      Cancer Maternal Grandfather      Stomach cancer Paternal Grandfather         Social History     Socioeconomic History    Marital status:      Spouse name: Alisa galvin    Number of children: 3   Occupational History    Occupation: [employer]-      Comment:     Tobacco Use    Smoking status: Never    Smokeless tobacco: Never   Substance and Sexual Activity    Alcohol use: No    Drug use: No    Sexual activity: Not Currently     Partners: Female     Social Determinants of Health     Financial Resource Strain: Low Risk  (3/7/2023)    Overall Financial Resource Strain (CARDIA)     Difficulty of Paying Living Expenses: Not very hard   Food Insecurity: No Food Insecurity (3/7/2023)     Hunger Vital Sign     Worried About Running Out of Food in the Last Year: Never true     Ran Out of Food in the Last Year: Never true   Transportation Needs: No Transportation Needs (3/7/2023)    PRAPARE - Transportation     Lack of Transportation (Medical): No     Lack of Transportation (Non-Medical): No   Physical Activity: Inactive (3/7/2023)    Exercise Vital Sign     Days of Exercise per Week: 0 days     Minutes of Exercise per Session: 0 min   Stress: Stress Concern Present (3/7/2023)    [employer] [place] [employer] - Occupational Stress Questionnaire     Feeling of Stress : To some extent   Housing Stability: Low Risk  (3/7/2023)    Housing Stability Vital Sign     Unable to Pay for Housing in the Last Year: No     Number of Places Lived in the Last Year: 1     Unstable Housing in the Last Year: No       Current Outpatient Medications   Medication Sig Dispense Refill    amLODIPine (NORVASC) 5 MG tablet Take 1 tablet (5 mg total) by mouth once daily. 90 tablet 3    ascorbic acid, vitamin C, (VITAMIN C) 500 MG tablet Take 500 mg by mouth once daily.      aspirin (ECOTRIN) 81 MG EC tablet Take 1 tablet (81 mg total) by mouth once daily. 90 tablet 3    b complex vitamins tablet Take 1 tablet by mouth once daily.      CALCIUM CARBONATE/VITAMIN D3 (VITAMIN D-3 ORAL) Take by mouth.      docusate sodium (COLACE) 50 MG capsule Take by mouth 2 (two) times daily as needed for Constipation.      finasteride (PROSCAR) 5 mg tablet Take 1 tablet (5 mg total) by mouth once daily. 90 tablet 3    losartan (COZAAR) 25 MG tablet Take 1 tablet (25 mg total) by mouth 2 (two) times daily. 180 tablet 3    multivitamin (THERAGRAN) per tablet Take 1 tablet by mouth once daily.      nitroGLYCERIN (NITROSTAT) 0.4 MG SL tablet Place 0.4 mg under the tongue every 5 (five) minutes as needed for Chest pain.      omega-3 fatty acids/fish oil (FISH OIL-OMEGA-3 FATTY ACIDS) 300-1,000 mg capsule Take 1 capsule by mouth once daily.  "90 capsule 3    pravastatin (PRAVACHOL) 20 MG tablet Take 1 tablet (20 mg total) by mouth once daily. 90 tablet 3    SYNTHROID 150 mcg tablet Take 150 mcg by mouth.      tamsulosin (FLOMAX) 0.4 mg Cap Take 1 capsule (0.4 mg total) by mouth once daily. 90 capsule 3    amoxicillin-clavulanate 875-125mg (AUGMENTIN) 875-125 mg per tablet Take 1 tablet by mouth 2 (two) times daily. 20 tablet 0    fluocinonide (LIDEX) 0.05 % ointment Apply up to twice a day to scaly rash on body for 3 wks or less (Patient not taking: Reported on 5/13/2024) 60 g 1    fluticasone propionate (FLONASE) 50 mcg/actuation nasal spray 1 spray (50 mcg total) by Each Nostril route 2 (two) times a day. 16 g 1    mupirocin (BACTROBAN) 2 % ointment by Nasal route 3 (three) times daily. (Patient not taking: Reported on 5/13/2024) 20 g 0     No current facility-administered medications for this visit.       Review of patient's allergies indicates:   Allergen Reactions    Codeine Other (See Comments)     mood changes  Makes him feel violent     Objective:      Blood pressure 134/70, pulse 66, temperature 99.4 °F (37.4 °C), height 5' 10" (1.778 m), weight 98.9 kg (218 lb), SpO2 99%. Body mass index is 31.28 kg/m².   Physical Exam  Vitals and nursing note reviewed.   Constitutional:       General: He is not in acute distress.     Appearance: Normal appearance. He is well-developed. He is obese. He is not ill-appearing.   HENT:      Head: Normocephalic and atraumatic.      Right Ear: External ear normal. There is impacted cerumen.      Left Ear: External ear normal. There is impacted cerumen.      Nose: Nose normal.      Mouth/Throat:      Mouth: Mucous membranes are moist.      Pharynx: Uvula midline. Oropharyngeal exudate present. No posterior oropharyngeal erythema.   Eyes:      General: Lids are normal.      Extraocular Movements: Extraocular movements intact.      Conjunctiva/sclera: Conjunctivae normal.      Pupils: Pupils are equal, round, and " "reactive to light.   Cardiovascular:      Rate and Rhythm: Normal rate and regular rhythm.      Pulses: Normal pulses.      Heart sounds: Normal heart sounds, S1 normal and S2 normal. No murmur heard.  Pulmonary:      Effort: Pulmonary effort is normal. No respiratory distress.      Breath sounds: Normal breath sounds. No stridor. No wheezing, rhonchi or rales.   Chest:      Chest wall: No tenderness.   Abdominal:      General: Bowel sounds are normal.      Palpations: Abdomen is soft.      Tenderness: There is no abdominal tenderness.   Musculoskeletal:         General: Normal range of motion.      Cervical back: Normal range of motion and neck supple.   Lymphadenopathy:      Cervical: No cervical adenopathy.   Skin:     General: Skin is warm and dry.      Findings: No rash.   Neurological:      Mental Status: He is alert and oriented to person, place, and time.   Psychiatric:         Mood and Affect: Mood normal.         Speech: Speech normal.         Behavior: Behavior normal.         Thought Content: Thought content normal.         Judgment: Judgment normal.             Assessment:       1. Post-nasal drainage    2. Laryngitis        Plan:       Nikolay Denise" was seen today for sinus problem, sore throat, fatigue and hoarse.    Diagnoses and all orders for this visit:    Post-nasal drainage  -     fluticasone propionate (FLONASE) 50 mcg/actuation nasal spray; 1 spray (50 mcg total) by Each Nostril route 2 (two) times a day.    Laryngitis  -     amoxicillin-clavulanate 875-125mg (AUGMENTIN) 875-125 mg per tablet; Take 1 tablet by mouth 2 (two) times daily.         Follow up if not improving in 1-2 weeks.  Printed augmentin prescriptions.  Patient should take flonase for 2-3 days. If not improving, obtain antibiotic   "

## 2024-06-04 DIAGNOSIS — R09.82 POST-NASAL DRAINAGE: ICD-10-CM

## 2024-06-04 RX ORDER — FLUTICASONE PROPIONATE 50 MCG
SPRAY, SUSPENSION (ML) NASAL
Qty: 48 ML | Refills: 1 | Status: SHIPPED | OUTPATIENT
Start: 2024-06-04

## 2024-07-11 ENCOUNTER — LAB VISIT (OUTPATIENT)
Dept: LAB | Facility: HOSPITAL | Age: 84
End: 2024-07-11
Attending: INTERNAL MEDICINE
Payer: MEDICARE

## 2024-07-11 DIAGNOSIS — I10 ESSENTIAL HYPERTENSION, MALIGNANT: ICD-10-CM

## 2024-07-11 DIAGNOSIS — N18.30 CHRONIC KIDNEY DISEASE, STAGE III (MODERATE): ICD-10-CM

## 2024-07-11 DIAGNOSIS — N25.81 SECONDARY HYPERPARATHYROIDISM OF RENAL ORIGIN: ICD-10-CM

## 2024-07-11 DIAGNOSIS — N18.30 CHRONIC KIDNEY DISEASE, STAGE III (MODERATE): Primary | ICD-10-CM

## 2024-07-11 LAB
25(OH)D3+25(OH)D2 SERPL-MCNC: 58 NG/ML (ref 30–96)
ALBUMIN SERPL BCP-MCNC: 4.1 G/DL (ref 3.5–5.2)
ANION GAP SERPL CALC-SCNC: 5 MMOL/L (ref 8–16)
BASOPHILS # BLD AUTO: 0.05 K/UL (ref 0–0.2)
BASOPHILS NFR BLD: 1.1 % (ref 0–1.9)
BUN SERPL-MCNC: 19 MG/DL (ref 8–23)
CALCIUM SERPL-MCNC: 9.1 MG/DL (ref 8.7–10.5)
CHLORIDE SERPL-SCNC: 108 MMOL/L (ref 95–110)
CO2 SERPL-SCNC: 27 MMOL/L (ref 23–29)
CREAT SERPL-MCNC: 1.3 MG/DL (ref 0.5–1.4)
DIFFERENTIAL METHOD BLD: ABNORMAL
EOSINOPHIL # BLD AUTO: 0.2 K/UL (ref 0–0.5)
EOSINOPHIL NFR BLD: 4.5 % (ref 0–8)
ERYTHROCYTE [DISTWIDTH] IN BLOOD BY AUTOMATED COUNT: 13.2 % (ref 11.5–14.5)
EST. GFR  (NO RACE VARIABLE): 54.2 ML/MIN/1.73 M^2
GLUCOSE SERPL-MCNC: 108 MG/DL (ref 70–110)
HCT VFR BLD AUTO: 42 % (ref 40–54)
HGB BLD-MCNC: 13.9 G/DL (ref 14–18)
IMM GRANULOCYTES # BLD AUTO: 0.01 K/UL (ref 0–0.04)
IMM GRANULOCYTES NFR BLD AUTO: 0.2 % (ref 0–0.5)
LYMPHOCYTES # BLD AUTO: 1.1 K/UL (ref 1–4.8)
LYMPHOCYTES NFR BLD: 24.3 % (ref 18–48)
MAGNESIUM SERPL-MCNC: 2 MG/DL (ref 1.6–2.6)
MCH RBC QN AUTO: 30.1 PG (ref 27–31)
MCHC RBC AUTO-ENTMCNC: 33.1 G/DL (ref 32–36)
MCV RBC AUTO: 91 FL (ref 82–98)
MICROSCOPIC COMMENT: NORMAL
MONOCYTES # BLD AUTO: 0.4 K/UL (ref 0.3–1)
MONOCYTES NFR BLD: 8.1 % (ref 4–15)
NEUTROPHILS # BLD AUTO: 2.7 K/UL (ref 1.8–7.7)
NEUTROPHILS NFR BLD: 61.8 % (ref 38–73)
NRBC BLD-RTO: 0 /100 WBC
PHOSPHATE SERPL-MCNC: 2.3 MG/DL (ref 2.7–4.5)
PLATELET # BLD AUTO: 201 K/UL (ref 150–450)
PMV BLD AUTO: 9.7 FL (ref 9.2–12.9)
POTASSIUM SERPL-SCNC: 4.2 MMOL/L (ref 3.5–5.1)
PTH-INTACT SERPL-MCNC: 40 PG/ML (ref 9–77)
RBC # BLD AUTO: 4.62 M/UL (ref 4.6–6.2)
RBC #/AREA URNS HPF: 0 /HPF (ref 0–4)
SODIUM SERPL-SCNC: 140 MMOL/L (ref 136–145)
SQUAMOUS #/AREA URNS HPF: 1 /HPF
URATE SERPL-MCNC: 6.1 MG/DL (ref 3.4–7)
WBC # BLD AUTO: 4.44 K/UL (ref 3.9–12.7)

## 2024-07-11 PROCEDURE — 83970 ASSAY OF PARATHORMONE: CPT | Performed by: INTERNAL MEDICINE

## 2024-07-11 PROCEDURE — 36415 COLL VENOUS BLD VENIPUNCTURE: CPT | Performed by: INTERNAL MEDICINE

## 2024-07-11 PROCEDURE — 81001 URINALYSIS AUTO W/SCOPE: CPT | Performed by: INTERNAL MEDICINE

## 2024-07-11 PROCEDURE — 80069 RENAL FUNCTION PANEL: CPT | Performed by: INTERNAL MEDICINE

## 2024-07-11 PROCEDURE — 83735 ASSAY OF MAGNESIUM: CPT | Performed by: INTERNAL MEDICINE

## 2024-07-11 PROCEDURE — 84550 ASSAY OF BLOOD/URIC ACID: CPT | Performed by: INTERNAL MEDICINE

## 2024-07-11 PROCEDURE — 82306 VITAMIN D 25 HYDROXY: CPT | Performed by: INTERNAL MEDICINE

## 2024-07-11 PROCEDURE — 85025 COMPLETE CBC W/AUTO DIFF WBC: CPT | Performed by: INTERNAL MEDICINE

## 2024-08-01 ENCOUNTER — OFFICE VISIT (OUTPATIENT)
Dept: FAMILY MEDICINE | Facility: CLINIC | Age: 84
End: 2024-08-01
Payer: MEDICARE

## 2024-08-01 VITALS
SYSTOLIC BLOOD PRESSURE: 122 MMHG | HEART RATE: 62 BPM | HEIGHT: 70 IN | WEIGHT: 214 LBS | BODY MASS INDEX: 30.64 KG/M2 | OXYGEN SATURATION: 98 % | DIASTOLIC BLOOD PRESSURE: 64 MMHG

## 2024-08-01 DIAGNOSIS — R10.31 RIGHT GROIN PAIN: Primary | ICD-10-CM

## 2024-08-01 PROCEDURE — 99999 PR PBB SHADOW E&M-EST. PATIENT-LVL V: CPT | Mod: PBBFAC,,, | Performed by: NURSE PRACTITIONER

## 2024-08-01 PROCEDURE — 99213 OFFICE O/P EST LOW 20 MIN: CPT | Mod: S$PBB,,, | Performed by: NURSE PRACTITIONER

## 2024-08-01 PROCEDURE — 99215 OFFICE O/P EST HI 40 MIN: CPT | Mod: PBBFAC,PN | Performed by: NURSE PRACTITIONER

## 2024-08-01 NOTE — PROGRESS NOTES
SUBJECTIVE:      Patient ID: Nikolay Barraza is a 83 y.o. male.    Chief Complaint: Hip Injury    83-year-old male presents to the clinic with complaints of right hip pain. He is an established patient of Dr. Cuellar. Reports right hip pain x1 week. Moved daughter last week, but did not  anything heavy, only small boxes. Reports pain became worse after weedeating today. Describes pain as sharp. Pain is associated with certain movements, but is unsure what movements increase the pain. He reports swinging his legs out of bed this morning, which caused him pain. The pain resolves with rest and sitting. He has not taken any medications for his symptoms. Denies injury/trauma or recent falls.         Family History   Problem Relation Name Age of Onset    Heart disease Father      Miscarriages / Stillbirths Father      Skin cancer Mother      Cancer Maternal Grandfather      Stomach cancer Paternal Grandfather        Social History     Socioeconomic History    Marital status:      Spouse name: Alisa barraza    Number of children: 3   Occupational History    Occupation: [employer]-      Comment:     Tobacco Use    Smoking status: Never    Smokeless tobacco: Never   Substance and Sexual Activity    Alcohol use: No    Drug use: No    Sexual activity: Not Currently     Partners: Female     Social Determinants of Health     Financial Resource Strain: Low Risk  (3/7/2023)    Overall Financial Resource Strain (CARDIA)     Difficulty of Paying Living Expenses: Not very hard   Food Insecurity: No Food Insecurity (3/7/2023)    Hunger Vital Sign     Worried About Running Out of Food in the Last Year: Never true     Ran Out of Food in the Last Year: Never true   Transportation Needs: No Transportation Needs (3/7/2023)    PRAPARE - Transportation     Lack of Transportation (Medical): No     Lack of Transportation (Non-Medical): No   Physical Activity: Inactive (3/7/2023)    Exercise  Vital Sign     Days of Exercise per Week: 0 days     Minutes of Exercise per Session: 0 min   Stress: Stress Concern Present (3/7/2023)    [employer] - Occupational Stress Questionnaire     Feeling of Stress : To some extent   Housing Stability: Low Risk  (3/7/2023)    Housing Stability Vital Sign     Unable to Pay for Housing in the Last Year: No     Number of Places Lived in the Last Year: 1     Unstable Housing in the Last Year: No     Current Outpatient Medications   Medication Sig Dispense Refill    amLODIPine (NORVASC) 5 MG tablet Take 1 tablet (5 mg total) by mouth once daily. 90 tablet 3    amoxicillin-clavulanate 875-125mg (AUGMENTIN) 875-125 mg per tablet Take 1 tablet by mouth 2 (two) times daily. 20 tablet 0    ascorbic acid, vitamin C, (VITAMIN C) 500 MG tablet Take 500 mg by mouth once daily.      aspirin (ECOTRIN) 81 MG EC tablet Take 1 tablet (81 mg total) by mouth once daily. 90 tablet 3    b complex vitamins tablet Take 1 tablet by mouth once daily.      CALCIUM CARBONATE/VITAMIN D3 (VITAMIN D-3 ORAL) Take by mouth.      docusate sodium (COLACE) 50 MG capsule Take by mouth 2 (two) times daily as needed for Constipation.      finasteride (PROSCAR) 5 mg tablet Take 1 tablet (5 mg total) by mouth once daily. 90 tablet 3    fluocinonide (LIDEX) 0.05 % ointment Apply up to twice a day to scaly rash on body for 3 wks or less 60 g 1    fluticasone propionate (FLONASE) 50 mcg/actuation nasal spray SPRAY 1 SPRAY (50 MCG TOTAL) INTO INTO EACH NOSTRIL TWICE A DAY 48 mL 1    ketoconazole (NIZORAL) 2 % shampoo Lather onto scalp, leave on x 3-5 minutes, then rinse.  Repeat  mL 2    losartan (COZAAR) 25 MG tablet Take 1 tablet (25 mg total) by mouth 2 (two) times daily. 180 tablet 3    multivitamin (THERAGRAN) per tablet Take 1 tablet by mouth once daily.      mupirocin (BACTROBAN) 2 % ointment by Nasal route 3 (three) times daily. 20 g 0    nitroGLYCERIN (NITROSTAT) 0.4 MG SL  tablet Place 0.4 mg under the tongue every 5 (five) minutes as needed for Chest pain.      omega-3 fatty acids/fish oil (FISH OIL-OMEGA-3 FATTY ACIDS) 300-1,000 mg capsule Take 1 capsule by mouth once daily. 90 capsule 3    pravastatin (PRAVACHOL) 20 MG tablet Take 1 tablet (20 mg total) by mouth once daily. 90 tablet 3    SYNTHROID 150 mcg tablet Take 150 mcg by mouth.      tamsulosin (FLOMAX) 0.4 mg Cap Take 1 capsule (0.4 mg total) by mouth once daily. 90 capsule 3     No current facility-administered medications for this visit.     Review of patient's allergies indicates:   Allergen Reactions    Codeine Other (See Comments)     mood changes  Makes him feel violent      Past Medical History:   Diagnosis Date    Allergy     Codeine    Closed fracture dislocation of lumbar spine 07/06/2018    #2 vertebra. Patient went to the emergency Department.  Followup with Erik:    GERD (gastroesophageal reflux disease)     Hyperlipidemia     Hypertension     Hypertensive retinopathy of both eyes 05/11/2021    As per ophthalmology notes.  Eye care 20/20    Incidental lung nodule, > 3mm and < 8mm 03/17/2024    There is a 4 mm noncalcified pulmonary nodule seen in association with the right upper lobe of the lung laterally.  Although the patient has prior CTs these are not of they will oval for viewing.     The liver, spleen, pancreas and gallbladder are unremarkable.  The portal vein, splenic vein and superior mesenteric veins are patent.     The patient has an infrarenal abdominal aortic aneurysm measu    Right inguinal hernia     Squamous cell carcinoma of skin     Thyroid disease      Past Surgical History:   Procedure Laterality Date    ADENOIDECTOMY      ANGIOGRAM, CORONARY, WITH LEFT HEART CATHETERIZATION N/A 3/23/2023    Procedure: Angiogram, Coronary, with Left Heart Cath;  Surgeon: Tomas Salomon MD;  Location: [facility] CATH/EP LAB;  Service: Cardiology;  Laterality: N/A;    INGUINAL HERNIA REPAIR Right 07/12/2018      "[facility]    KNEE SURGERY Left 07/31/2019    TONSILLECTOMY      VASECTOMY         Review of Systems   Constitutional:  Negative for activity change, appetite change, chills, diaphoresis, fatigue, fever and unexpected weight change.   Respiratory:  Negative for shortness of breath.    Cardiovascular:  Negative for chest pain and palpitations.   Gastrointestinal:  Negative for abdominal pain, constipation, diarrhea, nausea and vomiting.   Genitourinary:  Negative for difficulty urinating, scrotal swelling and testicular pain.   Musculoskeletal:  Positive for arthralgias. Negative for gait problem.        Right hip pain   Neurological:  Negative for dizziness, syncope and light-headedness.      OBJECTIVE:      Vitals:    08/01/24 1025   BP: 122/64   BP Location: Left arm   Patient Position: Sitting   BP Method: Large (Manual)   Pulse: 62   SpO2: 98%   Weight: 97.1 kg (214 lb)   Height: 5' 10" (1.778 m)     Physical Exam  Vitals and nursing note reviewed.   Constitutional:       General: He is awake. He is not in acute distress.     Appearance: He is well-developed and well-groomed. He is obese. He is not ill-appearing, toxic-appearing or diaphoretic.   HENT:      Head: Normocephalic and atraumatic.      Nose: Nose normal.   Eyes:      General: Lids are normal. Gaze aligned appropriately.      Conjunctiva/sclera: Conjunctivae normal.      Right eye: Right conjunctiva is not injected.      Left eye: Left conjunctiva is not injected.   Cardiovascular:      Rate and Rhythm: Normal rate and regular rhythm.      Pulses: Normal pulses.      Heart sounds: Normal heart sounds, S1 normal and S2 normal.   Pulmonary:      Effort: Pulmonary effort is normal.      Breath sounds: No decreased breath sounds.   Abdominal:      General: There is no distension.      Palpations: Abdomen is soft.      Tenderness: There is no abdominal tenderness. There is no guarding or rebound.      Hernia: There is no hernia in the right inguinal " area.       Musculoskeletal:      Cervical back: Neck supple.      Right hip: No tenderness, bony tenderness or crepitus. Normal range of motion. Normal strength.      Right lower leg: No edema.      Left lower leg: No edema.      Comments: Patient had good ROM of hips, no pain with movement. Unable to reproduce pain with hip movements. Greater trochanter nontender to palpation. No bony tenderness.   Lymphadenopathy:      Cervical: No cervical adenopathy.   Skin:     General: Skin is warm and dry.      Capillary Refill: Capillary refill takes less than 2 seconds.      Findings: No erythema or rash.   Neurological:      Mental Status: He is alert and oriented to person, place, and time. Mental status is at baseline.   Psychiatric:         Attention and Perception: Attention normal.         Mood and Affect: Mood normal.         Speech: Speech normal.         Behavior: Behavior normal. Behavior is cooperative.         Thought Content: Thought content normal.        Assessment:       1. Right groin pain        Plan:       Right groin pain  Suspect patient has a groin strain. There was no bulge to signify a hernia. ROM of his hips and palpation of his hips did not reproduce pain. Instructed patient to rest, apply heat, avoid heavy lifting. Discussed taking extra strength tylenol due to hx of CKD. His kidney function was only mildly decreased and discussed prn Ibuprofen or Aleve would be ok for short term use. Offered a short course of steroids, but patient declined. Will have him schedule follow-up with his PCP in 1-2 weeks to see if symptoms have improved.    This note was created using Chrom[employer] voice recognition software that occasionally misinterprets phrases or words.     I spent a total of 22 minutes on the day of the visit.This includes face to face time and non-face to face time preparing to see the patient (eg, review of tests), obtaining and/or reviewing separately obtained history, documenting clinical  information in the electronic or other health record, independently interpreting results and communicating results to the patient/family/caregiver, or care coordinator.    Follow up if symptoms worsen or fail to improve.          8/1/2024 JAYME Franco, FNP

## 2024-08-05 ENCOUNTER — HOSPITAL ENCOUNTER (OUTPATIENT)
Dept: RADIOLOGY | Facility: HOSPITAL | Age: 84
Discharge: HOME | End: 2024-08-05
Attending: INTERNAL MEDICINE
Payer: MEDICARE

## 2024-08-05 ENCOUNTER — OFFICE VISIT (OUTPATIENT)
Dept: FAMILY MEDICINE | Facility: CLINIC | Age: 84
End: 2024-08-05
Payer: MEDICARE

## 2024-08-05 VITALS
SYSTOLIC BLOOD PRESSURE: 139 MMHG | HEART RATE: 50 BPM | DIASTOLIC BLOOD PRESSURE: 89 MMHG | HEIGHT: 70 IN | WEIGHT: 216 LBS | BODY MASS INDEX: 30.92 KG/M2

## 2024-08-05 DIAGNOSIS — M25.551 RIGHT HIP PAIN: ICD-10-CM

## 2024-08-05 DIAGNOSIS — I10 BENIGN ESSENTIAL HYPERTENSION: ICD-10-CM

## 2024-08-05 DIAGNOSIS — R10.31 RIGHT GROIN PAIN: ICD-10-CM

## 2024-08-05 DIAGNOSIS — R10.31 RIGHT GROIN PAIN: Primary | ICD-10-CM

## 2024-08-05 PROCEDURE — 99999 PR PBB SHADOW E&M-EST. PATIENT-LVL III: CPT | Mod: PBBFAC,,, | Performed by: INTERNAL MEDICINE

## 2024-08-05 PROCEDURE — 99213 OFFICE O/P EST LOW 20 MIN: CPT | Mod: PBBFAC,PN | Performed by: INTERNAL MEDICINE

## 2024-08-05 PROCEDURE — 73502 X-RAY EXAM HIP UNI 2-3 VIEWS: CPT | Mod: 26,RT,, | Performed by: RADIOLOGY

## 2024-08-05 PROCEDURE — 99213 OFFICE O/P EST LOW 20 MIN: CPT | Mod: S$PBB,AQ,, | Performed by: INTERNAL MEDICINE

## 2024-08-05 PROCEDURE — 73502 X-RAY EXAM HIP UNI 2-3 VIEWS: CPT | Mod: TC,RT

## 2024-08-06 ENCOUNTER — OFFICE VISIT (OUTPATIENT)
Dept: URGENT CARE | Facility: CLINIC | Age: 84
End: 2024-08-06
Payer: MEDICARE

## 2024-08-06 ENCOUNTER — TELEPHONE (OUTPATIENT)
Dept: FAMILY MEDICINE | Facility: CLINIC | Age: 84
End: 2024-08-06
Payer: MEDICARE

## 2024-08-06 VITALS
WEIGHT: 216 LBS | HEART RATE: 55 BPM | OXYGEN SATURATION: 96 % | BODY MASS INDEX: 30.92 KG/M2 | SYSTOLIC BLOOD PRESSURE: 150 MMHG | RESPIRATION RATE: 18 BRPM | TEMPERATURE: 98 F | HEIGHT: 70 IN | DIASTOLIC BLOOD PRESSURE: 82 MMHG

## 2024-08-06 DIAGNOSIS — I10 HYPERTENSION, UNSPECIFIED TYPE: Primary | ICD-10-CM

## 2024-08-06 DIAGNOSIS — M25.552 LEFT HIP PAIN: Primary | ICD-10-CM

## 2024-08-06 PROCEDURE — 99204 OFFICE O/P NEW MOD 45 MIN: CPT | Mod: ,,, | Performed by: NURSE PRACTITIONER

## 2024-08-14 ENCOUNTER — TELEPHONE (OUTPATIENT)
Dept: UROLOGY | Facility: CLINIC | Age: 84
End: 2024-08-14
Payer: MEDICARE

## 2024-08-14 NOTE — TELEPHONE ENCOUNTER
----- Message from Celia Mcnair sent at 8/14/2024 10:42 AM CDT -----  Regarding: Needs return call  Type: Needs Medical Advice  Who Called:  Pt wife    Best Call Back Number: [phone]    Additional Information: Needs to speak to the office about his appt that is needing to be moved apparently, said they received notice to confirm it yesterday please advise

## 2024-09-19 ENCOUNTER — OFFICE VISIT (OUTPATIENT)
Dept: UROLOGY | Facility: CLINIC | Age: 84
End: 2024-09-19
Payer: MEDICARE

## 2024-09-19 DIAGNOSIS — N28.1 CYST OF KIDNEY, ACQUIRED: ICD-10-CM

## 2024-09-19 DIAGNOSIS — Z87.898 HISTORY OF URINARY RETENTION: ICD-10-CM

## 2024-09-19 DIAGNOSIS — R39.9 LOWER URINARY TRACT SYMPTOMS (LUTS): Primary | ICD-10-CM

## 2024-09-19 PROCEDURE — G2211 COMPLEX E/M VISIT ADD ON: HCPCS | Mod: S$PBB,,, | Performed by: UROLOGY

## 2024-09-19 PROCEDURE — 99214 OFFICE O/P EST MOD 30 MIN: CPT | Mod: S$PBB,,, | Performed by: UROLOGY

## 2024-09-19 PROCEDURE — 99211 OFF/OP EST MAY X REQ PHY/QHP: CPT | Mod: PBBFAC,PO | Performed by: UROLOGY

## 2024-09-19 PROCEDURE — 99999 PR PBB SHADOW E&M-EST. PATIENT-LVL I: CPT | Mod: PBBFAC,,, | Performed by: UROLOGY

## 2024-09-19 NOTE — PROGRESS NOTES
[facility] Established Patient/H&P:    Nikolay Barraza is a 83 y.o. male who presents for follow up for lower urinary tract symptoms and renal cysts.     Patient with a history of CKD and enlarged prostate complicated by lower urinary tract symptoms and urinary retention that have been managed with Dr. Galvin in the past.      On review of records, he has been on Flomax and Finasteride and feels as if he is voiding well. Per patient, his nephrologist Dr. Smith found that he had an elevated Creatinine of 1.6. It was rechecked on 2/2/21 and found to be 1.3. Referred to rule out urinary retention as a cause of his GUZMAN given his episode of acute urinary retention in 2017 after right inguinal hernia repair.         Interval History     2/9/22: He remains on Flomax 0.4 mg and Finasteride 5 mg PO daily for his well-controlled LUTS. IPSS 1 today. Renal ultrasound on 2/10/21 with right upper pole cystic lesions that grew in size. CT abdomen with contrast showed unremarkable cysts. Incidental lung nodule - referred to PCP.      5/8/23: Patient remains on Flomax 0.4 mg and Finasteride 5 mg PO daily for well-controlled urinary tract symptoms. No complaints. No recent imaging     9/19/24: Here today for follow up. No complaints today. Voiding well on Flomax 0.4 mg and Finasteride 5 mg PO daily.  Renal ultrasound on 5/30/23 with chronic bilateral simple to mildly complicated renal cysts. UA negative on 8/5/24.      Denies any significant lower urinary tract symptoms, gross hematuria, recent urinary tract infection,  trauma or history of  malignancy.        PSA  0.11                 6/28/19  0.1                   5/22/18     Creatinine  1.3                   2/2/21     IPSS    QoL  1 1 5/8/23  1          0          2/9/22  1          1          2/8/21     PVR  9 mL                2/8/21    Past Medical History:   Diagnosis Date    Allergy     Codeine    Closed fracture dislocation of lumbar spine 07/06/2018     #2 vertebra. Patient went to the emergency Department.  Followup with Erik:    GERD (gastroesophageal reflux disease)     Hyperlipidemia     Hypertension     Hypertensive retinopathy of both eyes 05/11/2021    As per ophthalmology notes.  Eye care 20/20    Incidental lung nodule, > 3mm and < 8mm 03/17/2024    There is a 4 mm noncalcified pulmonary nodule seen in association with the right upper lobe of the lung laterally.  Although the patient has prior CTs these are not of they will oval for viewing.     The liver, spleen, pancreas and gallbladder are unremarkable.  The portal vein, splenic vein and superior mesenteric veins are patent.     The patient has an infrarenal abdominal aortic aneurysm measu    Right inguinal hernia     Squamous cell carcinoma of skin     Thyroid disease        Past Surgical History:   Procedure Laterality Date    ADENOIDECTOMY      ANGIOGRAM, CORONARY, WITH LEFT HEART CATHETERIZATION N/A 3/23/2023    Procedure: Angiogram, Coronary, with Left Heart Cath;  Surgeon: Tomas Salomon MD;  Location: [facility] CATH/EP LAB;  Service: Cardiology;  Laterality: N/A;    INGUINAL HERNIA REPAIR Right 07/12/2018    Dr. Thomas - [facility]    KNEE SURGERY Left 07/31/2019    TONSILLECTOMY      VASECTOMY         Review of patient's allergies indicates:   Allergen Reactions    Codeine Other (See Comments)     mood changes  Makes him feel violent       Medications Reviewed: see MAR    FOCUSED PHYSICAL EXAM:    There were no vitals filed for this visit.  There is no height or weight on file to calculate BMI.           General: Alert, cooperative, no distress, appears stated age  Abdomen: Soft, non-tender, no CVA tenderness, non-distended      LABS:    No results found for this or any previous visit (from the past 336 hour(s)).        Assessment/Diagnosis:    1. Lower urinary tract symptoms (LUTS)        2. History of urinary retention        3. Cyst of kidney, acquired            Plans:    - I spent 30 minutes of  the day of this encounter preparing for, treating and managing this patient. Visit today included increased complexity associated with the care of the episodic problem addressed and managing the longitudinal care of the patient due to the serious and/or complex managed problem(s) history of urinary retention and renal cysts.Extensive discussion with patient regarding the etiology and management of his lower urinary tract symptoms. Explained that LUTS are multifactorial and can be secondary to an enlarged prostate, PO intake of bladder irritants, overactive bladder, constipation, malignancy, trauma, infection, stones or medications.   - Continue Flomax 0.4 mg and Finasteride 5 mg PO daily.   - RTC in 1 year with renal ultrasound.

## 2024-09-20 ENCOUNTER — LAB VISIT (OUTPATIENT)
Dept: LAB | Facility: HOSPITAL | Age: 84
End: 2024-09-20
Attending: INTERNAL MEDICINE
Payer: MEDICARE

## 2024-09-20 DIAGNOSIS — I51.9 MYXEDEMA HEART DISEASE: Primary | ICD-10-CM

## 2024-09-20 DIAGNOSIS — I51.9 MYXEDEMA HEART DISEASE: ICD-10-CM

## 2024-09-20 DIAGNOSIS — E03.9 MYXEDEMA HEART DISEASE: Primary | ICD-10-CM

## 2024-09-20 DIAGNOSIS — E03.9 MYXEDEMA HEART DISEASE: ICD-10-CM

## 2024-09-20 LAB
T4 FREE SERPL-MCNC: 1.32 NG/DL (ref 0.71–1.51)
TSH SERPL DL<=0.005 MIU/L-ACNC: 5.16 UIU/ML (ref 0.34–5.6)

## 2024-09-20 PROCEDURE — 84443 ASSAY THYROID STIM HORMONE: CPT | Performed by: INTERNAL MEDICINE

## 2024-09-20 PROCEDURE — 84439 ASSAY OF FREE THYROXINE: CPT | Performed by: INTERNAL MEDICINE

## 2024-09-20 PROCEDURE — 36415 COLL VENOUS BLD VENIPUNCTURE: CPT | Performed by: INTERNAL MEDICINE

## 2024-10-14 ENCOUNTER — TELEPHONE (OUTPATIENT)
Dept: FAMILY MEDICINE | Facility: CLINIC | Age: 84
End: 2024-10-14
Payer: MEDICARE

## 2024-10-14 ENCOUNTER — LAB VISIT (OUTPATIENT)
Dept: LAB | Facility: HOSPITAL | Age: 84
End: 2024-10-14
Attending: INTERNAL MEDICINE
Payer: MEDICARE

## 2024-10-14 DIAGNOSIS — R19.5 ABNORMAL FECES: ICD-10-CM

## 2024-10-14 DIAGNOSIS — E78.2 MULTIPLE-TYPE HYPERLIPIDEMIA: Primary | ICD-10-CM

## 2024-10-14 DIAGNOSIS — R06.09 DYSPNEA ON EXERTION: Primary | ICD-10-CM

## 2024-10-14 LAB
ERYTHROCYTE [DISTWIDTH] IN BLOOD BY AUTOMATED COUNT: 14.1 % (ref 11.5–14.5)
HCT VFR BLD AUTO: 43 % (ref 40–54)
HGB BLD-MCNC: 14.3 G/DL (ref 14–18)
MCH RBC QN AUTO: 29.5 PG (ref 27–31)
MCHC RBC AUTO-ENTMCNC: 33.3 G/DL (ref 32–36)
MCV RBC AUTO: 89 FL (ref 82–98)
PLATELET # BLD AUTO: 210 K/UL (ref 150–450)
PMV BLD AUTO: 9.6 FL (ref 9.2–12.9)
RBC # BLD AUTO: 4.85 M/UL (ref 4.6–6.2)
WBC # BLD AUTO: 5.46 K/UL (ref 3.9–12.7)

## 2024-10-14 PROCEDURE — 36415 COLL VENOUS BLD VENIPUNCTURE: CPT | Performed by: INTERNAL MEDICINE

## 2024-10-14 PROCEDURE — 85027 COMPLETE CBC AUTOMATED: CPT | Performed by: INTERNAL MEDICINE

## 2024-10-15 ENCOUNTER — LAB VISIT (OUTPATIENT)
Dept: LAB | Facility: HOSPITAL | Age: 84
End: 2024-10-15
Attending: INTERNAL MEDICINE
Payer: MEDICARE

## 2024-10-15 DIAGNOSIS — E78.2 MULTIPLE-TYPE HYPERLIPIDEMIA: ICD-10-CM

## 2024-10-15 LAB
CHOLEST SERPL-MCNC: 158 MG/DL (ref 120–199)
CHOLEST/HDLC SERPL: 4.3 {RATIO} (ref 2–5)
HDLC SERPL-MCNC: 37 MG/DL (ref 40–75)
HDLC SERPL: 23.4 % (ref 20–50)
LDLC SERPL CALC-MCNC: 95.2 MG/DL (ref 63–159)
NONHDLC SERPL-MCNC: 121 MG/DL
TRIGL SERPL-MCNC: 129 MG/DL (ref 30–150)

## 2024-10-15 PROCEDURE — 80061 LIPID PANEL: CPT | Performed by: INTERNAL MEDICINE

## 2024-10-15 PROCEDURE — 36415 COLL VENOUS BLD VENIPUNCTURE: CPT | Performed by: INTERNAL MEDICINE

## 2024-10-15 NOTE — TELEPHONE ENCOUNTER
----- Message from Jenn sent at 10/14/2024  8:42 AM CDT -----  Regarding: Labs  Patient came in requesting lab orders but there are none on his chart. He wants to know if he needs to have labs done prior to his appointment on 10/17/24. They have orders for his wife, but not him. Please contact patient to advise.     Thank You    Diagnoses and all orders for this visit:    Multiple-type hyperlipidemia  -     Lipid Panel; Future       
Notified patient    
no abrasions, no jaundice, no lesions, no pruritis, and no rashes.

## 2024-10-16 ENCOUNTER — TELEPHONE (OUTPATIENT)
Dept: FAMILY MEDICINE | Facility: CLINIC | Age: 84
End: 2024-10-16
Payer: MEDICARE

## 2024-10-16 NOTE — TELEPHONE ENCOUNTER
----- Message from Lalo Cuellar MD sent at 10/15/2024  9:31 PM CDT -----  The results are within acceptable range.  Please keep regular follow up.

## 2024-10-17 ENCOUNTER — OFFICE VISIT (OUTPATIENT)
Dept: FAMILY MEDICINE | Facility: CLINIC | Age: 84
End: 2024-10-17
Payer: MEDICARE

## 2024-10-17 VITALS
DIASTOLIC BLOOD PRESSURE: 69 MMHG | BODY MASS INDEX: 30.92 KG/M2 | SYSTOLIC BLOOD PRESSURE: 139 MMHG | HEIGHT: 70 IN | WEIGHT: 216 LBS | HEART RATE: 82 BPM

## 2024-10-17 DIAGNOSIS — Z23 NEED FOR INFLUENZA VACCINATION: ICD-10-CM

## 2024-10-17 DIAGNOSIS — E78.2 MULTIPLE-TYPE HYPERLIPIDEMIA: Chronic | ICD-10-CM

## 2024-10-17 DIAGNOSIS — I25.10 CORONARY ARTERY DISEASE INVOLVING NATIVE CORONARY ARTERY OF NATIVE HEART WITHOUT ANGINA PECTORIS: Chronic | ICD-10-CM

## 2024-10-17 DIAGNOSIS — I48.0 PAROXYSMAL ATRIAL FIBRILLATION: ICD-10-CM

## 2024-10-17 DIAGNOSIS — R41.89 COGNITIVE CHANGES: ICD-10-CM

## 2024-10-17 DIAGNOSIS — I67.82 SUBCORTICAL MICROVASCULAR ISCHEMIC OCCLUSIVE DISEASE: ICD-10-CM

## 2024-10-17 DIAGNOSIS — I10 BENIGN ESSENTIAL HYPERTENSION: Primary | Chronic | ICD-10-CM

## 2024-10-17 PROCEDURE — G0008 ADMIN INFLUENZA VIRUS VAC: HCPCS | Mod: PBBFAC,PN

## 2024-10-17 PROCEDURE — 99999 PR PBB SHADOW E&M-EST. PATIENT-LVL III: CPT | Mod: PBBFAC,,, | Performed by: INTERNAL MEDICINE

## 2024-10-17 PROCEDURE — 99999PBSHW PR PBB SHADOW TECHNICAL ONLY FILED TO HB: Mod: PBBFAC,,,

## 2024-10-17 PROCEDURE — 90653 IIV ADJUVANT VACCINE IM: CPT | Mod: PBBFAC,PN

## 2024-10-17 PROCEDURE — 99214 OFFICE O/P EST MOD 30 MIN: CPT | Mod: S$PBB,AQ,, | Performed by: INTERNAL MEDICINE

## 2024-10-17 PROCEDURE — 99213 OFFICE O/P EST LOW 20 MIN: CPT | Mod: PBBFAC,PN,25 | Performed by: INTERNAL MEDICINE

## 2024-10-17 RX ORDER — LEVOTHYROXINE SODIUM 150 UG/1
150 TABLET ORAL
COMMUNITY

## 2024-10-17 RX ADMIN — INFLUENZA A VIRUS A/VICTORIA/4897/2022 IVR-238 (H1N1) ANTIGEN (FORMALDEHYDE INACTIVATED), INFLUENZA A VIRUS A/THAILAND/8/2022 IVR-237 (H3N2) ANTIGEN (FORMALDEHYDE INACTIVATED), INFLUENZA B VIRUS B/AUSTRIA/1359417/2021 BVR-26 ANTIGEN (FORMALDEHYDE INACTIVATED) 0.5 ML: 15; 15; 15 INJECTION, SUSPENSION INTRAMUSCULAR at 02:10

## 2024-10-17 NOTE — PROGRESS NOTES
Subjective:       Patient ID: Nikolay Barraza is a 83 y.o. male.    Chief Complaint: Hypertension, Labs Only, Thyroid Problem, and Hyperlipidemia     Patient is 83-year-old gentleman who comes for follow-up.  He is accompanied with his wife also who is a patient.      1.         Benign essential hypertension -amlodipine 5 mg, losartan 25 mg  2.         Multiple-type hyperlipidemia   - on pravastatin 20 mg  3.         Secondary hypothyroidism  - levothyroxine 150 mcg -prefers generics now because of price savings  4.         Benign prostatic hyperplasia without urinary obstruction -currently on finasteride 5 mg, tamsulosin 0.4 mg  5.         Abdominal aortic aneurysm (AAA) without rupture   6.         Cold intolerance  today appeared to be comfortable in his shorts and half leaves in my office.  Despite cold  7.         Past appointments with Dermatology-Dr. Leighann Prince for skin lesions  8.         Finding of a 4 mm nodule in the right lower lobe of lung during an incidental CT scan done for abdomen.    9.-        Memory issues    As far as cognition is concerned, he does have some memory lapses.  Still driving though.  The other day he forgot his wallet and it was found in the  laundry room adjacent to the bathroom.  He was searching whole house for it. His wife had to call the [employer] and cancel those cards assuming that he had lost them.  Thankfully he has not   Forgotten his watch in the refrigerator or kept the microwave or   the baking oven on so far.  He recently probably forgot his anniversary also.    He had seen Dr. Cardoza for hypothyroidism and has been prescribed generic levothyroxine and I am okay with that.    No recent falls.          Hypertension  This is a chronic problem. The current episode started more than 1 year ago. The problem has been waxing and waning since onset. The problem is controlled. Pertinent negatives include no chest pain, palpitations or shortness of breath. Risk  factors for coronary artery disease include sedentary lifestyle, male gender and dyslipidemia. Past treatments include calcium channel blockers. The current treatment provides moderate improvement. Compliance problems include psychosocial issues.  Identifiable causes of hypertension include a thyroid problem. There is no history of coarctation of the aorta or hyperaldosteronism. Chronic renal disease: Patient is following Nephrology for the same but creatinine seems to be okay..  Thyroid Problem  Presents for follow-up visit. Patient reports no cold intolerance, constipation, diarrhea, dry skin, fatigue, heat intolerance, nail problem or palpitations. The symptoms have been stable. His past medical history is significant for hyperlipidemia. There is no history of diabetes.   Hyperlipidemia  This is a chronic problem. The current episode started more than 1 year ago. The problem is controlled. Exacerbating diseases include obesity. He has no history of diabetes or liver disease. Chronic renal disease: Patient is following Nephrology for the same but creatinine seems to be okay..Pertinent negatives include no chest pain, myalgias or shortness of breath. Current antihyperlipidemic treatment includes statins. The current treatment provides moderate improvement of lipids. Compliance problems include psychosocial issues.  Risk factors for coronary artery disease include a sedentary lifestyle, hypertension, male sex, dyslipidemia and obesity.   Groin Pain  The patient's pertinent negatives include no scrotal swelling or testicular pain. The current episode started 1 to 4 weeks ago. The problem occurs constantly. The problem has been unchanged. The pain is moderate. Pertinent negatives include no abdominal pain (Unclear if it is a lower abdominal pain or right groin pain.), chest pain, chills, constipation, diarrhea, discolored urine, dysuria, fever, flank pain, frequency, nausea, shortness of breath, urgency or vomiting.  His past medical history is significant for BPH.   Benign Prostatic Hypertrophy  This is a chronic problem. The current episode started more than 1 year ago. The problem has been gradually improving since onset. Irritative symptoms do not include frequency or urgency. Pertinent negatives include no chills, dysuria, nausea or vomiting. He is not sexually active. Past treatments include tamsulosin and finasteride. The treatment provided moderate relief. He has been using treatment for 2 or more years.       Past Medical History:   Diagnosis Date    Allergy     Codeine    Closed fracture dislocation of lumbar spine 07/06/2018    #2 vertebra. Patient went to the emergency Department.  Followup with Erik:    GERD (gastroesophageal reflux disease)     Hyperlipidemia     Hypertension     Hypertensive retinopathy of both eyes 05/11/2021    As per ophthalmology notes.  Eye care 20/20    Incidental lung nodule, > 3mm and < 8mm 03/17/2024    There is a 4 mm noncalcified pulmonary nodule seen in association with the right upper lobe of the lung laterally.  Although the patient has prior CTs these are not of they will oval for viewing.     The liver, spleen, pancreas and gallbladder are unremarkable.  The portal vein, splenic vein and superior mesenteric veins are patent.     The patient has an infrarenal abdominal aortic aneurysm measu    Right inguinal hernia     Squamous cell carcinoma of skin     Thyroid disease      Social History     Socioeconomic History    Marital status:      Spouse name: Alisa galvin    Number of children: 3   Occupational History    Occupation: [employer]-      Comment:     Tobacco Use    Smoking status: Never    Smokeless tobacco: Never   Substance and Sexual Activity    Alcohol use: No    Drug use: No    Sexual activity: Not Currently     Partners: Female     Social Drivers of Health     Financial Resource Strain: Low Risk  (3/7/2023)    Overall Financial  Resource Strain (CARDIA)     Difficulty of Paying Living Expenses: Not very hard   Food Insecurity: No Food Insecurity (3/7/2023)    Hunger Vital Sign     Worried About Running Out of Food in the Last Year: Never true     Ran Out of Food in the Last Year: Never true   Transportation Needs: No Transportation Needs (3/7/2023)    PRAPARE - Transportation     Lack of Transportation (Medical): No     Lack of Transportation (Non-Medical): No   Physical Activity: Inactive (3/7/2023)    Exercise Vital Sign     Days of Exercise per Week: 0 days     Minutes of Exercise per Session: 0 min   Stress: Stress Concern Present (3/7/2023)    [employer] - Occupational Stress Questionnaire     Feeling of Stress : To some extent   Housing Stability: Low Risk  (3/7/2023)    Housing Stability Vital Sign     Unable to Pay for Housing in the Last Year: No     Number of Places Lived in the Last Year: 1     Unstable Housing in the Last Year: No     Past Surgical History:   Procedure Laterality Date    ADENOIDECTOMY      ANGIOGRAM, CORONARY, WITH LEFT HEART CATHETERIZATION N/A 3/23/2023    Procedure: Angiogram, Coronary, with Left Heart Cath;  Surgeon: Tomas Salomon MD;  Location: [facility] CATH/EP LAB;  Service: Cardiology;  Laterality: N/A;    INGUINAL HERNIA REPAIR Right 07/12/2018    Dr. Thomas - [facility]    KNEE SURGERY Left 07/31/2019    TONSILLECTOMY      VASECTOMY       Family History   Problem Relation Name Age of Onset    Heart disease Father      Miscarriages / Stillbirths Father      Skin cancer Mother      Cancer Maternal Grandfather      Stomach cancer Paternal Grandfather         Review of Systems   Constitutional:  Negative for activity change, chills, fatigue, fever and unexpected weight change.   HENT:  Negative for congestion, mouth sores and sneezing.         Sinus and allergies and he takes his Flonase nasal spray.   Eyes:  Negative for pain, discharge and visual disturbance.   Respiratory:  Negative  "for apnea, choking and shortness of breath.    Cardiovascular:  Negative for chest pain and palpitations.        Hypertension, hyperlipidemia   Gastrointestinal:  Negative for abdominal pain (Unclear if it is a lower abdominal pain or right groin pain.), anal bleeding, blood in stool, constipation, diarrhea, nausea and vomiting.   Endocrine: Negative for cold intolerance, heat intolerance and polyuria.        Hypothyroidism currently on levothyroxine.   Genitourinary:  Negative for dysuria, flank pain, frequency, scrotal swelling, testicular pain and urgency.        Prostate symptoms currently on tamsulosin and finasteride and doing okay.   Musculoskeletal:  Positive for arthralgias. Negative for gait problem and myalgias.   Neurological:  Negative for dizziness, syncope and light-headedness.   Hematological:  Negative for adenopathy.   Psychiatric/Behavioral:  Negative for agitation, dysphoric mood and self-injury.          Objective:      Blood pressure 139/69, pulse 82, height 5' 10" (1.778 m), weight 98 kg (216 lb). Body mass index is 30.99 kg/m².  Physical Exam  Vitals and nursing note reviewed.   Constitutional:       General: He is not in acute distress.     Appearance: He is well-developed. He is obese. He is not ill-appearing, toxic-appearing or diaphoretic.      Comments: BMI is 30.99   HENT:      Head: Atraumatic.      Nose:      Right Sinus: No frontal sinus tenderness.      Left Sinus: No maxillary sinus tenderness or frontal sinus tenderness.   Eyes:      General: No scleral icterus.        Right eye: No discharge.         Left eye: No discharge.   Neck:      Vascular: No JVD.      Trachea: No tracheal deviation.   Cardiovascular:      Rate and Rhythm: Normal rate and regular rhythm.      Heart sounds: Normal heart sounds.   Pulmonary:      Effort: Pulmonary effort is normal. No respiratory distress.      Breath sounds: Normal breath sounds. No wheezing or rales.   Abdominal:      General: There is no " distension or abdominal bruit.      Palpations: Abdomen is soft.      Tenderness: There is no abdominal tenderness.   Musculoskeletal:         General: No tenderness.      Right lower leg: Right lower leg edema: 1+ edema which is probably reactive to secondary surgery on the right side..      Left lower leg: No edema.        Legs:       Comments: Postoperative knee   Cause of pain in right groin is uncertain.  Query compressive neuropathy   Lymphadenopathy:      Cervical: No cervical adenopathy.   Skin:     General: Skin is warm and dry.      Findings: No erythema or rash.      Comments: C/O Dry skin-  Chronic skin dryness and bruising.  Also in the legs.  Bruises easily because of aspirin and probably aging process.   Neurological:      Mental Status: He is alert. Mental status is at baseline.   Psychiatric:         Mood and Affect: Mood normal.         Behavior: Behavior normal.         Cognition and Memory: Cognition is impaired.      Comments: Beginnings of cognitive changes.Quirky sense of humor.           Assessment:       Lab Visit on 10/15/2024   Component Date Value Ref Range Status    Cholesterol 10/15/2024 158  120 - 199 mg/dL Final    Triglycerides 10/15/2024 129  30 - 150 mg/dL Final    HDL 10/15/2024 37 (L)  40 - 75 mg/dL Final    LDL Cholesterol 10/15/2024 95.2  63.0 - 159.0 mg/dL Final    HDL/Cholesterol Ratio 10/15/2024 23.4  20.0 - 50.0 % Final    Total Cholesterol/HDL Ratio 10/15/2024 4.3  2.0 - 5.0 Final    Non-HDL Cholesterol 10/15/2024 121  mg/dL Final   Lab Visit on 10/14/2024   Component Date Value Ref Range Status    WBC 10/14/2024 5.46  3.90 - 12.70 K/uL Final    RBC 10/14/2024 4.85  4.60 - 6.20 M/uL Final    Hemoglobin 10/14/2024 14.3  14.0 - 18.0 g/dL Final    Hematocrit 10/14/2024 43.0  40.0 - 54.0 % Final    MCV 10/14/2024 89  82 - 98 fL Final    MCH 10/14/2024 29.5  27.0 - 31.0 pg Final    MCHC 10/14/2024 33.3  32.0 - 36.0 g/dL Final    RDW 10/14/2024 14.1  11.5 - 14.5 % Final     Platelets 10/14/2024 210  150 - 450 K/uL Final    MPV 10/14/2024 9.6  9.2 - 12.9 fL Final   Lab Visit on 09/20/2024   Component Date Value Ref Range Status    Free T4 09/20/2024 1.32  0.71 - 1.51 ng/dL Final    TSH 09/20/2024 5.163  0.340 - 5.600 uIU/mL Final   Lab Visit on 08/05/2024   Component Date Value Ref Range Status    Specimen UA 08/05/2024 Urine, Clean Catch   Final    Color, UA 08/05/2024 Yellow  Yellow, Straw, Raina Final    Appearance, UA 08/05/2024 Clear  Clear Final    pH, UA 08/05/2024 7.0  5.0 - 8.0 Final    Specific Gravity, UA 08/05/2024 1.010  1.005 - 1.030 Final    Protein, UA 08/05/2024 Negative  Negative Final    Glucose, UA 08/05/2024 Negative  Negative Final    Ketones, UA 08/05/2024 Negative  Negative Final    Bilirubin (UA) 08/05/2024 Negative  Negative Final    Occult Blood UA 08/05/2024 Negative  Negative Final    Nitrite, UA 08/05/2024 Negative  Negative Final    Urobilinogen, UA 08/05/2024 Negative  Negative EU/dL Final    Leukocytes, UA 08/05/2024 Negative  Negative Final     Component Ref Range & Units 2 d ago  (10/15/24) 1 yr ago  (10/3/23) 3 yr ago  (12/27/20) 3 yr ago  (12/17/20) 4 yr ago  (1/14/20)   Cholesterol 120 - 199 mg/dL 158 167    CM   Comment: The National Cholesterol Education Program (NCEP) has set the  following guidelines (reference ranges) for Cholesterol:  Optimal.....................<200 mg/dL  Borderline High.............200-239 mg/dL  High........................> or = 240 mg/dL   Triglycerides 30 - 150 mg/dL 129 110  CM 98 CM 94 CM   Comment: The National Cholesterol Education Program (NCEP) has set the  following guidelines (reference values) for triglycerides:  Normal......................<150 mg/dL  Borderline High.............150-199 mg/dL  High........................200-499 mg/dL   HDL 40 - 75 mg/dL 37 Low  43 CM 37 Low  CM 41 CM 44 CM   Comment: The National Cholesterol Education Program (NCEP) has set the  following guidelines  (reference values) for HDL Cholesterol:  Low...............<40 mg/dL  Optimal...........>60 mg/dL   LDL Cholesterol 63.0 - 159.0 mg/dL 95.2 102.0 CM 93.6 .4 .2 CM   Comment: The National Cholesterol Education Program (NCEP) has set the     Component Ref Range & Units 3 d ago  (10/14/24) 3 mo ago  (7/11/24) 1 yr ago  (7/18/23) 1 yr ago  (3/20/23) 1 yr ago  (3/10/23) 2 yr ago  (4/7/22) 3 yr ago  (12/27/20)   WBC 3.90 - 12.70 K/uL 5.46 4.44 4.02 4.31 5.40 4.76 5.07   RBC 4.60 - 6.20 M/uL 4.85 4.62 4.77 5.01 5.04 4.47 Low  4.44 Low    Hemoglobin 14.0 - 18.0 g/dL 14.3 13.9 Low  14.1 14.4 14.5 12.7 Low  13.2 Low    Hematocrit 40.0 - 54.0 % 43.0 42.0 42.2 43.2 43.7 38.7 Low  39.8 Low    MCV 82 - 98 fL 89 91 89 86 87 87 90     Left Main   The vessel is large. There is minimal diffuse disease throughout the vessel.      Left Anterior Descending   Large caliber, osital 40% stenosis, 1st diagonal is large in caliber with ostial 30% stenosis, mid LAD after 1st diagonal has 30-40% stenosis. Distal LAD is small in caliber which does improve in size with IC nitro. Distal has focal 50% stenosis.      Left Circumflex   Large caliber, non dominant, proximal to mid mild disease, 1stm OM is medium in caliber with mild disease.      Right Coronary Artery   The vessel is large. Dominant, 30% stenosis in mid to distal RCA, Right PDA is medium in caliber with mild disease, distal RCA after bifurcation with PDA with large in caliber with mild disease.      Intervention     No interventions have been documented.     Left Heart Findings    Left Ventricle      1. Benign essential hypertension  Comments:  Blood pressures are borderline okay.  Try not to be too aggressive.  Currently on amlodipine and losartan continue.  Overview:  Blood pressures are still elevated today. It is felt that with further weight loss, perhaps his blood pressure may come down naturally and he may not need any new medications.His lipid panel shows  borderline LDL and triglycerides. HDL is 30.    Orders:  -     Microalbumin/Creatinine Ratio, Urine; Future; Expected date: 01/17/2025  -     Basic Metabolic Panel; Future; Expected date: 01/17/2025    2. Multiple-type hyperlipidemia  Comments:  Currently patient is on pravastatin 20 mg.  Tolerating okay.  Continue same.  Orders:  -     Basic Metabolic Panel; Future; Expected date: 01/17/2025    3. Paroxysmal atrial fibrillation  Comments:  Currently in sinus rhythm.    4. Cognitive changes  Comments:  Mild changes commensurate with his age and known vascular changes    5. Subcortical microvascular ischemic occlusive disease  Comments:  Mild ischemia    6. Coronary artery disease involving native coronary artery of native heart without angina pectoris  Comments:  Noncritical coronary artery disease as per angiogram.  Continue secondary measures including blood pressure, weight and lipid control.  Orders:  -     Basic Metabolic Panel; Future; Expected date: 01/17/2025    7. need for influenza vaccination  Comments:  Today he and his wife both got the flu shot.  Continue with COVID precautions.  Consider RSV vaccine also.  Orders:  -     influenza (adjuvanted) (Fluad) 45 mcg/0.5 mL IM vaccine (> or = 64 yo) 0.5 mL             Plan:   Benign essential hypertension  Comments:  Blood pressures are borderline okay.  Try not to be too aggressive.  Currently on amlodipine and losartan continue.  Orders:  -     Microalbumin/Creatinine Ratio, Urine; Future; Expected date: 01/17/2025  -     Basic Metabolic Panel; Future; Expected date: 01/17/2025    Multiple-type hyperlipidemia  Comments:  Currently patient is on pravastatin 20 mg.  Tolerating okay.  Continue same.  Orders:  -     Basic Metabolic Panel; Future; Expected date: 01/17/2025    Paroxysmal atrial fibrillation  Comments:  Currently in sinus rhythm.    Cognitive changes  Comments:  Mild changes commensurate with his age and known vascular changes    Subcortical  microvascular ischemic occlusive disease  Comments:  Mild ischemia    Coronary artery disease involving native coronary artery of native heart without angina pectoris  Comments:  Noncritical coronary artery disease as per angiogram.  Continue secondary measures including blood pressure, weight and lipid control.  Orders:  -     Basic Metabolic Panel; Future; Expected date: 01/17/2025    need for influenza vaccination  Comments:  Today he and his wife both got the flu shot.  Continue with COVID precautions.  Consider RSV vaccine also.  Orders:  -     influenza (adjuvanted) (Fluad) 45 mcg/0.5 mL IM vaccine (> or = 64 yo) 0.5 mL    Patient's medical issues have been reviewed.    Medications has been reviewed.      Mild cognitive changes commensurate with his age and chronic microvascular ischemic changes has been noted.  He was still fairly functional.    Patient has liver written bills or paid bills.  His wife takes care of this business.    Continue with watching diet.  Continue with precautions.    If all stable I will see him back in 4 months time.    Follow up in about 4 months (around 2/17/2025), or if symptoms worsen or fail to improve, for Hypertension/lipids.     Spent roland 30 minutes with patient which involved review of pts medical conditions, labs, medications and with 50% of time face-to-face discussion about medical problems, management and any applicable changes.    Current Outpatient Medications:     amLODIPine (NORVASC) 5 MG tablet, Take 1 tablet (5 mg total) by mouth once daily., Disp: 90 tablet, Rfl: 3    ascorbic acid, vitamin C, (VITAMIN C) 500 MG tablet, Take 500 mg by mouth once daily., Disp: , Rfl:     aspirin (ECOTRIN) 81 MG EC tablet, Take 1 tablet (81 mg total) by mouth once daily., Disp: 90 tablet, Rfl: 3    b complex vitamins tablet, Take 1 tablet by mouth once daily., Disp: , Rfl:     CALCIUM CARBONATE/VITAMIN D3 (VITAMIN D-3 ORAL), Take by mouth., Disp: , Rfl:     docusate sodium (COLACE)  50 MG capsule, Take by mouth 2 (two) times daily as needed for Constipation., Disp: , Rfl:     finasteride (PROSCAR) 5 mg tablet, Take 1 tablet (5 mg total) by mouth once daily., Disp: 90 tablet, Rfl: 3    fluocinonide (LIDEX) 0.05 % ointment, Apply up to twice a day to scaly rash on body for 3 wks or less, Disp: 60 g, Rfl: 1    fluorouraciL (EFUDEX) 5 % cream, Apply topically 2 (two) times daily. Apply topically 2 times daily for 2 weeks, Disp: 40 g, Rfl: 0    fluticasone propionate (FLONASE) 50 mcg/actuation nasal spray, SPRAY 1 SPRAY (50 MCG TOTAL) INTO INTO EACH NOSTRIL TWICE A DAY, Disp: 48 mL, Rfl: 1    ketoconazole (NIZORAL) 2 % shampoo, Lather onto scalp, leave on x 3-5 minutes, then rinse.  Repeat TIW, Disp: 120 mL, Rfl: 2    levothyroxine (UNITHROID) 150 MCG tablet, Take 150 mcg by mouth before breakfast., Disp: , Rfl:     losartan (COZAAR) 25 MG tablet, Take 1 tablet (25 mg total) by mouth 2 (two) times daily., Disp: 180 tablet, Rfl: 3    multivitamin (THERAGRAN) per tablet, Take 1 tablet by mouth once daily., Disp: , Rfl:     mupirocin (BACTROBAN) 2 % ointment, by Nasal route 3 (three) times daily., Disp: 20 g, Rfl: 0    nitroGLYCERIN (NITROSTAT) 0.4 MG SL tablet, Place 0.4 mg under the tongue every 5 (five) minutes as needed for Chest pain., Disp: , Rfl:     omega-3 fatty acids/fish oil (FISH OIL-OMEGA-3 FATTY ACIDS) 300-1,000 mg capsule, Take 1 capsule by mouth once daily., Disp: 90 capsule, Rfl: 3    pravastatin (PRAVACHOL) 20 MG tablet, Take 1 tablet (20 mg total) by mouth once daily., Disp: 90 tablet, Rfl: 3    tamsulosin (FLOMAX) 0.4 mg Cap, Take 1 capsule (0.4 mg total) by mouth once daily., Disp: 90 capsule, Rfl: 3  No current facility-administered medications for this visit.    Lalo Cuellar

## 2024-11-19 ENCOUNTER — TELEPHONE (OUTPATIENT)
Dept: FAMILY MEDICINE | Facility: CLINIC | Age: 84
End: 2024-11-19
Payer: MEDICARE

## 2024-11-19 NOTE — TELEPHONE ENCOUNTER
----- Message from Med Assistant Gordon sent at 11/18/2024  3:11 PM CST -----  Patients wife is calling requesting a call back from the nurse.  Patient when patient was here in October dr hardin said he was a little anemic but not bad.  Wife states patient eats ice all day everyday.  [place].  Gave blood last Monday as well.   Wants to know if patient can be retested prior to next visit.    Wife: [phone]  Pt: [phone]

## 2024-11-19 NOTE — TELEPHONE ENCOUNTER
Spoke with pt., he states he only felt that way 1 time. Also he states he was light headed, not dizzy.  His next appt. Is Feb.

## 2024-12-02 ENCOUNTER — LAB VISIT (OUTPATIENT)
Dept: LAB | Facility: HOSPITAL | Age: 84
End: 2024-12-02
Attending: INTERNAL MEDICINE
Payer: MEDICARE

## 2024-12-02 DIAGNOSIS — F50.83 PICA IN ADULTS: ICD-10-CM

## 2024-12-02 DIAGNOSIS — R42 DIZZINESS: ICD-10-CM

## 2024-12-02 LAB
BASOPHILS # BLD AUTO: 0.05 K/UL (ref 0–0.2)
BASOPHILS NFR BLD: 0.8 % (ref 0–1.9)
DIFFERENTIAL METHOD BLD: ABNORMAL
EOSINOPHIL # BLD AUTO: 0.1 K/UL (ref 0–0.5)
EOSINOPHIL NFR BLD: 2.1 % (ref 0–8)
ERYTHROCYTE [DISTWIDTH] IN BLOOD BY AUTOMATED COUNT: 13.2 % (ref 11.5–14.5)
HCT VFR BLD AUTO: 40.6 % (ref 40–54)
HGB BLD-MCNC: 13.2 G/DL (ref 14–18)
IMM GRANULOCYTES # BLD AUTO: 0.01 K/UL (ref 0–0.04)
IMM GRANULOCYTES NFR BLD AUTO: 0.2 % (ref 0–0.5)
LYMPHOCYTES # BLD AUTO: 1.4 K/UL (ref 1–4.8)
LYMPHOCYTES NFR BLD: 22.1 % (ref 18–48)
MCH RBC QN AUTO: 29.1 PG (ref 27–31)
MCHC RBC AUTO-ENTMCNC: 32.5 G/DL (ref 32–36)
MCV RBC AUTO: 89 FL (ref 82–98)
MONOCYTES # BLD AUTO: 0.6 K/UL (ref 0.3–1)
MONOCYTES NFR BLD: 9.9 % (ref 4–15)
NEUTROPHILS # BLD AUTO: 4 K/UL (ref 1.8–7.7)
NEUTROPHILS NFR BLD: 64.9 % (ref 38–73)
NRBC BLD-RTO: 0 /100 WBC
PLATELET # BLD AUTO: 217 K/UL (ref 150–450)
PMV BLD AUTO: 9.9 FL (ref 9.2–12.9)
RBC # BLD AUTO: 4.54 M/UL (ref 4.6–6.2)
WBC # BLD AUTO: 6.14 K/UL (ref 3.9–12.7)

## 2024-12-02 PROCEDURE — 85025 COMPLETE CBC W/AUTO DIFF WBC: CPT | Performed by: INTERNAL MEDICINE

## 2024-12-02 PROCEDURE — 36415 COLL VENOUS BLD VENIPUNCTURE: CPT | Performed by: INTERNAL MEDICINE

## 2024-12-09 ENCOUNTER — TELEPHONE (OUTPATIENT)
Dept: CARDIOLOGY | Facility: CLINIC | Age: 84
End: 2024-12-09
Payer: MEDICARE

## 2024-12-09 ENCOUNTER — TELEPHONE (OUTPATIENT)
Dept: FAMILY MEDICINE | Facility: CLINIC | Age: 84
End: 2024-12-09
Payer: MEDICARE

## 2024-12-09 NOTE — TELEPHONE ENCOUNTER
Sw the patient to inform him that the primary  pharmacy has change on his request to [facility] in [place]

## 2024-12-09 NOTE — TELEPHONE ENCOUNTER
----- Message from Cece sent at 12/9/2024 11:43 AM CST -----  Patient wants to transfer his medication to [facility] in [place]. Pravastatin Sodium tablets 20mg ,Losartan potassium Tablets 25mg ...

## 2024-12-09 NOTE — TELEPHONE ENCOUNTER
----- Message from Kaila sent at 12/9/2024  7:14 AM CST -----  - 12/6-12:33 pm- pt is calling about refills   [phone] (

## 2025-01-23 ENCOUNTER — LAB VISIT (OUTPATIENT)
Dept: LAB | Facility: HOSPITAL | Age: 85
End: 2025-01-23
Attending: INTERNAL MEDICINE
Payer: MEDICARE

## 2025-01-23 DIAGNOSIS — N18.30 CHRONIC KIDNEY DISEASE, STAGE III (MODERATE): Primary | ICD-10-CM

## 2025-01-23 LAB
ALBUMIN SERPL BCP-MCNC: 4.2 G/DL (ref 3.5–5.2)
ALBUMIN/CREAT UR: NORMAL UG/MG (ref 0–30)
ANION GAP SERPL CALC-SCNC: 10 MMOL/L (ref 8–16)
BILIRUB UR QL STRIP: NEGATIVE
BUN SERPL-MCNC: 24 MG/DL (ref 8–23)
CALCIUM SERPL-MCNC: 9.6 MG/DL (ref 8.7–10.5)
CHLORIDE SERPL-SCNC: 107 MMOL/L (ref 95–110)
CLARITY UR: CLEAR
CO2 SERPL-SCNC: 25 MMOL/L (ref 23–29)
COLOR UR: YELLOW
CREAT SERPL-MCNC: 1.3 MG/DL (ref 0.5–1.4)
CREAT UR-MCNC: 97.8 MG/DL (ref 23–375)
EST. GFR  (NO RACE VARIABLE): 53.8 ML/MIN/1.73 M^2
GLUCOSE SERPL-MCNC: 83 MG/DL (ref 70–110)
GLUCOSE UR QL STRIP: NEGATIVE
HGB UR QL STRIP: NEGATIVE
KETONES UR QL STRIP: NEGATIVE
LEUKOCYTE ESTERASE UR QL STRIP: NEGATIVE
MICROALBUMIN UR DL<=1MG/L-MCNC: <7 UG/ML
NITRITE UR QL STRIP: NEGATIVE
PH UR STRIP: 7 [PH] (ref 5–8)
PHOSPHATE SERPL-MCNC: 3.2 MG/DL (ref 2.7–4.5)
POTASSIUM SERPL-SCNC: 4.4 MMOL/L (ref 3.5–5.1)
PROT UR QL STRIP: NEGATIVE
SODIUM SERPL-SCNC: 142 MMOL/L (ref 136–145)
SP GR UR STRIP: 1.01 (ref 1–1.03)
URN SPEC COLLECT METH UR: NORMAL
UROBILINOGEN UR STRIP-ACNC: NEGATIVE EU/DL

## 2025-01-23 PROCEDURE — 80069 RENAL FUNCTION PANEL: CPT | Performed by: INTERNAL MEDICINE

## 2025-01-23 PROCEDURE — 82570 ASSAY OF URINE CREATININE: CPT | Performed by: INTERNAL MEDICINE

## 2025-01-23 PROCEDURE — 81003 URINALYSIS AUTO W/O SCOPE: CPT | Performed by: INTERNAL MEDICINE

## 2025-01-23 PROCEDURE — 36415 COLL VENOUS BLD VENIPUNCTURE: CPT | Performed by: INTERNAL MEDICINE

## 2025-02-10 ENCOUNTER — LAB VISIT (OUTPATIENT)
Dept: LAB | Facility: HOSPITAL | Age: 85
End: 2025-02-10
Attending: INTERNAL MEDICINE
Payer: MEDICARE

## 2025-02-10 DIAGNOSIS — I10 BENIGN ESSENTIAL HYPERTENSION: Chronic | ICD-10-CM

## 2025-02-10 DIAGNOSIS — I25.10 CORONARY ARTERY DISEASE INVOLVING NATIVE CORONARY ARTERY OF NATIVE HEART WITHOUT ANGINA PECTORIS: Chronic | ICD-10-CM

## 2025-02-10 DIAGNOSIS — E78.2 MULTIPLE-TYPE HYPERLIPIDEMIA: Chronic | ICD-10-CM

## 2025-02-10 LAB
ALBUMIN/CREAT UR: NORMAL UG/MG (ref 0–30)
ANION GAP SERPL CALC-SCNC: 11 MMOL/L (ref 8–16)
BUN SERPL-MCNC: 20 MG/DL (ref 8–23)
CALCIUM SERPL-MCNC: 9.5 MG/DL (ref 8.7–10.5)
CHLORIDE SERPL-SCNC: 105 MMOL/L (ref 95–110)
CO2 SERPL-SCNC: 24 MMOL/L (ref 23–29)
CREAT SERPL-MCNC: 1.2 MG/DL (ref 0.5–1.4)
CREAT UR-MCNC: 64 MG/DL (ref 23–375)
EST. GFR  (NO RACE VARIABLE): 59.3 ML/MIN/1.73 M^2
GLUCOSE SERPL-MCNC: 128 MG/DL (ref 70–110)
MICROALBUMIN UR DL<=1MG/L-MCNC: <7 UG/ML
POTASSIUM SERPL-SCNC: 4 MMOL/L (ref 3.5–5.1)
SODIUM SERPL-SCNC: 140 MMOL/L (ref 136–145)

## 2025-02-10 PROCEDURE — 80048 BASIC METABOLIC PNL TOTAL CA: CPT | Performed by: INTERNAL MEDICINE

## 2025-02-10 PROCEDURE — 82570 ASSAY OF URINE CREATININE: CPT | Performed by: INTERNAL MEDICINE

## 2025-02-10 PROCEDURE — 36415 COLL VENOUS BLD VENIPUNCTURE: CPT | Performed by: INTERNAL MEDICINE

## 2025-02-20 ENCOUNTER — OFFICE VISIT (OUTPATIENT)
Dept: FAMILY MEDICINE | Facility: CLINIC | Age: 85
End: 2025-02-20
Payer: MEDICARE

## 2025-02-20 VITALS
BODY MASS INDEX: 31.57 KG/M2 | SYSTOLIC BLOOD PRESSURE: 132 MMHG | WEIGHT: 220 LBS | HEART RATE: 68 BPM | OXYGEN SATURATION: 99 % | DIASTOLIC BLOOD PRESSURE: 72 MMHG

## 2025-02-20 DIAGNOSIS — E03.8 SECONDARY HYPOTHYROIDISM: ICD-10-CM

## 2025-02-20 DIAGNOSIS — E78.2 MIXED DYSLIPIDEMIA: ICD-10-CM

## 2025-02-20 DIAGNOSIS — I49.3 VENTRICULAR ECTOPICS: ICD-10-CM

## 2025-02-20 DIAGNOSIS — I10 ESSENTIAL HYPERTENSION: Primary | ICD-10-CM

## 2025-02-20 DIAGNOSIS — R79.89 ELEVATED SERUM CREATININE: ICD-10-CM

## 2025-02-20 DIAGNOSIS — D64.9 MILD ANEMIA: ICD-10-CM

## 2025-02-20 DIAGNOSIS — E78.2 MULTIPLE-TYPE HYPERLIPIDEMIA: ICD-10-CM

## 2025-02-20 DIAGNOSIS — I71.43 INFRARENAL ABDOMINAL AORTIC ANEURYSM (AAA) WITHOUT RUPTURE: ICD-10-CM

## 2025-02-20 PROCEDURE — 99214 OFFICE O/P EST MOD 30 MIN: CPT | Mod: PBBFAC,PN | Performed by: INTERNAL MEDICINE

## 2025-02-20 NOTE — PROGRESS NOTES
Subjective:       Patient ID: Nikolay Barraza is a 84 y.o. male.    Chief Complaint: Follow-up, Hyperlipidemia, Hypertension, CAD, Thyroid Problem, Sinus Problem, and Prostate Problem  atient is 53-year-old gentleman who comes for follow-up.  He is accompanied with his wife also who is a patient.      1.         Benign essential hypertension -amlodipine 5 mg, losartan 25 mg  2.         Multiple-type hyperlipidemia   - on pravastatin 20 mg  Dizzy already not or  3.         Secondary hypothyroidism  - levothyroxine 150 mcg -prefers generics now because of price savings  4.         Benign prostatic hyperplasia without urinary obstruction -currently on finasteride 5 mg, tamsulosin 0.4 mg  5.         Abdominal aortic aneurysm (AAA) without rupture   6.         Cold intolerance  today appeared to be comfortable in his shorts and half leaves in my office.  Despite cold  7.         Past appointments with Dermatology-Dr. Leighann Prince for skin lesions  8.         Finding of a 4 mm nodule in the right lower lobe of lung during an incidental CT scan done for abdomen.    9.-        Memory issues    As far as cognition is concerned, he does have some memory lapses.  Still driving though.  The other day he forgot his wallet and it was found in the  laundry room adjacent to the bathroom.  He was searching whole house for it. His wife had to call the [employer] and cancel those cards assuming that he had lost them.  Thankfully he has not   Forgotten his watch in the refrigerator or kept the microwave or   the baking oven on so far.  He recently probably forgot his anniversary also.    He had seen Dr. Cardoza for hypothyroidism and has been prescribed generic levothyroxine and I am okay with that.    Estela Reid is here for a 4-month follow-up visit, reporting no particular problems and stating he is doing well. He had an episode of lightheadedness at a zoo in [place], requiring him to sit down briefly.  The exact timing is unclear, but it occurred after his last visit in October. He sees Dr. Salomon, a cardiologist, for his heart condition and has a history of cardiac arrhythmia, as noted by Dr. Reese. He also sees Dr. Pro Alaniz for his prostate, with his last visit in October, and was advised to return in a year.    Nikolay acknowledges significant memory impairment and forgetfulness. His wife confirms that he sometimes repeats information, citing an example of him informing the nurse twice about an incident where he burned his lips.    He denies recent dizziness, forgetting who his wife is, asking what day it is at night, or where they are.    MEDICATIONS:  Nikolay is on Amlodipine and Losartan for blood pressure, Pravastatin for cholesterol, and Levothyroxine for thyroid. He is also on medication for atrial fibrillation.    MEDICAL HISTORY:  Nikolay has a history of hypertension, hyperlipidemia, hypothyroidism, and atrial fibrillation. Sinus arrhythmia with occasional ventricular ectopy was detected during the current visit. Nikolay received a flu vaccine on 10/17/24 and completed the pneumonia vaccine on 7/13/2017. He has received three COVID vaccines in the past and two shingles vaccines in 2013 and 2019.    FAMILY HISTORY:  Family history is significant for father who had a stroke at age 58 and was a heavy smoker.    TEST RESULTS:  Nikolay's cholesterol levels were okay last year. Recent urine tests and blood counts were also okay. During the current visit, a 30-second EKG reading from the patient's Apple Watch did not show atrial fibrillation. However, it revealed some sinus arrhythmia and occasional ventricular ectopy.    SOCIAL HISTORY:  Marital status:       ROS:  General: -weight loss  Cardiovascular: +palpitations  Neurological: -dizziness, +lightheadedness  Psychiatric: +memory problems         Past Medical History:   Diagnosis Date    Allergy     Codeine    Closed fracture dislocation of  lumbar spine 07/06/2018    #2 vertebra. Patient went to the emergency Department.  Followup with Erik:    GERD (gastroesophageal reflux disease)     Hyperlipidemia     Hypertension     Hypertensive retinopathy of both eyes 05/11/2021    As per ophthalmology notes.  Eye care 20/20    Incidental lung nodule, > 3mm and < 8mm 03/17/2024    There is a 4 mm noncalcified pulmonary nodule seen in association with the right upper lobe of the lung laterally.  Although the patient has prior CTs these are not of they will oval for viewing.     The liver, spleen, pancreas and gallbladder are unremarkable.  The portal vein, splenic vein and superior mesenteric veins are patent.     The patient has an infrarenal abdominal aortic aneurysm measu    Right inguinal hernia     Squamous cell carcinoma of skin     Thyroid disease      Social History[1]  Past Surgical History:   Procedure Laterality Date    ADENOIDECTOMY      ANGIOGRAM, CORONARY, WITH LEFT HEART CATHETERIZATION N/A 3/23/2023    Procedure: Angiogram, Coronary, with Left Heart Cath;  Surgeon: Tomas Salomon MD;  Location: [facility] CATH/EP LAB;  Service: Cardiology;  Laterality: N/A;    INGUINAL HERNIA REPAIR Right 07/12/2018    Dr. Thomas - [facility]    KNEE SURGERY Left 07/31/2019    TONSILLECTOMY      VASECTOMY       Family History   Problem Relation Name Age of Onset    Heart disease Father      Miscarriages / Stillbirths Father      Skin cancer Mother      Cancer Maternal Grandfather      Stomach cancer Paternal Grandfather         Objective:      Physical Exam  Blood pressure 132/72, pulse 68, weight 99.8 kg (220 lb), SpO2 99%. Body mass index is 31.57 kg/m².  Physical Exam    General: No acute distress. Well-developed. Well-nourished.  Eyes: EOMI. Sclerae anicteric.  HENT: Normocephalic. Atraumatic. Nares patent. Moist oral mucosa.    Cardiovascular: Regular rate. Regular rhythm. No murmurs. No rubs. No gallops. Normal S1, S2.  Respiratory: Normal respiratory effort. Clear to  auscultation bilaterally. No rales. No rhonchi. No wheezing.  Abdomen: Soft. Non-tender. Non-distended. Normoactive bowel sounds.  Musculoskeletal: No  obvious deformity.  Extremities: No lower extremity edema.  Neurological: Alert . No slurred speech. Normal gait.  Psychiatric:  AWARE OF IMMEDIATE ISSUES BUT DETAILS ARE NOT FAMILIAR TO HIM.  Skin: Warm. Dry. No rash.              Assessment:       Lab Visit on 02/10/2025   Component Date Value Ref Range Status    Microalbumin, Urine 02/10/2025 <7.0  <19.9 ug/mL Final    Creatinine, Urine 02/10/2025 64.0  23.0 - 375.0 mg/dL Final    Microalb/Creat Ratio 02/10/2025 Unable to calculate  0.0 - 30.0 ug/mg Final    Sodium 02/10/2025 140  136 - 145 mmol/L Final    Potassium 02/10/2025 4.0  3.5 - 5.1 mmol/L Final    Chloride 02/10/2025 105  95 - 110 mmol/L Final    CO2 02/10/2025 24  23 - 29 mmol/L Final    Glucose 02/10/2025 128 (H)  70 - 110 mg/dL Final    BUN 02/10/2025 20  8 - 23 mg/dL Final    Creatinine 02/10/2025 1.2  0.5 - 1.4 mg/dL Final    Calcium 02/10/2025 9.5  8.7 - 10.5 mg/dL Final    Anion Gap 02/10/2025 11  8 - 16 mmol/L Final    eGFR 02/10/2025 59.3 (A)  >60 mL/min/1.73 m^2 Final   Lab Visit on 01/23/2025   Component Date Value Ref Range Status    Glucose 01/23/2025 83  70 - 110 mg/dL Final    Sodium 01/23/2025 142  136 - 145 mmol/L Final    Potassium 01/23/2025 4.4  3.5 - 5.1 mmol/L Final    Chloride 01/23/2025 107  95 - 110 mmol/L Final    CO2 01/23/2025 25  23 - 29 mmol/L Final    BUN 01/23/2025 24 (H)  8 - 23 mg/dL Final    Calcium 01/23/2025 9.6  8.7 - 10.5 mg/dL Final    Creatinine 01/23/2025 1.3  0.5 - 1.4 mg/dL Final    Albumin 01/23/2025 4.2  3.5 - 5.2 g/dL Final    Phosphorus 01/23/2025 3.2  2.7 - 4.5 mg/dL Final    eGFR 01/23/2025 53.8 (A)  >60 mL/min/1.73 m^2 Final    Anion Gap 01/23/2025 10  8 - 16 mmol/L Final    Specimen UA 01/23/2025 Urine, Clean Catch   Final    Color, UA 01/23/2025 Yellow  Yellow, Straw, Raina Final    Appearance, UA  01/23/2025 Clear  Clear Final    pH, UA 01/23/2025 7.0  5.0 - 8.0 Final    Specific Gravity, UA 01/23/2025 1.015  1.005 - 1.030 Final    Protein, UA 01/23/2025 Negative  Negative Final    Glucose, UA 01/23/2025 Negative  Negative Final    Ketones, UA 01/23/2025 Negative  Negative Final    Bilirubin (UA) 01/23/2025 Negative  Negative Final    Occult Blood UA 01/23/2025 Negative  Negative Final    Nitrite, UA 01/23/2025 Negative  Negative Final    Urobilinogen, UA 01/23/2025 Negative  Negative EU/dL Final    Leukocytes, UA 01/23/2025 Negative  Negative Final    Microalbumin, Urine 01/23/2025 <7.0  <19.9 ug/mL Final    Creatinine, Urine 01/23/2025 97.8  23.0 - 375.0 mg/dL Final    Microalb/Creat Ratio 01/23/2025 Unable to calculate  0.0 - 30.0 ug/mg Final   Lab Visit on 12/02/2024   Component Date Value Ref Range Status    WBC 12/02/2024 6.14  3.90 - 12.70 K/uL Final    RBC 12/02/2024 4.54 (L)  4.60 - 6.20 M/uL Final    Hemoglobin 12/02/2024 13.2 (L)  14.0 - 18.0 g/dL Final    Hematocrit 12/02/2024 40.6  40.0 - 54.0 % Final    MCV 12/02/2024 89  82 - 98 fL Final    MCH 12/02/2024 29.1  27.0 - 31.0 pg Final    MCHC 12/02/2024 32.5  32.0 - 36.0 g/dL Final    RDW 12/02/2024 13.2  11.5 - 14.5 % Final    Platelets 12/02/2024 217  150 - 450 K/uL Final    MPV 12/02/2024 9.9  9.2 - 12.9 fL Final    Immature Granulocytes 12/02/2024 0.2  0.0 - 0.5 % Final    Gran # (ANC) 12/02/2024 4.0  1.8 - 7.7 K/uL Final    Immature Grans (Abs) 12/02/2024 0.01  0.00 - 0.04 K/uL Final    Lymph # 12/02/2024 1.4  1.0 - 4.8 K/uL Final    Mono # 12/02/2024 0.6  0.3 - 1.0 K/uL Final    Eos # 12/02/2024 0.1  0.0 - 0.5 K/uL Final    Baso # 12/02/2024 0.05  0.00 - 0.20 K/uL Final    nRBC 12/02/2024 0  0 /100 WBC Final    Gran % 12/02/2024 64.9  38.0 - 73.0 % Final    Lymph % 12/02/2024 22.1  18.0 - 48.0 % Final    Mono % 12/02/2024 9.9  4.0 - 15.0 % Final    Eosinophil % 12/02/2024 2.1  0.0 - 8.0 % Final    Basophil % 12/02/2024 0.8  0.0 - 1.9 % Final     Differential Method 12/02/2024 Automated   Final       Assessment & Plan    IMPRESSION:  - Reviewed current medications: amlodipine, losartan for blood pressure, pravastatin for cholesterol, and levothyroxine for thyroid  - Evaluated cardiac status using Apple Watch ECG, which showed sinus arrhythmia and occasional ventricular ectopy, but no atrial fibrillation  - Considered history of lightheadedness, but noted no current dizziness  - Assessed memory function and cognitive status  - Reviewed recent visits with specialists Dr. Smith, Dr. Gamez, and urologist Dr. Pro Alaniz    I10 ESSENTIAL (PRIMARY) HYPERTENSION:  - Continue amlodipine and losartan for blood pressure management.  - Order basic chemistry panel.    E78.5 HYPERLIPIDEMIA, UNSPECIFIED:  - Continue pravastatin for cholesterol management.  - Order cholesterol test.  - Cholesterol report from last year was satisfactory.    E03.9 HYPOTHYROIDISM, UNSPECIFIED:  - Continue levothyroxine for thyroid management.  - Order thyroid function test.    I49.8 OTHER SPECIFIED CARDIAC ARRHYTHMIAS:  - Monitor the patient's history of palpitations.  - Note that the patient is under the care of cardiologist, Dr. Salomon.  - Evaluate Apple Watch ECG showing sinus arrhythmia and occasional ventricular ectopy.  - Continue current medication for the heart condition.    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  - Note that the patient reports memory issues, including forgetting information and repeating questions.  - Observe that the patient does not forget who his wife is or ask about current location or date.  - Inquire about family history of dementia.    N40.1 BENIGN PROSTATIC HYPERPLASIA WITH LOWER URINARY TRACT SYMPTOMS:  - Note that the patient is under the care of Dr. Pro Alaniz for prostate management.  - Inquire about the patient's ability to urinate.  -  Schedule annual follow-up with urologist Dr. Pro Alaniz.    Z23 ENCOUNTER FOR IMMUNIZATION:  - Discuss RSV vaccine and its importance, especially given contact with grandchildren.  - Explain that minor pain is common with vaccinations.  - Note that the patient received flu vaccine on 10/17/24, completed pneumonia vaccine on 7/13/2017, received 3 COVID vaccines, and 2 shingles vaccines in 2013 and 2019.  - Plan for the patient to receive RSV vaccine.    Z86.0100 PERSONAL HISTORY OF COLON POLYPS, UNSPECIFIED:  - Note that the patient had a colonoscopy in 2015.    I49.1 ATRIAL PREMATURE DEPOLARIZATION:  - Evaluate Apple Watch ECG showing sinus arrhythmia and occasional ventricular ectopy, but no atrial fibrillation.     Sinus arrhythmia and 1 ventricular ectopic.  The reading exam was for 30 seconds.  Please see the strip above.  iner's watch did not show  Plan:   Essential hypertension  -     Basic Metabolic Panel; Future; Expected date: 08/20/2025    Mixed dyslipidemia  -     Lipid Panel; Future; Expected date: 08/20/2025    Ventricular ectopics    Infrarenal abdominal aortic aneurysm (AAA) without rupture    Elevated serum creatinine  -     Basic Metabolic Panel; Future; Expected date: 08/20/2025    Multiple-type hyperlipidemia    Secondary hypothyroidism  -     TSH; Future; Expected date: 08/20/2025    Mild anemia  -     CBC Auto Differential; Future; Expected date: 08/20/2025      MEDICAL ISSUES NOTED.  Cognitive issues consistent with the aging process and lack of recall for details are known in his case with no major changes.  Labs for future has been ordered.  At this point I will defer from diagnosed him with CKD given the fluctuating nature of his creatinine.  He has a infrarenal aneurysm which is kept under observation.  Rhythm strip shows occasional ectopics.  Injury precautions discussed.  He is updated on most of the recommended immunizations.  Consider shingles vaccine and RSV vaccine also.    Advised  Mr. Barraza about age and season appropriate immunizations/ cancer screenings.  Also seasonal influenza vaccine, update on tetanus diphtheria vaccination every 10 years.      Advised Mr. Barraza about age and season appropriate immunizations/ cancer screenings.  Also seasonal influenza vaccine, update on tetanus diphtheria vaccination every 10 years.        Follow up in about 6 months (around 8/20/2025), or if symptoms worsen or fail to improve, for Hypertension/lipids.    Current Medications[2]    This note was generated with the assistance of ambient listening technology. Verbal consent was obtained by the patient and accompanying visitor(s) for the recording of patient appointment to facilitate this note. I attest to having reviewed and edited the generated note for accuracy, though some syntax or spelling errors may persist. Please contact the author of this note for any clarification.      Lalo Cuellar           [1]   Social History  Socioeconomic History    Marital status:      Spouse name: Alisa barraza    Number of children: 3   Occupational History    Occupation: [employer]-      Comment:     Tobacco Use    Smoking status: Never    Smokeless tobacco: Never   Substance and Sexual Activity    Alcohol use: No    Drug use: No    Sexual activity: Not Currently     Partners: Female     Social Drivers of Health     Financial Resource Strain: Low Risk  (3/7/2023)    Overall Financial Resource Strain (CARDIA)     Difficulty of Paying Living Expenses: Not very hard   Food Insecurity: No Food Insecurity (3/7/2023)    Hunger Vital Sign     Worried About Running Out of Food in the Last Year: Never true     Ran Out of Food in the Last Year: Never true   Transportation Needs: No Transportation Needs (3/7/2023)    PRAPARE - Transportation     Lack of Transportation (Medical): No     Lack of Transportation (Non-Medical): No   Physical Activity: Inactive (3/7/2023)    Exercise Vital Sign      Days of Exercise per Week: 0 days     Minutes of Exercise per Session: 0 min   Stress: Stress Concern Present (3/7/2023)    [employer] - Occupational Stress Questionnaire     Feeling of Stress : To some extent   Housing Stability: Low Risk  (3/7/2023)    Housing Stability Vital Sign     Unable to Pay for Housing in the Last Year: No     Number of Places Lived in the Last Year: 1     Unstable Housing in the Last Year: No   [2]   Current Outpatient Medications:     amLODIPine (NORVASC) 5 MG tablet, Take 1 tablet (5 mg total) by mouth once daily., Disp: 90 tablet, Rfl: 3    ascorbic acid, vitamin C, (VITAMIN C) 500 MG tablet, Take 500 mg by mouth once daily., Disp: , Rfl:     aspirin (ECOTRIN) 81 MG EC tablet, Take 1 tablet (81 mg total) by mouth once daily., Disp: 90 tablet, Rfl: 3    b complex vitamins tablet, Take 1 tablet by mouth once daily., Disp: , Rfl:     CALCIUM CARBONATE/VITAMIN D3 (VITAMIN D-3 ORAL), Take by mouth., Disp: , Rfl:     docusate sodium (COLACE) 50 MG capsule, Take by mouth 2 (two) times daily as needed for Constipation., Disp: , Rfl:     finasteride (PROSCAR) 5 mg tablet, Take 1 tablet (5 mg total) by mouth once daily., Disp: 90 tablet, Rfl: 3    fluocinonide (LIDEX) 0.05 % ointment, Apply up to twice a day to scaly rash on body for 3 wks or less, Disp: 60 g, Rfl: 1    fluorouraciL (EFUDEX) 5 % cream, Apply topically 2 (two) times daily. Apply topically 2 times daily for 2 weeks, Disp: 40 g, Rfl: 0    fluticasone propionate (FLONASE) 50 mcg/actuation nasal spray, SPRAY 1 SPRAY (50 MCG TOTAL) INTO INTO EACH NOSTRIL TWICE A DAY, Disp: 48 mL, Rfl: 1    ketoconazole (NIZORAL) 2 % shampoo, Lather onto scalp, leave on x 3-5 minutes, then rinse.  Repeat TIW, Disp: 120 mL, Rfl: 2    levothyroxine (UNITHROID) 150 MCG tablet, Take 150 mcg by mouth before breakfast., Disp: , Rfl:     losartan (COZAAR) 25 MG tablet, Take 1 tablet (25 mg total) by mouth 2 (two) times  daily., Disp: 180 tablet, Rfl: 3    multivitamin (THERAGRAN) per tablet, Take 1 tablet by mouth once daily., Disp: , Rfl:     mupirocin (BACTROBAN) 2 % ointment, by Nasal route 3 (three) times daily., Disp: 20 g, Rfl: 0    nitroGLYCERIN (NITROSTAT) 0.4 MG SL tablet, Place 0.4 mg under the tongue every 5 (five) minutes as needed for Chest pain., Disp: , Rfl:     omega-3 fatty acids/fish oil (FISH OIL-OMEGA-3 FATTY ACIDS) 300-1,000 mg capsule, Take 1 capsule by mouth once daily., Disp: 90 capsule, Rfl: 3    tamsulosin (FLOMAX) 0.4 mg Cap, Take 1 capsule (0.4 mg total) by mouth once daily., Disp: 90 capsule, Rfl: 3    pravastatin (PRAVACHOL) 20 MG tablet, Take 1 tablet (20 mg total) by mouth once daily., Disp: 90 tablet, Rfl: 3

## 2025-02-21 DIAGNOSIS — Z00.00 ENCOUNTER FOR MEDICARE ANNUAL WELLNESS EXAM: ICD-10-CM

## 2025-03-13 ENCOUNTER — TELEPHONE (OUTPATIENT)
Dept: FAMILY MEDICINE | Facility: CLINIC | Age: 85
End: 2025-03-13
Payer: MEDICARE

## 2025-03-13 NOTE — TELEPHONE ENCOUNTER
----- Message from Poornima sent at 3/13/2025 12:12 PM CDT -----  Type: General Call Back Name of Caller:pt Reason pt states that he had gotten a letter about making an wellness visit, I know to believe that's different from an annual Would the patient rather a call back or a response via [facility]? Call Best Call Back Number:[phone] Additional Information:

## 2025-03-18 ENCOUNTER — LAB VISIT (OUTPATIENT)
Dept: LAB | Facility: HOSPITAL | Age: 85
End: 2025-03-18
Attending: INTERNAL MEDICINE
Payer: MEDICARE

## 2025-03-18 DIAGNOSIS — I51.9 MYXEDEMA HEART DISEASE: ICD-10-CM

## 2025-03-18 DIAGNOSIS — E03.9 MYXEDEMA HEART DISEASE: ICD-10-CM

## 2025-03-18 DIAGNOSIS — I51.9 MYXEDEMA HEART DISEASE: Primary | ICD-10-CM

## 2025-03-18 DIAGNOSIS — E03.9 MYXEDEMA HEART DISEASE: Primary | ICD-10-CM

## 2025-03-18 LAB
T4 FREE SERPL-MCNC: 0.99 NG/DL (ref 0.71–1.51)
TSH SERPL DL<=0.005 MIU/L-ACNC: 7.3 UIU/ML (ref 0.34–5.6)

## 2025-03-18 PROCEDURE — 36415 COLL VENOUS BLD VENIPUNCTURE: CPT | Performed by: INTERNAL MEDICINE

## 2025-03-18 PROCEDURE — 84443 ASSAY THYROID STIM HORMONE: CPT | Performed by: INTERNAL MEDICINE

## 2025-03-18 PROCEDURE — 84439 ASSAY OF FREE THYROXINE: CPT | Performed by: INTERNAL MEDICINE

## 2025-03-25 ENCOUNTER — TELEPHONE (OUTPATIENT)
Dept: FAMILY MEDICINE | Facility: CLINIC | Age: 85
End: 2025-03-25
Payer: MEDICARE

## 2025-03-25 NOTE — TELEPHONE ENCOUNTER
----- Message from Butch Cisneros sent at 3/25/2025  1:35 PM CDT -----  Can you make this pt an appt for Alivia please  ----- Message -----  From: Kaila Cheng  Sent: 3/25/2025  12:55 PM CDT  To: [facility]isa May Staff    Pt is calling back to get rescheduled for his annual wellness visit [phone]

## 2025-03-25 NOTE — TELEPHONE ENCOUNTER
----- Message from Kaila sent at 3/25/2025 12:53 PM CDT -----  Pt is calling back to get rescheduled for his annual wellness visit [phone]

## 2025-04-07 ENCOUNTER — TELEPHONE (OUTPATIENT)
Dept: CARDIOLOGY | Facility: CLINIC | Age: 85
End: 2025-04-07
Payer: MEDICARE

## 2025-04-07 ENCOUNTER — TELEPHONE (OUTPATIENT)
Dept: FAMILY MEDICINE | Facility: CLINIC | Age: 85
End: 2025-04-07
Payer: MEDICARE

## 2025-04-07 NOTE — TELEPHONE ENCOUNTER
This patient showed up at the office . He needs his medication called in [facility] -[phone]  Losartan 25 mg qd , 90 day , 1 rf     Patient showed up today , to verified with the patient .

## 2025-04-07 NOTE — TELEPHONE ENCOUNTER
----- Message from Elizabeth sent at 4/7/2025 12:38 PM CDT -----  Regarding: Medication refill  Patient is needing a refill on his prescription of losartan and for it to be sent to  (COZAAR) 25 MG tablet [id number] - [place], [place] - [address] AT [facility]. He is completely out of medication. Please contact patient if any questions. Thank you.

## 2025-06-04 DIAGNOSIS — Z87.898 HISTORY OF URINARY RETENTION: ICD-10-CM

## 2025-06-04 DIAGNOSIS — R39.9 LOWER URINARY TRACT SYMPTOMS (LUTS): ICD-10-CM

## 2025-06-05 RX ORDER — TAMSULOSIN HYDROCHLORIDE 0.4 MG/1
1 CAPSULE ORAL
Qty: 90 CAPSULE | Refills: 3 | Status: SHIPPED | OUTPATIENT
Start: 2025-06-05

## 2025-06-09 ENCOUNTER — TELEPHONE (OUTPATIENT)
Dept: PULMONOLOGY | Facility: CLINIC | Age: 85
End: 2025-06-09
Payer: MEDICARE

## 2025-06-09 ENCOUNTER — RESULTS FOLLOW-UP (OUTPATIENT)
Dept: FAMILY MEDICINE | Facility: CLINIC | Age: 85
End: 2025-06-09

## 2025-06-09 ENCOUNTER — OFFICE VISIT (OUTPATIENT)
Dept: FAMILY MEDICINE | Facility: CLINIC | Age: 85
End: 2025-06-09
Payer: MEDICARE

## 2025-06-09 ENCOUNTER — OFFICE VISIT (OUTPATIENT)
Dept: PULMONOLOGY | Facility: CLINIC | Age: 85
End: 2025-06-09
Payer: MEDICARE

## 2025-06-09 ENCOUNTER — TELEPHONE (OUTPATIENT)
Dept: FAMILY MEDICINE | Facility: CLINIC | Age: 85
End: 2025-06-09

## 2025-06-09 VITALS
HEIGHT: 70 IN | WEIGHT: 216.5 LBS | HEART RATE: 58 BPM | SYSTOLIC BLOOD PRESSURE: 151 MMHG | DIASTOLIC BLOOD PRESSURE: 70 MMHG | OXYGEN SATURATION: 98 % | BODY MASS INDEX: 30.99 KG/M2

## 2025-06-09 VITALS
HEART RATE: 63 BPM | SYSTOLIC BLOOD PRESSURE: 132 MMHG | OXYGEN SATURATION: 99 % | RESPIRATION RATE: 12 BRPM | TEMPERATURE: 98 F | BODY MASS INDEX: 30.96 KG/M2 | HEIGHT: 70 IN | DIASTOLIC BLOOD PRESSURE: 70 MMHG | WEIGHT: 216.25 LBS

## 2025-06-09 DIAGNOSIS — I25.10 ATHEROSCLEROSIS OF NATIVE CORONARY ARTERY OF NATIVE HEART WITHOUT ANGINA PECTORIS: ICD-10-CM

## 2025-06-09 DIAGNOSIS — G47.33 OBSTRUCTIVE SLEEP APNEA: Primary | ICD-10-CM

## 2025-06-09 DIAGNOSIS — R42 LIGHT HEADED: Primary | ICD-10-CM

## 2025-06-09 DIAGNOSIS — E66.811 OBESITY (BMI 30.0-34.9): ICD-10-CM

## 2025-06-09 LAB
OHS QRS DURATION: 96 MS
OHS QTC CALCULATION: 413 MS

## 2025-06-09 PROCEDURE — 99999 PR PBB SHADOW E&M-EST. PATIENT-LVL III: CPT | Mod: PBBFAC,,, | Performed by: NURSE PRACTITIONER

## 2025-06-09 PROCEDURE — 99214 OFFICE O/P EST MOD 30 MIN: CPT | Mod: S$PBB,,, | Performed by: INTERNAL MEDICINE

## 2025-06-09 PROCEDURE — 99214 OFFICE O/P EST MOD 30 MIN: CPT | Mod: PBBFAC,27,PO | Performed by: INTERNAL MEDICINE

## 2025-06-09 PROCEDURE — 99999 PR PBB SHADOW E&M-EST. PATIENT-LVL IV: CPT | Mod: PBBFAC,,, | Performed by: INTERNAL MEDICINE

## 2025-06-09 PROCEDURE — 99213 OFFICE O/P EST LOW 20 MIN: CPT | Mod: PBBFAC,PN | Performed by: NURSE PRACTITIONER

## 2025-06-09 PROCEDURE — 99214 OFFICE O/P EST MOD 30 MIN: CPT | Mod: S$PBB,,, | Performed by: NURSE PRACTITIONER

## 2025-06-09 PROCEDURE — 93005 ELECTROCARDIOGRAM TRACING: CPT | Mod: PBBFAC,PN | Performed by: INTERNAL MEDICINE

## 2025-06-09 PROCEDURE — 93010 ELECTROCARDIOGRAM REPORT: CPT | Mod: S$PBB,,, | Performed by: INTERNAL MEDICINE

## 2025-06-09 RX ORDER — AMOXICILLIN 500 MG/1
CAPSULE ORAL
COMMUNITY
Start: 2025-05-11

## 2025-06-09 RX ORDER — DEXAMETHASONE 4 MG/1
4 TABLET ORAL
COMMUNITY
Start: 2025-04-08

## 2025-06-09 NOTE — PROGRESS NOTES
This dictation has been generated using Modal Fluency Dictation some phonetic errors may occur. Please contact author for clarification if needed.     1. Light headed  -     IN OFFICE EKG 12-LEAD (to Muse)  -     US Carotid Bilateral; Future; Expected date: 06/09/2025    2. Atherosclerosis of native coronary artery of native heart without angina pectoris  -     US Carotid Bilateral; Future; Expected date: 06/09/2025         EKG   Bradycardia mild per my interpretation with leftward axis. Compared to EKG 2023 left anterior fascicular block no longer present first-degree AV block noted. Follow up cardiology.   Check US carotids.     I will review all results and address accordingly.   No follow-ups on file.    ________________________________________________________________  ________________________________________________________________      Chief Complaint   Patient presents with    Dizziness     History of present illness    This 84 y.o. presents today for complaint of   Lightheaded.  Symptoms occurred a week ago Sunday.  Notes that he was out of town and walked up a flight and half of steps.  While walking across the room he became lightheaded. He sat down in the feeling passed.  They checked his blood pressure at that time and it was normal patient states about like it is now.  He had another episode later in the day after standing up having just eaten.  That time was less remarkable.  He did not have any chest pain or palpitations.  He did not feel like he was going to pass out.  No other neuro or cardiovascular symptoms.  He does take his blood pressure medicine as directed in his cholesterol medicine.  He also takes aspirin daily.  Reviewed prior EKG and stress testing.  Reviewed heart catheterization and note no hemodynamically significant obstructions.  It has been greater than 1 year since last appointment with Cardiology we discussed his need for follow-up with Cardiology.      Past Medical History:    Diagnosis Date    Allergy     Codeine    Closed fracture dislocation of lumbar spine 07/06/2018    #2 vertebra. Patient went to the emergency Department.  Followup with Erik:    GERD (gastroesophageal reflux disease)     Hyperlipidemia     Hypertension     Hypertensive retinopathy of both eyes 05/11/2021    As per ophthalmology notes.  Eye care 20/20    Incidental lung nodule, > 3mm and < 8mm 03/17/2024    There is a 4 mm noncalcified pulmonary nodule seen in association with the right upper lobe of the lung laterally.  Although the patient has prior CTs these are not of they will oval for viewing.     The liver, spleen, pancreas and gallbladder are unremarkable.  The portal vein, splenic vein and superior mesenteric veins are patent.     The patient has an infrarenal abdominal aortic aneurysm measu    Right inguinal hernia     Squamous cell carcinoma of skin     Thyroid disease        Past Surgical History:   Procedure Laterality Date    ADENOIDECTOMY      ANGIOGRAM, CORONARY, WITH LEFT HEART CATHETERIZATION N/A 3/23/2023    Procedure: Angiogram, Coronary, with Left Heart Cath;  Surgeon: Tomas Salomon MD;  Location: [facility] CATH/EP LAB;  Service: Cardiology;  Laterality: N/A;    INGUINAL HERNIA REPAIR Right 07/12/2018    Dr. Thomas - [facility]    KNEE SURGERY Left 07/31/2019    TONSILLECTOMY      VASECTOMY         Family History   Problem Relation Name Age of Onset    Heart disease Father      Miscarriages / Stillbirths Father      Skin cancer Mother      Cancer Maternal Grandfather      Stomach cancer Paternal Grandfather         Social History     Socioeconomic History    Marital status:      Spouse name: Alisa galvin    Number of children: 3   Occupational History    Occupation: [employer]-      Comment:     Tobacco Use    Smoking status: Never    Smokeless tobacco: Never   Substance and Sexual Activity    Alcohol use: No    Drug use: No    Sexual activity: Not Currently      "Partners: Female     Social Drivers of Health     Financial Resource Strain: Low Risk  (3/7/2023)    Overall Financial Resource Strain (CARDIA)     Difficulty of Paying Living Expenses: Not very hard   Food Insecurity: No Food Insecurity (3/7/2023)    Hunger Vital Sign     Worried About Running Out of Food in the Last Year: Never true     Ran Out of Food in the Last Year: Never true   Transportation Needs: No Transportation Needs (3/7/2023)    PRAPARE - Transportation     Lack of Transportation (Medical): No     Lack of Transportation (Non-Medical): No   Physical Activity: Inactive (3/7/2023)    Exercise Vital Sign     Days of Exercise per Week: 0 days     Minutes of Exercise per Session: 0 min   Stress: Stress Concern Present (3/7/2023)    [employer] [place] [employer] - Occupational Stress Questionnaire     Feeling of Stress : To some extent   Housing Stability: Low Risk  (3/7/2023)    Housing Stability Vital Sign     Unable to Pay for Housing in the Last Year: No     Number of Places Lived in the Last Year: 1     Unstable Housing in the Last Year: No       Current Medications[1]    Review of patient's allergies indicates:   Allergen Reactions    Codeine Other (See Comments)     mood changes  Makes him feel violent       Physical examination  Vitals Reviewed  /70 (BP Location: Right arm, Patient Position: Sitting)   Pulse 63   Temp 97.6 °F (36.4 °C) (Oral)   Resp 12   Ht 5' 10" (1.778 m)   Wt 98.1 kg (216 lb 4.3 oz)   SpO2 99%   BMI 31.03 kg/m²  Body mass index is 31.03 kg/m².     BP Readings from Last 3 Encounters:   06/09/25 132/70   02/20/25 132/72   10/17/24 139/69       Wt Readings from Last 3 Encounters:   06/09/25 98.1 kg (216 lb 4.3 oz)   02/20/25 99.8 kg (220 lb)   10/17/24 98 kg (216 lb)     Gen. Well-dressed well-nourished   Skin warm dry and intact.  No rashes noted.  HEENT.  TM intact bilateral with normal light reflex.   Pharynx is unremarkable.  No maxillary or frontal sinus " tenderness when percussed.    Neck is supple without adenopathy  Chest.  Respirations are even unlabored.  Lungs are clear to auscultation.  Cardiac regular rate and rhythm.  No chest wall adenopathy noted.  Neuro. Awake alert oriented x4.  Normal judgment and cognition noted. CII CXII intact.   Extremities no clubbing cyanosis or edema noted. Normal gait.     Call or return to clinic prn if these symptoms worsen or fail to improve as anticipated.           [1]   Current Outpatient Medications   Medication Sig Dispense Refill    amLODIPine (NORVASC) 5 MG tablet Take 1 tablet (5 mg total) by mouth once daily. 90 tablet 3    ascorbic acid, vitamin C, (VITAMIN C) 500 MG tablet Take 500 mg by mouth once daily.      aspirin (ECOTRIN) 81 MG EC tablet Take 1 tablet (81 mg total) by mouth once daily. 90 tablet 3    b complex vitamins tablet Take 1 tablet by mouth once daily.      CALCIUM CARBONATE/VITAMIN D3 (VITAMIN D-3 ORAL) Take by mouth.      docusate sodium (COLACE) 50 MG capsule Take by mouth 2 (two) times daily as needed for Constipation.      finasteride (PROSCAR) 5 mg tablet Take 1 tablet (5 mg total) by mouth once daily. 90 tablet 3    fluocinonide (LIDEX) 0.05 % ointment Apply up to twice a day to scaly rash on body for 3 wks or less 60 g 1    fluorouraciL (EFUDEX) 5 % cream Apply topically 2 (two) times daily. Apply topically 2 times daily for 2 weeks 40 g 0    fluticasone propionate (FLONASE) 50 mcg/actuation nasal spray SPRAY 1 SPRAY (50 MCG TOTAL) INTO INTO EACH NOSTRIL TWICE A DAY 48 mL 1    ketoconazole (NIZORAL) 2 % shampoo Lather onto scalp, leave on x 3-5 minutes, then rinse.  Repeat  mL 2    levothyroxine (UNITHROID) 150 MCG tablet Take 150 mcg by mouth before breakfast.      losartan (COZAAR) 25 MG tablet Take 1 tablet (25 mg total) by mouth 2 (two) times daily. 180 tablet 3    multivitamin (THERAGRAN) per tablet Take 1 tablet by mouth once daily.      mupirocin (BACTROBAN) 2 % ointment by Nasal  route 3 (three) times daily. 20 g 0    nitroGLYCERIN (NITROSTAT) 0.4 MG SL tablet Place 0.4 mg under the tongue every 5 (five) minutes as needed for Chest pain.      omega-3 fatty acids/fish oil (FISH OIL-OMEGA-3 FATTY ACIDS) 300-1,000 mg capsule Take 1 capsule by mouth once daily. 90 capsule 3    pravastatin (PRAVACHOL) 20 MG tablet Take 1 tablet (20 mg total) by mouth once daily. 90 tablet 3    tamsulosin (FLOMAX) 0.4 mg Cap TAKE 1 CAPSULE BY MOUTH EVERY DAY 90 capsule 3     No current facility-administered medications for this visit.

## 2025-06-09 NOTE — PROGRESS NOTES
6/9/2025    Nikolay Abe Barraza  Follow up    Chief Complaint   Patient presents with    Sleep Apnea       HPI:   06/09/2025- pt uses CPAP nightly. He had an episode of lightheadedness during a trip out of town when he had not been using CPAP. He used to see Dr. Briseno- last note from 7/18/23 reviewed. He uses CPAP 8-12cm with nasal pillows. He sometimes has daytime sleepiness, naps in chair.  He has chronic cough/phlegm. He is a nonsmoker    3/2023-   Try albuterol inhaler to see if it helps - use 1-2 puffs as needed for shortness of breath  Continue cpap nightly use  Get pulmonary function tests  Walk in office to see if your sats drop  Pt is an 83 yo male with HTN, HLD, GERD, thyroid disease presenting for new evaluation.  Pt c/o feeling short of breath & lightheaded w/ exercise. Pt started noticing about 3-4mos ago w/ episode while walking from dinner felt lightheaded, presyncopal, band of discomfort over chest. Had angiogram recently w/ non-obstructive CAD. Has also seen neurology, brain MRI pending.  Episodes will happen predictably w/ exertion- starts breathing harder, gets light headed and feels like going to pass out, then sits down and resolves.  Wife states pt always coughs, clears throat- denies phlegm. Has been on cpap many years- follows w/ sleep dr Briseno.  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    The chief complaint problem is new to me.    PFSH:  Past Medical History:   Diagnosis Date    Allergy     Codeine    Closed fracture dislocation of lumbar spine 07/06/2018    #2 vertebra. Patient went to the emergency Department.  Followup with Erik:    GERD (gastroesophageal reflux disease)     Hyperlipidemia     Hypertension     Hypertensive retinopathy of both eyes 05/11/2021    As per ophthalmology notes.  Eye care 20/20    Incidental lung nodule, > 3mm and < 8mm 03/17/2024    There is a 4 mm noncalcified pulmonary nodule seen in association with the  "right upper lobe of the lung laterally.  Although the patient has prior CTs these are not of they will oval for viewing.     The liver, spleen, pancreas and gallbladder are unremarkable.  The portal vein, splenic vein and superior mesenteric veins are patent.     The patient has an infrarenal abdominal aortic aneurysm measu    Right inguinal hernia     Squamous cell carcinoma of skin     Thyroid disease          Past Surgical History:   Procedure Laterality Date    ADENOIDECTOMY      ANGIOGRAM, CORONARY, WITH LEFT HEART CATHETERIZATION N/A 3/23/2023    Procedure: Angiogram, Coronary, with Left Heart Cath;  Surgeon: Tomas Salomon MD;  Location: [facility] CATH/EP LAB;  Service: Cardiology;  Laterality: N/A;    INGUINAL HERNIA REPAIR Right 07/12/2018    Dr. Thomas - [facility]    KNEE SURGERY Left 07/31/2019    TONSILLECTOMY      VASECTOMY       Social History     Tobacco Use    Smoking status: Never    Smokeless tobacco: Never   Substance Use Topics    Alcohol use: No    Drug use: No     Family History   Problem Relation Name Age of Onset    Heart disease Father      Miscarriages / Stillbirths Father      Skin cancer Mother      Cancer Maternal Grandfather      Stomach cancer Paternal Grandfather       Review of patient's allergies indicates:   Allergen Reactions    Codeine Other (See Comments)     mood changes  Makes him feel violent       Performance Status:The patient's activity level is functions out of house.      Review of Systems:  a review of eleven systems covering constitutional, Eye, HEENT, Psych, Respiratory, Cardiac, GI, , Musculoskeletal, Endocrine, Dermatologic was negative except for pertinent findings as listed ABOVE and below:  All negative with pertinent positives as above        Exam:Comprehensive exam done. BP (!) 151/70 (BP Location: Right arm, Patient Position: Sitting)   Pulse (!) 58   Ht 5' 10" (1.778 m)   Wt 98.2 kg (216 lb 7.9 oz)   SpO2 98% Comment: on room air at rest  BMI 31.06 kg/m²   Exam " included Vitals as listed, and patient's appearance and affect and alertness and mood, oral exam for yeast and hygiene and pharynx lesions and Mallapatti (M) score, neck with inspection for jvd and masses and thyroid abnormalities and lymph nodes (supraclavicular and infraclavicular nodes and axillary also examined and noted if abn), chest exam included symmetry and effort and fremitus and percussion and auscultation, cardiac exam included rhythm and gallops and murmur and rubs and jvd and edema, abdominal exam for mass and hepatosplenomegaly and tenderness and hernias and bowel sounds, Musculoskeletal exam with muscle tone and posture and mobility/gait and  strength, and skin for rashes and cyanosis and pallor and turgor, extremity for clubbing.  Findings were normal except for pertinent findings listed below:  Oropharynx clear, M4  HR regular  Breath sounds clear bilaterally  No edema/clubbing      Radiographs (ct chest and cxr) reviewed: view by direct vision and interpreted  CXR 3/22/24- clear lungs  CXR 1/27/23- clear    TTE 2/23/23-   The left ventricle is normal in size with mild concentric hypertrophy and normal systolic function.  The estimated ejection fraction is 65%.  Indeterminate left ventricular diastolic function.  Normal right ventricular size with normal right ventricular systolic function.  Mild left atrial enlargement.  Normal central venous pressure (3 mmHg).  The estimated PA systolic pressure is 17 mmHg.    Labs reviewed      Lab Results   Component Value Date    WBC 6.14 12/02/2024    HGB 13.2 (L) 12/02/2024    HCT 40.6 12/02/2024    MCV 89 12/02/2024     12/02/2024       CMP  Sodium   Date Value Ref Range Status   02/10/2025 140 136 - 145 mmol/L Final   05/22/2018 141 134 - 144 mmol/L      Potassium   Date Value Ref Range Status   02/10/2025 4.0 3.5 - 5.1 mmol/L Final     Chloride   Date Value Ref Range Status   02/10/2025 105 95 - 110 mmol/L Final   05/22/2018 107 98 - 110 mmol/L  "     CO2   Date Value Ref Range Status   02/10/2025 24 23 - 29 mmol/L Final     Glucose   Date Value Ref Range Status   02/10/2025 128 (H) 70 - 110 mg/dL Final   05/22/2018 109 (H) 70 - 99 mg/dL      BUN   Date Value Ref Range Status   02/10/2025 20 8 - 23 mg/dL Final     Creatinine   Date Value Ref Range Status   02/10/2025 1.2 0.5 - 1.4 mg/dL Final   05/22/2018 1.26 0.60 - 1.40 mg/dL      Calcium   Date Value Ref Range Status   02/10/2025 9.5 8.7 - 10.5 mg/dL Final     Total Protein   Date Value Ref Range Status   12/26/2020 7.9 6.0 - 8.4 g/dL Final     Albumin   Date Value Ref Range Status   01/23/2025 4.2 3.5 - 5.2 g/dL Final   05/22/2018 4.0 3.1 - 4.7 g/dL      Total Bilirubin   Date Value Ref Range Status   12/26/2020 0.7 0.1 - 1.0 mg/dL Final     Comment:     For infants and newborns, interpretation of results should be based  on gestational age, weight and in agreement with clinical  observations.  Premature Infant recommended reference ranges:  Up to 24 hours.............<8.0 mg/dL  Up to 48 hours............<12.0 mg/dL  3-5 days..................<15.0 mg/dL  6-29 days.................<15.0 mg/dL       Alkaline Phosphatase   Date Value Ref Range Status   12/26/2020 98 55 - 135 U/L Final     AST   Date Value Ref Range Status   12/26/2020 34 10 - 40 U/L Final     ALT   Date Value Ref Range Status   12/26/2020 24 10 - 44 U/L Final     Anion Gap   Date Value Ref Range Status   02/10/2025 11 8 - 16 mmol/L Final     eGFR   Date Value Ref Range Status   02/10/2025 59.3 (A) >60 mL/min/1.73 m^2 Final           PFT reviewed  2023- normal      Plan:  Clinical impression is reasonably certain and repeated evaluation prn +/- follow up will be needed as below.     Nikolay Denise" was seen today for sleep apnea.    Diagnoses and all orders for this visit:    Obstructive sleep apnea    Obesity (BMI 30.0-34.9)          Follow up in about 1 year (around 6/9/2026).    Discussed with patient above for education the " following:      Patient Instructions   Continue CPAP nightly use  Will request CPAP compliance report  Need to use CPAP even when traveling  Continue exercise, weight loss recommended

## 2025-06-09 NOTE — TELEPHONE ENCOUNTER
----- Message from Diana sent at 6/9/2025 10:51 AM CDT -----  Wife Alisa calling to speak with a nurse. [phone]

## 2025-06-11 ENCOUNTER — OFFICE VISIT (OUTPATIENT)
Dept: CARDIOLOGY | Facility: CLINIC | Age: 85
End: 2025-06-11
Payer: MEDICARE

## 2025-06-11 ENCOUNTER — HOSPITAL ENCOUNTER (OUTPATIENT)
Dept: RADIOLOGY | Facility: HOSPITAL | Age: 85
Discharge: HOME | End: 2025-06-11
Attending: NURSE PRACTITIONER
Payer: MEDICARE

## 2025-06-11 VITALS
WEIGHT: 213.88 LBS | SYSTOLIC BLOOD PRESSURE: 145 MMHG | HEIGHT: 70 IN | BODY MASS INDEX: 30.62 KG/M2 | DIASTOLIC BLOOD PRESSURE: 72 MMHG | HEART RATE: 57 BPM

## 2025-06-11 DIAGNOSIS — R55 PRE-SYNCOPE: ICD-10-CM

## 2025-06-11 DIAGNOSIS — I25.10 CORONARY ARTERY DISEASE INVOLVING NATIVE CORONARY ARTERY OF NATIVE HEART WITHOUT ANGINA PECTORIS: Primary | ICD-10-CM

## 2025-06-11 DIAGNOSIS — R42 LIGHT HEADED: ICD-10-CM

## 2025-06-11 DIAGNOSIS — E78.2 MIXED HYPERLIPIDEMIA: ICD-10-CM

## 2025-06-11 DIAGNOSIS — I25.10 ATHEROSCLEROSIS OF NATIVE CORONARY ARTERY OF NATIVE HEART WITHOUT ANGINA PECTORIS: ICD-10-CM

## 2025-06-11 DIAGNOSIS — R00.1 BRADYCARDIA: ICD-10-CM

## 2025-06-11 DIAGNOSIS — Z86.79 H/O ATRIAL FIBRILLATION WITHOUT CURRENT MEDICATION: ICD-10-CM

## 2025-06-11 DIAGNOSIS — R42 DIZZINESS: ICD-10-CM

## 2025-06-11 DIAGNOSIS — I44.0 1ST DEGREE AV BLOCK: ICD-10-CM

## 2025-06-11 PROCEDURE — 99999 PR PBB SHADOW E&M-EST. PATIENT-LVL V: CPT | Mod: PBBFAC,,, | Performed by: INTERNAL MEDICINE

## 2025-06-11 PROCEDURE — 99215 OFFICE O/P EST HI 40 MIN: CPT | Mod: S$PBB,,, | Performed by: INTERNAL MEDICINE

## 2025-06-11 PROCEDURE — 93880 EXTRACRANIAL BILAT STUDY: CPT | Mod: TC,PO

## 2025-06-11 PROCEDURE — 99215 OFFICE O/P EST HI 40 MIN: CPT | Mod: PBBFAC,PN | Performed by: INTERNAL MEDICINE

## 2025-06-11 RX ORDER — PRAVASTATIN SODIUM 20 MG/1
20 TABLET ORAL DAILY
Qty: 90 TABLET | Refills: 3 | Status: SHIPPED | OUTPATIENT
Start: 2025-06-11 | End: 2026-06-11

## 2025-06-11 NOTE — PROGRESS NOTES
[facility] [place] Pending sale to [facility]    Subjective:     Patient ID:  Nikolay Barraza is a 84 y.o. male patient here for evaluation Hyperlipidemia and Hypertension      History of Present Illness    CHIEF COMPLAINT:  Patient presents today with episodes of near-syncope    HISTORY OF PRESENT ILLNESS:  He experienced two near-syncopal episodes on the same day. The first episode occurred after ascending one flight of stairs, requiring assistance to sit down and rest. The second episode, less severe, occurred when standing up after a meal at a restaurant. He denies any recurrence of symptoms since these events, even while engaging in outdoor activities in hot weather.    CARDIOVASCULAR:  He has a history of AF and CAD with non-critical stenosis identified on angiogram in 2023. He currently takes aspirin as his only anticoagulant. He declined loop recorder placement in 2022 for heart rhythm monitoring.    SLEEP:  He uses a CPAP.      ROS:  ROS is negative unless otherwise indicated in HPI.         Past Medical History:   Diagnosis Date    Allergy     Codeine    Closed fracture dislocation of lumbar spine 07/06/2018    #2 vertebra. Patient went to the emergency Department.  Followup with Erik:    GERD (gastroesophageal reflux disease)     Hyperlipidemia     Hypertension     Hypertensive retinopathy of both eyes 05/11/2021    As per ophthalmology notes.  Eye care 20/20    Incidental lung nodule, > 3mm and < 8mm 03/17/2024    There is a 4 mm noncalcified pulmonary nodule seen in association with the right upper lobe of the lung laterally.  Although the patient has prior CTs these are not of they will oval for viewing.     The liver, spleen, pancreas and gallbladder are unremarkable.  The portal vein, splenic vein and superior mesenteric veins are patent.     The patient has an infrarenal abdominal aortic aneurysm measu    Right inguinal hernia     Squamous cell carcinoma of skin      Thyroid disease        Past Surgical History:   Procedure Laterality Date    ADENOIDECTOMY      ANGIOGRAM, CORONARY, WITH LEFT HEART CATHETERIZATION N/A 3/23/2023    Procedure: Angiogram, Coronary, with Left Heart Cath;  Surgeon: Tomas Salomon MD;  Location: [facility] CATH[facility]EP LAB;  Service: Cardiology;  Laterality: N/A;    INGUINAL HERNIA REPAIR Right 07/12/2018    Dr. [facility]    KNEE SURGERY Left 07/31/2019    TONSILLECTOMY      VASECTOMY         Family History   Problem Relation Name Age of Onset    Heart disease Father      Miscarriages / Stillbirths Father      Skin cancer Mother      Cancer Maternal Grandfather      Stomach cancer Paternal Grandfather         Social History     Socioeconomic History    Marital status:      Spouse name: Alisa galvin    Number of children: 3   Occupational History    Occupation: [employer]-      Comment:     Tobacco Use    Smoking status: Never    Smokeless tobacco: Never   Substance and Sexual Activity    Alcohol use: No    Drug use: No    Sexual activity: Not Currently     Partners: Female     Social Drivers of Health     Financial Resource Strain: Low Risk  (3/7/2023)    Overall Financial Resource Strain (CARDIA)     Difficulty of Paying Living Expenses: Not very hard   Food Insecurity: No Food Insecurity (3/7/2023)    Hunger Vital Sign     Worried About Running Out of Food in the Last Year: Never true     Ran Out of Food in the Last Year: Never true   Transportation Needs: No Transportation Needs (3/7/2023)    PRAPARE - Transportation     Lack of Transportation (Medical): No     Lack of Transportation (Non-Medical): No   Physical Activity: Inactive (3/7/2023)    Exercise Vital Sign     Days of Exercise per Week: 0 days     Minutes of Exercise per Session: 0 min   Stress: Stress Concern Present (3/7/2023)    [employer] [place] [employer] - Occupational Stress Questionnaire     Feeling of Stress : To some extent   Housing  Stability: Low Risk  (3/7/2023)    Housing Stability Vital Sign     Unable to Pay for Housing in the Last Year: No     Number of Places Lived in the Last Year: 1     Unstable Housing in the Last Year: No       Current Medications[1]    Review of patient's allergies indicates:   Allergen Reactions    Codeine Other (See Comments)     mood changes  Makes him feel violent         Objective:        Vitals:    06/11/25 1017   BP: (!) 145/72   Pulse: (!) 57       Physical Exam  Vitals reviewed.   Constitutional:       Appearance: Normal appearance.   Cardiovascular:      Rate and Rhythm: Regular rhythm. Bradycardia present.      Pulses: Normal pulses.      Heart sounds: Normal heart sounds. No murmur heard.     No gallop.   Pulmonary:      Effort: Pulmonary effort is normal.   Skin:     General: Skin is warm.   Neurological:      General: No focal deficit present.      Mental Status: He is alert and oriented to person, place, and time.         LIPIDS - LAST 2   Lab Results   Component Value Date    CHOL 158 10/15/2024    CHOL 167 10/03/2023    HDL 37 (L) 10/15/2024    HDL 43 10/03/2023    LDLCALC 95.2 10/15/2024    LDLCALC 102.0 10/03/2023    TRIG 129 10/15/2024    TRIG 110 10/03/2023    CHOLHDL 23.4 10/15/2024    CHOLHDL 25.7 10/03/2023       CBC - LAST 2  Lab Results   Component Value Date    WBC 6.14 12/02/2024    WBC 5.46 10/14/2024    RBC 4.54 (L) 12/02/2024    RBC 4.85 10/14/2024    HGB 13.2 (L) 12/02/2024    HGB 14.3 10/14/2024    HCT 40.6 12/02/2024    HCT 43.0 10/14/2024    MCV 89 12/02/2024    MCV 89 10/14/2024    MCH 29.1 12/02/2024    MCH 29.5 10/14/2024    MCHC 32.5 12/02/2024    MCHC 33.3 10/14/2024    RDW 13.2 12/02/2024    RDW 14.1 10/14/2024     12/02/2024     10/14/2024    MPV 9.9 12/02/2024    MPV 9.6 10/14/2024    GRAN 4.0 12/02/2024    GRAN 64.9 12/02/2024    LYMPH 1.4 12/02/2024    LYMPH 22.1 12/02/2024    MONO 0.6 12/02/2024    MONO 9.9 12/02/2024    BASO 0.05 12/02/2024    BASO 0.05  07/11/2024    NRBC 0 12/02/2024    NRBC 0 07/11/2024       CHEMISTRY & LIVER FUNCTION - LAST 2  Lab Results   Component Value Date     02/10/2025     01/23/2025    K 4.0 02/10/2025    K 4.4 01/23/2025     02/10/2025     01/23/2025    CO2 24 02/10/2025    CO2 25 01/23/2025    ANIONGAP 11 02/10/2025    ANIONGAP 10 01/23/2025    BUN 20 02/10/2025    BUN 24 (H) 01/23/2025    CREATININE 1.2 02/10/2025    CREATININE 1.3 01/23/2025     (H) 02/10/2025    GLU 83 01/23/2025    CALCIUM 9.5 02/10/2025    CALCIUM 9.6 01/23/2025    MG 2.0 07/11/2024    MG 2.0 07/18/2023    ALBUMIN 4.2 01/23/2025    ALBUMIN 4.1 07/11/2024    PROT 7.9 12/26/2020    PROT 7.3 12/17/2020    ALKPHOS 98 12/26/2020    ALKPHOS 75 12/17/2020    ALT 24 12/26/2020    ALT 20 12/17/2020    AST 34 12/26/2020    AST 24 12/17/2020    BILITOT 0.7 12/26/2020    BILITOT 0.9 12/17/2020        CARDIAC PROFILE - LAST 2  Lab Results   Component Value Date    BNP 68 01/27/2023     (H) 04/07/2022    CPK 32 12/27/2020    CPK 37 12/26/2020    CPKMB 0.6 12/27/2020    CPKMB 0.9 12/26/2020    TROPONINI <0.006 12/27/2020    TROPONINI <0.006 12/27/2020        COAGULATION - LAST 2  Lab Results   Component Value Date    INR 1.0 12/26/2020    APTT 27.3 12/26/2020       ENDOCRINE & PSA - LAST 2  Lab Results   Component Value Date    HGBA1C 5.8 08/22/2017    TSH 7.298 (H) 03/18/2025    TSH 5.163 09/20/2024    PSA 0.11 06/28/2019    PSA 0.1 05/22/2018        ECHOCARDIOGRAM RESULTS  Results for orders placed during the hospital encounter of 02/23/23    Echo    Interpretation Summary  · The left ventricle is normal in size with mild concentric hypertrophy and normal systolic function.  · The estimated ejection fraction is 65%.  · Indeterminate left ventricular diastolic function.  · Normal right ventricular size with normal right ventricular systolic function.  · Mild left atrial enlargement.  · Normal central venous pressure (3 mmHg).  · The  estimated PA systolic pressure is 17 mmHg.      CURRENT/PREVIOUS VISIT EKG  Results for orders placed or performed in visit on 06/09/25   IN OFFICE EKG 12-LEAD (to [employer])    Collection Time: 06/09/25 10:00 AM   Result Value Ref Range    QRS Duration 96 ms    OHS QTC Calculation 413 ms    Narrative    Test Reason : R42,    Vent. Rate :  55 BPM     Atrial Rate :  55 BPM     P-R Int : 228 ms          QRS Dur :  96 ms      QT Int : 432 ms       P-R-T Axes :  62 -42  83 degrees    QTcB Int : 413 ms    Sinus bradycardia with 1st degree A-V block  Left axis deviation  Abnormal ECG  When compared with ECG of 20-Mar-2023 11:05,  PA interval has increased  QRS duration has increased  Minimal criteria for Anterior infarct are no longer Present  Criteria for Inferior infarct are no longer Present  Confirmed by Colton Rodriguez ([id number]) on 6/9/2025 11:02:20 AM    Referred By: Matthias Marquez           Confirmed By: Colton Rodriguez     No valid procedures specified.   Results for orders placed in visit on 06/21/22    Nuclear Stress Test    Interpretation Summary    The nuclear portion of this study will be reported separately.    The EKG portion of this study is negative for ischemia.    The patient reported chest pain during the stress test.    There were no arrhythmias during stress.    No valid procedures specified.        Assessment:        Assessment & Plan      54-year-old male with past medical history of atrial fibrillation when he had his knee surgery.  No documented recurrence since then.  Reports getting dizzy and lightheaded recently.  EKG shows sinus bradycardia with first-degree AV block.  Not on any AV beatrice blockers.  Blood pressure is mildly elevated.  Coronary angiogram done 2 years ago did not show any significant CAD.  Concern that patient might need a pacemaker.  Discussed briefly with EP Dr. Velasquez.  Will get monitor, exercise stress test to evaluate chronotropic competence and follow-up with EP.  Patient instructed to come  and get admitted if he has recurrent episodes of dizziness, lightheadedness or syncope.    PLAN SUMMARY:  - Ordered 14-day heart monitor for cardiac rhythm assessment  - Ordered stress test (treadmill preferred) to evaluate heart rate response  - Referred to Dr. Velasquez for pacemaker evaluation  - Follow up with Dr. Velasquez as scheduled by   - Go to emergency department if experiencing multiple episodes of dizziness, lightheadedness, or syncope before seeing Dr. Randhawa    BRADYCARDIA AND POTENTIAL PACEMAKER NEED:  - Patient may need a pacemaker due to low heart rate and EKG abnormalities.  - Consulted with Dr. Velasquez regarding EKG findings and potential need for pacemaker.  - Referred to Dr. Velasquez for evaluation of potential need for pacemaker.  - Ordered 14-day heart monitor to assess cardiac rhythm.    SYNCOPE AND COLLAPSE:  - Patient to go to the emergency department for admission if having multiple episodes of dizziness or lightheadedness at home, or if experiencing episodes of dizziness, lightheadedness, feeling like passing out, or actual syncope before seeing Dr. Velasquez, to expedite pacemaker evaluation process.  - Ordered stress test (treadmill preferred over chemical stress test) to evaluate heart rate response to exercise. Advised walking on treadmill as much as possible during stress test, but stop if feeling lightheaded or dizzy to prevent falls.    OBSTRUCTIVE SLEEP APNEA:  - Clarified that missing CPAP for a few days is unlikely to be the cause of current symptoms.    FOLLOW-UP:  - Follow up with Dr. Velasquez as scheduled by .        1. Coronary artery disease involving native coronary artery of native heart without angina pectoris    2. Pre-syncope    3. Mixed hyperlipidemia    4. Dizziness    5. Bradycardia    6. 1st degree AV block    7. H/O atrial fibrillation without current medication           Plan:       Coronary artery disease involving native coronary artery of native heart without  angina pectoris  -     Nuclear Stress - Cardiology Interpreted; Future    Pre-syncope  -     Cardiac Monitor - 3-15 Day Adult (Cupid Only); Future  -     Nuclear Stress - Cardiology Interpreted; Future  -     Ambulatory referral/consult to Electrophysiology; Future; Expected date: 06/18/2025    Mixed hyperlipidemia  -     pravastatin (PRAVACHOL) 20 MG tablet; Take 1 tablet (20 mg total) by mouth once daily.  Dispense: 90 tablet; Refill: 3    Dizziness    Bradycardia  -     Cardiac Monitor - 3-15 Day Adult (Cupid Only); Future  -     Nuclear Stress - Cardiology Interpreted; Future  -     Ambulatory referral/consult to Electrophysiology; Future; Expected date: 06/18/2025    1st degree AV block    H/O atrial fibrillation without current medication      Follow up in about 8 weeks (around 8/6/2025) for f/u bradycardia, presyncope.        All pertinent data including labs, imaging, EKGs, and studies listed above were reviewed personally.  Patient's most recent EKG tracing was personally interpreted by this provider.    This note was generated with the assistance of ambient listening technology. Verbal consent was obtained by the patient and accompanying visitor(s) for the recording of patient appointment to facilitate this note. I attest to having reviewed and edited the generated note for accuracy, though some syntax or spelling errors may persist. Please contact the author of this note for any clarification.      Tomas Salomon MD  [facility] [place] [facility] [place]         [1]   Current Outpatient Medications   Medication Sig Dispense Refill    amLODIPine (NORVASC) 5 MG tablet Take 1 tablet (5 mg total) by mouth once daily. 90 tablet 3    aspirin (ECOTRIN) 81 MG EC tablet Take 1 tablet (81 mg total) by mouth once daily. 90 tablet 3    losartan (COZAAR) 25 MG tablet Take 1 tablet (25 mg total) by mouth 2 (two) times daily. 180 tablet 3    nitroGLYCERIN (NITROSTAT) 0.4 MG SL tablet  Place 0.4 mg under the tongue every 5 (five) minutes as needed for Chest pain.      omega-3 fatty acids/fish oil (FISH OIL-OMEGA-3 FATTY ACIDS) 300-1,000 mg capsule Take 1 capsule by mouth once daily. 90 capsule 3    amoxicillin (AMOXIL) 500 MG capsule Take by mouth.      ascorbic acid, vitamin C, (VITAMIN C) 500 MG tablet Take 500 mg by mouth once daily.      b complex vitamins tablet Take 1 tablet by mouth once daily.      CALCIUM CARBONATE/VITAMIN D3 (VITAMIN D-3 ORAL) Take by mouth.      dexAMETHasone (DECADRON) 4 MG Tab Take 4 mg by mouth.      docusate sodium (COLACE) 50 MG capsule Take by mouth 2 (two) times daily as needed for Constipation.      finasteride (PROSCAR) 5 mg tablet Take 1 tablet (5 mg total) by mouth once daily. 90 tablet 3    fluocinonide (LIDEX) 0.05 % ointment Apply up to twice a day to scaly rash on body for 3 wks or less 60 g 1    fluorouraciL (EFUDEX) 5 % cream Apply topically 2 (two) times daily. Apply topically 2 times daily for 2 weeks 40 g 0    fluticasone propionate (FLONASE) 50 mcg/actuation nasal spray SPRAY 1 SPRAY (50 MCG TOTAL) INTO INTO EACH NOSTRIL TWICE A DAY 48 mL 1    ketoconazole (NIZORAL) 2 % shampoo Lather onto scalp, leave on x 3-5 minutes, then rinse.  Repeat  mL 2    levothyroxine (UNITHROID) 150 MCG tablet Take 150 mcg by mouth before breakfast.      multivitamin (THERAGRAN) per tablet Take 1 tablet by mouth once daily.      mupirocin (BACTROBAN) 2 % ointment by Nasal route 3 (three) times daily. 20 g 0    pravastatin (PRAVACHOL) 20 MG tablet Take 1 tablet (20 mg total) by mouth once daily. 90 tablet 3    tamsulosin (FLOMAX) 0.4 mg Cap TAKE 1 CAPSULE BY MOUTH EVERY DAY 90 capsule 3     No current facility-administered medications for this visit.

## 2025-06-18 ENCOUNTER — TELEPHONE (OUTPATIENT)
Dept: CARDIOLOGY | Facility: HOSPITAL | Age: 85
End: 2025-06-18

## 2025-06-18 NOTE — TELEPHONE ENCOUNTER
Patient advised, test will be at [facility] ([id number]  Will need to register on the first floor at the main entrance.  Patient advised that arrival time is 07:30.  Patient advised that they may be here about 3.5-4 hours, and may want to bring something to occupy their time, as there will be periods of waiting.    Patient advised, they may take their medications prior to testing if you need to.  Patient should HOLD nitro. Advised if food is needed to take medications, please keep it light, like toast and juice.    Patient advised to avoid all caffeine 12 hours prior to testing.  This includes chocolate, tea and decaf coffee.    Wear comfortable clothing with tennis shoes.  No lotions, oils, or powders to the upper chest area. May wear deodorant.    No metal jewelry, buttons, or zippers to the upper body.  Patient verbalizes understanding of instructions.

## 2025-06-19 ENCOUNTER — HOSPITAL ENCOUNTER (OUTPATIENT)
Dept: RADIOLOGY | Facility: HOSPITAL | Age: 85
Discharge: HOME | End: 2025-06-19
Attending: INTERNAL MEDICINE
Payer: MEDICARE

## 2025-06-19 ENCOUNTER — HOSPITAL ENCOUNTER (OUTPATIENT)
Dept: CARDIOLOGY | Facility: HOSPITAL | Age: 85
Discharge: HOME | End: 2025-06-19
Attending: INTERNAL MEDICINE
Payer: MEDICARE

## 2025-06-19 DIAGNOSIS — R00.1 BRADYCARDIA: ICD-10-CM

## 2025-06-19 DIAGNOSIS — R55 PRE-SYNCOPE: ICD-10-CM

## 2025-06-19 DIAGNOSIS — I25.10 CORONARY ARTERY DISEASE INVOLVING NATIVE CORONARY ARTERY OF NATIVE HEART WITHOUT ANGINA PECTORIS: ICD-10-CM

## 2025-06-19 LAB
CV STRESS BASE HR: 68 BPM
DIASTOLIC BLOOD PRESSURE: 58 MMHG
EJECTION FRACTION- HIGH: 65 %
END DIASTOLIC INDEX-HIGH: 153 ML/M2
END DIASTOLIC INDEX-LOW: 93 ML/M2
END SYSTOLIC INDEX-HIGH: 71 ML/M2
END SYSTOLIC INDEX-LOW: 31 ML/M2
NUC REST DIASTOLIC VOLUME INDEX: 73
NUC REST EJECTION FRACTION: 64
NUC REST SYSTOLIC VOLUME INDEX: 26
NUC STRESS DIASTOLIC VOLUME INDEX: 65
NUC STRESS EJECTION FRACTION: 78 %
NUC STRESS SYSTOLIC VOLUME INDEX: 14
OHS CV CPX 1 MINUTE RECOVERY HEART RATE: 90 BPM
OHS CV CPX 85 PERCENT MAX PREDICTED HEART RATE MALE: 115
OHS CV CPX MAX PREDICTED HEART RATE: 135
OHS CV CPX PATIENT IS FEMALE: 0
OHS CV CPX PATIENT IS MALE: 1
OHS CV CPX PEAK DIASTOLIC BLOOD PRESSURE: 78 MMHG
OHS CV CPX PEAK HEAR RATE: 120 BPM
OHS CV CPX PEAK RATE PRESSURE PRODUCT: NORMAL
OHS CV CPX PEAK SYSTOLIC BLOOD PRESSURE: 164 MMHG
OHS CV CPX PERCENT MAX PREDICTED HEART RATE ACHIEVED: 89
OHS CV CPX RATE PRESSURE PRODUCT PRESENTING: 9520
OHS CV INITIAL DOSE: 12.5 MCG/KG/MIN
OHS CV PEAK DOSE: 27.4 MCG/KG/MIN
RETIRED EF AND QEF - SEE NOTES: 53 %
SYSTOLIC BLOOD PRESSURE: 140 MMHG

## 2025-06-19 PROCEDURE — 78452 HT MUSCLE IMAGE SPECT MULT: CPT

## 2025-06-19 PROCEDURE — A9502 TC99M TETROFOSMIN: HCPCS | Performed by: INTERNAL MEDICINE

## 2025-06-19 RX ADMIN — TETROFOSMIN 12.5 MILLICURIE: 1.38 INJECTION, POWDER, LYOPHILIZED, FOR SOLUTION INTRAVENOUS at 07:06

## 2025-06-19 RX ADMIN — TETROFOSMIN 27.4 MILLICURIE: 1.38 INJECTION, POWDER, LYOPHILIZED, FOR SOLUTION INTRAVENOUS at 09:06

## 2025-06-26 ENCOUNTER — HOSPITAL ENCOUNTER (OUTPATIENT)
Dept: CARDIOLOGY | Facility: CLINIC | Age: 85
Discharge: HOME | End: 2025-06-26
Attending: INTERNAL MEDICINE
Payer: MEDICARE

## 2025-06-26 DIAGNOSIS — R00.1 BRADYCARDIA: ICD-10-CM

## 2025-06-26 DIAGNOSIS — R55 PRE-SYNCOPE: ICD-10-CM

## 2025-06-30 ENCOUNTER — LAB VISIT (OUTPATIENT)
Dept: LAB | Facility: HOSPITAL | Age: 85
End: 2025-06-30
Attending: INTERNAL MEDICINE
Payer: MEDICARE

## 2025-06-30 DIAGNOSIS — E03.9 MYXEDEMA HEART DISEASE: Primary | ICD-10-CM

## 2025-06-30 DIAGNOSIS — I51.9 MYXEDEMA HEART DISEASE: Primary | ICD-10-CM

## 2025-06-30 LAB
T4 FREE SERPL-MCNC: 1.37 NG/DL (ref 0.71–1.51)
TSH SERPL-ACNC: 0.66 UIU/ML (ref 0.34–5.6)

## 2025-06-30 PROCEDURE — 84443 ASSAY THYROID STIM HORMONE: CPT

## 2025-06-30 PROCEDURE — 36415 COLL VENOUS BLD VENIPUNCTURE: CPT | Mod: PO

## 2025-06-30 PROCEDURE — 84439 ASSAY OF FREE THYROXINE: CPT

## 2025-07-15 ENCOUNTER — LAB VISIT (OUTPATIENT)
Dept: LAB | Facility: HOSPITAL | Age: 85
End: 2025-07-15
Attending: INTERNAL MEDICINE
Payer: MEDICARE

## 2025-07-15 DIAGNOSIS — I10 ESSENTIAL HYPERTENSION, MALIGNANT: ICD-10-CM

## 2025-07-15 DIAGNOSIS — N25.81 SECONDARY HYPERPARATHYROIDISM OF RENAL ORIGIN: ICD-10-CM

## 2025-07-15 DIAGNOSIS — N18.30 CHRONIC KIDNEY DISEASE, STAGE III (MODERATE): Primary | ICD-10-CM

## 2025-07-15 LAB
25(OH)D3+25(OH)D2 SERPL-MCNC: 44 NG/ML (ref 30–96)
ALBUMIN SERPL-MCNC: 4 G/DL (ref 3.5–5.2)
ALBUMIN/CREAT UR: NORMAL
ANION GAP (SMH): 9 MMOL/L (ref 8–16)
BUN SERPL-MCNC: 21 MG/DL (ref 8–23)
CALCIUM SERPL-MCNC: 9.2 MG/DL (ref 8.7–10.5)
CHLORIDE SERPL-SCNC: 107 MMOL/L (ref 95–110)
CO2 SERPL-SCNC: 24 MMOL/L (ref 23–29)
CREAT SERPL-MCNC: 1.2 MG/DL (ref 0.5–1.4)
CREAT UR-MCNC: 155.3 MG/DL (ref 23–375)
GFR SERPLBLD CREATININE-BSD FMLA CKD-EPI: 59 ML/MIN/1.73/M2
GLUCOSE SERPL-MCNC: 146 MG/DL (ref 70–110)
MAGNESIUM SERPL-MCNC: 2 MG/DL (ref 1.6–2.6)
MICROALBUMIN UR-MCNC: <7 UG/ML
MICROSCOPIC COMMENT: NORMAL
PHOSPHATE SERPL-MCNC: 3.2 MG/DL (ref 2.7–4.5)
POTASSIUM SERPL-SCNC: 4 MMOL/L (ref 3.5–5.1)
PTH-INTACT SERPL-MCNC: 38.4 PG/ML (ref 9–77)
RBC #/AREA URNS AUTO: 2 /HPF
SODIUM SERPL-SCNC: 140 MMOL/L (ref 136–145)
SQUAMOUS #/AREA URNS AUTO: <1 /HPF
URATE SERPL-MCNC: 6.6 MG/DL (ref 3.4–7)

## 2025-07-15 PROCEDURE — 81001 URINALYSIS AUTO W/SCOPE: CPT

## 2025-07-15 PROCEDURE — 83970 ASSAY OF PARATHORMONE: CPT

## 2025-07-15 PROCEDURE — 82306 VITAMIN D 25 HYDROXY: CPT

## 2025-07-15 PROCEDURE — 84550 ASSAY OF BLOOD/URIC ACID: CPT

## 2025-07-15 PROCEDURE — 83735 ASSAY OF MAGNESIUM: CPT

## 2025-07-15 PROCEDURE — 82570 ASSAY OF URINE CREATININE: CPT

## 2025-07-15 PROCEDURE — 82040 ASSAY OF SERUM ALBUMIN: CPT

## 2025-07-15 PROCEDURE — 36415 COLL VENOUS BLD VENIPUNCTURE: CPT | Mod: PO

## 2025-08-13 ENCOUNTER — LAB VISIT (OUTPATIENT)
Dept: LAB | Facility: HOSPITAL | Age: 85
End: 2025-08-13
Attending: INTERNAL MEDICINE
Payer: MEDICARE

## 2025-08-13 DIAGNOSIS — E78.2 MIXED DYSLIPIDEMIA: ICD-10-CM

## 2025-08-13 DIAGNOSIS — E03.8 SECONDARY HYPOTHYROIDISM: ICD-10-CM

## 2025-08-13 DIAGNOSIS — R79.89 ELEVATED SERUM CREATININE: ICD-10-CM

## 2025-08-13 DIAGNOSIS — D64.9 MILD ANEMIA: ICD-10-CM

## 2025-08-13 DIAGNOSIS — I10 ESSENTIAL HYPERTENSION: ICD-10-CM

## 2025-08-13 LAB
ABSOLUTE EOSINOPHIL (SMH): 0.14 K/UL
ABSOLUTE MONOCYTE (SMH): 0.47 K/UL (ref 0.3–1)
ABSOLUTE NEUTROPHIL COUNT (SMH): 3.4 K/UL (ref 1.8–7.7)
ANION GAP (SMH): 6 MMOL/L (ref 8–16)
BASOPHILS # BLD AUTO: 0.04 K/UL
BASOPHILS NFR BLD AUTO: 0.8 %
BUN SERPL-MCNC: 16 MG/DL (ref 8–23)
CALCIUM SERPL-MCNC: 9.3 MG/DL (ref 8.7–10.5)
CHLORIDE SERPL-SCNC: 109 MMOL/L (ref 95–110)
CHOLEST SERPL-MCNC: 149 MG/DL (ref 120–199)
CHOLEST/HDLC SERPL: 4 {RATIO} (ref 2–5)
CO2 SERPL-SCNC: 26 MMOL/L (ref 23–29)
CREAT SERPL-MCNC: 1.2 MG/DL (ref 0.5–1.4)
ERYTHROCYTE [DISTWIDTH] IN BLOOD BY AUTOMATED COUNT: 13.2 % (ref 11.5–14.5)
GFR SERPLBLD CREATININE-BSD FMLA CKD-EPI: 59 ML/MIN/1.73/M2
GLUCOSE SERPL-MCNC: 107 MG/DL (ref 70–110)
HCT VFR BLD AUTO: 41.2 % (ref 40–54)
HDLC SERPL-MCNC: 37 MG/DL (ref 40–75)
HDLC SERPL: 24.8 % (ref 20–50)
HGB BLD-MCNC: 14 GM/DL (ref 14–18)
IMM GRANULOCYTES # BLD AUTO: 0.01 K/UL (ref 0–0.04)
IMM GRANULOCYTES NFR BLD AUTO: 0.2 % (ref 0–0.5)
LDLC SERPL CALC-MCNC: 91.6 MG/DL (ref 63–159)
LYMPHOCYTES # BLD AUTO: 1.18 K/UL (ref 1–4.8)
MCH RBC QN AUTO: 29.7 PG (ref 27–31)
MCHC RBC AUTO-ENTMCNC: 34 G/DL (ref 32–36)
MCV RBC AUTO: 87 FL (ref 82–98)
NONHDLC SERPL-MCNC: 112 MG/DL
NUCLEATED RBC (/100WBC) (SMH): 0 /100 WBC
PLATELET # BLD AUTO: 222 K/UL (ref 150–450)
PMV BLD AUTO: 9.8 FL (ref 9.2–12.9)
POTASSIUM SERPL-SCNC: 4.2 MMOL/L (ref 3.5–5.1)
RBC # BLD AUTO: 4.72 M/UL (ref 4.6–6.2)
RELATIVE EOSINOPHIL (SMH): 2.7 % (ref 0–8)
RELATIVE LYMPHOCYTE (SMH): 22.5 % (ref 18–48)
RELATIVE MONOCYTE (SMH): 9 % (ref 4–15)
RELATIVE NEUTROPHIL (SMH): 64.8 % (ref 38–73)
SODIUM SERPL-SCNC: 141 MMOL/L (ref 136–145)
TRIGL SERPL-MCNC: 102 MG/DL (ref 30–150)
TSH SERPL-ACNC: 0.71 UIU/ML (ref 0.34–5.6)
WBC # BLD AUTO: 5.24 K/UL (ref 3.9–12.7)

## 2025-08-13 PROCEDURE — 36415 COLL VENOUS BLD VENIPUNCTURE: CPT | Mod: PO

## 2025-08-13 PROCEDURE — 82310 ASSAY OF CALCIUM: CPT

## 2025-08-13 PROCEDURE — 80061 LIPID PANEL: CPT

## 2025-08-13 PROCEDURE — 84443 ASSAY THYROID STIM HORMONE: CPT

## 2025-08-13 PROCEDURE — 85025 COMPLETE CBC W/AUTO DIFF WBC: CPT

## 2025-08-21 ENCOUNTER — OFFICE VISIT (OUTPATIENT)
Dept: FAMILY MEDICINE | Facility: CLINIC | Age: 85
End: 2025-08-21
Payer: MEDICARE

## 2025-08-21 VITALS
SYSTOLIC BLOOD PRESSURE: 106 MMHG | HEART RATE: 64 BPM | BODY MASS INDEX: 30.3 KG/M2 | DIASTOLIC BLOOD PRESSURE: 67 MMHG | HEIGHT: 70 IN | WEIGHT: 211.63 LBS

## 2025-08-21 DIAGNOSIS — E03.8 SECONDARY HYPOTHYROIDISM: Chronic | ICD-10-CM

## 2025-08-21 DIAGNOSIS — I49.3 VENTRICULAR ECTOPICS: ICD-10-CM

## 2025-08-21 DIAGNOSIS — E78.2 MIXED DYSLIPIDEMIA: ICD-10-CM

## 2025-08-21 DIAGNOSIS — I10 ESSENTIAL HYPERTENSION: Primary | Chronic | ICD-10-CM

## 2025-08-21 DIAGNOSIS — I71.43 INFRARENAL ABDOMINAL AORTIC ANEURYSM (AAA) WITHOUT RUPTURE: ICD-10-CM

## 2025-08-21 PROCEDURE — 99213 OFFICE O/P EST LOW 20 MIN: CPT | Mod: S$PBB,,, | Performed by: INTERNAL MEDICINE

## 2025-08-21 PROCEDURE — 99213 OFFICE O/P EST LOW 20 MIN: CPT | Mod: PBBFAC,PN | Performed by: INTERNAL MEDICINE

## 2025-08-21 PROCEDURE — 99999 PR PBB SHADOW E&M-EST. PATIENT-LVL III: CPT | Mod: PBBFAC,,, | Performed by: INTERNAL MEDICINE

## 2025-08-21 PROCEDURE — G2211 COMPLEX E/M VISIT ADD ON: HCPCS | Mod: ,,, | Performed by: INTERNAL MEDICINE

## 2025-09-03 ENCOUNTER — OFFICE VISIT (OUTPATIENT)
Dept: CARDIOLOGY | Facility: CLINIC | Age: 85
End: 2025-09-03
Payer: MEDICARE

## 2025-09-03 VITALS
HEART RATE: 59 BPM | SYSTOLIC BLOOD PRESSURE: 122 MMHG | BODY MASS INDEX: 30.56 KG/M2 | OXYGEN SATURATION: 99 % | DIASTOLIC BLOOD PRESSURE: 68 MMHG | RESPIRATION RATE: 16 BRPM | WEIGHT: 213.5 LBS | HEIGHT: 70 IN

## 2025-09-03 DIAGNOSIS — I10 BENIGN ESSENTIAL HYPERTENSION: ICD-10-CM

## 2025-09-03 DIAGNOSIS — I49.3 VENTRICULAR ECTOPICS: Primary | ICD-10-CM

## 2025-09-03 DIAGNOSIS — E03.8 SECONDARY HYPOTHYROIDISM: ICD-10-CM

## 2025-09-03 DIAGNOSIS — E78.2 MIXED HYPERLIPIDEMIA: ICD-10-CM

## 2025-09-03 DIAGNOSIS — G47.33 OBSTRUCTIVE SLEEP APNEA: ICD-10-CM

## 2025-09-03 PROCEDURE — 99214 OFFICE O/P EST MOD 30 MIN: CPT | Mod: PBBFAC,PN | Performed by: INTERNAL MEDICINE

## 2025-09-03 PROCEDURE — 99999 PR PBB SHADOW E&M-EST. PATIENT-LVL IV: CPT | Mod: PBBFAC,,, | Performed by: INTERNAL MEDICINE

## (undated) DEVICE — TR BAND

## (undated) DEVICE — CATHETER DIAGNOSTIC DXTERITY 5FR JL3.5

## (undated) DEVICE — SHEATH INTRODUCER SLENDER 6FX10CM

## (undated) DEVICE — CATHETER DIAGNOSTIC DXTERITY 5FR JR4.0

## (undated) DEVICE — GUIDEWIRE J TIP EXCHANGE FIXED CORE 260